# Patient Record
Sex: MALE | Race: WHITE | Employment: FULL TIME | URBAN - METROPOLITAN AREA
[De-identification: names, ages, dates, MRNs, and addresses within clinical notes are randomized per-mention and may not be internally consistent; named-entity substitution may affect disease eponyms.]

---

## 2017-02-14 PROBLEM — S83.241A TEAR OF MEDIAL MENISCUS OF RIGHT KNEE, CURRENT: Status: ACTIVE | Noted: 2017-02-14

## 2017-02-14 PROBLEM — M17.11 PRIMARY OSTEOARTHRITIS OF RIGHT KNEE: Status: ACTIVE | Noted: 2017-02-14

## 2018-03-27 ENCOUNTER — OFFICE VISIT (OUTPATIENT)
Dept: ORTHOPEDIC SURGERY | Age: 58
End: 2018-03-27
Payer: COMMERCIAL

## 2018-03-27 VITALS — HEIGHT: 76 IN | WEIGHT: 285 LBS | BODY MASS INDEX: 34.7 KG/M2

## 2018-03-27 DIAGNOSIS — M25.561 RIGHT KNEE PAIN, UNSPECIFIED CHRONICITY: Primary | ICD-10-CM

## 2018-03-27 DIAGNOSIS — M17.11 PRIMARY OSTEOARTHRITIS OF RIGHT KNEE: ICD-10-CM

## 2018-03-27 PROCEDURE — 99214 OFFICE O/P EST MOD 30 MIN: CPT | Performed by: ORTHOPAEDIC SURGERY

## 2018-03-27 PROCEDURE — 20610 DRAIN/INJ JOINT/BURSA W/O US: CPT | Performed by: ORTHOPAEDIC SURGERY

## 2018-03-27 RX ORDER — HYDROCODONE BITARTRATE AND ACETAMINOPHEN 7.5; 325 MG/1; MG/1
1 TABLET ORAL EVERY 6 HOURS PRN
COMMUNITY
End: 2018-04-24 | Stop reason: SDUPTHER

## 2018-03-27 RX ORDER — TRIAMCINOLONE ACETONIDE 40 MG/ML
40 INJECTION, SUSPENSION INTRA-ARTICULAR; INTRAMUSCULAR ONCE
Status: COMPLETED | OUTPATIENT
Start: 2018-03-27 | End: 2018-03-27

## 2018-03-27 RX ADMIN — TRIAMCINOLONE ACETONIDE 40 MG: 40 INJECTION, SUSPENSION INTRA-ARTICULAR; INTRAMUSCULAR at 11:10

## 2018-03-27 NOTE — PROGRESS NOTES
(-) nausea, (-) vomiting, (-) diarrhea, (-) blood in stool, (-) gastric ulcer. Psychiatric: (-) Depression, (-) Anxiety, (-) bipolar disease, (-) Alzheimer's Disease  Allergic/Immunologic: (-) allergies latex, (-) allergies metal, (-) skin sensitivity. Hematlogic: (-) anemia, (-) blood transfusion, (-) DVT/PE, (-) Clotting disorders      Subjective:    Constitution:    Ht. 6 ft 4 in. , Wt. 280 lbs. Psycihatric:    The patient is alert and oriented x 3, appears to be stated age and in no distress. Respiratory:    Respiratory effort is not labored. Patient is not gasping. Palpation of the chest reveals no tactile fremitus. Skin:    Upon inspection: the skin appears warm, dry and intact. There is  a previous scar over the affected area. There is any cellulitis, lymphedema or cutaneous lesions noted in the lower extremities. Upon palpation there is no induration noted. Neurologic:    Gait: antalgic; Motor exam of the lower extremities show ; quadriceps, hamstrings, foot dorsi and plantar flexors intact R.  5/5 and L. 5/5. Deep tendon reflexes are 2/4 at the knees and 2/4 at the ankles with strong extensor hallicus longus motor strength bilaterally. Sensory to both feet is intact to all sensory roots,. Cardiovascular: The vascular exam is normal and is well perfused to distal extremities. Distal pulses DP/PT: R. 2+; L. 2+. There is cap refill noted less than two seconds in all digits. There is not edema of the bilateral lower extremities. There is not varicosities noted in the distal extremities. Lymph:    Upon palpation,  there is no lymphadenopathy noted in bilateral lower extremities. Musculoskeletal:      Gait: antalgic; examination of the nails and digits reveal no cyanosis or clubbing. Lumbar exam:    On visual inspection, there is not deformity of the spine. full range of motion, no tenderness, palpable spasm or pain on motion.  Special tests: Straight Leg diagnosis. I explained treatment options including surgical vs non surgical treatment. I reviewed in detail the risks and benefits and outlined the procedure in detail with expected outcomes and possible complications. I also discussed non surgical treatment such as injections (CSI and visco supplementation), physical therapy, topical creams and NSAID's. They have elected for conservative management at this time. I explained to the patient that replacing his knee may or may not help with the ankle pain. I will proceed with a cortisone injection in the Right knee. Consent was obtained for the injections. The skin was prepped with alcohol. 1mL of Kenalog 40mg and 9mL of 0.25% Marcaine was  injected to Right knee. The injection was given through the lateral side of the knee. The patient tolerated the injection well.  I will see the patient back prn    I recommend that when he goes back to podiatrist I would like a copy of his XR of his ankle

## 2018-04-24 ENCOUNTER — OFFICE VISIT (OUTPATIENT)
Dept: ORTHOPEDIC SURGERY | Age: 58
End: 2018-04-24
Payer: COMMERCIAL

## 2018-04-24 VITALS — BODY MASS INDEX: 33.49 KG/M2 | HEIGHT: 76 IN | TEMPERATURE: 98 F | WEIGHT: 275 LBS

## 2018-04-24 DIAGNOSIS — M65.4 RADIAL STYLOID TENOSYNOVITIS: ICD-10-CM

## 2018-04-24 DIAGNOSIS — M93.1 KIENBOCK'S AVASCULAR NECROSIS OF LUNATE, ADULT: ICD-10-CM

## 2018-04-24 DIAGNOSIS — M65.4 RADIAL STYLOID TENOSYNOVITIS OF RIGHT HAND: ICD-10-CM

## 2018-04-24 PROCEDURE — 99213 OFFICE O/P EST LOW 20 MIN: CPT | Performed by: ORTHOPAEDIC SURGERY

## 2018-04-24 RX ORDER — CYCLOBENZAPRINE HCL 10 MG
TABLET ORAL
Qty: 60 TABLET | Refills: 0 | Status: SHIPPED | OUTPATIENT
Start: 2018-04-24 | End: 2018-06-13 | Stop reason: SDUPTHER

## 2018-04-24 RX ORDER — HYDROCODONE BITARTRATE AND ACETAMINOPHEN 7.5; 325 MG/1; MG/1
1 TABLET ORAL EVERY 6 HOURS PRN
Qty: 28 TABLET | Refills: 0 | Status: SHIPPED | OUTPATIENT
Start: 2018-04-24 | End: 2018-05-16 | Stop reason: SDUPTHER

## 2018-04-24 RX ORDER — CELECOXIB 200 MG/1
200 CAPSULE ORAL 2 TIMES DAILY
Qty: 60 CAPSULE | Refills: 3 | Status: SHIPPED | OUTPATIENT
Start: 2018-04-24 | End: 2018-07-24 | Stop reason: SDUPTHER

## 2018-05-16 ENCOUNTER — TELEPHONE (OUTPATIENT)
Dept: ORTHOPEDIC SURGERY | Age: 58
End: 2018-05-16

## 2018-05-16 DIAGNOSIS — M65.4 RADIAL STYLOID TENOSYNOVITIS: ICD-10-CM

## 2018-05-16 DIAGNOSIS — M65.4 RADIAL STYLOID TENOSYNOVITIS OF RIGHT HAND: ICD-10-CM

## 2018-05-16 DIAGNOSIS — M93.1 KIENBOCK'S AVASCULAR NECROSIS OF LUNATE, ADULT: ICD-10-CM

## 2018-05-16 RX ORDER — HYDROCODONE BITARTRATE AND ACETAMINOPHEN 7.5; 325 MG/1; MG/1
1 TABLET ORAL EVERY 8 HOURS PRN
Qty: 21 TABLET | Refills: 0 | Status: SHIPPED | OUTPATIENT
Start: 2018-05-16 | End: 2018-06-13 | Stop reason: SDUPTHER

## 2018-06-11 ENCOUNTER — TELEPHONE (OUTPATIENT)
Dept: ORTHOPEDIC SURGERY | Age: 58
End: 2018-06-11

## 2018-06-11 DIAGNOSIS — M93.1 KIENBOCK'S AVASCULAR NECROSIS OF LUNATE, ADULT: ICD-10-CM

## 2018-06-11 DIAGNOSIS — M65.4 RADIAL STYLOID TENOSYNOVITIS OF RIGHT HAND: ICD-10-CM

## 2018-06-11 DIAGNOSIS — M65.4 RADIAL STYLOID TENOSYNOVITIS: ICD-10-CM

## 2018-06-13 RX ORDER — CYCLOBENZAPRINE HCL 10 MG
TABLET ORAL
Qty: 60 TABLET | Refills: 0 | Status: SHIPPED | OUTPATIENT
Start: 2018-06-13 | End: 2018-07-24 | Stop reason: SDUPTHER

## 2018-06-13 RX ORDER — HYDROCODONE BITARTRATE AND ACETAMINOPHEN 7.5; 325 MG/1; MG/1
1 TABLET ORAL EVERY 6 HOURS PRN
Qty: 28 TABLET | Refills: 0 | Status: SHIPPED | OUTPATIENT
Start: 2018-06-13 | End: 2018-07-12 | Stop reason: SDUPTHER

## 2018-07-12 ENCOUNTER — TELEPHONE (OUTPATIENT)
Dept: ORTHOPEDIC SURGERY | Age: 58
End: 2018-07-12

## 2018-07-12 DIAGNOSIS — M65.4 RADIAL STYLOID TENOSYNOVITIS: ICD-10-CM

## 2018-07-12 DIAGNOSIS — M65.4 RADIAL STYLOID TENOSYNOVITIS OF RIGHT HAND: ICD-10-CM

## 2018-07-12 DIAGNOSIS — M93.1 KIENBOCK'S AVASCULAR NECROSIS OF LUNATE, ADULT: ICD-10-CM

## 2018-07-12 RX ORDER — HYDROCODONE BITARTRATE AND ACETAMINOPHEN 7.5; 325 MG/1; MG/1
1 TABLET ORAL EVERY 8 HOURS PRN
Qty: 21 TABLET | Refills: 0 | Status: SHIPPED | OUTPATIENT
Start: 2018-07-12 | End: 2018-07-24 | Stop reason: SDUPTHER

## 2018-07-24 ENCOUNTER — OFFICE VISIT (OUTPATIENT)
Dept: ORTHOPEDIC SURGERY | Age: 58
End: 2018-07-24
Payer: COMMERCIAL

## 2018-07-24 VITALS — TEMPERATURE: 96.4 F | HEIGHT: 76 IN | BODY MASS INDEX: 34.1 KG/M2 | WEIGHT: 280 LBS

## 2018-07-24 DIAGNOSIS — M93.1 KIENBOCK'S AVASCULAR NECROSIS OF LUNATE, ADULT: ICD-10-CM

## 2018-07-24 DIAGNOSIS — M65.4 RADIAL STYLOID TENOSYNOVITIS: ICD-10-CM

## 2018-07-24 DIAGNOSIS — M65.4 RADIAL STYLOID TENOSYNOVITIS OF RIGHT HAND: ICD-10-CM

## 2018-07-24 PROCEDURE — 99213 OFFICE O/P EST LOW 20 MIN: CPT | Performed by: ORTHOPAEDIC SURGERY

## 2018-07-24 RX ORDER — HYDROCODONE BITARTRATE AND ACETAMINOPHEN 7.5; 325 MG/1; MG/1
1 TABLET ORAL EVERY 8 HOURS PRN
Qty: 28 TABLET | Refills: 0 | Status: SHIPPED | OUTPATIENT
Start: 2018-07-24 | End: 2018-08-07

## 2018-07-24 RX ORDER — POLYETHYLENE GLYCOL 3350 17 G/17G
17 POWDER, FOR SOLUTION ORAL DAILY
Qty: 510 G | Refills: 3 | Status: SHIPPED | OUTPATIENT
Start: 2018-07-24 | End: 2018-08-23

## 2018-07-24 RX ORDER — CELECOXIB 200 MG/1
200 CAPSULE ORAL 2 TIMES DAILY
Qty: 60 CAPSULE | Refills: 3 | Status: SHIPPED | OUTPATIENT
Start: 2018-07-24 | End: 2018-10-15 | Stop reason: SDUPTHER

## 2018-07-24 RX ORDER — CYCLOBENZAPRINE HCL 10 MG
TABLET ORAL
Qty: 60 TABLET | Refills: 0 | Status: SHIPPED | OUTPATIENT
Start: 2018-07-24 | End: 2018-08-01 | Stop reason: ALTCHOICE

## 2018-07-24 NOTE — PROGRESS NOTES
no induration noted. Neurologic:    Gait: antalgic; Motor exam of the upper extremities show: The reflexes in biceps/triceps/brachioradialis are equal and symmetric. Sensory exam C5-T1 are normal bilaterally. Cardiovascular: The vascular exam is normal and is well perfused to distal extremities. There are 2+ radial pulses bilaterally, and motor and sensation is intact to median, ulnar, and radial, musclocutaneus, and axillary nerve distribution and grossly symmetric bilaterally. There is cap refill noted less than two seconds in all digits. There is not edema of the bilateral upper extremities. There is  varicosities noted in the distal extremities. Lymph:    Upon palpation,  there is no lymphadenopathy noted in bilateral upper extremities. Musculoskeletal:  Gait: antalgic; examination of the nails and digits reveal no cyanosis or clubbing. Cervical Exam:    On physical exam, Courtney Ramirez is well-developed, well-nourished, oriented to person, place and time. his gait is normal.  On evaluation of his cervical spine, he has full range of motion of the cervical spine without pain. There is no cervical tenderness to palpation. Shoulder Exam:    On evaluation of his bilaterally upper extremities, his bilateral shoulder has no deformity. There is not evidence of scapular dyskinesis. There is not muscle atrophy in shoulder girdle. The range of motion for the Right Shoulder is 160/45/T8 and for the Left shoulder is 160/45/T8. Right shoulder Motor strength is 5/5 in the supraspinatus, 5/5 internal rotation and 5/5 in external rotation, and Left shoulder motor strength 5/5 in supraspinatus, 5/5 in internal rotation, 5/5 in external rotation. Elbow exam:    Evaluation of the elbow, reveals no signs of swelling or deformity. ROM is 0-150. There is not instability with varus/valgus stresses. Motor strength is 5/5 with flexion/extension.      Wrist exam:       Inspection of

## 2018-07-28 ENCOUNTER — APPOINTMENT (OUTPATIENT)
Dept: ULTRASOUND IMAGING | Age: 58
End: 2018-07-28
Payer: COMMERCIAL

## 2018-07-28 ENCOUNTER — HOSPITAL ENCOUNTER (EMERGENCY)
Age: 58
Discharge: HOME | End: 2018-07-28
Attending: EMERGENCY MEDICINE
Payer: COMMERCIAL

## 2018-07-28 VITALS
BODY MASS INDEX: 34.1 KG/M2 | HEIGHT: 76 IN | TEMPERATURE: 98.3 F | OXYGEN SATURATION: 98 % | HEART RATE: 66 BPM | RESPIRATION RATE: 14 BRPM | SYSTOLIC BLOOD PRESSURE: 98 MMHG | DIASTOLIC BLOOD PRESSURE: 68 MMHG | WEIGHT: 280 LBS

## 2018-07-28 DIAGNOSIS — L03.115 CELLULITIS OF RIGHT LOWER EXTREMITY: Primary | ICD-10-CM

## 2018-07-28 PROCEDURE — 93971 EXTREMITY STUDY: CPT

## 2018-07-28 PROCEDURE — 99283 EMERGENCY DEPT VISIT LOW MDM: CPT

## 2018-07-28 RX ORDER — CEPHALEXIN 250 MG/1
500 CAPSULE ORAL 3 TIMES DAILY
Qty: 60 CAPSULE | Refills: 0 | Status: SHIPPED | OUTPATIENT
Start: 2018-07-28 | End: 2018-08-01

## 2018-07-28 ASSESSMENT — ENCOUNTER SYMPTOMS
VOMITING: 0
NAUSEA: 0
SHORTNESS OF BREATH: 0
RHINORRHEA: 0
SORE THROAT: 0
CONSTIPATION: 0
COLOR CHANGE: 1
COUGH: 0
PHOTOPHOBIA: 0
WHEEZING: 0
ABDOMINAL PAIN: 0
SINUS PRESSURE: 0
DIARRHEA: 0
SINUS PAIN: 0
BACK PAIN: 0

## 2018-07-28 NOTE — ED PROVIDER NOTES
Patient is a 59-year-old male who presents with a chief complaint of leg pain and swelling. Patient states that 2 days ago he had onset of right lower leg pain. He states that he woke up the next day and noticed some erythema of the anterior aspect of his distal right lower extremity. Today he woke up and he had increased swelling and erythema of the leg. He denies any numbness or tingling. Patient does have a history of multiple surgeries on his right foot, but no recent procedures. He did have a steroid injection in his right ankle recently. Patient denies any known trauma. No history of DVT/PE, no recent travel, no hemoptysis. The patient admits to subjective fever and chills. Denies any lightheadedness, dizziness, chest pain, shortness breath, abdominal pain, nausea, vomiting. The history is provided by the patient. No  was used. Review of Systems   Constitutional: Negative for chills, fatigue and fever. HENT: Negative for congestion, rhinorrhea, sinus pain, sinus pressure, sneezing and sore throat. Eyes: Negative for photophobia and visual disturbance. Respiratory: Negative for cough, shortness of breath and wheezing. Cardiovascular: Positive for leg swelling. Negative for chest pain and palpitations. Gastrointestinal: Negative for abdominal pain, constipation, diarrhea, nausea and vomiting. Genitourinary: Negative for dysuria, frequency and hematuria. Musculoskeletal: Negative for back pain, neck pain and neck stiffness. Skin: Positive for color change. Negative for rash and wound. Neurological: Negative for dizziness, light-headedness and headaches. Psychiatric/Behavioral: Negative for agitation, behavioral problems and confusion. Physical Exam   Constitutional: He is oriented to person, place, and time. He appears well-developed and well-nourished. No distress. HENT:   Head: Normocephalic and atraumatic.    Mouth/Throat: Oropharynx is clear and father; Asthma in his mother; Cancer in his mother; Emphysema in his mother; Heart Disease in his father. The patients home medications have been reviewed. Allergies: Daypro [oxaprozin]    -------------------------------------------------- RESULTS -------------------------------------------------  Labs:  No results found for this visit on 07/28/18. Radiology:  US DUP LOWER EXTREMITY RIGHT LAURITA   Final Result   No evidence of acute deep venous thrombosis.             ------------------------- NURSING NOTES AND VITALS REVIEWED ---------------------------  Date / Time Roomed:  7/28/2018  3:12 PM  ED Bed Assignment:  03/03    The nursing notes within the ED encounter and vital signs as below have been reviewed. BP 98/68   Pulse 66   Temp 98.3 °F (36.8 °C)   Resp 14   Ht 6' 4\" (1.93 m)   Wt 280 lb (127 kg)   SpO2 98%   BMI 34.08 kg/m²   Oxygen Saturation Interpretation: Normal      ------------------------------------------ PROGRESS NOTES ------------------------------------------  I have spoken with the patient and discussed todays results, in addition to providing specific details for the plan of care and counseling regarding the diagnosis and prognosis. Their questions are answered at this time and they are agreeable with the plan. I discussed at length with them reasons for immediate return here for re evaluation. They will followup with primary care by calling their office tomorrow. --------------------------------- ADDITIONAL PROVIDER NOTES ---------------------------------  At this time the patient is without objective evidence of an acute process requiring hospitalization or inpatient management. They have remained hemodynamically stable throughout their entire ED visit and are stable for discharge with outpatient follow-up. The plan has been discussed in detail and they are aware of the specific conditions for emergent return, as well as the importance of follow-up. MDM:  Patient presented with right leg swelling and erythema. He did not have any DVT present on US. Patient was given Keflex for cellulitis. He will return if he has worsening symptoms. He has no further questions at this time. Discharge Medication List as of 7/28/2018  6:12 PM      START taking these medications    Details   cephALEXin (KEFLEX) 250 MG capsule Take 2 capsules by mouth 3 times daily for 10 days, Disp-60 capsule, R-0Print             Diagnosis:  1. Cellulitis of right lower extremity        Disposition:  Patient's disposition: Discharge to home  Patient's condition is stable. ATTENDING PROVIDER ATTESTATION:     Eli Collado presented to the emergency department for evaluation of Leg Swelling (right lower leg pain with redness since thursday, fever/chills)    I have reviewed and discussed the case, including pertinent history (medical, surgical, family and social) and exam findings with the Resident and the Nurse assigned to Eli Collado. I have personally performed and/or participated in the history, exam, medical decision making, and procedures and agree with all pertinent clinical information. I have reviewed my findings and recommendations with Eli Collado and members of family present at the time of disposition. MDM: Supportive care, will obtain appropriate labs and imaging to assess patient's Leg Swelling (right lower leg pain with redness since thursday, fever/chills)       My findings/plan: There were no encounter diagnoses.   New Prescriptions    No medications on file     Rober Galindo [facility]   Resident  07/28/18 [id number]

## 2018-08-01 ENCOUNTER — APPOINTMENT (OUTPATIENT)
Dept: GENERAL RADIOLOGY | Age: 58
DRG: 603 | End: 2018-08-01
Payer: COMMERCIAL

## 2018-08-01 ENCOUNTER — HOSPITAL ENCOUNTER (INPATIENT)
Age: 58
LOS: 6 days | Discharge: HOME | DRG: 603 | End: 2018-08-07
Attending: EMERGENCY MEDICINE | Admitting: INTERNAL MEDICINE
Payer: COMMERCIAL

## 2018-08-01 ENCOUNTER — APPOINTMENT (OUTPATIENT)
Dept: ULTRASOUND IMAGING | Age: 58
DRG: 603 | End: 2018-08-01
Payer: COMMERCIAL

## 2018-08-01 DIAGNOSIS — L03.115 CELLULITIS OF RIGHT LOWER EXTREMITY: Primary | ICD-10-CM

## 2018-08-01 PROBLEM — L03.90 CELLULITIS: Status: ACTIVE | Noted: 2018-08-01

## 2018-08-01 LAB
ALBUMIN SERPL-MCNC: 3 G/DL (ref 3.5–5.2)
ALP BLD-CCNC: 120 U/L (ref 40–129)
ALT SERPL-CCNC: 49 U/L (ref 0–40)
ANION GAP SERPL CALCULATED.3IONS-SCNC: 9 MMOL/L (ref 7–16)
AST SERPL-CCNC: 32 U/L (ref 0–39)
BASOPHILS ABSOLUTE: 0.08 E9/L (ref 0–0.2)
BASOPHILS RELATIVE PERCENT: 0.6 % (ref 0–2)
BILIRUB SERPL-MCNC: 0.4 MG/DL (ref 0–1.2)
BUN BLDV-MCNC: 26 MG/DL (ref 6–20)
C-REACTIVE PROTEIN: 12.6 MG/DL (ref 0–0.4)
CALCIUM SERPL-MCNC: 8.7 MG/DL (ref 8.6–10.2)
CHLORIDE BLD-SCNC: 101 MMOL/L (ref 98–107)
CO2: 24 MMOL/L (ref 22–29)
CREAT SERPL-MCNC: 1 MG/DL (ref 0.7–1.2)
EOSINOPHILS ABSOLUTE: 0.06 E9/L (ref 0.05–0.5)
EOSINOPHILS RELATIVE PERCENT: 0.5 % (ref 0–6)
GFR AFRICAN AMERICAN: >60
GFR NON-AFRICAN AMERICAN: >60 ML/MIN/1.73
GLUCOSE BLD-MCNC: 88 MG/DL (ref 74–109)
HCT VFR BLD CALC: 41.8 % (ref 37–54)
HEMOGLOBIN: 14.1 G/DL (ref 12.5–16.5)
IMMATURE GRANULOCYTES #: 0.16 E9/L
IMMATURE GRANULOCYTES %: 1.2 % (ref 0–5)
LACTIC ACID: 1.4 MMOL/L (ref 0.5–2.2)
LYMPHOCYTES ABSOLUTE: 1.35 E9/L (ref 1.5–4)
LYMPHOCYTES RELATIVE PERCENT: 10.2 % (ref 20–42)
MCH RBC QN AUTO: 28.8 PG (ref 26–35)
MCHC RBC AUTO-ENTMCNC: 33.7 % (ref 32–34.5)
MCV RBC AUTO: 85.3 FL (ref 80–99.9)
MONOCYTES ABSOLUTE: 1.38 E9/L (ref 0.1–0.95)
MONOCYTES RELATIVE PERCENT: 10.5 % (ref 2–12)
NEUTROPHILS ABSOLUTE: 10.16 E9/L (ref 1.8–7.3)
NEUTROPHILS RELATIVE PERCENT: 77 % (ref 43–80)
PDW BLD-RTO: 13.3 FL (ref 11.5–15)
PLATELET # BLD: 272 E9/L (ref 130–450)
PMV BLD AUTO: 11.1 FL (ref 7–12)
POTASSIUM SERPL-SCNC: 4.6 MMOL/L (ref 3.5–5)
RBC # BLD: 4.9 E12/L (ref 3.8–5.8)
SEDIMENTATION RATE, ERYTHROCYTE: 45 MM/HR (ref 0–15)
SODIUM BLD-SCNC: 134 MMOL/L (ref 132–146)
TOTAL PROTEIN: 7.1 G/DL (ref 6.4–8.3)
WBC # BLD: 13.2 E9/L (ref 4.5–11.5)

## 2018-08-01 PROCEDURE — 71046 X-RAY EXAM CHEST 2 VIEWS: CPT

## 2018-08-01 PROCEDURE — 85025 COMPLETE CBC W/AUTO DIFF WBC: CPT

## 2018-08-01 PROCEDURE — 87040 BLOOD CULTURE FOR BACTERIA: CPT

## 2018-08-01 PROCEDURE — 99285 EMERGENCY DEPT VISIT HI MDM: CPT

## 2018-08-01 PROCEDURE — 86140 C-REACTIVE PROTEIN: CPT

## 2018-08-01 PROCEDURE — 6360000002 HC RX W HCPCS: Performed by: INTERNAL MEDICINE

## 2018-08-01 PROCEDURE — 1200000000 HC SEMI PRIVATE

## 2018-08-01 PROCEDURE — 85651 RBC SED RATE NONAUTOMATED: CPT

## 2018-08-01 PROCEDURE — 83605 ASSAY OF LACTIC ACID: CPT

## 2018-08-01 PROCEDURE — 87070 CULTURE OTHR SPECIMN AEROBIC: CPT

## 2018-08-01 PROCEDURE — 73590 X-RAY EXAM OF LOWER LEG: CPT

## 2018-08-01 PROCEDURE — 6360000002 HC RX W HCPCS: Performed by: NURSE PRACTITIONER

## 2018-08-01 PROCEDURE — 36415 COLL VENOUS BLD VENIPUNCTURE: CPT

## 2018-08-01 PROCEDURE — 80053 COMPREHEN METABOLIC PANEL: CPT

## 2018-08-01 PROCEDURE — 76881 US COMPL JOINT R-T W/IMG: CPT

## 2018-08-01 PROCEDURE — 2580000003 HC RX 258: Performed by: NURSE PRACTITIONER

## 2018-08-01 PROCEDURE — 6370000000 HC RX 637 (ALT 250 FOR IP): Performed by: INTERNAL MEDICINE

## 2018-08-01 PROCEDURE — 2580000003 HC RX 258: Performed by: INTERNAL MEDICINE

## 2018-08-01 PROCEDURE — 6370000000 HC RX 637 (ALT 250 FOR IP): Performed by: NURSE PRACTITIONER

## 2018-08-01 PROCEDURE — 96365 THER/PROPH/DIAG IV INF INIT: CPT

## 2018-08-01 RX ORDER — NICOTINE POLACRILEX 4 MG
15 LOZENGE BUCCAL PRN
Status: DISCONTINUED | OUTPATIENT
Start: 2018-08-01 | End: 2018-08-07 | Stop reason: HOSPADM

## 2018-08-01 RX ORDER — ONDANSETRON 2 MG/ML
4 INJECTION INTRAMUSCULAR; INTRAVENOUS EVERY 6 HOURS PRN
Status: DISCONTINUED | OUTPATIENT
Start: 2018-08-01 | End: 2018-08-01

## 2018-08-01 RX ORDER — SODIUM CHLORIDE 0.9 % (FLUSH) 0.9 %
10 SYRINGE (ML) INJECTION PRN
Status: DISCONTINUED | OUTPATIENT
Start: 2018-08-01 | End: 2018-08-07 | Stop reason: HOSPADM

## 2018-08-01 RX ORDER — HYDROCODONE BITARTRATE AND ACETAMINOPHEN 7.5; 325 MG/1; MG/1
1 TABLET ORAL EVERY 6 HOURS PRN
Status: DISCONTINUED | OUTPATIENT
Start: 2018-08-01 | End: 2018-08-07 | Stop reason: HOSPADM

## 2018-08-01 RX ORDER — 0.9 % SODIUM CHLORIDE 0.9 %
1000 INTRAVENOUS SOLUTION INTRAVENOUS ONCE
Status: COMPLETED | OUTPATIENT
Start: 2018-08-01 | End: 2018-08-01

## 2018-08-01 RX ORDER — SODIUM CHLORIDE 9 MG/ML
INJECTION, SOLUTION INTRAVENOUS CONTINUOUS
Status: DISCONTINUED | OUTPATIENT
Start: 2018-08-01 | End: 2018-08-03

## 2018-08-01 RX ORDER — SODIUM CHLORIDE 0.9 % (FLUSH) 0.9 %
10 SYRINGE (ML) INJECTION EVERY 12 HOURS SCHEDULED
Status: DISCONTINUED | OUTPATIENT
Start: 2018-08-01 | End: 2018-08-07 | Stop reason: HOSPADM

## 2018-08-01 RX ORDER — MAGNESIUM OXIDE 400 MG/1
400 TABLET ORAL DAILY
COMMUNITY
End: 2019-06-13

## 2018-08-01 RX ORDER — HYDROCODONE BITARTRATE AND ACETAMINOPHEN 7.5; 325 MG/1; MG/1
1 TABLET ORAL EVERY 8 HOURS PRN
Status: DISCONTINUED | OUTPATIENT
Start: 2018-08-01 | End: 2018-08-04

## 2018-08-01 RX ORDER — CLINDAMYCIN HYDROCHLORIDE 300 MG/1
300 CAPSULE ORAL 3 TIMES DAILY
COMMUNITY
Start: 2018-07-30 | End: 2018-08-08

## 2018-08-01 RX ORDER — CYCLOBENZAPRINE HCL 10 MG
10 TABLET ORAL NIGHTLY
Status: ON HOLD | COMMUNITY
End: 2018-08-07 | Stop reason: HOSPADM

## 2018-08-01 RX ORDER — TURMERIC ROOT EXTRACT 500 MG
500 TABLET ORAL DAILY
COMMUNITY

## 2018-08-01 RX ORDER — PANTOPRAZOLE SODIUM 40 MG/1
40 TABLET, DELAYED RELEASE ORAL
Status: DISCONTINUED | OUTPATIENT
Start: 2018-08-02 | End: 2018-08-07 | Stop reason: HOSPADM

## 2018-08-01 RX ORDER — DEXTROSE MONOHYDRATE 25 G/50ML
12.5 INJECTION, SOLUTION INTRAVENOUS PRN
Status: DISCONTINUED | OUTPATIENT
Start: 2018-08-01 | End: 2018-08-07 | Stop reason: HOSPADM

## 2018-08-01 RX ORDER — DILTIAZEM HYDROCHLORIDE 240 MG/1
240 CAPSULE, EXTENDED RELEASE ORAL DAILY
Status: DISCONTINUED | OUTPATIENT
Start: 2018-08-02 | End: 2018-08-02 | Stop reason: CLARIF

## 2018-08-01 RX ORDER — LOSARTAN POTASSIUM 50 MG/1
100 TABLET ORAL DAILY
Status: DISCONTINUED | OUTPATIENT
Start: 2018-08-02 | End: 2018-08-07 | Stop reason: HOSPADM

## 2018-08-01 RX ORDER — CYCLOBENZAPRINE HCL 10 MG
10 TABLET ORAL 2 TIMES DAILY PRN
COMMUNITY
End: 2018-09-04 | Stop reason: SDUPTHER

## 2018-08-01 RX ORDER — IRBESARTAN 150 MG/1
300 TABLET ORAL DAILY
Status: DISCONTINUED | OUTPATIENT
Start: 2018-08-02 | End: 2018-08-01 | Stop reason: CLARIF

## 2018-08-01 RX ORDER — DILTIAZEM HYDROCHLORIDE 240 MG/1
240 CAPSULE, EXTENDED RELEASE ORAL NIGHTLY
COMMUNITY
End: 2021-04-23

## 2018-08-01 RX ORDER — DEXTROSE MONOHYDRATE 50 MG/ML
100 INJECTION, SOLUTION INTRAVENOUS PRN
Status: DISCONTINUED | OUTPATIENT
Start: 2018-08-01 | End: 2018-08-07 | Stop reason: HOSPADM

## 2018-08-01 RX ORDER — CYCLOBENZAPRINE HCL 10 MG
10 TABLET ORAL NIGHTLY
Status: DISCONTINUED | OUTPATIENT
Start: 2018-08-01 | End: 2018-08-07 | Stop reason: HOSPADM

## 2018-08-01 RX ORDER — CYCLOBENZAPRINE HCL 10 MG
10 TABLET ORAL 2 TIMES DAILY PRN
Status: DISCONTINUED | OUTPATIENT
Start: 2018-08-01 | End: 2018-08-07 | Stop reason: HOSPADM

## 2018-08-01 RX ADMIN — PIPERACILLIN SODIUM AND TAZOBACTAM SODIUM 3.38 G: 3; .375 INJECTION, POWDER, LYOPHILIZED, FOR SOLUTION INTRAVENOUS at 22:46

## 2018-08-01 RX ADMIN — SODIUM CHLORIDE: 9 INJECTION, SOLUTION INTRAVENOUS at 17:33

## 2018-08-01 RX ADMIN — SODIUM CHLORIDE 1000 ML: 9 INJECTION, SOLUTION INTRAVENOUS at 14:03

## 2018-08-01 RX ADMIN — CYCLOBENZAPRINE HYDROCHLORIDE 10 MG: 10 TABLET, FILM COATED ORAL at 22:51

## 2018-08-01 RX ADMIN — PIPERACILLIN SODIUM,TAZOBACTAM SODIUM 3.38 G: 3; .375 INJECTION, POWDER, FOR SOLUTION INTRAVENOUS at 15:22

## 2018-08-01 RX ADMIN — HYDROCODONE BITARTRATE AND ACETAMINOPHEN 1 TABLET: 7.5; 325 TABLET ORAL at 14:02

## 2018-08-01 RX ADMIN — VANCOMYCIN HYDROCHLORIDE 2000 MG: 10 INJECTION, POWDER, LYOPHILIZED, FOR SOLUTION INTRAVENOUS at 16:48

## 2018-08-01 RX ADMIN — ENOXAPARIN SODIUM 40 MG: 40 INJECTION, SOLUTION INTRAVENOUS; SUBCUTANEOUS at 17:32

## 2018-08-01 ASSESSMENT — PAIN SCALES - GENERAL
PAINLEVEL_OUTOF10: 8
PAINLEVEL_OUTOF10: 0

## 2018-08-01 ASSESSMENT — PAIN DESCRIPTION - PAIN TYPE: TYPE: ACUTE PAIN

## 2018-08-01 ASSESSMENT — PAIN DESCRIPTION - LOCATION: LOCATION: FOOT;LEG

## 2018-08-01 ASSESSMENT — PAIN DESCRIPTION - ORIENTATION: ORIENTATION: RIGHT

## 2018-08-01 ASSESSMENT — PAIN DESCRIPTION - FREQUENCY: FREQUENCY: CONTINUOUS

## 2018-08-01 ASSESSMENT — PAIN DESCRIPTION - DESCRIPTORS: DESCRIPTORS: ACHING

## 2018-08-01 NOTE — ED PROVIDER NOTES
- 5.0 mmol/L    Chloride 101 98 - 107 mmol/L    CO2 24 22 - 29 mmol/L    Anion Gap 9 7 - 16 mmol/L    Glucose 88 74 - 109 mg/dL    BUN 26 (H) 6 - 20 mg/dL    CREATININE 1.0 0.7 - 1.2 mg/dL    GFR Non-African American >60 >=60 mL/min/1.73    GFR African American >60     Calcium 8.7 8.6 - 10.2 mg/dL    Total Protein 7.1 6.4 - 8.3 g/dL    Alb 3.0 (L) 3.5 - 5.2 g/dL    Total Bilirubin 0.4 0.0 - 1.2 mg/dL    Alkaline Phosphatase 120 40 - 129 U/L    ALT 49 (H) 0 - 40 U/L    AST 32 0 - 39 U/L   Lactic Acid, Plasma   Result Value Ref Range    Lactic Acid 1.4 0.5 - 2.2 mmol/L       Imaging: All Radiology results interpreted by Radiologist unless otherwise noted. XR TIBIA FIBULA RIGHT (2 VIEWS)   Final Result   1. No radiographic evidence of acute osteomyelitis. 2. Focal prominence of the posterior soft tissues at the level of the   distal tibial diaphysis. XR CHEST STANDARD (2 VW)   Final Result   No acute cardiopulmonary disease. ED Course / Medical Decision Making     Medications   HYDROcodone-acetaminophen (NORCO) 7.5-325 MG per tablet 1 tablet (1 tablet Oral Not Given 8/1/18 1403)   vancomycin 2 g in dextrose 5% 500 ml IVPB (not administered)   0.9 % sodium chloride bolus (1,000 mLs Intravenous New Bag 8/1/18 1403)   piperacillin-tazobactam (ZOSYN) 3.375 g in dextrose 5 % 50 mL IVPB (mini-bag) (3.375 g Intravenous New Bag 8/1/18 1522)     ED Course as of Aug 01 1558   Wed Aug 01, 2018   1506 ATTENDING PROVIDER ATTESTATION:     I have personally performed and/or participated in the history, exam, medical decision making, and procedures and agree with all pertinent clinical information unless otherwise noted. I have also reviewed and agree with the past medical, family and social history unless otherwise noted. My findings/plan: Patient complaining of infection to the right lower extremity.  Started several days ago with redness and developed some pain, he had some aches and generalized constitutional symptoms with this and then developed swelling to the right leg. No shortness of breath or chest pain. States that he had an ultrasound which showed no DVT. Has been on 2 different antibiotics including Keflex and clindamycin he continues to have worsening redness and swelling of the lower right leg down into the foot with blistering and fluid draining. On exam he has a diffusely erythematous, warm and tender lower extremity from the proximal right tibia down into the foot, the distal calf area is circumferentially erythematous with multiple fluid-filled blisters that are draining serosanguineous fluid. Heart rate regular, lungs are clear and equal. No abscesses. Has apparently failed outpatient attempts at therapy. [NC]   2801 3:47 PM  Case discussed with Dr. Saud Johnson, detailed overview given, he will admit the patient. [NC]      ED Course User Index  [NC] Curtis Fortune DO     Consults:   IP CONSULT TO INTERNAL MEDICINE    Procedures:   none    MDM:   Labs, wound and blood cultures were collected on the patient. He has been on PO Keflex and Clindamycin but has failed to improve with outpatient therapy. He was started on IVF and given IV antibiotics in the ER. He will be admitted for continued care and treatment. Patient is stable at this time- hospitalist has been notified and patient is ready for transport to the unit. Counseling: The emergency provider has spoken with the patient and spouse/SO and discussed todays results, in addition to providing specific details for the plan of care and counseling regarding the diagnosis and prognosis. Questions are answered at this time and they are agreeable with the plan. Assessment      1.  Cellulitis of right lower extremity      Plan   Admit to med/surg floor  Patient condition is stable    New Medications     New Prescriptions    No medications on file     Electronically signed by DINH Escamilla CNP   DD: 8/1/18  **This report was

## 2018-08-01 NOTE — PROGRESS NOTES
Pharmacy Consultation Note  (Antibiotic Dosing and Monitoring)    Initial consult date: 18  Consulting physician:Dr. Marcie Magdaleno  Drug(s): Vancomycin  Indication: R lower extremity cellulitis    Ht Readings from Last 1 Encounters:   18 6' 4\" (1.93 m)     Wt Readings from Last 1 Encounters:   18 280 lb (127 kg)       Age/  Gender IBW DW  Allergy Information   62 y.o. male  100 kg  Daypro [oxaprozin]                 Date  WBC BUN/CR Drug/Dose Time   Given Level(s)   (Time) Comments   18 13.2 26/1.0 Vancomycin 2000 mg x1 1648                                  Estimated Creatinine Clearance: 119 mL/min (based on SCr of 1 mg/dL).     No intake or output data in the 24 hours ending 18 1728    Temp max: Temp (24hrs), Av.5 °F (36.9 °C), Min:98.2 °F (36.8 °C), Max:98.8 °F (37.1 °C)      Cultures:  available culture and sensitivity results were reviewed in EPIC  · : Wound Culture  · : Blood Culture    Assessment:  · Consulted by Dr. Prince Valerio to dose/monitor vancomycin  · Goal trough level:  15-20 mcg/mL  · 57 YOM with cellulitis of R lower extremity   · Patient received Vancomycin 2000 mg x1 today at 1648  · On Vancomycin/Zosyn: day 1    Plan:  · Start Vancomycin 1750 mg IV Q12H tomorrow AM  · Will check trough at steady state  · Pharmacist will follow and monitor/adjust dosing as necessary      Aida Gracia PharmD 2018 5:28 PM

## 2018-08-01 NOTE — H&P
Name: Crissy Fuentes  : 1960  MRN: [id number]  Room:   DOS: 2018  PCP: Sarkis Walton MD  Admitting Physician: No admitting provider for patient encounter. Internal Medicine Resident History & Physical    Chief Complaint:  Right lower extremity cellulitis     History of Present Illness:    Patient is 62year old male who presented to the ED due to failure of antibiotic treatment for right lower extremity cellulitis. States he noticed fever/chills Friday night. On Saturday notice edema and erythema of right lower extremity in the area of the shin. Patient state he was started on antibiotics of keflex after coming to ED. He then visited podiatrist on Monday and was prescribed clindamycin. States however edema increased. Erythema increased. He does have pus like drainage from cellulitis main from posterior site. Patient admits to wearing brace to right lower extremity but no bites, trauma, etc to the right lower extremity. Labs:  Lab Results   Component Value Date    WBC 13.2 (H) 2018    HGB 14.1 2018    HCT 41.8 2018     2018     2018    K 4.6 2018     2018    CREATININE 1.0 2018    BUN 26 (H) 2018    CO2 24 2018    GLUCOSE 88 2018    ALT 49 (H) 2018    AST 32 2018     No results found for: CKTOTAL, CKMB, CKMBINDEX, TROPONINI  No results for input(s): BNP in the last 72 hours. Radiology:  Xr Chest Standard (2 Vw)    Result Date: 2018  Reading location: 200 Indication: Cough. Comparison: None available. Technique: Chest PA and lateral radiographs were obtained. Findings: The cardiomediastinal silhouette is normal in size and contours. Bilateral lungs and costophrenic angles are clear. There is no evidence of pneumothorax. No acute cardiopulmonary disease.      Xr Tibia Fibula Right (2 Views)    Result Date: 2018  Reading location:  200 Indication: Right leg cellulitis, evaluate hematochezia, melena, acholic stools  Genito-Urinary:  Dysuria, urgency, frequency, hematuria  Musculoskeletal:  Joint pain, joint stiffness, joint swelling, muscle pain  Neurology:  Headache, focal neurological deficits, weakness, numbness, paresthesia  Derm:  Rashes, ulcers, excoriations, bruising  Extremities:  Decreased ROM, peripheral edema, mottling    Past Medical Hx:  Past Medical History:   Diagnosis Date    GERD (gastroesophageal reflux disease)     Hyperlipidemia     Hypertension     Osteoarthritis        Past Surgical Hx:  Past Surgical History:   Procedure Laterality Date    FOOT SURGERY      OTHER SURGICAL HISTORY Right 01-25-13    right wrist 1st dorsal compartment release    SHOULDER ARTHROSCOPY      TONSILLECTOMY      VASECTOMY         Family Hx:  Family History   Problem Relation Age of Onset    Asthma Mother     Cancer Mother     Emphysema Mother     Heart Disease Father     Alzheimer's Disease Father         Home Medications:  Prior to Admission medications    Medication Sig Start Date End Date Taking?  Authorizing Provider   clindamycin (CLEOCIN) 300 MG capsule Take 300 mg by mouth 3 times daily 7/30/18 8/8/18 Yes Historical Provider, MD   cyclobenzaprine (FLEXERIL) 10 MG tablet Take 10 mg by mouth nightly   Yes Historical Provider, MD   cyclobenzaprine (FLEXERIL) 10 MG tablet Take 10 mg by mouth 2 times daily as needed for Muscle spasms   Yes Historical Provider, MD   Glucosamine-Chondroit-Vit C-Mn (GLUCOSAMINE 1500 COMPLEX PO) Take 1 tablet by mouth daily   Yes Historical Provider, MD   diltiazem (DILACOR XR) 240 MG extended release capsule Take 240 mg by mouth daily   Yes Historical Provider, MD   Turmeric 500 MG TABS Take 500 mg by mouth daily   Yes Historical Provider, MD   magnesium oxide (MAG-OX) 400 MG tablet Take 400 mg by mouth daily   Yes Historical Provider, MD   polyethylene glycol (MIRALAX) powder Take 17 g by mouth daily  Patient taking differently: Take 17 g by mouth daily as needed (Constipation)  7/24/18 8/23/18 Yes Izzy Ramos DO   celecoxib (CELEBREX) 200 MG capsule Take 1 capsule by mouth 2 times daily 7/24/18  Yes Izzy Ramos DO   HYDROcodone-acetaminophen (NORCO) 7.5-325 MG per tablet Take 1 tablet by mouth every 8 hours as needed for Pain for up to 14 days. .  Patient taking differently: Take 1 tablet by mouth 2 times daily as needed for Pain. . 7/24/18 8/7/18 Yes Izzy Ramos DO   omeprazole (PRILOSEC) 20 MG capsule Take 20 mg by mouth Daily  1/4/13  Yes Historical Provider, MD   irbesartan (AVAPRO) 300 MG tablet Take 300 mg by mouth daily. 12/18/12  Yes Historical Provider, MD       Allergies:  Daypro [oxaprozin]    Social Hx:  Social History     Social History    Marital status:      Spouse name: N/A    Number of children: N/A    Years of education: N/A     Occupational History    Not on file. Social History Main Topics    Smoking status: Never Smoker    Smokeless tobacco: Never Used    Alcohol use No    Drug use: No    Sexual activity: Not on file     Other Topics Concern    Not on file     Social History Narrative    No narrative on file       Vitals:  /76   Pulse 75   Temp 98.8 °F (37.1 °C) (Oral)   Resp 14   Ht 6' 4\" (1.93 m)   Wt 280 lb (127 kg)   SpO2 99%   BMI 34.08 kg/m²     Physical Exam:  General: Awake, alert, oriented to person, place, time, and purpose, appears stated age, cooperative, no acute distress   Head: Normocephalic, atraumatic  Eyes: Conjunctivae/corneas clear, Sclera non icteric  Mouth: Mucous membranes moist  Neck: No JVD, no adenopathy,  neck is supple, trachea is midline  Back: ROM normal  Lungs: Clear to auscultation bilaterally. No retractions or use of accessory muscles. No vocal fremitus. No rhonchi, crackle or rale.   Heart: Regular rate and regular rhythm, no murmur, normal S1, S2  Abdomen: Soft, non-tender; bowel sounds normal; no masses, no organomegaly  Extremities: right  lower

## 2018-08-02 LAB
ANION GAP SERPL CALCULATED.3IONS-SCNC: 10 MMOL/L (ref 7–16)
BUN BLDV-MCNC: 18 MG/DL (ref 6–20)
CALCIUM SERPL-MCNC: 8.4 MG/DL (ref 8.6–10.2)
CHLORIDE BLD-SCNC: 102 MMOL/L (ref 98–107)
CO2: 25 MMOL/L (ref 22–29)
CREAT SERPL-MCNC: 1 MG/DL (ref 0.7–1.2)
GFR AFRICAN AMERICAN: >60
GFR NON-AFRICAN AMERICAN: >60 ML/MIN/1.73
GLUCOSE BLD-MCNC: 97 MG/DL (ref 74–109)
HCT VFR BLD CALC: 37 % (ref 37–54)
HEMOGLOBIN: 12.6 G/DL (ref 12.5–16.5)
MAGNESIUM: 2 MG/DL (ref 1.6–2.6)
MCH RBC QN AUTO: 29 PG (ref 26–35)
MCHC RBC AUTO-ENTMCNC: 34.1 % (ref 32–34.5)
MCV RBC AUTO: 85.1 FL (ref 80–99.9)
PDW BLD-RTO: 13.2 FL (ref 11.5–15)
PHOSPHORUS: 3.7 MG/DL (ref 2.5–4.5)
PLATELET # BLD: 256 E9/L (ref 130–450)
PMV BLD AUTO: 10.9 FL (ref 7–12)
POTASSIUM SERPL-SCNC: 4.5 MMOL/L (ref 3.5–5)
RBC # BLD: 4.35 E12/L (ref 3.8–5.8)
SODIUM BLD-SCNC: 137 MMOL/L (ref 132–146)
TSH SERPL DL<=0.05 MIU/L-ACNC: 2.21 UIU/ML (ref 0.27–4.2)
WBC # BLD: 11.5 E9/L (ref 4.5–11.5)

## 2018-08-02 PROCEDURE — 84443 ASSAY THYROID STIM HORMONE: CPT

## 2018-08-02 PROCEDURE — 6360000002 HC RX W HCPCS: Performed by: INTERNAL MEDICINE

## 2018-08-02 PROCEDURE — 6370000000 HC RX 637 (ALT 250 FOR IP): Performed by: INTERNAL MEDICINE

## 2018-08-02 PROCEDURE — 83735 ASSAY OF MAGNESIUM: CPT

## 2018-08-02 PROCEDURE — 85027 COMPLETE CBC AUTOMATED: CPT

## 2018-08-02 PROCEDURE — 80048 BASIC METABOLIC PNL TOTAL CA: CPT

## 2018-08-02 PROCEDURE — 1200000000 HC SEMI PRIVATE

## 2018-08-02 PROCEDURE — 2580000003 HC RX 258: Performed by: INTERNAL MEDICINE

## 2018-08-02 PROCEDURE — 36415 COLL VENOUS BLD VENIPUNCTURE: CPT

## 2018-08-02 PROCEDURE — 6370000000 HC RX 637 (ALT 250 FOR IP): Performed by: NURSE PRACTITIONER

## 2018-08-02 PROCEDURE — 84100 ASSAY OF PHOSPHORUS: CPT

## 2018-08-02 RX ORDER — DILTIAZEM HYDROCHLORIDE 240 MG/1
240 CAPSULE, COATED, EXTENDED RELEASE ORAL DAILY
Status: DISCONTINUED | OUTPATIENT
Start: 2018-08-02 | End: 2018-08-07 | Stop reason: HOSPADM

## 2018-08-02 RX ORDER — DILTIAZEM HYDROCHLORIDE 240 MG/1
240 CAPSULE, EXTENDED RELEASE ORAL DAILY
Status: DISCONTINUED | OUTPATIENT
Start: 2018-08-02 | End: 2018-08-02 | Stop reason: CLARIF

## 2018-08-02 RX ADMIN — SODIUM CHLORIDE: 9 INJECTION, SOLUTION INTRAVENOUS at 09:06

## 2018-08-02 RX ADMIN — LOSARTAN POTASSIUM 100 MG: 50 TABLET, FILM COATED ORAL at 09:26

## 2018-08-02 RX ADMIN — VANCOMYCIN HYDROCHLORIDE 1750 MG: 10 INJECTION, POWDER, LYOPHILIZED, FOR SOLUTION INTRAVENOUS at 06:18

## 2018-08-02 RX ADMIN — VANCOMYCIN HYDROCHLORIDE 1750 MG: 10 INJECTION, POWDER, LYOPHILIZED, FOR SOLUTION INTRAVENOUS at 18:27

## 2018-08-02 RX ADMIN — Medication 10 ML: at 09:27

## 2018-08-02 RX ADMIN — PIPERACILLIN SODIUM AND TAZOBACTAM SODIUM 3.38 G: 3; .375 INJECTION, POWDER, LYOPHILIZED, FOR SOLUTION INTRAVENOUS at 23:19

## 2018-08-02 RX ADMIN — CYCLOBENZAPRINE HYDROCHLORIDE 10 MG: 10 TABLET, FILM COATED ORAL at 20:37

## 2018-08-02 RX ADMIN — HYDROCODONE BITARTRATE AND ACETAMINOPHEN 1 TABLET: 7.5; 325 TABLET ORAL at 00:31

## 2018-08-02 RX ADMIN — Medication 10 ML: at 20:40

## 2018-08-02 RX ADMIN — ENOXAPARIN SODIUM 40 MG: 40 INJECTION, SOLUTION INTRAVENOUS; SUBCUTANEOUS at 09:26

## 2018-08-02 RX ADMIN — HYDROCODONE BITARTRATE AND ACETAMINOPHEN 1 TABLET: 7.5; 325 TABLET ORAL at 20:37

## 2018-08-02 RX ADMIN — Medication 400 MG: at 09:26

## 2018-08-02 RX ADMIN — MAGNESIUM HYDROXIDE 30 ML: 400 SUSPENSION ORAL at 16:48

## 2018-08-02 RX ADMIN — DILTIAZEM HYDROCHLORIDE 240 MG: 240 CAPSULE, COATED, EXTENDED RELEASE ORAL at 11:52

## 2018-08-02 RX ADMIN — PANTOPRAZOLE SODIUM 40 MG: 40 TABLET, DELAYED RELEASE ORAL at 06:28

## 2018-08-02 RX ADMIN — PIPERACILLIN SODIUM AND TAZOBACTAM SODIUM 3.38 G: 3; .375 INJECTION, POWDER, LYOPHILIZED, FOR SOLUTION INTRAVENOUS at 09:05

## 2018-08-02 RX ADMIN — PIPERACILLIN SODIUM AND TAZOBACTAM SODIUM 3.38 G: 3; .375 INJECTION, POWDER, LYOPHILIZED, FOR SOLUTION INTRAVENOUS at 15:36

## 2018-08-02 ASSESSMENT — PAIN DESCRIPTION - PAIN TYPE
TYPE: ACUTE PAIN

## 2018-08-02 ASSESSMENT — PAIN DESCRIPTION - DESCRIPTORS
DESCRIPTORS: ACHING;DISCOMFORT;DULL
DESCRIPTORS: ACHING;DISCOMFORT;SORE
DESCRIPTORS: ACHING;DISCOMFORT
DESCRIPTORS: DISCOMFORT;SORE;DULL

## 2018-08-02 ASSESSMENT — PAIN SCALES - GENERAL
PAINLEVEL_OUTOF10: 3
PAINLEVEL_OUTOF10: 3
PAINLEVEL_OUTOF10: 2
PAINLEVEL_OUTOF10: 7
PAINLEVEL_OUTOF10: 3

## 2018-08-02 ASSESSMENT — PAIN DESCRIPTION - LOCATION
LOCATION: LEG

## 2018-08-02 ASSESSMENT — PAIN DESCRIPTION - ONSET
ONSET: ON-GOING

## 2018-08-02 ASSESSMENT — PAIN DESCRIPTION - FREQUENCY
FREQUENCY: CONTINUOUS
FREQUENCY: INTERMITTENT
FREQUENCY: CONTINUOUS
FREQUENCY: CONTINUOUS

## 2018-08-02 ASSESSMENT — PAIN DESCRIPTION - PROGRESSION
CLINICAL_PROGRESSION: GRADUALLY IMPROVING
CLINICAL_PROGRESSION: GRADUALLY IMPROVING

## 2018-08-02 ASSESSMENT — PAIN DESCRIPTION - ORIENTATION
ORIENTATION: RIGHT;LOWER
ORIENTATION: RIGHT
ORIENTATION: RIGHT
ORIENTATION: RIGHT;LOWER

## 2018-08-02 NOTE — PLAN OF CARE
Problem: Falls - Risk of:  Goal: Will remain free from falls  Will remain free from falls   Outcome: Ongoing      Problem: Skin Integrity - Impaired:  Goal: Skin will be intact without erythema or breakdown  Outcome: Ongoing      Problem: Tissue Perfusion - Peripheral, Altered:  Goal: Tissue perfusion - peripheral  Extent to which blood flows through the small vessels of the extremities  and maintains tissue function.      Outcome: Ongoing      Problem: Pain:  Goal: Pain level will decrease  Pain level will decrease   Outcome: Ongoing

## 2018-08-02 NOTE — PLAN OF CARE
Problem: Falls - Risk of:  Goal: Will remain free from falls  Will remain free from falls   Outcome: Met This Shift      Problem: Skin Integrity - Impaired:  Goal: Skin will be intact without erythema or breakdown  Outcome: Ongoing      Problem: Tissue Perfusion - Peripheral, Altered:  Goal: Tissue perfusion - peripheral  Extent to which blood flows through the small vessels of the extremities  and maintains tissue function.     Outcome: Ongoing

## 2018-08-02 NOTE — PROGRESS NOTES
Pharmacy Consultation Note  (Antibiotic Dosing and Monitoring)    Initial consult date: 18  Consulting physician:Dr. Matty Hoang  Drug(s): Vancomycin  Indication: R lower extremity cellulitis    Ht Readings from Last 1 Encounters:   18 6' 4\" (1.93 m)     Wt Readings from Last 1 Encounters:   18 280 lb (127 kg)       Age/  Gender IBW DW  Allergy Information   62 y.o. male  100 kg  Daypro [oxaprozin]                 Date  WBC BUN/CR Drug/Dose Time   Given Level(s)   (Time) Comments   18 13.2 26/1.0 Vancomycin 2000 mg x1 1648     18 11.5 18/1.0 Vancomycin 1750 mg IV Q12H 0618  <1800>                         Estimated Creatinine Clearance: 119 mL/min (based on SCr of 1 mg/dL).       Intake/Output Summary (Last 24 hours) at 18 1215  Last data filed at 18 7238   Gross per 24 hour   Intake              985 ml   Output                0 ml   Net              985 ml       Temp max: Temp (24hrs), Av.4 °F (37.4 °C), Min:98.2 °F (36.8 °C), Max:101.4 °F (38.6 °C)      Cultures:  available culture and sensitivity results were reviewed in EPIC  · : Wound Culture: GPCs  · : Blood Culture: sent    Assessment:  · Consulted by Dr. Ynes Katz to dose/monitor vancomycin  · Goal trough level:  15-20 mcg/mL  · 57 YOM with cellulitis of R lower extremity   · On Vancomycin/Zosyn: day 2    Plan:  · Continue Vancomycin 1750 mg IV Q12H  · Will check trough at steady state  · Pharmacist will follow and monitor/adjust dosing as necessary      Clive Miller PharmD 2018 12:15 PM

## 2018-08-03 LAB
ANION GAP SERPL CALCULATED.3IONS-SCNC: 9 MMOL/L (ref 7–16)
BUN BLDV-MCNC: 15 MG/DL (ref 6–20)
CALCIUM SERPL-MCNC: 8.4 MG/DL (ref 8.6–10.2)
CHLORIDE BLD-SCNC: 101 MMOL/L (ref 98–107)
CO2: 28 MMOL/L (ref 22–29)
CREAT SERPL-MCNC: 1.1 MG/DL (ref 0.7–1.2)
GFR AFRICAN AMERICAN: >60
GFR NON-AFRICAN AMERICAN: >60 ML/MIN/1.73
GLUCOSE BLD-MCNC: 113 MG/DL (ref 74–109)
HCT VFR BLD CALC: 35.5 % (ref 37–54)
HEMOGLOBIN: 11.8 G/DL (ref 12.5–16.5)
MCH RBC QN AUTO: 28.7 PG (ref 26–35)
MCHC RBC AUTO-ENTMCNC: 33.2 % (ref 32–34.5)
MCV RBC AUTO: 86.4 FL (ref 80–99.9)
ORGANISM: ABNORMAL
ORGANISM: ABNORMAL
PDW BLD-RTO: 13.2 FL (ref 11.5–15)
PLATELET # BLD: 263 E9/L (ref 130–450)
PMV BLD AUTO: 10.5 FL (ref 7–12)
POTASSIUM SERPL-SCNC: 4.6 MMOL/L (ref 3.5–5)
RBC # BLD: 4.11 E12/L (ref 3.8–5.8)
SODIUM BLD-SCNC: 138 MMOL/L (ref 132–146)
VANCOMYCIN TROUGH: 12.6 MCG/ML (ref 5–16)
WBC # BLD: 9.9 E9/L (ref 4.5–11.5)
WOUND/ABSCESS: ABNORMAL

## 2018-08-03 PROCEDURE — 80202 ASSAY OF VANCOMYCIN: CPT

## 2018-08-03 PROCEDURE — 85027 COMPLETE CBC AUTOMATED: CPT

## 2018-08-03 PROCEDURE — 1200000000 HC SEMI PRIVATE

## 2018-08-03 PROCEDURE — 6360000002 HC RX W HCPCS: Performed by: INTERNAL MEDICINE

## 2018-08-03 PROCEDURE — 6370000000 HC RX 637 (ALT 250 FOR IP): Performed by: NURSE PRACTITIONER

## 2018-08-03 PROCEDURE — 6370000000 HC RX 637 (ALT 250 FOR IP): Performed by: INTERNAL MEDICINE

## 2018-08-03 PROCEDURE — 80048 BASIC METABOLIC PNL TOTAL CA: CPT

## 2018-08-03 PROCEDURE — 36415 COLL VENOUS BLD VENIPUNCTURE: CPT

## 2018-08-03 PROCEDURE — 2580000003 HC RX 258: Performed by: INTERNAL MEDICINE

## 2018-08-03 RX ADMIN — LOSARTAN POTASSIUM 100 MG: 50 TABLET, FILM COATED ORAL at 10:50

## 2018-08-03 RX ADMIN — CYCLOBENZAPRINE HYDROCHLORIDE 10 MG: 10 TABLET, FILM COATED ORAL at 21:00

## 2018-08-03 RX ADMIN — PIPERACILLIN SODIUM AND TAZOBACTAM SODIUM 3.38 G: 3; .375 INJECTION, POWDER, LYOPHILIZED, FOR SOLUTION INTRAVENOUS at 06:42

## 2018-08-03 RX ADMIN — Medication 400 MG: at 10:50

## 2018-08-03 RX ADMIN — HYDROCODONE BITARTRATE AND ACETAMINOPHEN 1 TABLET: 7.5; 325 TABLET ORAL at 10:58

## 2018-08-03 RX ADMIN — VANCOMYCIN HYDROCHLORIDE 1750 MG: 10 INJECTION, POWDER, LYOPHILIZED, FOR SOLUTION INTRAVENOUS at 19:24

## 2018-08-03 RX ADMIN — HYDROCODONE BITARTRATE AND ACETAMINOPHEN 1 TABLET: 7.5; 325 TABLET ORAL at 19:35

## 2018-08-03 RX ADMIN — VANCOMYCIN HYDROCHLORIDE 1750 MG: 10 INJECTION, POWDER, LYOPHILIZED, FOR SOLUTION INTRAVENOUS at 06:11

## 2018-08-03 RX ADMIN — PIPERACILLIN SODIUM AND TAZOBACTAM SODIUM 3.38 G: 3; .375 INJECTION, POWDER, LYOPHILIZED, FOR SOLUTION INTRAVENOUS at 14:52

## 2018-08-03 RX ADMIN — DILTIAZEM HYDROCHLORIDE 240 MG: 240 CAPSULE, COATED, EXTENDED RELEASE ORAL at 10:50

## 2018-08-03 RX ADMIN — PANTOPRAZOLE SODIUM 40 MG: 40 TABLET, DELAYED RELEASE ORAL at 06:10

## 2018-08-03 RX ADMIN — SODIUM CHLORIDE: 9 INJECTION, SOLUTION INTRAVENOUS at 03:35

## 2018-08-03 RX ADMIN — ENOXAPARIN SODIUM 40 MG: 40 INJECTION, SOLUTION INTRAVENOUS; SUBCUTANEOUS at 10:50

## 2018-08-03 ASSESSMENT — PAIN SCALES - GENERAL
PAINLEVEL_OUTOF10: 3
PAINLEVEL_OUTOF10: 5
PAINLEVEL_OUTOF10: 7
PAINLEVEL_OUTOF10: 4
PAINLEVEL_OUTOF10: 0
PAINLEVEL_OUTOF10: 7
PAINLEVEL_OUTOF10: 0

## 2018-08-03 ASSESSMENT — PAIN DESCRIPTION - FREQUENCY: FREQUENCY: CONTINUOUS

## 2018-08-03 ASSESSMENT — PAIN DESCRIPTION - PAIN TYPE: TYPE: ACUTE PAIN

## 2018-08-03 ASSESSMENT — PAIN DESCRIPTION - ORIENTATION: ORIENTATION: RIGHT

## 2018-08-03 ASSESSMENT — PAIN DESCRIPTION - ONSET: ONSET: ON-GOING

## 2018-08-03 ASSESSMENT — PAIN DESCRIPTION - PROGRESSION: CLINICAL_PROGRESSION: GRADUALLY IMPROVING

## 2018-08-03 ASSESSMENT — PAIN DESCRIPTION - LOCATION: LOCATION: FOOT;LEG

## 2018-08-03 NOTE — PROGRESS NOTES
GLUCOSE  88  97  113*         Hepatic Function Panel:    Lab Results   Component Value Date    ALKPHOS 120 08/01/2018    ALT 49 08/01/2018    AST 32 08/01/2018    PROT 7.1 08/01/2018    BILITOT 0.4 08/01/2018    LABALBU 3.0 08/01/2018       Lab Results   Component Value Date    SEDRATE 45 (H) 08/01/2018       . Lab Results   Component Value Date    CRP 12.6 (H) 08/01/2018         Microbiology :  Recent Labs      08/01/18   1350   BC  24 Hours- no growth     Recent Labs      08/01/18   1350   BLOODCULT2  24 Hours- no growth     No results for input(s): Deanna Messenger in the last 72 hours. No results for input(s): CULTRESP in the last 72 hours. Recent Labs      08/01/18   1350   WNDABS  Rare growth  Rare growth       Radiology :  US EXTREMITY JOINT RIGHT NON VASC COMPLETE   Final Result   Subcutaneous edema within the right calf without a loculated   collection to suggest an abscess. XR TIBIA FIBULA RIGHT (2 VIEWS)   Final Result   1. No radiographic evidence of acute osteomyelitis. 2. Focal prominence of the posterior soft tissues at the level of the   distal tibial diaphysis. XR CHEST STANDARD (2 VW)   Final Result   No acute cardiopulmonary disease.                ASSESSMENT:   Rt. Leg cellulitis --- purulent   So far culture growing CoNS and Corynebacterium species (A)        PLAN:  Stop zosyn   Continue vancomycin       Trinh Morgan MD, FACP  8/3/2018  2:56 PM

## 2018-08-03 NOTE — CONSULTS
Consults     Infectious Disease Consult Note     Admit Date: 8/1/2018  1:00 PM    Chief complaint: rt. Leg cellulitis     Reason for Consult:   rt. Leg cellulitis    Requesting Physician:  Laura Veras DO      HISTORY OF PRESENT ILLNESS:  Patient is 62year old male who presented to the ED due to failure of antibiotic treatment for right lower extremity cellulitis. States he noticed fever/chills Friday night. On Saturday notice edema and erythema of right lower extremity in the area of the shin. Patient state he was started on antibiotics of keflex after coming to ED. He then visited podiatrist on Monday and was prescribed clindamycin. States however edema increased. Erythema increased. He does have pus like drainage from cellulitis main from posterior site. Patient admits to wearing brace to right lower extremity but no bites, trauma, etc to the right lower extremity. REVIEW OF SYSTEMS:    Constitutional:no Fever, chills or rigors. No unexplained weight loss. HEENT: no headache, dizziness or lightheadedness. No sore throat or runny nose. Respiratory: no cough, chest pain, shortness of breath or wheeze. Cardiovascular: no chest pain or palpitations  Gastrointestinal: no nausea, vomiting, diarrhea, constipation. No blood in stool. Genitourinary: no dysuria, hematuria, urgency, frequency or incontinence. Musculoskeletal: no joint pain anywhere in body, or bodyache. Neurologic: no h/o passing out, focal weakness or seizure. Skin: no h/o rashes or easy bruising. Hematologic: no gum bleeding or easy bruising. No hemoptysis. MEDICAL HISTORY  Past Medical History:   Diagnosis Date    GERD (gastroesophageal reflux disease)     Hyperlipidemia     Hypertension     Osteoarthritis         There is no immunization history on file for this patient.   Past Surgical History:   Procedure Laterality Date    FOOT SURGERY      OTHER SURGICAL HISTORY Right 01-25-13    right wrist 1st dorsal compartment release    SHOULDER ARTHROSCOPY      TONSILLECTOMY      VASECTOMY       FAMILY HISTORY  family history includes Alzheimer's Disease in his father; Asthma in his mother; Cancer in his mother; Emphysema in his mother; Heart Disease in his father. SOCIAL HISTORY  Social History     Social History    Marital status:      Spouse name: N/A    Number of children: N/A    Years of education: N/A     Occupational History    Not on file.      Social History Main Topics    Smoking status: Never Smoker    Smokeless tobacco: Never Used    Alcohol use No    Drug use: No    Sexual activity: Not on file     Other Topics Concern    Not on file     Social History Narrative    No narrative on file         Current Medications:     Scheduled Meds:   diltiazem  240 mg Oral Daily    sodium chloride flush  10 mL Intravenous 2 times per day    enoxaparin  40 mg Subcutaneous Daily    piperacillin-tazobactam  3.375 g Intravenous Q8H    pantoprazole  40 mg Oral QAM AC    losartan  100 mg Oral Daily    vancomycin  1,750 mg Intravenous Q12H    cyclobenzaprine  10 mg Oral Nightly    magnesium oxide  400 mg Oral Daily     Continuous Infusions:   dextrose      sodium chloride 75 mL/hr at 08/02/18 0906     PRN Meds:HYDROcodone-acetaminophen, sodium chloride flush, magnesium hydroxide, glucose, dextrose, glucagon (rDNA), dextrose, cyclobenzaprine, HYDROcodone-acetaminophen      PHYSICAL EXAM:  Vitals:    08/02/18 0430 08/02/18 0630 08/02/18 0822 08/02/18 2036   BP:  110/69 125/73 (!) 101/57   Pulse:  84 84 86   Resp:  16 18 16   Temp: 100.4 °F (38 °C) 99.9 °F (37.7 °C) 98.4 °F (36.9 °C) 98.9 °F (37.2 °C)   TempSrc: Oral Oral Oral Oral   SpO2:   98% 93%   Weight:       Height:           General Appearance:       Alert, cooperative, no distress, appears stated age        Heent:    Normocephalic, atraumatic,     PERRL, conjunctiva/corneas clear     no drainage or sinus tenderness      Neck:   Supple, symmetrical, trachea midline Back:     Symmetric, no CVA tenderness   Lungs:     Clear to auscultation bilaterally, respirations unlabored   Chest Wall:    No tenderness or deformity    Heart:    Regular rate and rhythm, S1 and S2 normal, no murmur, rub or   gallop   Abdomen:     Soft, non-tender, bowel sounds active all four quadrants         Extremities:   Rt. Leg redness and swelling , + warmth +purulent cellulitis    Pulses:   LE extremities   Skin:   Skin color, texture, turgor normal, no rashes or lesions   Lymph nodes:   Cervical, supraclavicular, and axillary nodes normal   Neurologic:   CNII-XII intact, no focal deficits    LINES : peripheral     BROWN: absent        LABS AND DATA REVIEW:     Recent Labs      08/01/18   1350  08/02/18   0504   WBC  13.2*  11.5   HGB  14.1  12.6   HCT  41.8  37.0   MCV  85.3  85.1   PLT  272  256     Recent Labs      08/01/18   1350  08/02/18   0504   NA  134  137   K  4.6  4.5   CL  101  102   CO2  24  25   BUN  26*  18   CREATININE  1.0  1.0   GFRAA  >60  >60   LABGLOM  >60  >60   GLUCOSE  88  97   PROT  7.1   --    LABALBU  3.0*   --    CALCIUM  8.7  8.4*   BILITOT  0.4   --    ALKPHOS  120   --    AST  32   --    ALT  49*   --        BLOOD CX #1  Recent Labs      08/01/18   1350   BC  24 Hours- no growth     BLOOD CX #2  Recent Labs      08/01/18   1350   BLOODCULT2  24 Hours- no growth       WOUND culture  Recent Labs      08/01/18   1350   WNDABS  Growth present, evaluating for:  Gram positive cocci         URINE CULTURE  No results for input(s): LABURIN in the last 72 hours. ASSESSMENT & PLAN  1. Cellulitis of right lower extremity: failed outpatient antibiotic treatment with clindamycin and keflex. Continue vancomycin   Stop zosyn   Follow cultures     Thank you for consult.       Gordon Echols MD, FACP  8/2/2018  10:01 PM

## 2018-08-03 NOTE — PROGRESS NOTES
[facility] Quality Flow/Interdisciplinary Rounds Progress Note        Quality Flow Rounds held on August 3, 2018    Disciplines Attending:  Bedside Nurse, ,  and Nursing Unit Leadership    Gerson Torres was admitted on 8/1/2018  1:00 PM    Anticipated Discharge Date:  Expected Discharge Date: 08/04/18    Disposition:    Floyd Score:  Floyd Scale Score: 21    Readmission Risk              Risk of Unplanned Readmission:        9             Discussed patient goal for the day, patient clinical progression, and barriers to discharge.   The following Goal(s) of the Day/Commitment(s) have been identified:  Continue to monitor      Speedyndhemanth Eusebia  August 3, 2018

## 2018-08-04 LAB
ANION GAP SERPL CALCULATED.3IONS-SCNC: 8 MMOL/L (ref 7–16)
BUN BLDV-MCNC: 13 MG/DL (ref 6–20)
CALCIUM SERPL-MCNC: 8.7 MG/DL (ref 8.6–10.2)
CHLORIDE BLD-SCNC: 104 MMOL/L (ref 98–107)
CO2: 28 MMOL/L (ref 22–29)
CREAT SERPL-MCNC: 1 MG/DL (ref 0.7–1.2)
GFR AFRICAN AMERICAN: >60
GFR NON-AFRICAN AMERICAN: >60 ML/MIN/1.73
GLUCOSE BLD-MCNC: 113 MG/DL (ref 74–109)
HCT VFR BLD CALC: 36 % (ref 37–54)
HEMOGLOBIN: 11.9 G/DL (ref 12.5–16.5)
MCH RBC QN AUTO: 28.5 PG (ref 26–35)
MCHC RBC AUTO-ENTMCNC: 33.1 % (ref 32–34.5)
MCV RBC AUTO: 86.3 FL (ref 80–99.9)
PDW BLD-RTO: 12.9 FL (ref 11.5–15)
PLATELET # BLD: 292 E9/L (ref 130–450)
PMV BLD AUTO: 10.3 FL (ref 7–12)
POTASSIUM SERPL-SCNC: 4.9 MMOL/L (ref 3.5–5)
RBC # BLD: 4.17 E12/L (ref 3.8–5.8)
SODIUM BLD-SCNC: 140 MMOL/L (ref 132–146)
WBC # BLD: 9.8 E9/L (ref 4.5–11.5)

## 2018-08-04 PROCEDURE — 80048 BASIC METABOLIC PNL TOTAL CA: CPT

## 2018-08-04 PROCEDURE — 6370000000 HC RX 637 (ALT 250 FOR IP): Performed by: INTERNAL MEDICINE

## 2018-08-04 PROCEDURE — 85027 COMPLETE CBC AUTOMATED: CPT

## 2018-08-04 PROCEDURE — 6370000000 HC RX 637 (ALT 250 FOR IP): Performed by: NURSE PRACTITIONER

## 2018-08-04 PROCEDURE — 2580000003 HC RX 258: Performed by: INTERNAL MEDICINE

## 2018-08-04 PROCEDURE — 36415 COLL VENOUS BLD VENIPUNCTURE: CPT

## 2018-08-04 PROCEDURE — 6360000002 HC RX W HCPCS: Performed by: INTERNAL MEDICINE

## 2018-08-04 PROCEDURE — 1200000000 HC SEMI PRIVATE

## 2018-08-04 RX ADMIN — Medication 10 ML: at 20:38

## 2018-08-04 RX ADMIN — Medication 400 MG: at 08:11

## 2018-08-04 RX ADMIN — HYDROCODONE BITARTRATE AND ACETAMINOPHEN 1 TABLET: 7.5; 325 TABLET ORAL at 08:11

## 2018-08-04 RX ADMIN — VANCOMYCIN HYDROCHLORIDE 1750 MG: 10 INJECTION, POWDER, LYOPHILIZED, FOR SOLUTION INTRAVENOUS at 17:54

## 2018-08-04 RX ADMIN — ENOXAPARIN SODIUM 40 MG: 40 INJECTION, SOLUTION INTRAVENOUS; SUBCUTANEOUS at 08:11

## 2018-08-04 RX ADMIN — PANTOPRAZOLE SODIUM 40 MG: 40 TABLET, DELAYED RELEASE ORAL at 06:36

## 2018-08-04 RX ADMIN — HYDROCODONE BITARTRATE AND ACETAMINOPHEN 1 TABLET: 7.5; 325 TABLET ORAL at 19:40

## 2018-08-04 RX ADMIN — Medication 10 ML: at 17:55

## 2018-08-04 RX ADMIN — Medication 10 ML: at 08:12

## 2018-08-04 RX ADMIN — DILTIAZEM HYDROCHLORIDE 240 MG: 240 CAPSULE, COATED, EXTENDED RELEASE ORAL at 08:12

## 2018-08-04 RX ADMIN — LOSARTAN POTASSIUM 100 MG: 50 TABLET, FILM COATED ORAL at 08:11

## 2018-08-04 RX ADMIN — Medication 10 ML: at 17:54

## 2018-08-04 RX ADMIN — CYCLOBENZAPRINE HYDROCHLORIDE 10 MG: 10 TABLET, FILM COATED ORAL at 20:37

## 2018-08-04 RX ADMIN — VANCOMYCIN HYDROCHLORIDE 1750 MG: 10 INJECTION, POWDER, LYOPHILIZED, FOR SOLUTION INTRAVENOUS at 06:03

## 2018-08-04 ASSESSMENT — PAIN DESCRIPTION - PAIN TYPE
TYPE: ACUTE PAIN
TYPE: ACUTE PAIN

## 2018-08-04 ASSESSMENT — PAIN DESCRIPTION - FREQUENCY
FREQUENCY: CONTINUOUS
FREQUENCY: CONTINUOUS

## 2018-08-04 ASSESSMENT — PAIN SCALES - GENERAL
PAINLEVEL_OUTOF10: 7
PAINLEVEL_OUTOF10: 5
PAINLEVEL_OUTOF10: 4
PAINLEVEL_OUTOF10: 7
PAINLEVEL_OUTOF10: 0
PAINLEVEL_OUTOF10: 6

## 2018-08-04 ASSESSMENT — PAIN DESCRIPTION - PROGRESSION
CLINICAL_PROGRESSION: NOT CHANGED
CLINICAL_PROGRESSION: NOT CHANGED

## 2018-08-04 ASSESSMENT — PAIN DESCRIPTION - ORIENTATION
ORIENTATION: RIGHT;LOWER
ORIENTATION: RIGHT

## 2018-08-04 ASSESSMENT — PAIN DESCRIPTION - DESCRIPTORS
DESCRIPTORS: ACHING;DISCOMFORT;SORE;TENDER
DESCRIPTORS: ACHING;DISCOMFORT;SORE;TENDER

## 2018-08-04 ASSESSMENT — PAIN DESCRIPTION - ONSET
ONSET: ON-GOING
ONSET: ON-GOING

## 2018-08-04 ASSESSMENT — PAIN DESCRIPTION - LOCATION
LOCATION: LEG
LOCATION: LEG

## 2018-08-04 NOTE — PROGRESS NOTES
Pharmacy Consultation Note  (Antibiotic Dosing and Monitoring)    Initial consult date: 18  Consulting physician:Dr. Mejia  Drug(s): Vancomycin  Indication: R lower extremity cellulitis    Ht Readings from Last 1 Encounters:   18 6' 4\" (1.93 m)     Wt Readings from Last 1 Encounters:   18 280 lb (127 kg)       Age/  Gender IBW DW  Allergy Information   62 y.o. male  100 kg  Daypro [oxaprozin]                 Date  WBC BUN/CR Drug/Dose Time   Given Level(s)   (Time) Comments   18 13.2 26/1.0 Vancomycin 2000 mg x1 1648     18 11.5 18/1.0 Vancomycin 1750 mg IV Q12H 0618  1820     8/3/18 9.9 15/1.1 Vancomycin 1750 mg IV Q12H 0611  1924   12.6 mcg/ml @1756    18 9.8 13/1.0 Vancomycin 1750 mg IV Q12H 0603  1754       Estimated Creatinine Clearance: 119 mL/min (based on SCr of 1 mg/dL).       Intake/Output Summary (Last 24 hours) at 18 1900  Last data filed at 18 1428   Gross per 24 hour   Intake              850 ml   Output             1100 ml   Net             -250 ml       Temp max: Temp (24hrs), Av.8 °F (37.1 °C), Min:98.4 °F (36.9 °C), Max:99.2 °F (37.3 °C)      Cultures:  available culture and sensitivity results were reviewed in EPIC  · : Wound Culture: Coag Negative staph, corynebacterium  · : Blood Culture: NGTD    Assessment:  · Consulted by Dr. Suyapa Dias to dose/monitor vancomycin  · Goal trough level:  10-20 mcg/mL  · 57 YOM with cellulitis of R lower extremity   · On Vancomycin: Day 4, Zosyn stopped by ID  · Vancomycin trough obtained this afternoon was 12.6 mcg/ml    Plan:  · Continue Vancomycin 1750 mg IV Q12H  · Will follow ID plan (probable home with oral ATB)  · Pharmacist will follow and monitor/adjust dosing as necessary      Jarrod Dominguez PharmD 2018 7:00 PM

## 2018-08-05 ENCOUNTER — APPOINTMENT (OUTPATIENT)
Dept: ULTRASOUND IMAGING | Age: 58
DRG: 603 | End: 2018-08-05
Payer: COMMERCIAL

## 2018-08-05 LAB
ANION GAP SERPL CALCULATED.3IONS-SCNC: 8 MMOL/L (ref 7–16)
BUN BLDV-MCNC: 12 MG/DL (ref 6–20)
CALCIUM SERPL-MCNC: 8.7 MG/DL (ref 8.6–10.2)
CHLORIDE BLD-SCNC: 101 MMOL/L (ref 98–107)
CO2: 29 MMOL/L (ref 22–29)
CREAT SERPL-MCNC: 1 MG/DL (ref 0.7–1.2)
GFR AFRICAN AMERICAN: >60
GFR NON-AFRICAN AMERICAN: >60 ML/MIN/1.73
GLUCOSE BLD-MCNC: 100 MG/DL (ref 74–109)
HCT VFR BLD CALC: 36.8 % (ref 37–54)
HEMOGLOBIN: 12.2 G/DL (ref 12.5–16.5)
MCH RBC QN AUTO: 28.5 PG (ref 26–35)
MCHC RBC AUTO-ENTMCNC: 33.2 % (ref 32–34.5)
MCV RBC AUTO: 86 FL (ref 80–99.9)
PDW BLD-RTO: 12.7 FL (ref 11.5–15)
PLATELET # BLD: 318 E9/L (ref 130–450)
PMV BLD AUTO: 10.2 FL (ref 7–12)
POTASSIUM SERPL-SCNC: 4.7 MMOL/L (ref 3.5–5)
RBC # BLD: 4.28 E12/L (ref 3.8–5.8)
SODIUM BLD-SCNC: 138 MMOL/L (ref 132–146)
WBC # BLD: 10.7 E9/L (ref 4.5–11.5)

## 2018-08-05 PROCEDURE — 2580000003 HC RX 258: Performed by: INTERNAL MEDICINE

## 2018-08-05 PROCEDURE — 85027 COMPLETE CBC AUTOMATED: CPT

## 2018-08-05 PROCEDURE — 6370000000 HC RX 637 (ALT 250 FOR IP): Performed by: NURSE PRACTITIONER

## 2018-08-05 PROCEDURE — 6360000002 HC RX W HCPCS: Performed by: INTERNAL MEDICINE

## 2018-08-05 PROCEDURE — 36415 COLL VENOUS BLD VENIPUNCTURE: CPT

## 2018-08-05 PROCEDURE — 1200000000 HC SEMI PRIVATE

## 2018-08-05 PROCEDURE — 80048 BASIC METABOLIC PNL TOTAL CA: CPT

## 2018-08-05 PROCEDURE — 93970 EXTREMITY STUDY: CPT

## 2018-08-05 PROCEDURE — 6370000000 HC RX 637 (ALT 250 FOR IP): Performed by: INTERNAL MEDICINE

## 2018-08-05 RX ORDER — FUROSEMIDE 10 MG/ML
20 INJECTION INTRAMUSCULAR; INTRAVENOUS ONCE
Status: COMPLETED | OUTPATIENT
Start: 2018-08-05 | End: 2018-08-05

## 2018-08-05 RX ADMIN — VANCOMYCIN HYDROCHLORIDE 1750 MG: 10 INJECTION, POWDER, LYOPHILIZED, FOR SOLUTION INTRAVENOUS at 06:27

## 2018-08-05 RX ADMIN — HYDROCODONE BITARTRATE AND ACETAMINOPHEN 1 TABLET: 7.5; 325 TABLET ORAL at 07:58

## 2018-08-05 RX ADMIN — PANTOPRAZOLE SODIUM 40 MG: 40 TABLET, DELAYED RELEASE ORAL at 06:26

## 2018-08-05 RX ADMIN — Medication 400 MG: at 09:57

## 2018-08-05 RX ADMIN — DILTIAZEM HYDROCHLORIDE 240 MG: 240 CAPSULE, COATED, EXTENDED RELEASE ORAL at 09:57

## 2018-08-05 RX ADMIN — Medication 10 ML: at 13:47

## 2018-08-05 RX ADMIN — VANCOMYCIN HYDROCHLORIDE 1750 MG: 10 INJECTION, POWDER, LYOPHILIZED, FOR SOLUTION INTRAVENOUS at 18:20

## 2018-08-05 RX ADMIN — CYCLOBENZAPRINE HYDROCHLORIDE 10 MG: 10 TABLET, FILM COATED ORAL at 20:53

## 2018-08-05 RX ADMIN — Medication 10 ML: at 20:50

## 2018-08-05 RX ADMIN — LOSARTAN POTASSIUM 100 MG: 50 TABLET, FILM COATED ORAL at 09:57

## 2018-08-05 RX ADMIN — Medication 10 ML: at 09:58

## 2018-08-05 RX ADMIN — HYDROCODONE BITARTRATE AND ACETAMINOPHEN 1 TABLET: 7.5; 325 TABLET ORAL at 20:53

## 2018-08-05 RX ADMIN — FUROSEMIDE 20 MG: 10 INJECTION, SOLUTION INTRAMUSCULAR; INTRAVENOUS at 13:47

## 2018-08-05 RX ADMIN — ENOXAPARIN SODIUM 40 MG: 40 INJECTION, SOLUTION INTRAVENOUS; SUBCUTANEOUS at 09:57

## 2018-08-05 RX ADMIN — HYDROCODONE BITARTRATE AND ACETAMINOPHEN 1 TABLET: 7.5; 325 TABLET ORAL at 16:06

## 2018-08-05 ASSESSMENT — PAIN DESCRIPTION - PROGRESSION: CLINICAL_PROGRESSION: NOT CHANGED

## 2018-08-05 ASSESSMENT — PAIN DESCRIPTION - ORIENTATION
ORIENTATION: RIGHT

## 2018-08-05 ASSESSMENT — PAIN SCALES - GENERAL
PAINLEVEL_OUTOF10: 2
PAINLEVEL_OUTOF10: 0
PAINLEVEL_OUTOF10: 3
PAINLEVEL_OUTOF10: 9
PAINLEVEL_OUTOF10: 6
PAINLEVEL_OUTOF10: 8
PAINLEVEL_OUTOF10: 8

## 2018-08-05 ASSESSMENT — PAIN DESCRIPTION - DESCRIPTORS
DESCRIPTORS: ACHING;DISCOMFORT;SORE;TENDER
DESCRIPTORS: ACHING;THROBBING
DESCRIPTORS: ACHING;DISCOMFORT;NAGGING

## 2018-08-05 ASSESSMENT — PAIN DESCRIPTION - PAIN TYPE
TYPE: ACUTE PAIN

## 2018-08-05 ASSESSMENT — PAIN DESCRIPTION - FREQUENCY: FREQUENCY: CONTINUOUS

## 2018-08-05 ASSESSMENT — PAIN DESCRIPTION - ONSET: ONSET: ON-GOING

## 2018-08-05 ASSESSMENT — PAIN DESCRIPTION - LOCATION
LOCATION: LEG

## 2018-08-05 NOTE — PROGRESS NOTES
Internal Medicine Progress Note      Hasmukh Willis. Victoria Heads., & [address] [facility] [address] Victoria Garcia., F.A.C.O.I. Indu Goldberg D.O. , F.A.C.O.I. Primary Care Physician: Silva Fuchs MD   Admitting Physician:  Laura Veras DO  Admission date and time: 8/1/2018  1:00 PM    Room:  [address] [place] [address]  Admitting diagnosis: Cellulitis [L03.90]    Patient Name: Shayna Hartman  MRN: [id number]    Date of Service: 8/5/2018     Subjective:    Rebecca Kauffman is a 62 y. o.  male who was seen and examined today,8/5/2018, at the bedside. Patient is complaining of increased edema of the right lower extremity today. States the right lower extremity has also been seeping a lot today. States he has been elevating his legs. No family members were present during my examination. Questions, answers, and tests reviewed. ROS:  Cardiovascular:   Denies any chest pain, irregular heartbeats, or palpitations. Respiratory:   Denies shortness of breath, coughing, sputum production, hemoptysis, or wheezing. Gastrointestinal:   Denies nausea, vomiting, diarrhea, or constipation. Denies any abdominal pain. Extremities:   Cellulitic pain appears is well controlled at this point. See subjective. Neurology:    Denies any headache or focal neurological deficits. No weakness or paresthesia. Derm:    Denies any rashes. Denies bruising. Cellulitis of the right lower leg. Genitourinary:    Denies any urgency, frequency, hematuria. Voiding without difficulty. Physical Exam:  Vital signs: Blood pressure 135/69, pulse 79, temperature 98.6 °F (37 °C), temperature source Oral, resp. rate 16, height 6' 4\" (1.93 m), weight 280 lb (127 kg), SpO2 98 %. HEENT:  PERRLA. EOMI. Sclera clear. Buccal mucosa moist. Eyeglasses on    Neck:  Supple. Trachea midline. No thyromegaly. No JVD. No bruits. Heart:  Rhythm regular, rate controlled. No murmurs. Lungs:  Symmetrical. Clear to auscultation bilaterally. No wheezes.  No extremity cellulitis with culture positive for coagulase negative       staphylococci and corynebacterium species   2. Essential hypertension  3. Hyperlipidemia  4. Gastroesophageal reflux disease  5. Leukocytosis secondary to #1  6. Chronic foot drop right foot. Plan:   The infectious disease team is providing consultation and antibiotics are at their discretion. Await sensitivities. Wayne Ackerman is also being followed by the wound care team. Will obtain US of the lower extremities due to increased swelling. Lasix times one. Monitor lower leg edema. Increase activity as tolerated. Patient's medications were reviewed/continued/adjusted. Labs as ordered. Please see orders for further plan of care. More than 50% of my  time was spent counseling and/or coordination of care with the patient and/or family with face to face contact. Time was spent reviewing notes and laboratory data, instructing and counseling the patient  regarding my findings and recommendations and reviewing and answer questions. Juanjose Rene D.O., F.A.C.O.I.  8/5/2018  12:39 PM

## 2018-08-05 NOTE — PROGRESS NOTES
Pharmacy Consultation Note  (Antibiotic Dosing and Monitoring)    Initial consult date: 18  Consulting physician:Dr. Cortland Seip  Drug(s): Vancomycin  Indication: R lower extremity cellulitis    Ht Readings from Last 1 Encounters:   18 6' 4\" (1.93 m)     Wt Readings from Last 1 Encounters:   18 280 lb (127 kg)       Age/  Gender IBW DW  Allergy Information   62 y.o. male  100 kg  Daypro [oxaprozin]                 Date  WBC BUN/CR Drug/Dose Time   Given Level(s)   (Time) Comments   18 13.2 26/1.0 Vancomycin 2000 mg x1 1648     18 11.5 18/1.0 Vancomycin 1750 mg IV Q12H 0618  1820     8/3/18 9.9 15/1.1 Vancomycin 1750 mg IV Q12H 0611  1924   12.6 mcg/ml @1756    18 9.8 13/1.0 Vancomycin 1750 mg IV Q12H 0603  1754     18 10.7 12/1.0 Vancomycin 1750 mg IV Q12H 0627  <1800>                         Estimated Creatinine Clearance: 119 mL/min (based on SCr of 1 mg/dL).       Intake/Output Summary (Last 24 hours) at 18 1335  Last data filed at 18 3810   Gross per 24 hour   Intake              960 ml   Output              975 ml   Net              -15 ml       Temp max: Temp (24hrs), Av.9 °F (37.2 °C), Min:98.6 °F (37 °C), Max:99.2 °F (37.3 °C)      Cultures:  available culture and sensitivity results were reviewed in EPIC  · : Wound Culture: Coag Negative staph, corynebacterium  · : Blood Culture: NGTD    Assessment:  · Consulted by Dr. Kika Valiente to dose/monitor vancomycin  · Goal trough level:  10-20 mcg/mL  · 57 YOM with cellulitis of R lower extremity   · On Vancomycin: Day 4, Zosyn stopped by ID  · Vancomycin trough obtained this afternoon was 12.6 mcg/ml    Plan:  · Continue Vancomycin 1750 mg IV Q12H  · Will follow ID plan (probable home with oral ATB)  · Pharmacist will follow and monitor/adjust dosing as necessary      Dora Rodgers PharmD 2018 1:35 PM

## 2018-08-06 LAB
ANION GAP SERPL CALCULATED.3IONS-SCNC: 8 MMOL/L (ref 7–16)
BLOOD CULTURE, ROUTINE: NORMAL
BUN BLDV-MCNC: 14 MG/DL (ref 6–20)
CALCIUM SERPL-MCNC: 8.6 MG/DL (ref 8.6–10.2)
CHLORIDE BLD-SCNC: 101 MMOL/L (ref 98–107)
CO2: 29 MMOL/L (ref 22–29)
CREAT SERPL-MCNC: 1 MG/DL (ref 0.7–1.2)
CULTURE, BLOOD 2: NORMAL
GFR AFRICAN AMERICAN: >60
GFR NON-AFRICAN AMERICAN: >60 ML/MIN/1.73
GLUCOSE BLD-MCNC: 109 MG/DL (ref 74–109)
HCT VFR BLD CALC: 38.5 % (ref 37–54)
HEMOGLOBIN: 12.8 G/DL (ref 12.5–16.5)
MCH RBC QN AUTO: 28.8 PG (ref 26–35)
MCHC RBC AUTO-ENTMCNC: 33.2 % (ref 32–34.5)
MCV RBC AUTO: 86.5 FL (ref 80–99.9)
PDW BLD-RTO: 12.5 FL (ref 11.5–15)
PLATELET # BLD: 341 E9/L (ref 130–450)
PMV BLD AUTO: 10.1 FL (ref 7–12)
POTASSIUM SERPL-SCNC: 4.7 MMOL/L (ref 3.5–5)
RBC # BLD: 4.45 E12/L (ref 3.8–5.8)
SODIUM BLD-SCNC: 138 MMOL/L (ref 132–146)
WBC # BLD: 10.8 E9/L (ref 4.5–11.5)

## 2018-08-06 PROCEDURE — 6370000000 HC RX 637 (ALT 250 FOR IP): Performed by: INTERNAL MEDICINE

## 2018-08-06 PROCEDURE — 2580000003 HC RX 258: Performed by: INTERNAL MEDICINE

## 2018-08-06 PROCEDURE — 85027 COMPLETE CBC AUTOMATED: CPT

## 2018-08-06 PROCEDURE — 36415 COLL VENOUS BLD VENIPUNCTURE: CPT

## 2018-08-06 PROCEDURE — 1200000000 HC SEMI PRIVATE

## 2018-08-06 PROCEDURE — 6360000002 HC RX W HCPCS: Performed by: INTERNAL MEDICINE

## 2018-08-06 PROCEDURE — 80048 BASIC METABOLIC PNL TOTAL CA: CPT

## 2018-08-06 PROCEDURE — 6370000000 HC RX 637 (ALT 250 FOR IP): Performed by: NURSE PRACTITIONER

## 2018-08-06 RX ADMIN — HYDROCODONE BITARTRATE AND ACETAMINOPHEN 1 TABLET: 7.5; 325 TABLET ORAL at 07:49

## 2018-08-06 RX ADMIN — Medication 10 ML: at 20:33

## 2018-08-06 RX ADMIN — ENOXAPARIN SODIUM 40 MG: 40 INJECTION, SOLUTION INTRAVENOUS; SUBCUTANEOUS at 09:10

## 2018-08-06 RX ADMIN — DILTIAZEM HYDROCHLORIDE 240 MG: 240 CAPSULE, COATED, EXTENDED RELEASE ORAL at 09:10

## 2018-08-06 RX ADMIN — VANCOMYCIN HYDROCHLORIDE 1750 MG: 10 INJECTION, POWDER, LYOPHILIZED, FOR SOLUTION INTRAVENOUS at 18:10

## 2018-08-06 RX ADMIN — PANTOPRAZOLE SODIUM 40 MG: 40 TABLET, DELAYED RELEASE ORAL at 06:57

## 2018-08-06 RX ADMIN — Medication 400 MG: at 09:10

## 2018-08-06 RX ADMIN — VANCOMYCIN HYDROCHLORIDE 1750 MG: 10 INJECTION, POWDER, LYOPHILIZED, FOR SOLUTION INTRAVENOUS at 05:02

## 2018-08-06 RX ADMIN — LOSARTAN POTASSIUM 100 MG: 50 TABLET, FILM COATED ORAL at 09:10

## 2018-08-06 RX ADMIN — CYCLOBENZAPRINE HYDROCHLORIDE 10 MG: 10 TABLET, FILM COATED ORAL at 20:32

## 2018-08-06 RX ADMIN — HYDROCODONE BITARTRATE AND ACETAMINOPHEN 1 TABLET: 7.5; 325 TABLET ORAL at 18:13

## 2018-08-06 ASSESSMENT — PAIN DESCRIPTION - ORIENTATION: ORIENTATION: RIGHT

## 2018-08-06 ASSESSMENT — PAIN DESCRIPTION - PAIN TYPE: TYPE: ACUTE PAIN

## 2018-08-06 ASSESSMENT — PAIN DESCRIPTION - ONSET: ONSET: ON-GOING

## 2018-08-06 ASSESSMENT — PAIN DESCRIPTION - LOCATION: LOCATION: LEG

## 2018-08-06 ASSESSMENT — PAIN SCALES - GENERAL
PAINLEVEL_OUTOF10: 5
PAINLEVEL_OUTOF10: 3
PAINLEVEL_OUTOF10: 8
PAINLEVEL_OUTOF10: 9

## 2018-08-06 ASSESSMENT — PAIN DESCRIPTION - FREQUENCY: FREQUENCY: CONTINUOUS

## 2018-08-06 ASSESSMENT — PAIN DESCRIPTION - PROGRESSION: CLINICAL_PROGRESSION: NOT CHANGED

## 2018-08-06 ASSESSMENT — PAIN DESCRIPTION - DESCRIPTORS: DESCRIPTORS: ACHING;DISCOMFORT

## 2018-08-06 NOTE — PROGRESS NOTES
[address] [facility]  NUPUR  Progress Note    Chief complain:  Rt. Leg cellulitis     SUBJECTIVE:    Patient is awake, alert , tolerating antibiotics well   swelling better    ROS:  Constitutional:  denies fever , chills   Cardiovascular: denies chest pain or palpitation   Gastrointestinal: . Denies abdominal pain, diarrhea, no nausea or vomiting  Genitourinary:      Denies  dysuria or burning micturition  Musculoskeletal: no aches or pain   Allergic/Immunologic:  No rash or itching     OBJECTIVE:    Vitals:    08/05/18 0758 08/05/18 1915 08/05/18 2330 08/06/18 0751   BP:  104/62 (!) 101/59 133/76   Pulse:  80 80 75   Resp:  16 16 18   Temp:  98.8 °F (37.1 °C) 98.1 °F (36.7 °C) 98.4 °F (36.9 °C)   TempSrc:  Oral  Oral   SpO2: 98% 98% 94% 95%   Weight:       Height:           HEENT :         unremarkable   Heart:             RRR,  No murmurs, no gallops  Lungs:            clear to auscultation, no wheezing , no rales  Abdomen:       soft, non tender, bowel sounds present  Extremites:     Significant redness and swelling of rt.  Leg with blister no pain  Skin:                Normal turgor,normal texture  Lines :             Peripheral              Covarrubias catheter :   Absent                        diltiazem  240 mg Oral Daily    sodium chloride flush  10 mL Intravenous 2 times per day    enoxaparin  40 mg Subcutaneous Daily    pantoprazole  40 mg Oral QAM AC    losartan  100 mg Oral Daily    vancomycin  1,750 mg Intravenous Q12H    cyclobenzaprine  10 mg Oral Nightly    magnesium oxide  400 mg Oral Daily       LABS    CBC:   Recent Labs      08/04/18 0454  08/05/18 0428  08/06/18   0437   WBC  9.8  10.7  10.8   HGB  11.9*  12.2*  12.8   HCT  36.0*  36.8*  38.5   PLT  292  318  341       BMP:    Recent Labs      08/04/18 0454  08/05/18 0428  08/06/18   0437   NA  140  138  138   K  4.9  4.7  4.7   CL  104  101  101   CO2  28  29  29   BUN  13  12  14   CREATININE  1.0  1.0 1.0   GLUCOSE  113*  100  109         Hepatic Function Panel:    Lab Results   Component Value Date    ALKPHOS 120 08/01/2018    ALT 49 08/01/2018    AST 32 08/01/2018    PROT 7.1 08/01/2018    BILITOT 0.4 08/01/2018    LABALBU 3.0 08/01/2018       Lab Results   Component Value Date    SEDRATE 45 (H) 08/01/2018       . Lab Results   Component Value Date    CRP 12.6 (H) 08/01/2018         Microbiology :   8/1  Radiology :  US DUP LOWER EXTREMITIES BILATERAL VENOUS   Final Result   1. No evidence of acute deep venous thrombosis. 2. 5.6 x 2.7 x 4.0 cm left popliteal fossa cyst      US EXTREMITY JOINT RIGHT NON VASC COMPLETE   Final Result   Subcutaneous edema within the right calf without a loculated   collection to suggest an abscess. XR TIBIA FIBULA RIGHT (2 VIEWS)   Final Result   1. No radiographic evidence of acute osteomyelitis. 2. Focal prominence of the posterior soft tissues at the level of the   distal tibial diaphysis. XR CHEST STANDARD (2 VW)   Final Result   No acute cardiopulmonary disease.            ASSESSMENT:   Rt. Leg cellulitis --- purulent complicated  So far culture growing CoNS and Corynebacterium species (A)      PLAN:    Continue vancomycin   Anticipate discharge with oral ATB in day or two -prob linezolid   vanco trough --- 12.6       Armani Toribio MD,  8/6/2018  8:54 AM

## 2018-08-06 NOTE — PROGRESS NOTES
2. Essential hypertension  3. Hyperlipidemia  4. Gastroesophageal reflux disease  5. Leukocytosis secondary to #1  6. Chronic foot drop right foot. Plan:   The infectious disease team is providing consultation and antibiotics are at their discretion. Await  discharge medication recommendations(antibiotics). Romain Vasques is also being followed by the wound care team. Leg edema much improved status post her therapy. Ultrasound was negative for DVT. Encouraged patient to elevate the right lower extremity throughout the day. Anticipate discharge home soon.  for discharge planning. Increase activity as tolerated. Patient's medications were reviewed/continued/adjusted. Labs as ordered. Please see orders for further plan of care. More than 50% of my  time was spent counseling and/or coordination of care with the patient and/or family with face to face contact. Time was spent reviewing notes and laboratory data, instructing and counseling the patient  regarding my findings and recommendations and reviewing and answer questions. Quentin Rene D.O., TOBIAS.PAN.O.I.  8/6/2018  1:26 PM

## 2018-08-07 VITALS
DIASTOLIC BLOOD PRESSURE: 62 MMHG | RESPIRATION RATE: 16 BRPM | BODY MASS INDEX: 34.1 KG/M2 | HEART RATE: 75 BPM | WEIGHT: 280 LBS | TEMPERATURE: 98.8 F | HEIGHT: 76 IN | OXYGEN SATURATION: 98 % | SYSTOLIC BLOOD PRESSURE: 115 MMHG

## 2018-08-07 LAB
ANION GAP SERPL CALCULATED.3IONS-SCNC: 7 MMOL/L (ref 7–16)
BUN BLDV-MCNC: 11 MG/DL (ref 6–20)
CALCIUM SERPL-MCNC: 8.5 MG/DL (ref 8.6–10.2)
CHLORIDE BLD-SCNC: 102 MMOL/L (ref 98–107)
CO2: 29 MMOL/L (ref 22–29)
CREAT SERPL-MCNC: 0.9 MG/DL (ref 0.7–1.2)
GFR AFRICAN AMERICAN: >60
GFR NON-AFRICAN AMERICAN: >60 ML/MIN/1.73
GLUCOSE BLD-MCNC: 109 MG/DL (ref 74–109)
HCT VFR BLD CALC: 36.8 % (ref 37–54)
HEMOGLOBIN: 12.2 G/DL (ref 12.5–16.5)
MCH RBC QN AUTO: 28.6 PG (ref 26–35)
MCHC RBC AUTO-ENTMCNC: 33.2 % (ref 32–34.5)
MCV RBC AUTO: 86.4 FL (ref 80–99.9)
PDW BLD-RTO: 12.6 FL (ref 11.5–15)
PLATELET # BLD: 330 E9/L (ref 130–450)
PMV BLD AUTO: 10.4 FL (ref 7–12)
POTASSIUM SERPL-SCNC: 4.6 MMOL/L (ref 3.5–5)
RBC # BLD: 4.26 E12/L (ref 3.8–5.8)
SODIUM BLD-SCNC: 138 MMOL/L (ref 132–146)
WBC # BLD: 8.8 E9/L (ref 4.5–11.5)

## 2018-08-07 PROCEDURE — 80048 BASIC METABOLIC PNL TOTAL CA: CPT

## 2018-08-07 PROCEDURE — 6370000000 HC RX 637 (ALT 250 FOR IP): Performed by: INTERNAL MEDICINE

## 2018-08-07 PROCEDURE — 36415 COLL VENOUS BLD VENIPUNCTURE: CPT

## 2018-08-07 PROCEDURE — 6370000000 HC RX 637 (ALT 250 FOR IP): Performed by: NURSE PRACTITIONER

## 2018-08-07 PROCEDURE — 85027 COMPLETE CBC AUTOMATED: CPT

## 2018-08-07 PROCEDURE — 2580000003 HC RX 258: Performed by: INTERNAL MEDICINE

## 2018-08-07 PROCEDURE — 6370000000 HC RX 637 (ALT 250 FOR IP): Performed by: SPECIALIST

## 2018-08-07 PROCEDURE — 6360000002 HC RX W HCPCS: Performed by: INTERNAL MEDICINE

## 2018-08-07 RX ORDER — LINEZOLID 600 MG/1
600 TABLET, FILM COATED ORAL EVERY 12 HOURS SCHEDULED
Qty: 28 TABLET | Refills: 0 | Status: SHIPPED | OUTPATIENT
Start: 2018-08-07 | End: 2018-08-07

## 2018-08-07 RX ORDER — LINEZOLID 600 MG/1
600 TABLET, FILM COATED ORAL EVERY 12 HOURS SCHEDULED
Qty: 28 TABLET | Refills: 0 | Status: SHIPPED | OUTPATIENT
Start: 2018-08-07 | End: 2018-08-21

## 2018-08-07 RX ORDER — HYDROCHLOROTHIAZIDE 25 MG/1
25 TABLET ORAL DAILY
Qty: 30 TABLET | Refills: 0 | Status: SHIPPED | OUTPATIENT
Start: 2018-08-07 | End: 2019-06-13 | Stop reason: ALTCHOICE

## 2018-08-07 RX ORDER — LINEZOLID 600 MG/1
600 TABLET, FILM COATED ORAL EVERY 12 HOURS SCHEDULED
Status: DISCONTINUED | OUTPATIENT
Start: 2018-08-07 | End: 2018-08-07 | Stop reason: HOSPADM

## 2018-08-07 RX ADMIN — DILTIAZEM HYDROCHLORIDE 240 MG: 240 CAPSULE, COATED, EXTENDED RELEASE ORAL at 09:38

## 2018-08-07 RX ADMIN — LINEZOLID 600 MG: 600 TABLET, FILM COATED ORAL at 09:38

## 2018-08-07 RX ADMIN — Medication 400 MG: at 09:38

## 2018-08-07 RX ADMIN — PANTOPRAZOLE SODIUM 40 MG: 40 TABLET, DELAYED RELEASE ORAL at 05:36

## 2018-08-07 RX ADMIN — VANCOMYCIN HYDROCHLORIDE 1750 MG: 10 INJECTION, POWDER, LYOPHILIZED, FOR SOLUTION INTRAVENOUS at 05:36

## 2018-08-07 RX ADMIN — Medication 10 ML: at 09:00

## 2018-08-07 RX ADMIN — ENOXAPARIN SODIUM 40 MG: 40 INJECTION, SOLUTION INTRAVENOUS; SUBCUTANEOUS at 09:38

## 2018-08-07 RX ADMIN — LOSARTAN POTASSIUM 100 MG: 50 TABLET, FILM COATED ORAL at 09:38

## 2018-08-07 RX ADMIN — HYDROCODONE BITARTRATE AND ACETAMINOPHEN 1 TABLET: 7.5; 325 TABLET ORAL at 05:39

## 2018-08-07 ASSESSMENT — PAIN DESCRIPTION - ORIENTATION: ORIENTATION: RIGHT;LOWER

## 2018-08-07 ASSESSMENT — PAIN DESCRIPTION - FREQUENCY: FREQUENCY: CONTINUOUS

## 2018-08-07 ASSESSMENT — PAIN SCALES - GENERAL
PAINLEVEL_OUTOF10: 2
PAINLEVEL_OUTOF10: 6
PAINLEVEL_OUTOF10: 0

## 2018-08-07 ASSESSMENT — PAIN DESCRIPTION - LOCATION: LOCATION: LEG

## 2018-08-07 ASSESSMENT — PAIN DESCRIPTION - PAIN TYPE: TYPE: ACUTE PAIN

## 2018-08-07 ASSESSMENT — PAIN DESCRIPTION - DESCRIPTORS: DESCRIPTORS: ACHING;DISCOMFORT;SORE

## 2018-08-07 ASSESSMENT — PAIN DESCRIPTION - ONSET: ONSET: ON-GOING

## 2018-08-07 NOTE — PLAN OF CARE
Problem: Falls - Risk of:  Goal: Will remain free from falls  Will remain free from falls   Outcome: Met This Shift      Problem: Pain:  Goal: Pain level will decrease  Pain level will decrease   Outcome: Met This Shift    Goal: Control of acute pain  Control of acute pain   Outcome: Met This Shift

## 2018-08-07 NOTE — PROGRESS NOTES
[address] [place] [address] [facility]  [facility]  Progress Note    Chief complain:  Rt. Leg cellulitis     SUBJECTIVE:    Patient is awake, alert , tolerating antibiotics well   swelling better min pain     ROS:  Constitutional:  denies fever , chills   Cardiovascular: denies chest pain or palpitation   Gastrointestinal: . Denies abdominal pain, diarrhea, no nausea or vomiting  Genitourinary:      Denies  dysuria or burning micturition  Musculoskeletal: no aches or pain   Allergic/Immunologic:  No rash or itching     OBJECTIVE:    Vitals:    08/05/18 2330 08/06/18 0751 08/06/18 1906 08/07/18 0000   BP: (!) 101/59 133/76 125/65    Pulse: 80 75 74    Resp: 16 18 16    Temp: 98.1 °F (36.7 °C) 98.4 °F (36.9 °C) 99.3 °F (37.4 °C)    TempSrc:  Oral Oral    SpO2: 94% 95% 98% 98%   Weight:       Height:           HEENT :         unremarkable   Heart:             RRR,  No murmurs, no gallops  Lungs:            clear to auscultation, no wheezing , no rales  Abdomen:       soft, non tender, bowel sounds present  Extremites:       redness and swelling of rt.  Leg with blister no pain dec  Skin:                Normal turgor,normal texture  Lines :             Peripheral              Covarrubias catheter :   Absent                        linezolid  600 mg Oral 2 times per day    diltiazem  240 mg Oral Daily    sodium chloride flush  10 mL Intravenous 2 times per day    enoxaparin  40 mg Subcutaneous Daily    pantoprazole  40 mg Oral QAM AC    losartan  100 mg Oral Daily    cyclobenzaprine  10 mg Oral Nightly    magnesium oxide  400 mg Oral Daily       LABS    CBC:   Recent Labs      08/05/18 0428 08/06/18 0437  08/07/18   0453   WBC  10.7  10.8  8.8   HGB  12.2*  12.8  12.2*   HCT  36.8*  38.5  36.8*   PLT  318  341  330       BMP:    Recent Labs      08/05/18 0428 08/06/18   0437  08/07/18   0453   NA  138  138  138   K  4.7  4.7  4.6   CL  101  101  102   CO2  29  29  29   BUN  12  14  11   CREATININE  1.0 1.0  0.9   GLUCOSE  100  109  109         Hepatic Function Panel:    Lab Results   Component Value Date    ALKPHOS 120 08/01/2018    ALT 49 08/01/2018    AST 32 08/01/2018    PROT 7.1 08/01/2018    BILITOT 0.4 08/01/2018    LABALBU 3.0 08/01/2018       Lab Results   Component Value Date    SEDRATE 45 (H) 08/01/2018       . Lab Results   Component Value Date    CRP 12.6 (H) 08/01/2018         Microbiology :   8/1  Radiology :  US DUP LOWER EXTREMITIES BILATERAL VENOUS   Final Result   1. No evidence of acute deep venous thrombosis. 2. 5.6 x 2.7 x 4.0 cm left popliteal fossa cyst      US EXTREMITY JOINT RIGHT NON VASC COMPLETE   Final Result   Subcutaneous edema within the right calf without a loculated   collection to suggest an abscess. XR TIBIA FIBULA RIGHT (2 VIEWS)   Final Result   1. No radiographic evidence of acute osteomyelitis. 2. Focal prominence of the posterior soft tissues at the level of the   distal tibial diaphysis. XR CHEST STANDARD (2 VW)   Final Result   No acute cardiopulmonary disease.            ASSESSMENT:   Rt. Leg cellulitis --- purulent complicated  So far culture growing CoNS and Corynebacterium species (A)      PLAN:    vancomycin  Change to linezolid  discharge with oral ATB in day or two -prob linezolid   f/u 2 weeks  Spoke with Dr Jatin Araujo MD,  8/7/2018  7:55 AM

## 2018-08-07 NOTE — CARE COORDINATION
Call placed to [address] [facility] [phone] - F [address] [place] [address] . No PA required but co-pay for Zyvox generic is $439.62 and brand name is $7,500. Patient unable to afford co-pay so with his consent, I placed a call to [employer] assist at [phone] and I spoke with Bautista Tanner. Patient provided information of hh size of 2 and monthly income of $2500/month. Based on that information patient approved for Zyvox with no out of pocket cost. Medication will be delivered to patients home tomorrow 8/8/18. Electronically signed by Augustine Smock Christopher Sever on 8/7/2018 at 9:31 AM

## 2018-08-08 NOTE — DISCHARGE SUMMARY
[address]                                 DISCHARGE SUMMARY    PATIENT NAME: Leidy Kent                   :        1960  MED REC NO:   [id number]                            ROOM:       [id number]  ACCOUNT NO:   [de-identified]                           ADMIT DATE: 2018  PROVIDER:     Dinesh Goodrich DO                  DISCHARGE DATE: 2018      ADMITTING DIAGNOSIS:  Cellulitis of right lower extremity with culture  positive for coag-negative staph and Corynebacterium. SECONDARY DISCHARGE DIAGNOSES:  Chronic right foot drop, essential  hypertension, hyperlipidemia, gastroesophageal reflux disease, leukocytosis  secondary to cellulitis. COMPLICATIONS:  None. OPERATIONS:  None. CONSULTATION OBTAINED:  Dr. Sharon Arnold. No OMT was given. ADMITTING PHYSICIANS:  Dr. Dillon Choe and Dr. Tahir Osborne. CHIEF COMPLAINT AND HISTORY OF CHIEF COMPLAINT:  This is a pleasant  63-year-old white male who was admitted to [address] [place] [address]. The  patient presented to the hospital here on 2018. The patient  presented to the emergency room with what appeared to be outpatient failure  of antibiotic therapy for right lower leg cellulitis. The patient was  noted to have fever and chills Friday evening. On Saturday, the patient  began to have edema and erythema of the right lower extremity in the area. The patient states that his symptomatology worsened and he presented to the  hospital here, was seen and evaluated and subsequently admitted to the  hospital under the service of Dr. Dillon Choe and Dr. Tahir Osborne. The patient was  seen at this time. PAST MEDICAL HISTORY:  Positive for the usual childhood diseases, history  of gastroesophageal reflux disease, hyperlipidemia, hypertension, and  osteoarthritis. PHYSICAL EXAMINATION:  VITAL SIGNS:  At this time showed blood pressure 121/76, pulse 75,  respirations 18. t.i.d. for the additional 2 days, Avapro 300  mg daily, magnesium oxide 400 daily, Prilosec 20 mg daily. The patient was given full instructions. Will follow up with the primary  care doctor. The patient should have further evaluation, which we  performed including suggestion for a sleep study. Venous duplex study was  obtained which showed no evidence of a DVT in the hospital.  The patient  would be recommended compressive stocking and possible modified brace to  the right lower extremity to prevent further cellulitis due to contact. Changes were made accordingly. The patient did verbalize understanding at  this time with his wife present at this time upon discharge.         [address] [place] [address],     D: 08/07/2018 13:15:31       T: 08/08/2018 12:47:20     [email or web address]  Job#: [id number]     Doc#: [id number]    CC:

## 2018-09-04 ENCOUNTER — TELEPHONE (OUTPATIENT)
Dept: ORTHOPEDIC SURGERY | Age: 58
End: 2018-09-04

## 2018-09-04 DIAGNOSIS — M93.1 KIENBOCK'S AVASCULAR NECROSIS OF LUNATE, ADULT: ICD-10-CM

## 2018-09-04 DIAGNOSIS — M65.4 RADIAL STYLOID TENOSYNOVITIS OF RIGHT HAND: ICD-10-CM

## 2018-09-04 DIAGNOSIS — M65.4 RADIAL STYLOID TENOSYNOVITIS: ICD-10-CM

## 2018-09-04 RX ORDER — CYCLOBENZAPRINE HCL 10 MG
10 TABLET ORAL 2 TIMES DAILY PRN
Qty: 30 TABLET | Refills: 0 | Status: SHIPPED | OUTPATIENT
Start: 2018-09-04 | End: 2018-10-15 | Stop reason: SDUPTHER

## 2018-09-04 RX ORDER — HYDROCODONE BITARTRATE AND ACETAMINOPHEN 7.5; 325 MG/1; MG/1
1 TABLET ORAL EVERY 8 HOURS PRN
Qty: 21 TABLET | Refills: 0 | Status: SHIPPED | OUTPATIENT
Start: 2018-09-04 | End: 2018-10-15 | Stop reason: SDUPTHER

## 2018-09-04 NOTE — TELEPHONE ENCOUNTER
.Patient called in requesting refill of cyclobenzaprine (FLEXERIL) 10 MG     HYDROcodone-acetaminophen (NORCO) 7.5-325 MG

## 2018-09-14 DIAGNOSIS — M25.512 ACUTE PAIN OF LEFT SHOULDER: Primary | ICD-10-CM

## 2018-09-17 ENCOUNTER — OFFICE VISIT (OUTPATIENT)
Dept: ORTHOPEDIC SURGERY | Age: 58
End: 2018-09-17
Payer: COMMERCIAL

## 2018-09-17 VITALS — WEIGHT: 275 LBS | HEIGHT: 76 IN | BODY MASS INDEX: 33.49 KG/M2

## 2018-09-17 DIAGNOSIS — M75.102 TEAR OF LEFT ROTATOR CUFF, UNSPECIFIED TEAR EXTENT: Primary | ICD-10-CM

## 2018-09-17 PROCEDURE — 1036F TOBACCO NON-USER: CPT | Performed by: ORTHOPAEDIC SURGERY

## 2018-09-17 PROCEDURE — G8427 DOCREV CUR MEDS BY ELIG CLIN: HCPCS | Performed by: ORTHOPAEDIC SURGERY

## 2018-09-17 PROCEDURE — 3017F COLORECTAL CA SCREEN DOC REV: CPT | Performed by: ORTHOPAEDIC SURGERY

## 2018-09-17 PROCEDURE — G8417 CALC BMI ABV UP PARAM F/U: HCPCS | Performed by: ORTHOPAEDIC SURGERY

## 2018-09-17 PROCEDURE — 99214 OFFICE O/P EST MOD 30 MIN: CPT | Performed by: ORTHOPAEDIC SURGERY

## 2018-09-17 RX ORDER — HYDROCODONE BITARTRATE AND ACETAMINOPHEN 5; 325 MG/1; MG/1
TABLET ORAL
COMMUNITY
Start: 2018-08-16 | End: 2018-10-01 | Stop reason: SDUPTHER

## 2018-09-17 NOTE — PROGRESS NOTES
Rfl: 3    omeprazole (PRILOSEC) 20 MG capsule, Take 20 mg by mouth Daily , Disp: , Rfl:     irbesartan (AVAPRO) 300 MG tablet, Take 300 mg by mouth daily. , Disp: , Rfl:   Allergies   Allergen Reactions    Daypro [Oxaprozin] Other (See Comments)     constipation     Social History     Social History    Marital status:      Spouse name: N/A    Number of children: N/A    Years of education: N/A     Occupational History    Not on file. Social History Main Topics    Smoking status: Never Smoker    Smokeless tobacco: Never Used    Alcohol use No    Drug use: No    Sexual activity: Not on file     Other Topics Concern    Not on file     Social History Narrative    No narrative on file     Family History   Problem Relation Age of Onset    Asthma Mother     Cancer Mother     Emphysema Mother     Heart Disease Father     Alzheimer's Disease Father        REVIEW OF SYSTEMS:     General/Constitution:  (-)weight loss, (-)fever, (-)chills, (-)weakness. Skin: (-) rash,(-) psoriasis,(-) eczema, (-)skin cancer. Musculoskeletal: (-) fractures,  (-) dislocations,(-) collagen vascular disease, (-) fibromyalgia, (-) multiple sclerosis, (-) muscular dystrophy, (-) RSD,(-) joint pain (-)swelling, (-) joint pain,swelling. Neurologic: (-) epilepsy, (-)seizures,(-) brain tumor,(-) TIA, (-)stroke, (-)headaches, (-)Parkinson disease,(-) memory loss, (-) LOC. Cardiovascular: (-) Chest pain, (-) swelling in legs/feet, (-) SOB, (-) cramping in legs/feet with walking. Respiratory: (-) SOB, (-) Coughing, (-) night sweats. GI: (-) nausea, (-) vomiting, (-) diarrhea, (-) blood in stool, (-) gastric ulcer. Psychiatric: (-) Depression, (-) Anxiety, (-) bipolar disease, (-) Alzheimer's Disease  Allergic/Immunologic: (-) allergies latex, (-) allergies metal, (-) skin sensitivity.   Hematlogic: (-) anemia, (-) blood transfusion, (-) DVT/PE, (-) Clotting disorders      Subjective:    Constitution:  Ht 6' 4\" (1.93 m)

## 2018-09-27 ENCOUNTER — HOSPITAL ENCOUNTER (OUTPATIENT)
Dept: MRI IMAGING | Age: 58
Discharge: HOME | End: 2018-09-29
Payer: COMMERCIAL

## 2018-09-27 DIAGNOSIS — M75.102 TEAR OF LEFT ROTATOR CUFF, UNSPECIFIED TEAR EXTENT: ICD-10-CM

## 2018-09-27 PROCEDURE — 73221 MRI JOINT UPR EXTREM W/O DYE: CPT

## 2018-10-01 ENCOUNTER — TELEPHONE (OUTPATIENT)
Dept: ORTHOPEDIC SURGERY | Age: 58
End: 2018-10-01

## 2018-10-01 DIAGNOSIS — M93.1 KIENBOCK'S AVASCULAR NECROSIS OF LUNATE, ADULT: Primary | ICD-10-CM

## 2018-10-01 RX ORDER — HYDROCODONE BITARTRATE AND ACETAMINOPHEN 5; 325 MG/1; MG/1
1 TABLET ORAL EVERY 8 HOURS PRN
Qty: 21 TABLET | Refills: 0 | Status: SHIPPED | OUTPATIENT
Start: 2018-10-01 | End: 2018-10-08

## 2018-10-15 ENCOUNTER — OFFICE VISIT (OUTPATIENT)
Dept: ORTHOPEDIC SURGERY | Age: 58
End: 2018-10-15
Payer: COMMERCIAL

## 2018-10-15 VITALS — BODY MASS INDEX: 33.49 KG/M2 | TEMPERATURE: 98 F | HEIGHT: 76 IN | WEIGHT: 275 LBS

## 2018-10-15 DIAGNOSIS — M65.4 RADIAL STYLOID TENOSYNOVITIS: ICD-10-CM

## 2018-10-15 DIAGNOSIS — M65.4 RADIAL STYLOID TENOSYNOVITIS OF RIGHT HAND: ICD-10-CM

## 2018-10-15 DIAGNOSIS — M93.1 KIENBOCK'S AVASCULAR NECROSIS OF LUNATE, ADULT: ICD-10-CM

## 2018-10-15 DIAGNOSIS — M75.112 INCOMPLETE TEAR OF LEFT ROTATOR CUFF: Primary | ICD-10-CM

## 2018-10-15 PROCEDURE — 20610 DRAIN/INJ JOINT/BURSA W/O US: CPT | Performed by: ORTHOPAEDIC SURGERY

## 2018-10-15 PROCEDURE — 99213 OFFICE O/P EST LOW 20 MIN: CPT | Performed by: ORTHOPAEDIC SURGERY

## 2018-10-15 RX ORDER — TRIAMCINOLONE ACETONIDE 40 MG/ML
40 INJECTION, SUSPENSION INTRA-ARTICULAR; INTRAMUSCULAR ONCE
Status: COMPLETED | OUTPATIENT
Start: 2018-10-15 | End: 2018-10-15

## 2018-10-15 RX ORDER — CELECOXIB 200 MG/1
200 CAPSULE ORAL 2 TIMES DAILY
Qty: 60 CAPSULE | Refills: 3 | Status: SHIPPED | OUTPATIENT
Start: 2018-10-15 | End: 2019-01-29 | Stop reason: SDUPTHER

## 2018-10-15 RX ORDER — HYDROCODONE BITARTRATE AND ACETAMINOPHEN 7.5; 325 MG/1; MG/1
1 TABLET ORAL EVERY 8 HOURS PRN
Qty: 21 TABLET | Refills: 0 | Status: SHIPPED | OUTPATIENT
Start: 2018-10-15 | End: 2018-11-07 | Stop reason: SDUPTHER

## 2018-10-15 RX ORDER — CYCLOBENZAPRINE HCL 10 MG
10 TABLET ORAL 2 TIMES DAILY PRN
Qty: 30 TABLET | Refills: 0 | Status: SHIPPED | OUTPATIENT
Start: 2018-10-15 | End: 2018-11-20 | Stop reason: SDUPTHER

## 2018-10-15 RX ADMIN — TRIAMCINOLONE ACETONIDE 40 MG: 40 INJECTION, SUSPENSION INTRA-ARTICULAR; INTRAMUSCULAR at 16:06

## 2018-10-15 NOTE — PROGRESS NOTES
mouth Daily , Disp: , Rfl:     irbesartan (AVAPRO) 300 MG tablet, Take 300 mg by mouth daily. , Disp: , Rfl:   Allergies   Allergen Reactions    Daypro [Oxaprozin] Other (See Comments)     constipation     Social History     Social History    Marital status:      Spouse name: N/A    Number of children: N/A    Years of education: N/A     Occupational History    Not on file. Social History Main Topics    Smoking status: Never Smoker    Smokeless tobacco: Never Used    Alcohol use No    Drug use: No    Sexual activity: Not on file     Other Topics Concern    Not on file     Social History Narrative    No narrative on file     Family History   Problem Relation Age of Onset    Asthma Mother     Cancer Mother     Emphysema Mother     Heart Disease Father     Alzheimer's Disease Father        REVIEW OF SYSTEMS:     General/Constitution:  (-)weight loss, (-)fever, (-)chills, (-)weakness. Skin: (-) rash,(-) psoriasis,(-) eczema, (-)skin cancer. Musculoskeletal: (-) fractures,  (-) dislocations,(-) collagen vascular disease, (-) fibromyalgia, (-) multiple sclerosis, (-) muscular dystrophy, (-) RSD,(-) joint pain (-)swelling, (-) joint pain,swelling. Neurologic: (-) epilepsy, (-)seizures,(-) brain tumor,(-) TIA, (-)stroke, (-)headaches, (-)Parkinson disease,(-) memory loss, (-) LOC. Cardiovascular: (-) Chest pain, (-) swelling in legs/feet, (-) SOB, (-) cramping in legs/feet with walking. Respiratory: (-) SOB, (-) Coughing, (-) night sweats. GI: (-) nausea, (-) vomiting, (-) diarrhea, (-) blood in stool, (-) gastric ulcer. Psychiatric: (-) Depression, (-) Anxiety, (-) bipolar disease, (-) Alzheimer's Disease  Allergic/Immunologic: (-) allergies latex, (-) allergies metal, (-) skin sensitivity.   Hematlogic: (-) anemia, (-) blood transfusion, (-) DVT/PE, (-) Clotting disorders      Subjective:    Constitution:  Temp 98 °F (36.7 °C)   Ht 6' 4\" (1.93 m)   Wt 275 lb (124.7 kg)   BMI 33.47 Shoulder is 160/45/t8 and for the Left shoulder is 140/30/pl. Right shoulder Motor strength is 5/5 in the supraspinatus, 5/5 internal rotation and 5/5 in external rotation, and Left shoulder motor strength 4+/5 in supraspinatus, 5/5 in internal rotation, 4+/5 in external rotation. Right shoulder:  negative Impingement , negative Willis ,negative  Speeds,negative  Apprehension ,negative Caballero Load Shift, negative Hank manuver, negative Cross arm test.     Left shoulder:  positive Impingement , positive Willis ,negative  Speeds,negative  Apprehension ,negative Caballero Load Shift, negative Hank manuver, negative Cross arm test.     XRAY:  Ac joint arthritis otherwise normal    MRI:    Impression   1. Subscapularis tearing with associated atrophy. 2. Mild subluxation of the biceps tendon. 3. No full-thickness rotator cuff tears. Radiographic findings reviewed with patient    Impression:   Encounter Diagnosis   Name Primary?  Incomplete tear of left rotator cuff Yes       Plan:    I will proceed with a cortisone injection in the Left shoulder. Verbal and written consent was obtained for the injection. Skin was prepped with alcohol, 1 ml of Kenalog 40 mg and 9 ml of 0.25% Marcaine was injected to the posterior shoulder through the subacromial space of the Left Shoulder. The patient tolerated the injections well. I will see the patient back prn.   HEP

## 2018-10-29 ENCOUNTER — OFFICE VISIT (OUTPATIENT)
Dept: ORTHOPEDIC SURGERY | Age: 58
End: 2018-10-29
Payer: COMMERCIAL

## 2018-10-29 VITALS — HEIGHT: 76 IN | TEMPERATURE: 98 F | WEIGHT: 275 LBS | BODY MASS INDEX: 33.49 KG/M2

## 2018-10-29 DIAGNOSIS — M93.1 KIENBOCK'S AVASCULAR NECROSIS OF LUNATE, ADULT: Primary | ICD-10-CM

## 2018-10-29 DIAGNOSIS — M65.4 RADIAL STYLOID TENOSYNOVITIS: ICD-10-CM

## 2018-10-29 PROCEDURE — 99213 OFFICE O/P EST LOW 20 MIN: CPT | Performed by: ORTHOPAEDIC SURGERY

## 2018-10-29 NOTE — PROGRESS NOTES
Chief Complaint   Patient presents with    Wrist Pain     Right Wrist, WC F/U         Emma Leigh is a 62y.o. year old male who presents for follow up of right wrist.  He states he has good days and bad. He reports he is overall the same as he has been. He is doing well with current medication regimen of celebrex, flexeril and norco.     Past Medical History:   Diagnosis Date    GERD (gastroesophageal reflux disease)     Hyperlipidemia     Hypertension     Osteoarthritis      Past Surgical History:   Procedure Laterality Date    FOOT SURGERY      OTHER SURGICAL HISTORY Right 01-25-13    right wrist 1st dorsal compartment release    SHOULDER ARTHROSCOPY      TONSILLECTOMY      VASECTOMY         Current Outpatient Prescriptions:     cyclobenzaprine (FLEXERIL) 10 MG tablet, Take 1 tablet by mouth 2 times daily as needed for Muscle spasms, Disp: 30 tablet, Rfl: 0    celecoxib (CELEBREX) 200 MG capsule, Take 1 capsule by mouth 2 times daily, Disp: 60 capsule, Rfl: 3    hydrochlorothiazide (HYDRODIURIL) 25 MG tablet, Take 1 tablet by mouth daily, Disp: 30 tablet, Rfl: 0    Glucosamine-Chondroit-Vit C-Mn (GLUCOSAMINE 1500 COMPLEX PO), Take 1 tablet by mouth daily, Disp: , Rfl:     diltiazem (DILACOR XR) 240 MG extended release capsule, Take 240 mg by mouth daily, Disp: , Rfl:     Turmeric 500 MG TABS, Take 500 mg by mouth daily, Disp: , Rfl:     magnesium oxide (MAG-OX) 400 MG tablet, Take 400 mg by mouth daily, Disp: , Rfl:     omeprazole (PRILOSEC) 20 MG capsule, Take 20 mg by mouth Daily , Disp: , Rfl:     irbesartan (AVAPRO) 300 MG tablet, Take 300 mg by mouth daily. , Disp: , Rfl:   Allergies   Allergen Reactions    Daypro [Oxaprozin] Other (See Comments)     constipation     Social History     Social History    Marital status:      Spouse name: N/A    Number of children: N/A    Years of education: N/A     Occupational History    Not on file.      Social History Main Topics    Smoking status: Never Smoker    Smokeless tobacco: Never Used    Alcohol use No    Drug use: No    Sexual activity: Not on file     Other Topics Concern    Not on file     Social History Narrative    No narrative on file     Family History   Problem Relation Age of Onset    Asthma Mother     Cancer Mother     Emphysema Mother     Heart Disease Father     Alzheimer's Disease Father        REVIEW OF SYSTEMS:    General/Constitution:  (-)weight loss, (-)fever, (-)chills, (-)weakness. Skin: (-) rash,(-) psoriasis,(-) eczema, (-)skin cancer. Musculoskeletal: (-) fractures,  (-) dislocations,(-) collagen vascular disease, (-) fibromyalgia, (-) multiple sclerosis, (-) muscular dystrophy, (-) RSD,(-) joint pain (-)swelling, (-) joint pain,swelling. Neurologic: (-) epilepsy, (-)seizures,(-) brain tumor,(-) TIA, (-)stroke, (-)headaches, (-)Parkinson disease,(-) memory loss, (-) LOC. Cardiovascular: (-) Chest pain, (-) swelling in legs/feet, (-) SOB, (-) cramping in legs/feet with walking. Respiratory: (-) SOB, (-) Coughing, (-) night sweats. GI: (-) nausea, (-) vomiting, (-) diarrhea, (-) blood in stool, (-) gastric ulcer. Psychiatric: (-) Depression, (-) Anxiety, (-) bipolar disease, (-) Alzheimer's Disease  Allergic/Immunologic: (-) allergies latex, (-) allergies metal, (-) skin sensitivity. Hematlogic: (-) anemia, (-) blood transfusion, (-) DVT/PE, (-) Clotting disorders    SUBJECTIVE:      Constitutional:    The patient is alert and oriented x 3, appears to be stated age and in no distress. Ht. 6 ft 4 in., Wt. 285 lbs. Skin:    Upon inspection: the skin appears warm, dry and intact. There is not a previous scar over the affected area. There is not any cellulitis, lymphedema or cutaneous lesions noted in the lower extremities. Upon palpation there is no induration noted. Neurologic:    Gait: antalgic; Motor exam of the upper extremities show:  The reflexes in biceps/triceps/brachioradialis

## 2018-11-06 ENCOUNTER — TELEPHONE (OUTPATIENT)
Dept: ORTHOPEDIC SURGERY | Age: 58
End: 2018-11-06

## 2018-11-06 DIAGNOSIS — M65.4 RADIAL STYLOID TENOSYNOVITIS OF RIGHT HAND: ICD-10-CM

## 2018-11-06 DIAGNOSIS — M65.4 RADIAL STYLOID TENOSYNOVITIS: ICD-10-CM

## 2018-11-06 DIAGNOSIS — M93.1 KIENBOCK'S AVASCULAR NECROSIS OF LUNATE, ADULT: ICD-10-CM

## 2018-11-07 RX ORDER — HYDROCODONE BITARTRATE AND ACETAMINOPHEN 7.5; 325 MG/1; MG/1
1 TABLET ORAL EVERY 8 HOURS PRN
Qty: 21 TABLET | Refills: 0 | Status: SHIPPED | OUTPATIENT
Start: 2018-11-07 | End: 2018-11-28 | Stop reason: SDUPTHER

## 2018-11-20 ENCOUNTER — TELEPHONE (OUTPATIENT)
Dept: ENT CLINIC | Age: 58
End: 2018-11-20

## 2018-11-20 RX ORDER — CYCLOBENZAPRINE HCL 10 MG
10 TABLET ORAL 2 TIMES DAILY PRN
Qty: 30 TABLET | Refills: 0 | Status: SHIPPED | OUTPATIENT
Start: 2018-11-20 | End: 2019-01-04 | Stop reason: SDUPTHER

## 2018-11-28 ENCOUNTER — TELEPHONE (OUTPATIENT)
Dept: ORTHOPEDIC SURGERY | Age: 58
End: 2018-11-28

## 2018-11-28 DIAGNOSIS — M65.4 RADIAL STYLOID TENOSYNOVITIS OF RIGHT HAND: ICD-10-CM

## 2018-11-28 DIAGNOSIS — M93.1 KIENBOCK'S AVASCULAR NECROSIS OF LUNATE, ADULT: ICD-10-CM

## 2018-11-28 DIAGNOSIS — M65.4 RADIAL STYLOID TENOSYNOVITIS: ICD-10-CM

## 2018-11-28 RX ORDER — HYDROCODONE BITARTRATE AND ACETAMINOPHEN 7.5; 325 MG/1; MG/1
1 TABLET ORAL EVERY 6 HOURS PRN
Qty: 28 TABLET | Refills: 0 | Status: SHIPPED | OUTPATIENT
Start: 2018-11-28 | End: 2019-01-04 | Stop reason: SDUPTHER

## 2018-12-26 ENCOUNTER — TELEPHONE (OUTPATIENT)
Dept: ORTHOPEDIC SURGERY | Age: 58
End: 2018-12-26

## 2019-01-04 DIAGNOSIS — M65.4 RADIAL STYLOID TENOSYNOVITIS OF RIGHT HAND: ICD-10-CM

## 2019-01-04 DIAGNOSIS — M93.1 KIENBOCK'S AVASCULAR NECROSIS OF LUNATE, ADULT: ICD-10-CM

## 2019-01-04 DIAGNOSIS — M65.4 RADIAL STYLOID TENOSYNOVITIS: ICD-10-CM

## 2019-01-04 RX ORDER — HYDROCODONE BITARTRATE AND ACETAMINOPHEN 7.5; 325 MG/1; MG/1
1 TABLET ORAL EVERY 6 HOURS PRN
Qty: 28 TABLET | Refills: 0 | Status: SHIPPED | OUTPATIENT
Start: 2019-01-04 | End: 2019-01-29 | Stop reason: SDUPTHER

## 2019-01-04 RX ORDER — CYCLOBENZAPRINE HCL 10 MG
10 TABLET ORAL 2 TIMES DAILY PRN
Qty: 30 TABLET | Refills: 0 | Status: SHIPPED | OUTPATIENT
Start: 2019-01-04 | End: 2019-01-29 | Stop reason: SDUPTHER

## 2019-01-29 ENCOUNTER — HOSPITAL ENCOUNTER (OUTPATIENT)
Dept: INTERVENTIONAL RADIOLOGY/VASCULAR | Age: 59
Discharge: HOME | End: 2019-01-31
Payer: COMMERCIAL

## 2019-01-29 ENCOUNTER — OFFICE VISIT (OUTPATIENT)
Dept: ORTHOPEDIC SURGERY | Age: 59
End: 2019-01-29
Payer: COMMERCIAL

## 2019-01-29 ENCOUNTER — HOSPITAL ENCOUNTER (OUTPATIENT)
Dept: ULTRASOUND IMAGING | Age: 59
Discharge: HOME | End: 2019-01-29
Payer: COMMERCIAL

## 2019-01-29 VITALS — WEIGHT: 280 LBS | HEIGHT: 76 IN | BODY MASS INDEX: 34.1 KG/M2

## 2019-01-29 DIAGNOSIS — M65.4 RADIAL STYLOID TENOSYNOVITIS: ICD-10-CM

## 2019-01-29 DIAGNOSIS — M93.1 KIENBOCK'S AVASCULAR NECROSIS OF LUNATE, ADULT: Primary | ICD-10-CM

## 2019-01-29 DIAGNOSIS — R52 PAIN: ICD-10-CM

## 2019-01-29 DIAGNOSIS — M65.4 RADIAL STYLOID TENOSYNOVITIS OF RIGHT HAND: ICD-10-CM

## 2019-01-29 PROCEDURE — 99213 OFFICE O/P EST LOW 20 MIN: CPT | Performed by: ORTHOPAEDIC SURGERY

## 2019-01-29 PROCEDURE — 93971 EXTREMITY STUDY: CPT

## 2019-01-29 RX ORDER — CYCLOBENZAPRINE HCL 10 MG
10 TABLET ORAL 2 TIMES DAILY PRN
Qty: 30 TABLET | Refills: 0 | Status: SHIPPED | OUTPATIENT
Start: 2019-01-29 | End: 2019-03-08 | Stop reason: SDUPTHER

## 2019-01-29 RX ORDER — HYDROCODONE BITARTRATE AND ACETAMINOPHEN 7.5; 325 MG/1; MG/1
1 TABLET ORAL EVERY 6 HOURS PRN
Qty: 28 TABLET | Refills: 0 | Status: SHIPPED | OUTPATIENT
Start: 2019-01-29 | End: 2019-02-27 | Stop reason: SDUPTHER

## 2019-01-29 RX ORDER — CELECOXIB 200 MG/1
200 CAPSULE ORAL 2 TIMES DAILY
Qty: 60 CAPSULE | Refills: 3 | Status: SHIPPED | OUTPATIENT
Start: 2019-01-29 | End: 2019-07-05 | Stop reason: SDUPTHER

## 2019-02-27 ENCOUNTER — TELEPHONE (OUTPATIENT)
Dept: ORTHOPEDIC SURGERY | Age: 59
End: 2019-02-27

## 2019-02-27 DIAGNOSIS — M93.1 KIENBOCK'S AVASCULAR NECROSIS OF LUNATE, ADULT: ICD-10-CM

## 2019-02-27 DIAGNOSIS — M65.4 RADIAL STYLOID TENOSYNOVITIS OF RIGHT HAND: ICD-10-CM

## 2019-02-27 DIAGNOSIS — M65.4 RADIAL STYLOID TENOSYNOVITIS: ICD-10-CM

## 2019-02-27 RX ORDER — HYDROCODONE BITARTRATE AND ACETAMINOPHEN 7.5; 325 MG/1; MG/1
1 TABLET ORAL EVERY 6 HOURS PRN
Qty: 28 TABLET | Refills: 0 | Status: SHIPPED | OUTPATIENT
Start: 2019-02-27 | End: 2019-04-01 | Stop reason: SDUPTHER

## 2019-03-08 ENCOUNTER — TELEPHONE (OUTPATIENT)
Dept: ORTHOPEDIC SURGERY | Age: 59
End: 2019-03-08

## 2019-03-08 RX ORDER — CYCLOBENZAPRINE HCL 10 MG
10 TABLET ORAL 2 TIMES DAILY PRN
Qty: 30 TABLET | Refills: 0 | Status: SHIPPED | OUTPATIENT
Start: 2019-03-08 | End: 2019-04-01 | Stop reason: SDUPTHER

## 2019-04-01 ENCOUNTER — TELEPHONE (OUTPATIENT)
Dept: ORTHOPEDIC SURGERY | Age: 59
End: 2019-04-01

## 2019-04-01 DIAGNOSIS — M65.4 RADIAL STYLOID TENOSYNOVITIS OF RIGHT HAND: ICD-10-CM

## 2019-04-01 DIAGNOSIS — M65.4 RADIAL STYLOID TENOSYNOVITIS: ICD-10-CM

## 2019-04-01 DIAGNOSIS — M93.1 KIENBOCK'S AVASCULAR NECROSIS OF LUNATE, ADULT: ICD-10-CM

## 2019-04-01 RX ORDER — CYCLOBENZAPRINE HCL 10 MG
10 TABLET ORAL 2 TIMES DAILY PRN
Qty: 30 TABLET | Refills: 0 | Status: SHIPPED | OUTPATIENT
Start: 2019-04-01 | End: 2019-06-11 | Stop reason: SDUPTHER

## 2019-04-01 RX ORDER — HYDROCODONE BITARTRATE AND ACETAMINOPHEN 7.5; 325 MG/1; MG/1
1 TABLET ORAL EVERY 6 HOURS PRN
Qty: 28 TABLET | Refills: 0 | Status: SHIPPED | OUTPATIENT
Start: 2019-04-01 | End: 2019-04-08

## 2019-05-06 ENCOUNTER — APPOINTMENT (OUTPATIENT)
Dept: ULTRASOUND IMAGING | Age: 59
End: 2019-05-06
Payer: COMMERCIAL

## 2019-05-06 ENCOUNTER — HOSPITAL ENCOUNTER (EMERGENCY)
Age: 59
Discharge: HOME | End: 2019-05-06
Attending: EMERGENCY MEDICINE
Payer: COMMERCIAL

## 2019-05-06 VITALS
SYSTOLIC BLOOD PRESSURE: 142 MMHG | HEIGHT: 76 IN | OXYGEN SATURATION: 99 % | HEART RATE: 66 BPM | WEIGHT: 289 LBS | TEMPERATURE: 98 F | DIASTOLIC BLOOD PRESSURE: 95 MMHG | BODY MASS INDEX: 35.19 KG/M2 | RESPIRATION RATE: 18 BRPM

## 2019-05-06 DIAGNOSIS — R60.0 LEG EDEMA: Primary | ICD-10-CM

## 2019-05-06 DIAGNOSIS — I87.2 VENOUS STASIS DERMATITIS OF BOTH LOWER EXTREMITIES: ICD-10-CM

## 2019-05-06 DIAGNOSIS — L03.116 CELLULITIS OF LEFT LOWER EXTREMITY: ICD-10-CM

## 2019-05-06 LAB
ALBUMIN SERPL-MCNC: 4.3 G/DL (ref 3.5–5.2)
ALP BLD-CCNC: 111 U/L (ref 40–129)
ALT SERPL-CCNC: 19 U/L (ref 0–40)
ANION GAP SERPL CALCULATED.3IONS-SCNC: 7 MMOL/L (ref 7–16)
APTT: 43.2 SEC (ref 24.5–35.1)
AST SERPL-CCNC: 17 U/L (ref 0–39)
BASOPHILS ABSOLUTE: 0.05 E9/L (ref 0–0.2)
BASOPHILS RELATIVE PERCENT: 0.7 % (ref 0–2)
BILIRUB SERPL-MCNC: 0.4 MG/DL (ref 0–1.2)
BUN BLDV-MCNC: 18 MG/DL (ref 6–20)
CALCIUM SERPL-MCNC: 8.8 MG/DL (ref 8.6–10.2)
CHLORIDE BLD-SCNC: 105 MMOL/L (ref 98–107)
CO2: 26 MMOL/L (ref 22–29)
CREAT SERPL-MCNC: 0.8 MG/DL (ref 0.7–1.2)
EOSINOPHILS ABSOLUTE: 0.12 E9/L (ref 0.05–0.5)
EOSINOPHILS RELATIVE PERCENT: 1.8 % (ref 0–6)
GFR AFRICAN AMERICAN: >60
GFR NON-AFRICAN AMERICAN: >60 ML/MIN/1.73
GLUCOSE BLD-MCNC: 93 MG/DL (ref 74–99)
HCT VFR BLD CALC: 42.1 % (ref 37–54)
HEMOGLOBIN: 14.1 G/DL (ref 12.5–16.5)
IMMATURE GRANULOCYTES #: 0.02 E9/L
IMMATURE GRANULOCYTES %: 0.3 % (ref 0–5)
INR BLD: 1.1
LACTIC ACID: 0.8 MMOL/L (ref 0.5–2.2)
LYMPHOCYTES ABSOLUTE: 1.88 E9/L (ref 1.5–4)
LYMPHOCYTES RELATIVE PERCENT: 28.1 % (ref 20–42)
MAGNESIUM: 1.9 MG/DL (ref 1.6–2.6)
MCH RBC QN AUTO: 28.1 PG (ref 26–35)
MCHC RBC AUTO-ENTMCNC: 33.5 % (ref 32–34.5)
MCV RBC AUTO: 83.9 FL (ref 80–99.9)
MONOCYTES ABSOLUTE: 0.67 E9/L (ref 0.1–0.95)
MONOCYTES RELATIVE PERCENT: 10 % (ref 2–12)
NEUTROPHILS ABSOLUTE: 3.94 E9/L (ref 1.8–7.3)
NEUTROPHILS RELATIVE PERCENT: 59.1 % (ref 43–80)
PDW BLD-RTO: 12.6 FL (ref 11.5–15)
PLATELET # BLD: 212 E9/L (ref 130–450)
PMV BLD AUTO: 11 FL (ref 7–12)
POTASSIUM SERPL-SCNC: 3.9 MMOL/L (ref 3.5–5)
PRO-BNP: 47 PG/ML (ref 0–125)
PROTHROMBIN TIME: 12 SEC (ref 9.3–12.4)
RBC # BLD: 5.02 E12/L (ref 3.8–5.8)
SEDIMENTATION RATE, ERYTHROCYTE: 6 MM/HR (ref 0–15)
SODIUM BLD-SCNC: 138 MMOL/L (ref 132–146)
TOTAL PROTEIN: 7.4 G/DL (ref 6.4–8.3)
TROPONIN: <0.01 NG/ML (ref 0–0.03)
WBC # BLD: 6.7 E9/L (ref 4.5–11.5)

## 2019-05-06 PROCEDURE — 85610 PROTHROMBIN TIME: CPT

## 2019-05-06 PROCEDURE — 93970 EXTREMITY STUDY: CPT

## 2019-05-06 PROCEDURE — 87186 SC STD MICRODIL/AGAR DIL: CPT

## 2019-05-06 PROCEDURE — 93010 ELECTROCARDIOGRAM REPORT: CPT | Performed by: INTERNAL MEDICINE

## 2019-05-06 PROCEDURE — 36415 COLL VENOUS BLD VENIPUNCTURE: CPT

## 2019-05-06 PROCEDURE — 87077 CULTURE AEROBIC IDENTIFY: CPT

## 2019-05-06 PROCEDURE — 96365 THER/PROPH/DIAG IV INF INIT: CPT

## 2019-05-06 PROCEDURE — 6370000000 HC RX 637 (ALT 250 FOR IP): Performed by: EMERGENCY MEDICINE

## 2019-05-06 PROCEDURE — 2580000003 HC RX 258: Performed by: EMERGENCY MEDICINE

## 2019-05-06 PROCEDURE — 83735 ASSAY OF MAGNESIUM: CPT

## 2019-05-06 PROCEDURE — 85730 THROMBOPLASTIN TIME PARTIAL: CPT

## 2019-05-06 PROCEDURE — 87040 BLOOD CULTURE FOR BACTERIA: CPT

## 2019-05-06 PROCEDURE — 2500000003 HC RX 250 WO HCPCS: Performed by: EMERGENCY MEDICINE

## 2019-05-06 PROCEDURE — 83880 ASSAY OF NATRIURETIC PEPTIDE: CPT

## 2019-05-06 PROCEDURE — 83605 ASSAY OF LACTIC ACID: CPT

## 2019-05-06 PROCEDURE — 99284 EMERGENCY DEPT VISIT MOD MDM: CPT

## 2019-05-06 PROCEDURE — 85025 COMPLETE CBC W/AUTO DIFF WBC: CPT

## 2019-05-06 PROCEDURE — 84484 ASSAY OF TROPONIN QUANT: CPT

## 2019-05-06 PROCEDURE — 85651 RBC SED RATE NONAUTOMATED: CPT

## 2019-05-06 PROCEDURE — 87070 CULTURE OTHR SPECIMN AEROBIC: CPT

## 2019-05-06 PROCEDURE — 80053 COMPREHEN METABOLIC PANEL: CPT

## 2019-05-06 PROCEDURE — 93005 ELECTROCARDIOGRAM TRACING: CPT | Performed by: EMERGENCY MEDICINE

## 2019-05-06 RX ORDER — DIAPER,BRIEF,INFANT-TODD,DISP
EACH MISCELLANEOUS ONCE
Status: COMPLETED | OUTPATIENT
Start: 2019-05-06 | End: 2019-05-06

## 2019-05-06 RX ORDER — DOXYCYCLINE 100 MG/1
100 CAPSULE ORAL 2 TIMES DAILY
Qty: 20 CAPSULE | Refills: 0 | Status: SHIPPED | OUTPATIENT
Start: 2019-05-06 | End: 2019-05-16

## 2019-05-06 RX ORDER — NYSTATIN 10B UNIT
POWDER (EA) MISCELLANEOUS
Qty: 1 EACH | Refills: 0 | Status: SHIPPED | OUTPATIENT
Start: 2019-05-06 | End: 2019-09-26 | Stop reason: ALTCHOICE

## 2019-05-06 RX ORDER — DOXYCYCLINE 100 MG/1
100 CAPSULE ORAL 2 TIMES DAILY
Qty: 20 CAPSULE | Refills: 0 | Status: SHIPPED | OUTPATIENT
Start: 2019-05-06 | End: 2019-05-06 | Stop reason: SDUPTHER

## 2019-05-06 RX ADMIN — BACITRACIN ZINC: 500 OINTMENT TOPICAL at 21:34

## 2019-05-06 RX ADMIN — DOXYCYCLINE 100 MG: 100 INJECTION, POWDER, LYOPHILIZED, FOR SOLUTION INTRAVENOUS at 21:32

## 2019-05-06 ASSESSMENT — PAIN DESCRIPTION - LOCATION
LOCATION: BACK
LOCATION: LEG

## 2019-05-06 ASSESSMENT — PAIN DESCRIPTION - ORIENTATION
ORIENTATION: UPPER
ORIENTATION: RIGHT;LEFT

## 2019-05-06 ASSESSMENT — PAIN SCALES - GENERAL
PAINLEVEL_OUTOF10: 10
PAINLEVEL_OUTOF10: 8

## 2019-05-06 ASSESSMENT — PAIN DESCRIPTION - PAIN TYPE
TYPE: ACUTE PAIN;CHRONIC PAIN
TYPE: CHRONIC PAIN

## 2019-05-06 ASSESSMENT — PAIN DESCRIPTION - DESCRIPTORS: DESCRIPTORS: ACHING;THROBBING

## 2019-05-06 NOTE — ED PROVIDER NOTES
HPI:  5/6/19,   Time: 6:47 PM           Jd Gonzales is a 62 y.o. male presenting to the ED for leg welling weeping from the left side, beginning one week ago. The complaint has been persistent, moderate in severity, and worsened by nothing. Patient presents for worsening leg swelling and weeping of the left leg. Has a history of chronic venous stasis, states he had a vein procedure done in February of this year, states he had some improvement initially but then returned of swelling. He states is medically worse in last week and is now leaking fluid particular the left leg in between left toes. Denies fevers chills nausea vomiting. Has previous history of cellulitis. Vein procedure done in 2/2019     ROS:   Pertinent positives and negatives are stated within HPI, all other systems reviewed and are negative.  --------------------------------------------- PAST HISTORY ---------------------------------------------  Past Medical History:  has a past medical history of Cellulitis, GERD (gastroesophageal reflux disease), Hyperlipidemia, Hypertension, and Osteoarthritis. Past Surgical History:  has a past surgical history that includes Shoulder arthroscopy; Foot surgery; Vasectomy; Tonsillectomy; and other surgical history (Right, 01-25-13). Social History:  reports that he has never smoked. He has never used smokeless tobacco. He reports that he does not drink alcohol or use drugs. Family History: family history includes Alzheimer's Disease in his father; Asthma in his mother; Cancer in his mother; Emphysema in his mother; Heart Disease in his father. The patients home medications have been reviewed.     Allergies: Daypro [oxaprozin]    -------------------------------------------------- RESULTS -------------------------------------------------  All laboratory and radiology results have been personally reviewed by myself   LABS:  Results for orders placed or performed during the hospital encounter of 05/06/19   Troponin   Result Value Ref Range    Troponin <0.01 0.00 - 0.03 ng/mL   CBC Auto Differential   Result Value Ref Range    WBC 6.7 4.5 - 11.5 E9/L    RBC 5.02 3.80 - 5.80 E12/L    Hemoglobin 14.1 12.5 - 16.5 g/dL    Hematocrit 42.1 37.0 - 54.0 %    MCV 83.9 80.0 - 99.9 fL    MCH 28.1 26.0 - 35.0 pg    MCHC 33.5 32.0 - 34.5 %    RDW 12.6 11.5 - 15.0 fL    Platelets 190 451 - 655 E9/L    MPV 11.0 7.0 - 12.0 fL    Neutrophils % 59.1 43.0 - 80.0 %    Immature Granulocytes % 0.3 0.0 - 5.0 %    Lymphocytes % 28.1 20.0 - 42.0 %    Monocytes % 10.0 2.0 - 12.0 %    Eosinophils % 1.8 0.0 - 6.0 %    Basophils % 0.7 0.0 - 2.0 %    Neutrophils # 3.94 1.80 - 7.30 E9/L    Immature Granulocytes # 0.02 E9/L    Lymphocytes # 1.88 1.50 - 4.00 E9/L    Monocytes # 0.67 0.10 - 0.95 E9/L    Eosinophils # 0.12 0.05 - 0.50 E9/L    Basophils # 0.05 0.00 - 0.20 E9/L   Comprehensive Metabolic Panel   Result Value Ref Range    Sodium 138 132 - 146 mmol/L    Potassium 3.9 3.5 - 5.0 mmol/L    Chloride 105 98 - 107 mmol/L    CO2 26 22 - 29 mmol/L    Anion Gap 7 7 - 16 mmol/L    Glucose 93 74 - 99 mg/dL    BUN 18 6 - 20 mg/dL    CREATININE 0.8 0.7 - 1.2 mg/dL    GFR Non-African American >60 >=60 mL/min/1.73    GFR African American >60     Calcium 8.8 8.6 - 10.2 mg/dL    Total Protein 7.4 6.4 - 8.3 g/dL    Alb 4.3 3.5 - 5.2 g/dL    Total Bilirubin 0.4 0.0 - 1.2 mg/dL    Alkaline Phosphatase 111 40 - 129 U/L    ALT 19 0 - 40 U/L    AST 17 0 - 39 U/L   Protime-INR   Result Value Ref Range    Protime 12.0 9.3 - 12.4 sec    INR 1.1    APTT   Result Value Ref Range    aPTT 43.2 (H) 24.5 - 35.1 sec   Lactic Acid, Plasma   Result Value Ref Range    Lactic Acid 0.8 0.5 - 2.2 mmol/L   Sedimentation Rate   Result Value Ref Range    Sed Rate 6 0 - 15 mm/Hr   Brain Natriuretic Peptide   Result Value Ref Range    Pro-BNP 47 0 - 125 pg/mL   Magnesium   Result Value Ref Range    Magnesium 1.9 1.6 - 2.6 mg/dL   EKG 12 Lead   Result Value Ref Range Ventricular Rate 74 BPM    Atrial Rate 74 BPM    P-R Interval 172 ms    QRS Duration 102 ms    Q-T Interval 400 ms    QTc Calculation (Bazett) 444 ms    P Axis 55 degrees    R Axis 47 degrees    T Axis 55 degrees       RADIOLOGY:  Interpreted by Radiologist.  US DUP LOWER EXTREMITIES BILATERAL VENOUS   Final Result   No evidence of acute deep venous thrombosis.             ------------------------- NURSING NOTES AND VITALS REVIEWED ---------------------------   The nursing notes within the ED encounter and vital signs as below have been reviewed. BP (!) 142/95   Pulse 66   Temp 98 °F (36.7 °C) (Oral)   Resp 18   Ht 6' 4\" (1.93 m)   Wt 289 lb (131.1 kg)   SpO2 99%   BMI 35.18 kg/m²   Oxygen Saturation Interpretation: Normal      ---------------------------------------------------PHYSICAL EXAM--------------------------------------       Constitutional/General: Alert and oriented x3,    Head: NC/AT  Eyes: PERRL, EOMI  Mouth: Oropharynx clear, handling secretions, no trismus  Neck: Supple, full ROM, no meningeal signs no JVD   Pulmonary: Lungs clear to auscultation bilaterally,   Not in respiratory distress  Cardiovascular:  Regular rate and rhythm,   2+ distal pulses  Abdomen: Soft, non tender, non distended,   Extremities: Moves all extremities x 4. Warm and well perfused  Skin: warm. There are venous stasis changes noted bilateral lower extremities with significant swelling and asymmetric swelling. Worse on the left compared to right. Both leaking serosanguineous when his fluid but much worse on the left. There is small eschar on the left 2nd toe. Neurologic: GCS 15,  Psych: Normal Affect    EKG: This EKG is signed and interpreted by me.     Rate: 74  Rhythm: Sinus  Interpretation: NSR, normal axis, PVC, Non Specific ST Changes    Comparison: no previous EKG available    ------------------------------ ED COURSE/MEDICAL DECISION MAKING----------------------  Medications   doxycycline (VIBRAMYCIN) 100 mg in dextrose 5 % 100 mL IVPB (0 mg Intravenous Stopped 5/6/19 2238)   bacitracin zinc ointment ( Topical Given 5/6/19 2134)         Medical Decision Making:    Workup reassuring, we'll give dose of IV antibiotics, started on oral antibiotics, he is referred to wound care,    Counseling: The emergency provider has spoken with the patient and discussed todays results, in addition to providing specific details for the plan of care and counseling regarding the diagnosis and prognosis. Questions are answered at this time and they are agreeable with the plan.      --------------------------------- IMPRESSION AND DISPOSITION ---------------------------------    IMPRESSION  1. Leg edema    2. Cellulitis of left lower extremity    3.  Venous stasis dermatitis of both lower extremities        DISPOSITION  Disposition: Discharge to home  Patient condition is stable                 Mathew Alvarez DO  05/06/19 [id number]

## 2019-05-06 NOTE — LETTER
[facility] Emergency Department  [address]  [place] [address]  Phone: [phone]               May 6, 2019    Patient: Helen Bates   YOB: 1960   Date of Visit: 5/6/2019       To Whom It May Concern:    Jing Benitez was seen and treated in our emergency department on 5/6/2019. He may return to work on 5/8/19.       Sincerely,       Junior Patel RN         Signature:__________________________________

## 2019-05-07 LAB
EKG ATRIAL RATE: 74 BPM
EKG P AXIS: 55 DEGREES
EKG P-R INTERVAL: 172 MS
EKG Q-T INTERVAL: 400 MS
EKG QRS DURATION: 102 MS
EKG QTC CALCULATION (BAZETT): 444 MS
EKG R AXIS: 47 DEGREES
EKG T AXIS: 55 DEGREES
EKG VENTRICULAR RATE: 74 BPM

## 2019-05-09 ENCOUNTER — OFFICE VISIT (OUTPATIENT)
Dept: ORTHOPEDIC SURGERY | Age: 59
End: 2019-05-09
Payer: COMMERCIAL

## 2019-05-09 VITALS — HEIGHT: 76 IN | BODY MASS INDEX: 34.1 KG/M2 | WEIGHT: 280 LBS

## 2019-05-09 DIAGNOSIS — M65.4 RADIAL STYLOID TENOSYNOVITIS OF RIGHT HAND: ICD-10-CM

## 2019-05-09 DIAGNOSIS — M93.1 KIENBOCK'S AVASCULAR NECROSIS OF LUNATE, ADULT: Primary | ICD-10-CM

## 2019-05-09 LAB
ORGANISM: ABNORMAL
ORGANISM: ABNORMAL
WOUND/ABSCESS: ABNORMAL

## 2019-05-09 PROCEDURE — 99213 OFFICE O/P EST LOW 20 MIN: CPT | Performed by: ORTHOPAEDIC SURGERY

## 2019-05-09 PROCEDURE — G8427 DOCREV CUR MEDS BY ELIG CLIN: HCPCS | Performed by: ORTHOPAEDIC SURGERY

## 2019-05-09 PROCEDURE — 1036F TOBACCO NON-USER: CPT | Performed by: ORTHOPAEDIC SURGERY

## 2019-05-09 PROCEDURE — G8417 CALC BMI ABV UP PARAM F/U: HCPCS | Performed by: ORTHOPAEDIC SURGERY

## 2019-05-09 PROCEDURE — 3017F COLORECTAL CA SCREEN DOC REV: CPT | Performed by: ORTHOPAEDIC SURGERY

## 2019-05-09 RX ORDER — CYCLOBENZAPRINE HCL 10 MG
10 TABLET ORAL 2 TIMES DAILY PRN
Qty: 30 TABLET | Refills: 0 | Status: SHIPPED | OUTPATIENT
Start: 2019-05-09 | End: 2019-05-23

## 2019-05-09 RX ORDER — HYDROCODONE BITARTRATE AND ACETAMINOPHEN 10; 325 MG/1; MG/1
1 TABLET ORAL DAILY PRN
Qty: 28 TABLET | Refills: 0 | Status: SHIPPED | OUTPATIENT
Start: 2019-05-09 | End: 2019-05-10 | Stop reason: SDUPTHER

## 2019-05-09 NOTE — PROGRESS NOTES
Chief Complaint   Patient presents with    Follow-up     follow up from right wrist [facility] 6/15/11, patient states he has pain that goes from wrist to elbow. Sung Jimenes is a 62y.o. year old male who presents for follow up of right wrist.  He states he has good days and bad. He reports he is overall the same as he has been. He is doing well with current medication regimen of celebrex, flexeril and norco.     Past Medical History:   Diagnosis Date    Cellulitis     GERD (gastroesophageal reflux disease)     Hyperlipidemia     Hypertension     Osteoarthritis      Past Surgical History:   Procedure Laterality Date    FOOT SURGERY      OTHER SURGICAL HISTORY Right 01-25-13    right wrist 1st dorsal compartment release    SHOULDER ARTHROSCOPY      TONSILLECTOMY      VASECTOMY         Current Outpatient Medications:     doxycycline monohydrate (MONODOX) 100 MG capsule, Take 1 capsule by mouth 2 times daily for 10 days, Disp: 20 capsule, Rfl: 0    nystatin (MYCOSTATIN) POWD powder, Apply powder to toes of left foot twice daily for 21 days.  Dispense QS no refills, Disp: 1 each, Rfl: 0    cyclobenzaprine (FLEXERIL) 10 MG tablet, Take 1 tablet by mouth 2 times daily as needed for Muscle spasms, Disp: 30 tablet, Rfl: 0    celecoxib (CELEBREX) 200 MG capsule, Take 1 capsule by mouth 2 times daily, Disp: 60 capsule, Rfl: 3    hydrochlorothiazide (HYDRODIURIL) 25 MG tablet, Take 1 tablet by mouth daily, Disp: 30 tablet, Rfl: 0    Glucosamine-Chondroit-Vit C-Mn (GLUCOSAMINE 1500 COMPLEX PO), Take 1 tablet by mouth daily, Disp: , Rfl:     diltiazem (DILACOR XR) 240 MG extended release capsule, Take 240 mg by mouth daily, Disp: , Rfl:     Turmeric 500 MG TABS, Take 500 mg by mouth daily, Disp: , Rfl:     magnesium oxide (MAG-OX) 400 MG tablet, Take 400 mg by mouth daily, Disp: , Rfl:     omeprazole (PRILOSEC) 20 MG capsule, Take 20 mg by mouth Daily , Disp: , Rfl:     irbesartan (AVAPRO) 300 MG tablet, Take 300 mg by mouth daily. , Disp: , Rfl:   Allergies   Allergen Reactions    Daypro [Oxaprozin] Other (See Comments)     constipation     Social History     Socioeconomic History    Marital status:      Spouse name: Not on file    Number of children: Not on file    Years of education: Not on file    Highest education level: Not on file   Occupational History    Not on file   Social Needs    Financial resource strain: Not on file    Food insecurity:     Worry: Not on file     Inability: Not on file    Transportation needs:     Medical: Not on file     Non-medical: Not on file   Tobacco Use    Smoking status: Never Smoker    Smokeless tobacco: Never Used   Substance and Sexual Activity    Alcohol use: No    Drug use: No    Sexual activity: Not on file   Lifestyle    Physical activity:     Days per week: Not on file     Minutes per session: Not on file    Stress: Not on file   Relationships    Social connections:     Talks on phone: Not on file     Gets together: Not on file     Attends Mormon service: Not on file     Active member of club or organization: Not on file     Attends meetings of clubs or organizations: Not on file     Relationship status: Not on file    Intimate partner violence:     Fear of current or ex partner: Not on file     Emotionally abused: Not on file     Physically abused: Not on file     Forced sexual activity: Not on file   Other Topics Concern    Not on file   Social History Narrative    Not on file     Family History   Problem Relation Age of Onset    Asthma Mother     Cancer Mother     Emphysema Mother     Heart Disease Father     Alzheimer's Disease Father        REVIEW OF SYSTEMS:    General/Constitution:  (-)weight loss, (-)fever, (-)chills, (-)weakness. Skin: (-) rash,(-) psoriasis,(-) eczema, (-)skin cancer.    Musculoskeletal: (-) fractures,  (-) dislocations,(-) collagen vascular disease, (-) fibromyalgia, (-) multiple sclerosis, (-) muscular dystrophy, (-) RSD,(-) joint pain (-)swelling, (-) joint pain,swelling. Neurologic: (-) epilepsy, (-)seizures,(-) brain tumor,(-) TIA, (-)stroke, (-)headaches, (-)Parkinson disease,(-) memory loss, (-) LOC. Cardiovascular: (-) Chest pain, (-) swelling in legs/feet, (-) SOB, (-) cramping in legs/feet with walking. Respiratory: (-) SOB, (-) Coughing, (-) night sweats. GI: (-) nausea, (-) vomiting, (-) diarrhea, (-) blood in stool, (-) gastric ulcer. Psychiatric: (-) Depression, (-) Anxiety, (-) bipolar disease, (-) Alzheimer's Disease  Allergic/Immunologic: (-) allergies latex, (-) allergies metal, (-) skin sensitivity. Hematlogic: (-) anemia, (-) blood transfusion, (-) DVT/PE, (-) Clotting disorders    SUBJECTIVE:      Constitutional:    The patient is alert and oriented x 3, appears to be stated age and in no distress. Ht. 6 ft 4 in., Wt. 285 lbs. Skin:    Upon inspection: the skin appears warm, dry and intact. There is not a previous scar over the affected area. There is not any cellulitis, lymphedema or cutaneous lesions noted in the lower extremities. Upon palpation there is no induration noted. Neurologic:    Gait: antalgic; Motor exam of the upper extremities show: The reflexes in biceps/triceps/brachioradialis are equal and symmetric. Sensory exam C5-T1 are normal bilaterally. Cardiovascular: The vascular exam is normal and is well perfused to distal extremities. There are 2+ radial pulses bilaterally, and motor and sensation is intact to median, ulnar, and radial, musclocutaneus, and axillary nerve distribution and grossly symmetric bilaterally. There is cap refill noted less than two seconds in all digits. There is not edema of the bilateral upper extremities. There is  varicosities noted in the distal extremities. Lymph:    Upon palpation,  there is no lymphadenopathy noted in bilateral upper extremities.       Musculoskeletal:  Gait: antalgic; examination of flexion WNL/ extension WNL, DIP flexion WNL/ extension WNL. There is not rotational deformity. There is no masses or adenopathy in bilateral upper extremities. Radial pulses are 2+ and symmetric bilaterally. Capillary refill is intact and < 2 seconds. Motor strength is 5/5 with flexion and extension of the small finger joints. Phallens sign(-), Tinnells sign (-), Median nerve compression test (-),  Finklesteins (-), CMC Grind test (-), Piano Key Test(+). Xrays:   Not performed today. Radiographic findings reviewed with patient    Impression:   Encounter Diagnoses   Name Primary?  Kienbock's avascular necrosis of lunate, adult Yes    Radial styloid tenosynovitis of right hand        Plan: Natural history and expected course discussed. Questions answered. Educational materials distributed. He will continue with activities as tolerated. Celebrex, and norco escribed  I will see him back in 3 months.

## 2019-05-10 DIAGNOSIS — M65.4 RADIAL STYLOID TENOSYNOVITIS OF RIGHT HAND: ICD-10-CM

## 2019-05-10 DIAGNOSIS — M93.1 KIENBOCK'S AVASCULAR NECROSIS OF LUNATE, ADULT: ICD-10-CM

## 2019-05-10 RX ORDER — HYDROCODONE BITARTRATE AND ACETAMINOPHEN 10; 325 MG/1; MG/1
1 TABLET ORAL DAILY PRN
Qty: 30 TABLET | Refills: 0 | Status: SHIPPED | OUTPATIENT
Start: 2019-05-10 | End: 2019-06-11 | Stop reason: SDUPTHER

## 2019-05-11 LAB — BLOOD CULTURE, ROUTINE: NORMAL

## 2019-05-12 LAB — CULTURE, BLOOD 2: NORMAL

## 2019-05-23 ENCOUNTER — OFFICE VISIT (OUTPATIENT)
Dept: ORTHOPEDIC SURGERY | Age: 59
End: 2019-05-23
Payer: COMMERCIAL

## 2019-05-23 VITALS — WEIGHT: 285 LBS | HEIGHT: 76 IN | BODY MASS INDEX: 34.7 KG/M2

## 2019-05-23 DIAGNOSIS — M17.12 PRIMARY OSTEOARTHRITIS OF LEFT KNEE: ICD-10-CM

## 2019-05-23 DIAGNOSIS — M17.11 PRIMARY OSTEOARTHRITIS OF RIGHT KNEE: Primary | ICD-10-CM

## 2019-05-23 PROCEDURE — G8427 DOCREV CUR MEDS BY ELIG CLIN: HCPCS | Performed by: ORTHOPAEDIC SURGERY

## 2019-05-23 PROCEDURE — 3017F COLORECTAL CA SCREEN DOC REV: CPT | Performed by: ORTHOPAEDIC SURGERY

## 2019-05-23 PROCEDURE — 99213 OFFICE O/P EST LOW 20 MIN: CPT | Performed by: ORTHOPAEDIC SURGERY

## 2019-05-23 PROCEDURE — G8417 CALC BMI ABV UP PARAM F/U: HCPCS | Performed by: ORTHOPAEDIC SURGERY

## 2019-05-23 PROCEDURE — 1036F TOBACCO NON-USER: CPT | Performed by: ORTHOPAEDIC SURGERY

## 2019-05-23 NOTE — PROGRESS NOTES
Chief Complaint   Patient presents with    Follow-up     follow up from bilateral knee pain, patient wants to discuss surgery. Subjective:     Patient ID: Jd Gonzales is a 62 y.o..  male    Knee Pain  Patient complains of bilateral knee pain. This is evaluated as a personal injury. There was not a history of injury. The pain began 6 months ago. The pain is located global. He describes his symptoms as aching and throbbing. He has experienced popping, clicking, locking, and giving way in the affected knee. The patient has had pain with kneeling, squating, and climbing stairs. Symptoms improve with rest. The symptoms are worse with activity, stair climbing, kneeling. The knee has given out or felt unstable. The patient cannot bend and straighten the knee fully. The patient is active in none. Treatment to date has been ice, NSAID's, CSI without significant relief. The patient is working. The patients occupation is a . He would like to discuss surgery. Past Medical History:   Diagnosis Date    Cellulitis     GERD (gastroesophageal reflux disease)     Hyperlipidemia     Hypertension     Osteoarthritis      Past Surgical History:   Procedure Laterality Date    FOOT SURGERY      OTHER SURGICAL HISTORY Right 01-25-13    right wrist 1st dorsal compartment release    SHOULDER ARTHROSCOPY      TONSILLECTOMY      VASECTOMY         Current Outpatient Medications:     HYDROcodone-acetaminophen (NORCO)  MG per tablet, Take 1 tablet by mouth daily as needed for Pain for up to 30 days. , Disp: 30 tablet, Rfl: 0    nystatin (MYCOSTATIN) POWD powder, Apply powder to toes of left foot twice daily for 21 days.  Dispense QS no refills, Disp: 1 each, Rfl: 0    cyclobenzaprine (FLEXERIL) 10 MG tablet, Take 1 tablet by mouth 2 times daily as needed for Muscle spasms, Disp: 30 tablet, Rfl: 0    celecoxib (CELEBREX) 200 MG capsule, Take 1 capsule by mouth 2 times daily, Disp: 60 capsule, Rfl: 3    hydrochlorothiazide (HYDRODIURIL) 25 MG tablet, Take 1 tablet by mouth daily, Disp: 30 tablet, Rfl: 0    Glucosamine-Chondroit-Vit C-Mn (GLUCOSAMINE 1500 COMPLEX PO), Take 1 tablet by mouth daily, Disp: , Rfl:     diltiazem (DILACOR XR) 240 MG extended release capsule, Take 240 mg by mouth daily, Disp: , Rfl:     Turmeric 500 MG TABS, Take 500 mg by mouth daily, Disp: , Rfl:     magnesium oxide (MAG-OX) 400 MG tablet, Take 400 mg by mouth daily, Disp: , Rfl:     omeprazole (PRILOSEC) 20 MG capsule, Take 20 mg by mouth Daily , Disp: , Rfl:     irbesartan (AVAPRO) 300 MG tablet, Take 300 mg by mouth daily. , Disp: , Rfl:   Allergies   Allergen Reactions    Daypro [Oxaprozin] Other (See Comments)     constipation     Social History     Socioeconomic History    Marital status:      Spouse name: Not on file    Number of children: Not on file    Years of education: Not on file    Highest education level: Not on file   Occupational History    Not on file   Social Needs    Financial resource strain: Not on file    Food insecurity:     Worry: Not on file     Inability: Not on file    Transportation needs:     Medical: Not on file     Non-medical: Not on file   Tobacco Use    Smoking status: Never Smoker    Smokeless tobacco: Never Used   Substance and Sexual Activity    Alcohol use: No    Drug use: No    Sexual activity: Not on file   Lifestyle    Physical activity:     Days per week: Not on file     Minutes per session: Not on file    Stress: Not on file   Relationships    Social connections:     Talks on phone: Not on file     Gets together: Not on file     Attends Adventism service: Not on file     Active member of club or organization: Not on file     Attends meetings of clubs or organizations: Not on file     Relationship status: Not on file    Intimate partner violence:     Fear of current or ex partner: Not on file     Emotionally abused: Not on file     Physically abused: Not on file     Forced sexual activity: Not on file   Other Topics Concern    Not on file   Social History Narrative    Not on file     Family History   Problem Relation Age of Onset    Asthma Mother     Cancer Mother     Emphysema Mother     Heart Disease Father     Alzheimer's Disease Father          REVIEW OF SYSTEMS:     General/Constitution:  (-)weight loss, (-)fever, (-)chills, (-)weakness. Skin: (-) rash,(-) psoriasis,(-) eczema, (-)skin cancer. Musculoskeletal: (-) fractures,  (-) dislocations,(-) collagen vascular disease, (-) fibromyalgia, (-) multiple sclerosis, (-) muscular dystrophy, (-) RSD,(-) joint pain (-)swelling, (-) joint pain,swelling. Neurologic: (-) epilepsy, (-)seizures,(-) brain tumor,(-) TIA, (-)stroke, (-)headaches, (-)Parkinson disease,(-) memory loss, (-) LOC. Cardiovascular: (-) Chest pain, (-) swelling in legs/feet, (-) SOB, (-) cramping in legs/feet with walking. Respiratory: (-) SOB, (-) Coughing, (-) night sweats. GI: (-) nausea, (-) vomiting, (-) diarrhea, (-) blood in stool, (-) gastric ulcer. Psychiatric: (-) Depression, (-) Anxiety, (-) bipolar disease, (-) Alzheimer's Disease  Allergic/Immunologic: (-) allergies latex, (-) allergies metal, (-) skin sensitivity. Hematlogic: (-) anemia, (-) blood transfusion, (-) DVT/PE, (-) Clotting disorders      Subjective:    Constitution:    Ht. 6 ft 4 in. , Wt. 280 lbs. Psycihatric:    The patient is alert and oriented x 3, appears to be stated age and in no distress. Respiratory:    Respiratory effort is not labored. Patient is not gasping. Palpation of the chest reveals no tactile fremitus. Skin:    Upon inspection: the skin appears warm, dry and intact. There is  a previous scar over the affected area. There is any cellulitis, lymphedema or cutaneous lesions noted in the lower extremities. Upon palpation there is no induration noted. Neurologic:    Gait: antalgic;   Motor exam of the lower extremities show ; quadriceps, hamstrings, foot dorsi and plantar flexors intact R.  5/5 and L. 5/5. Deep tendon reflexes are 2/4 at the knees and 2/4 at the ankles with strong extensor hallicus longus motor strength bilaterally. Sensory to both feet is intact to all sensory roots,. Cardiovascular: The vascular exam is normal and is well perfused to distal extremities. Distal pulses DP/PT: R. 2+; L. 2+. There is cap refill noted less than two seconds in all digits. There is not edema of the bilateral lower extremities. There is not varicosities noted in the distal extremities. Lymph:    Upon palpation,  there is no lymphadenopathy noted in bilateral lower extremities. Musculoskeletal:      Gait: antalgic; examination of the nails and digits reveal no cyanosis or clubbing. Lumbar exam:    On visual inspection, there is not deformity of the spine. full range of motion, no tenderness, palpable spasm or pain on motion. Special tests: Straight Leg Raise negative, Rico test negative. Hip exam-     Upon inspection, there is not deformity noted. Upon palpation there is not tenderness. ROM: is  full and symmetrical.   Strength: Hip Flexors 5/5; Hip Abductors 5/5; Hip Adduction 5/5. Knee exam    Right knee exam shows;  range of motion of R. Knee is -5 to 115, and L. Knee is 0 to 115. The patient does have  pain on motion, effusion is severe, there is tenderness over the  lateral, anterior region, there are not any masses, there is not ligamentous instability, there is  deformity noted. Knee exam: right positive for moderate crepitations, some mild tenderness laxity is not noted with stress.       R. Knee:  Lachman's negative, Anterior Drawer negative, Posterior Drawer negative  Elijah's positive, Thallasy  positive,   PF grind test positive, Apprehension test negative, Patellar J sign  negative  L. Knee:  Lachman's negative, Anterior Drawer negative, Posterior Drawer

## 2019-06-11 ENCOUNTER — TELEPHONE (OUTPATIENT)
Dept: ORTHOPEDIC SURGERY | Age: 59
End: 2019-06-11

## 2019-06-11 DIAGNOSIS — M65.4 RADIAL STYLOID TENOSYNOVITIS OF RIGHT HAND: ICD-10-CM

## 2019-06-11 DIAGNOSIS — M93.1 KIENBOECK DISEASE OF ADULTS: ICD-10-CM

## 2019-06-11 RX ORDER — CYCLOBENZAPRINE HCL 10 MG
10 TABLET ORAL 2 TIMES DAILY PRN
Qty: 30 TABLET | Refills: 0 | Status: SHIPPED | OUTPATIENT
Start: 2019-06-11 | End: 2019-08-13 | Stop reason: SDUPTHER

## 2019-06-11 RX ORDER — HYDROCODONE BITARTRATE AND ACETAMINOPHEN 10; 325 MG/1; MG/1
1 TABLET ORAL DAILY PRN
Qty: 30 TABLET | Refills: 0 | Status: SHIPPED | OUTPATIENT
Start: 2019-06-11 | End: 2019-06-21 | Stop reason: SDUPTHER

## 2019-06-11 NOTE — TELEPHONE ENCOUNTER
.Patient called in requesting refill of   :  HYDROcodone-acetaminophen ([facility])  MG per tablet [[id number]]      Pharmacy:  [address] [place] [address] Avenue          :  cyclobenzaprine (FLEXERIL) 10 MG tablet [[id number]]      Pharmacy:  [id number] - [address]

## 2019-06-13 ENCOUNTER — HOSPITAL ENCOUNTER (OUTPATIENT)
Dept: WOUND CARE | Age: 59
Discharge: HOME | End: 2019-06-13
Payer: COMMERCIAL

## 2019-06-13 VITALS
HEART RATE: 62 BPM | HEIGHT: 76 IN | RESPIRATION RATE: 16 BRPM | BODY MASS INDEX: 34.7 KG/M2 | SYSTOLIC BLOOD PRESSURE: 130 MMHG | WEIGHT: 285 LBS | TEMPERATURE: 97.5 F | DIASTOLIC BLOOD PRESSURE: 76 MMHG

## 2019-06-13 DIAGNOSIS — I87.8 VENOUS STASIS OF LOWER EXTREMITY: ICD-10-CM

## 2019-06-13 DIAGNOSIS — L97.922 NON-PRESSURE CHRONIC ULCER OF LEFT LOWER LEG WITH FAT LAYER EXPOSED (HCC): ICD-10-CM

## 2019-06-13 PROCEDURE — 99213 OFFICE O/P EST LOW 20 MIN: CPT

## 2019-06-13 PROCEDURE — 11042 DBRDMT SUBQ TIS 1ST 20SQCM/<: CPT

## 2019-06-13 RX ORDER — LIDOCAINE HYDROCHLORIDE 20 MG/ML
JELLY TOPICAL ONCE
Status: DISCONTINUED | OUTPATIENT
Start: 2019-06-13 | End: 2019-06-14 | Stop reason: HOSPADM

## 2019-06-13 NOTE — PROGRESS NOTES
[facility]  History and Physical/Consultation  Podiatry    Referring Physician : Ashley Murillo MD  [address] [place] [address] RECORD NUMBER:  [id number]  AGE: 62 y.o. GENDER: male  : 1960  EPISODE DATE:  2019  Subjective:     Chief Complaint   Patient presents with    Wound Check     lt leg          HISTORY of PRESENT ILLNESS HPI     Mark Kelly is a 62 y.o. male who presents today for wound/ulcer evaluation. History of Wound Context:  The patient has had a wound of left leg which was first noted approximately May 6th of 2019. This has been treated per his PCP with local wound care and doxycycline 100mg BID. The patient was first seen in the urgent care with concern of cellulitis of the left leg. He was placed on docycyline at this time. He then followed up with his family doctor and did a second course of abx. The ulcerations have persisted and he was referred to  Clinic. He is also seeing vascular for venous insufficiency. On their initial visit to the wound healing center, 19, the patient has noted that the wound has not been improving. The patient has had wounds similar to this in the past, on the right leg. Pt is currently not on abx. He has completed 2 courses of abx.       Wound/Ulcer Pain Timing/Severity: none  Quality of pain: N/A  Severity:  0 / 10   Modifying Factors: None  Associated Signs/Symptoms: none    Ulcer Identification:  Ulcer Type: venous  Contributing Factors: edema, venous stasis and lymphedema      Wound: partial thickness ulcerations LLE        PAST MEDICAL HISTORY      Diagnosis Date    Cellulitis     GERD (gastroesophageal reflux disease)     Hyperlipidemia     Hypertension     Osteoarthritis      Past Surgical History:   Procedure Laterality Date    FOOT SURGERY      OTHER SURGICAL HISTORY Right 13    right wrist 1st dorsal compartment release    SHOULDER ARTHROSCOPY      TONSILLECTOMY      VASECTOMY       Family History Problem Relation Age of Onset    Asthma Mother     Cancer Mother     Emphysema Mother     Heart Disease Father     Alzheimer's Disease Father      Social History     Tobacco Use    Smoking status: Never Smoker    Smokeless tobacco: Never Used   Substance Use Topics    Alcohol use: No    Drug use: No     Allergies   Allergen Reactions    Daypro [Oxaprozin] Other (See Comments)     constipation     Current Outpatient Medications on File Prior to Encounter   Medication Sig Dispense Refill    HYDROcodone-acetaminophen (NORCO)  MG per tablet Take 1 tablet by mouth daily as needed for Pain for up to 30 days. 30 tablet 0    cyclobenzaprine (FLEXERIL) 10 MG tablet Take 1 tablet by mouth 2 times daily as needed for Muscle spasms 30 tablet 0    nystatin (MYCOSTATIN) POWD powder Apply powder to toes of left foot twice daily for 21 days. Dispense QS no refills 1 each 0    celecoxib (CELEBREX) 200 MG capsule Take 1 capsule by mouth 2 times daily 60 capsule 3    Glucosamine-Chondroit-Vit C-Mn (GLUCOSAMINE 1500 COMPLEX PO) Take 1 tablet by mouth daily      diltiazem (DILACOR XR) 240 MG extended release capsule Take 240 mg by mouth daily      Turmeric 500 MG TABS Take 500 mg by mouth daily      omeprazole (PRILOSEC) 20 MG capsule Take 20 mg by mouth Daily       irbesartan (AVAPRO) 300 MG tablet Take 300 mg by mouth daily. No current facility-administered medications on file prior to encounter.         REVIEW OF SYSTEMS   ROS : All others Negative if blank [], Positive if [x]  General Vascular   [] Fevers [] Claudication   [] Chills [] Rest Pain   Skin Neurologic   [x] Tissue Loss [] Lower extremity neuropathy     Objective:    /76   Pulse 62   Temp 97.5 °F (36.4 °C) (Oral)   Resp 16   Ht 6' 4\" (1.93 m)   Wt 285 lb (129.3 kg)   BMI 34.69 kg/m²   Wt Readings from Last 3 Encounters:   06/13/19 285 lb (129.3 kg)   05/23/19 285 lb (129.3 kg)   05/09/19 280 lb (127 kg)       PHYSICAL EXAM CONSTITUTIONAL:   Awake, alert, cooperative   PSYCHIATRIC :  Oriented to time, place and person      normal insight to disease process  EXTREMITIES:   R LE Open wounds are not noted   Skin color is nearly normal without any significant findings   Edema is  Noted, pitting +2. Sensation intact to light touch   Palpation of the foot does not cause pain   5/5 strength DF/PF  L LE Open wounds are noted, lateral aspect of the LLE. The ulceration are partial thickness, with fibrotic tissue present in the wound bed. No purulent drainage, no mal odor, no ascending erythema LLE. No increased calor present LLE. Skin color is nearly normal without any significant findings   Edema is  Noted, pitting +2. 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     Performed by: Katarzyna Perez DPM    Consent obtained:  No:     Time out taken:  No:     Pain Control: Anesthetic  Anesthetic: 2% Lidocaine Gel Topical     Measurements:  Wound/Ulcer Descriptions are Pre Debridement except measurements:    Wound 06/13/19 Leg Lateral;Left;Lower #1 ACQUIRED 6-1-19 (Active)   Wound Image   6/13/2019  9:30 AM   Wound Venous 6/13/2019  9:30 AM   Wound Length (cm) 1.5 cm 6/13/2019  9:30 AM   Wound Width (cm) 1.4 cm 6/13/2019  9:30 AM   Wound Depth (cm) 0.1 cm 6/13/2019  9:30 AM   Wound Surface Area (cm^2) 2.1 cm^2 6/13/2019  9:30 AM   Wound Volume (cm^3) 0.21 cm^3 6/13/2019  9:30 AM   Post-Procedure Length (cm) 1.5 cm 6/13/2019  9:43 AM   Post-Procedure Width (cm) 1.4 cm 6/13/2019  9:43 AM   Post-Procedure Depth (cm) 0.1 cm 6/13/2019  9:43 AM   Post-Procedure Surface Area (cm^2) 2.1 cm^2 6/13/2019  9:43 AM   Post-Procedure Volume (cm^3) 0.21 cm^3 6/13/2019  9:43 AM   Wound Assessment Pink;Red 6/13/2019  9:30 AM   Drainage Amount Scant 6/13/2019  9:30 AM   Drainage Description Yellow 6/13/2019  9:30 AM   Odor None 6/13/2019  9:30 AM   Patricia-wound Assessment Fragile 6/13/2019  9:30 AM   Number of days: 0       Percent of Wound/Ulcer Debrided: 0%    Total Surface Area Debrided:  0 sq cm     Response to treatment:  Well tolerated by patient. A culture was not done. Plan:   Discussed lymphedema therapy with the pt. He is to have a procedure for the venous insufficiency later to day. He was informed that he does need to wear his compression stocking regularly to control the edema that is present b/l LE. He will be referred to [facility] for therapy to help control and manage the lymphedema that is present b/l LE. Will follow, xeroform to the ulcerations of the left leg. Pt has never been a smoker   - Discussed relationship of smoking and negative affects on wound healing   - Emphasized importance of tobacco avoidace/cessation   - no Script for nicotine patch given to patient   - no Information regarding support groups for smoking cessation given    In my professional opinion and based off the information that is available at this time this patient has appropriate indication for HBO Therapy: No    Treatment Note please see attached Discharge Instructions    Written patient dismissal instructions given to patient and signed by patient or POA.          Discharge Instructions       Visit Discharge/Physician Orders    Discharge condition: Stable    Assessment of pain at discharge: Minimal    Anesthetic used: 2% lidocaine    Discharge to: Home    Left via:Private automobile    Accompanied by: accompanied by self    ECF/HHA: N/A    Dressing Orders: Cleanse left leg wound with normal saline solution and apply xeroform to left leg wound. I will give you some xeroform to take with you to appt with Dr. Dontae Rico today and leave in place per his orders. Treatment Orders: Will consider lymphedema therapy. [address] [place] [address],[address] followup visit ____________one week_________________  (Please note your next appointment above and if you are unable to keep, kindly give a 24 hour notice. Thank you.)    Physician signature:__________________________      If you experience any of the following, please call the [address] during business hours:    * Increase in Pain  * Temperature over 101  * Increase in drainage from your wound  * Drainage with a foul odor  * Bleeding  * Increase in swelling  * Need for compression bandage changes due to slippage, breakthrough drainage. If you need medical attention outside of the business hours of the [address] please contact your PCP or go to the nearest emergency room.         Electronically signed by Valarie Hinojosa DPM on 6/13/2019 at 9:59 AM

## 2019-06-21 DIAGNOSIS — M93.1 KIENBOECK DISEASE OF ADULTS: ICD-10-CM

## 2019-06-21 DIAGNOSIS — M65.4 RADIAL STYLOID TENOSYNOVITIS OF RIGHT HAND: ICD-10-CM

## 2019-06-21 RX ORDER — HYDROCODONE BITARTRATE AND ACETAMINOPHEN 10; 325 MG/1; MG/1
1 TABLET ORAL DAILY PRN
Qty: 30 TABLET | Refills: 0 | Status: SHIPPED | OUTPATIENT
Start: 2019-06-21 | End: 2019-07-12 | Stop reason: SDUPTHER

## 2019-06-27 ENCOUNTER — OFFICE VISIT (OUTPATIENT)
Dept: ORTHOPEDIC SURGERY | Age: 59
End: 2019-06-27
Payer: COMMERCIAL

## 2019-06-27 VITALS — WEIGHT: 281 LBS | HEIGHT: 76 IN | BODY MASS INDEX: 34.22 KG/M2

## 2019-06-27 DIAGNOSIS — M17.12 PRIMARY OSTEOARTHRITIS OF LEFT KNEE: ICD-10-CM

## 2019-06-27 DIAGNOSIS — S83.411A COMPLETE TEAR OF MEDIAL COLLATERAL LIGAMENT OF RIGHT KNEE, INITIAL ENCOUNTER: Primary | ICD-10-CM

## 2019-06-27 DIAGNOSIS — M17.11 PRIMARY OSTEOARTHRITIS OF RIGHT KNEE: ICD-10-CM

## 2019-06-27 DIAGNOSIS — S83.412A COMPLETE TEAR OF MEDIAL COLLATERAL LIGAMENT OF LEFT KNEE, INITIAL ENCOUNTER: ICD-10-CM

## 2019-06-27 PROCEDURE — G8417 CALC BMI ABV UP PARAM F/U: HCPCS | Performed by: ORTHOPAEDIC SURGERY

## 2019-06-27 PROCEDURE — 99214 OFFICE O/P EST MOD 30 MIN: CPT | Performed by: ORTHOPAEDIC SURGERY

## 2019-06-27 PROCEDURE — G8427 DOCREV CUR MEDS BY ELIG CLIN: HCPCS | Performed by: ORTHOPAEDIC SURGERY

## 2019-06-27 PROCEDURE — 3017F COLORECTAL CA SCREEN DOC REV: CPT | Performed by: ORTHOPAEDIC SURGERY

## 2019-06-27 PROCEDURE — 1036F TOBACCO NON-USER: CPT | Performed by: ORTHOPAEDIC SURGERY

## 2019-06-27 NOTE — PROGRESS NOTES
celecoxib (CELEBREX) 200 MG capsule, Take 1 capsule by mouth 2 times daily, Disp: 60 capsule, Rfl: 3    Glucosamine-Chondroit-Vit C-Mn (GLUCOSAMINE 1500 COMPLEX PO), Take 1 tablet by mouth daily, Disp: , Rfl:     diltiazem (DILACOR XR) 240 MG extended release capsule, Take 240 mg by mouth daily, Disp: , Rfl:     Turmeric 500 MG TABS, Take 500 mg by mouth daily, Disp: , Rfl:     omeprazole (PRILOSEC) 20 MG capsule, Take 20 mg by mouth Daily , Disp: , Rfl:     irbesartan (AVAPRO) 300 MG tablet, Take 300 mg by mouth daily. , Disp: , Rfl:   Allergies   Allergen Reactions    Daypro [Oxaprozin] Other (See Comments)     constipation     Social History     Socioeconomic History    Marital status:      Spouse name: Not on file    Number of children: Not on file    Years of education: Not on file    Highest education level: Not on file   Occupational History    Not on file   Social Needs    Financial resource strain: Not on file    Food insecurity:     Worry: Not on file     Inability: Not on file    Transportation needs:     Medical: Not on file     Non-medical: Not on file   Tobacco Use    Smoking status: Never Smoker    Smokeless tobacco: Never Used   Substance and Sexual Activity    Alcohol use: No    Drug use: No    Sexual activity: Yes   Lifestyle    Physical activity:     Days per week: Not on file     Minutes per session: Not on file    Stress: Not on file   Relationships    Social connections:     Talks on phone: Not on file     Gets together: Not on file     Attends Confucianist service: Not on file     Active member of club or organization: Not on file     Attends meetings of clubs or organizations: Not on file     Relationship status: Not on file    Intimate partner violence:     Fear of current or ex partner: Not on file     Emotionally abused: Not on file     Physically abused: Not on file     Forced sexual activity: Not on file   Other Topics Concern    Not on file   Social History Narrative    Not on file     Family History   Problem Relation Age of Onset    Asthma Mother     Cancer Mother     Emphysema Mother     Heart Disease Father     Alzheimer's Disease Father          REVIEW OF SYSTEMS:     General/Constitution:  (-)weight loss, (-)fever, (-)chills, (-)weakness. Skin: (-) rash,(-) psoriasis,(-) eczema, (-)skin cancer. Musculoskeletal: (-) fractures,  (-) dislocations,(-) collagen vascular disease, (-) fibromyalgia, (-) multiple sclerosis, (-) muscular dystrophy, (-) RSD,(-) joint pain (-)swelling, (-) joint pain,swelling. Neurologic: (-) epilepsy, (-)seizures,(-) brain tumor,(-) TIA, (-)stroke, (-)headaches, (-)Parkinson disease,(-) memory loss, (-) LOC. Cardiovascular: (-) Chest pain, (-) swelling in legs/feet, (-) SOB, (-) cramping in legs/feet with walking. Respiratory: (-) SOB, (-) Coughing, (-) night sweats. GI: (-) nausea, (-) vomiting, (-) diarrhea, (-) blood in stool, (-) gastric ulcer. Psychiatric: (-) Depression, (-) Anxiety, (-) bipolar disease, (-) Alzheimer's Disease  Allergic/Immunologic: (-) allergies latex, (-) allergies metal, (-) skin sensitivity. Hematlogic: (-) anemia, (-) blood transfusion, (-) DVT/PE, (-) Clotting disorders    Subjective:    Constitution:  Ht 6' 4\" (1.93 m)   Wt 281 lb (127.5 kg)   BMI 34.20 kg/m²     Psycihatric:  The patient is alert and oriented x 3, appears to be stated age and in no distress. Respiratory:  Respiratory effort is not labored. Patient is not gasping. Palpation of the chest reveals no tactile fremitus. Skin:  Upon inspection: the skin appears warm, dry and intact. There is  a previous scar over the affected area. There is any cellulitis, lymphedema or cutaneous lesions noted in the lower extremities. Upon palpation there is no induration noted. Neurologic:  Gait: antalgic and using straight cane;   Motor exam of the lower extremities show ; quadriceps, hamstrings, foot dorsi and plantar flexors Thallasy  negative,   PF grind test negative, Apprehension test negative,  Patellar J sign  negative    Xray Exam:  Severe tricompartmental djd b/l knees with varus deformity    Radiographic findings reviewed with patient    Assessment:  Encounter Diagnoses   Name Primary?  Complete tear of medial collateral ligament of right knee, initial encounter Yes    Primary osteoarthritis of right knee     Primary osteoarthritis of left knee     Complete tear of medial collateral ligament of left knee, initial encounter        Plan:  Natural history and expected course discussed. Questions answered. Educational materials distributed. Rest, ice, compression, and elevation (RICE) therapy. Reduction in offending activity. We discussed various treatment options both surgical and non-surgical.   The patient is unable to ambulate more than 100 feet and is unable to perform the average daily activities including:  Light housework, ADLs, donning clothes, toileting and exercise. Patient has failed previous conservative measures including cortisone injections, NSAIDs, PT, HEP and pain medication and is currently a fall risk to the disability and decreased functioning. The patient wishes to have the total knee arthroplasty. The risks and benefits of a total knee replacement were discussed with the patient. The risks include but are not limited to: infection, injury to blood vessels and nerves, non relief of symptoms, arthrofibrosis of knee, aseptic loosening of prosthesis, intraoperative fracture, blood loss, PE/DVT, MI, dislocation of hip and knee, need for further operative intervention and death. The patient is aware of the risks and wished to proceed with a Bilateral total knee replacement 7/31/2019. We will obtain medical clearence from the PCP.

## 2019-07-05 DIAGNOSIS — M65.4 RADIAL STYLOID TENOSYNOVITIS OF RIGHT HAND: ICD-10-CM

## 2019-07-05 DIAGNOSIS — M65.4 RADIAL STYLOID TENOSYNOVITIS: ICD-10-CM

## 2019-07-05 DIAGNOSIS — M16.11 ARTHRITIS OF RIGHT HIP: ICD-10-CM

## 2019-07-05 RX ORDER — CELECOXIB 200 MG/1
200 CAPSULE ORAL 2 TIMES DAILY
Qty: 60 CAPSULE | Refills: 3 | Status: SHIPPED | OUTPATIENT
Start: 2019-07-05 | End: 2019-11-19 | Stop reason: SDUPTHER

## 2019-07-09 ENCOUNTER — HOSPITAL ENCOUNTER (OUTPATIENT)
Dept: MRI IMAGING | Age: 59
Discharge: HOME | End: 2019-07-11
Payer: COMMERCIAL

## 2019-07-09 DIAGNOSIS — S83.412A COMPLETE TEAR OF MEDIAL COLLATERAL LIGAMENT OF LEFT KNEE, INITIAL ENCOUNTER: ICD-10-CM

## 2019-07-09 DIAGNOSIS — S83.411A COMPLETE TEAR OF MEDIAL COLLATERAL LIGAMENT OF RIGHT KNEE, INITIAL ENCOUNTER: ICD-10-CM

## 2019-07-09 DIAGNOSIS — M17.12 PRIMARY OSTEOARTHRITIS OF LEFT KNEE: ICD-10-CM

## 2019-07-09 PROCEDURE — 73721 MRI JNT OF LWR EXTRE W/O DYE: CPT

## 2019-07-12 ENCOUNTER — TELEPHONE (OUTPATIENT)
Dept: ORTHOPEDIC SURGERY | Age: 59
End: 2019-07-12

## 2019-07-12 DIAGNOSIS — M93.1 KIENBOECK DISEASE OF ADULTS: ICD-10-CM

## 2019-07-12 DIAGNOSIS — M65.4 RADIAL STYLOID TENOSYNOVITIS OF RIGHT HAND: ICD-10-CM

## 2019-07-12 RX ORDER — HYDROCODONE BITARTRATE AND ACETAMINOPHEN 10; 325 MG/1; MG/1
1 TABLET ORAL DAILY PRN
Qty: 30 TABLET | Refills: 0 | Status: SHIPPED | OUTPATIENT
Start: 2019-07-12 | End: 2020-01-06 | Stop reason: SDUPTHER

## 2019-07-22 ENCOUNTER — HOSPITAL ENCOUNTER (OUTPATIENT)
Dept: PREADMISSION TESTING | Age: 59
Discharge: HOME | End: 2019-07-22
Payer: COMMERCIAL

## 2019-07-22 ENCOUNTER — HOSPITAL ENCOUNTER (OUTPATIENT)
Dept: GENERAL RADIOLOGY | Age: 59
Discharge: HOME | End: 2019-07-24
Payer: COMMERCIAL

## 2019-07-22 ENCOUNTER — ANESTHESIA EVENT (OUTPATIENT)
Dept: OPERATING ROOM | Age: 59
End: 2019-07-22
Payer: COMMERCIAL

## 2019-07-22 VITALS
DIASTOLIC BLOOD PRESSURE: 66 MMHG | OXYGEN SATURATION: 99 % | HEART RATE: 58 BPM | SYSTOLIC BLOOD PRESSURE: 114 MMHG | RESPIRATION RATE: 20 BRPM | BODY MASS INDEX: 34.81 KG/M2 | WEIGHT: 286 LBS | TEMPERATURE: 98.4 F

## 2019-07-22 DIAGNOSIS — Z01.818 PRE-OP TESTING: Primary | ICD-10-CM

## 2019-07-22 LAB
ABO/RH: NORMAL
ANION GAP SERPL CALCULATED.3IONS-SCNC: 9 MMOL/L (ref 7–16)
ANTIBODY SCREEN: NORMAL
APTT: 43.2 SEC (ref 24.5–35.1)
BILIRUBIN URINE: NEGATIVE
BLOOD, URINE: NEGATIVE
BUN BLDV-MCNC: 22 MG/DL (ref 6–20)
CALCIUM SERPL-MCNC: 8.9 MG/DL (ref 8.6–10.2)
CHLORIDE BLD-SCNC: 106 MMOL/L (ref 98–107)
CLARITY: CLEAR
CO2: 28 MMOL/L (ref 22–29)
COLOR: YELLOW
CREAT SERPL-MCNC: 1 MG/DL (ref 0.7–1.2)
GFR AFRICAN AMERICAN: >60
GFR NON-AFRICAN AMERICAN: >60 ML/MIN/1.73
GLUCOSE BLD-MCNC: 98 MG/DL (ref 74–99)
GLUCOSE URINE: NEGATIVE MG/DL
HCT VFR BLD CALC: 40.9 % (ref 37–54)
HEMOGLOBIN: 13.4 G/DL (ref 12.5–16.5)
INR BLD: 1
KETONES, URINE: NEGATIVE MG/DL
LEUKOCYTE ESTERASE, URINE: NEGATIVE
MCH RBC QN AUTO: 28.2 PG (ref 26–35)
MCHC RBC AUTO-ENTMCNC: 32.8 % (ref 32–34.5)
MCV RBC AUTO: 86.1 FL (ref 80–99.9)
NITRITE, URINE: NEGATIVE
PDW BLD-RTO: 13.1 FL (ref 11.5–15)
PH UA: 6 (ref 5–9)
PLATELET # BLD: 199 E9/L (ref 130–450)
PMV BLD AUTO: 11.5 FL (ref 7–12)
POTASSIUM REFLEX MAGNESIUM: 5 MMOL/L (ref 3.5–5)
PREALBUMIN: 26 MG/DL (ref 20–40)
PROTEIN UA: NEGATIVE MG/DL
PROTHROMBIN TIME: 11.6 SEC (ref 9.3–12.4)
RBC # BLD: 4.75 E12/L (ref 3.8–5.8)
SODIUM BLD-SCNC: 143 MMOL/L (ref 132–146)
SPECIFIC GRAVITY UA: 1.02 (ref 1–1.03)
UROBILINOGEN, URINE: 0.2 E.U./DL
WBC # BLD: 5.3 E9/L (ref 4.5–11.5)

## 2019-07-22 PROCEDURE — 86900 BLOOD TYPING SEROLOGIC ABO: CPT

## 2019-07-22 PROCEDURE — 80048 BASIC METABOLIC PNL TOTAL CA: CPT

## 2019-07-22 PROCEDURE — 87088 URINE BACTERIA CULTURE: CPT

## 2019-07-22 PROCEDURE — 71046 X-RAY EXAM CHEST 2 VIEWS: CPT

## 2019-07-22 PROCEDURE — 85730 THROMBOPLASTIN TIME PARTIAL: CPT

## 2019-07-22 PROCEDURE — 36415 COLL VENOUS BLD VENIPUNCTURE: CPT

## 2019-07-22 PROCEDURE — 84134 ASSAY OF PREALBUMIN: CPT

## 2019-07-22 PROCEDURE — 87081 CULTURE SCREEN ONLY: CPT

## 2019-07-22 PROCEDURE — 81003 URINALYSIS AUTO W/O SCOPE: CPT

## 2019-07-22 PROCEDURE — 85027 COMPLETE CBC AUTOMATED: CPT

## 2019-07-22 PROCEDURE — 85610 PROTHROMBIN TIME: CPT

## 2019-07-22 PROCEDURE — 86850 RBC ANTIBODY SCREEN: CPT

## 2019-07-22 PROCEDURE — 86901 BLOOD TYPING SEROLOGIC RH(D): CPT

## 2019-07-22 RX ORDER — FENTANYL CITRATE 50 UG/ML
50 INJECTION, SOLUTION INTRAMUSCULAR; INTRAVENOUS EVERY 10 MIN PRN
Status: DISCONTINUED | OUTPATIENT
Start: 2019-07-22 | End: 2019-07-23 | Stop reason: HOSPADM

## 2019-07-22 RX ORDER — ROPIVACAINE HYDROCHLORIDE 5 MG/ML
60 INJECTION, SOLUTION EPIDURAL; INFILTRATION; PERINEURAL
Status: CANCELLED | OUTPATIENT
Start: 2019-07-22 | End: 2019-07-22

## 2019-07-22 RX ORDER — ACETAMINOPHEN,DIPHENHYDRAMINE HCL 500; 25 MG/1; MG/1
1 TABLET, FILM COATED ORAL NIGHTLY PRN
COMMUNITY

## 2019-07-22 RX ORDER — SODIUM CHLORIDE, SODIUM LACTATE, POTASSIUM CHLORIDE, CALCIUM CHLORIDE 600; 310; 30; 20 MG/100ML; MG/100ML; MG/100ML; MG/100ML
INJECTION, SOLUTION INTRAVENOUS CONTINUOUS
Status: CANCELLED | OUTPATIENT
Start: 2019-07-31

## 2019-07-22 RX ORDER — MIDAZOLAM HYDROCHLORIDE 1 MG/ML
1 INJECTION INTRAMUSCULAR; INTRAVENOUS PRN
Status: CANCELLED | OUTPATIENT
Start: 2019-07-22

## 2019-07-22 ASSESSMENT — PAIN DESCRIPTION - LOCATION: LOCATION: KNEE

## 2019-07-22 ASSESSMENT — PAIN DESCRIPTION - ORIENTATION: ORIENTATION: RIGHT;LEFT

## 2019-07-22 ASSESSMENT — PAIN DESCRIPTION - DESCRIPTORS: DESCRIPTORS: BURNING

## 2019-07-22 ASSESSMENT — PAIN DESCRIPTION - ONSET: ONSET: ON-GOING

## 2019-07-22 ASSESSMENT — PAIN DESCRIPTION - FREQUENCY: FREQUENCY: CONTINUOUS

## 2019-07-22 ASSESSMENT — PAIN DESCRIPTION - PAIN TYPE: TYPE: CHRONIC PAIN

## 2019-07-22 ASSESSMENT — PAIN DESCRIPTION - PROGRESSION: CLINICAL_PROGRESSION: GRADUALLY WORSENING

## 2019-07-22 ASSESSMENT — PAIN SCALES - GENERAL: PAINLEVEL_OUTOF10: 8

## 2019-07-23 ENCOUNTER — HOSPITAL ENCOUNTER (OUTPATIENT)
Age: 59
Discharge: HOME | End: 2019-07-23
Payer: COMMERCIAL

## 2019-07-23 LAB
APTT: 42.7 SEC (ref 24.5–35.1)
MRSA CULTURE ONLY: NORMAL

## 2019-07-23 PROCEDURE — 85730 THROMBOPLASTIN TIME PARTIAL: CPT

## 2019-07-23 PROCEDURE — 36415 COLL VENOUS BLD VENIPUNCTURE: CPT

## 2019-07-27 LAB — URINE CULTURE, ROUTINE: NORMAL

## 2019-07-31 ENCOUNTER — APPOINTMENT (OUTPATIENT)
Dept: OPERATING ROOM | Age: 59
End: 2019-07-31
Attending: ORTHOPAEDIC SURGERY
Payer: COMMERCIAL

## 2019-07-31 ENCOUNTER — ANESTHESIA (OUTPATIENT)
Dept: OPERATING ROOM | Age: 59
End: 2019-07-31
Payer: COMMERCIAL

## 2019-07-31 ENCOUNTER — APPOINTMENT (OUTPATIENT)
Dept: GENERAL RADIOLOGY | Age: 59
End: 2019-07-31
Attending: ORTHOPAEDIC SURGERY
Payer: COMMERCIAL

## 2019-07-31 ENCOUNTER — HOSPITAL ENCOUNTER (OUTPATIENT)
Age: 59
Setting detail: OBSERVATION
Discharge: NURSING, REHAB OR LONG-TERM CARE | End: 2019-08-02
Attending: ORTHOPAEDIC SURGERY | Admitting: ORTHOPAEDIC SURGERY
Payer: COMMERCIAL

## 2019-07-31 VITALS
SYSTOLIC BLOOD PRESSURE: 144 MMHG | RESPIRATION RATE: 6 BRPM | OXYGEN SATURATION: 100 % | DIASTOLIC BLOOD PRESSURE: 72 MMHG

## 2019-07-31 DIAGNOSIS — M17.11 PRIMARY OSTEOARTHRITIS OF RIGHT KNEE: Primary | ICD-10-CM

## 2019-07-31 PROBLEM — Z96.659 S/P KNEE REPLACEMENT: Status: ACTIVE | Noted: 2019-07-31

## 2019-07-31 PROBLEM — M17.12 PRIMARY OSTEOARTHRITIS OF LEFT KNEE: Status: ACTIVE | Noted: 2019-07-31

## 2019-07-31 LAB
APTT: 38.4 SEC (ref 24.5–35.1)
INR BLD: 1.1
PROTHROMBIN TIME: 11.9 SEC (ref 9.3–12.4)

## 2019-07-31 PROCEDURE — 2580000003 HC RX 258: Performed by: ORTHOPAEDIC SURGERY

## 2019-07-31 PROCEDURE — 96360 HYDRATION IV INFUSION INIT: CPT

## 2019-07-31 PROCEDURE — 85610 PROTHROMBIN TIME: CPT

## 2019-07-31 PROCEDURE — 6370000000 HC RX 637 (ALT 250 FOR IP): Performed by: ANESTHESIOLOGY

## 2019-07-31 PROCEDURE — 6360000002 HC RX W HCPCS: Performed by: NURSE PRACTITIONER

## 2019-07-31 PROCEDURE — 97110 THERAPEUTIC EXERCISES: CPT | Performed by: PHYSICAL THERAPIST

## 2019-07-31 PROCEDURE — 6360000002 HC RX W HCPCS: Performed by: ORTHOPAEDIC SURGERY

## 2019-07-31 PROCEDURE — 2580000003 HC RX 258: Performed by: ANESTHESIOLOGY

## 2019-07-31 PROCEDURE — 7100000001 HC PACU RECOVERY - ADDTL 15 MIN: Performed by: ORTHOPAEDIC SURGERY

## 2019-07-31 PROCEDURE — 6360000002 HC RX W HCPCS: Performed by: ANESTHESIOLOGY

## 2019-07-31 PROCEDURE — 2720000010 HC SURG SUPPLY STERILE: Performed by: ORTHOPAEDIC SURGERY

## 2019-07-31 PROCEDURE — 3700000000 HC ANESTHESIA ATTENDED CARE: Performed by: ORTHOPAEDIC SURGERY

## 2019-07-31 PROCEDURE — 6360000002 HC RX W HCPCS: Performed by: REGISTERED NURSE

## 2019-07-31 PROCEDURE — C1713 ANCHOR/SCREW BN/BN,TIS/BN: HCPCS | Performed by: ORTHOPAEDIC SURGERY

## 2019-07-31 PROCEDURE — 2580000003 HC RX 258: Performed by: REGISTERED NURSE

## 2019-07-31 PROCEDURE — 3600000005 HC SURGERY LEVEL 5 BASE: Performed by: ORTHOPAEDIC SURGERY

## 2019-07-31 PROCEDURE — 73562 X-RAY EXAM OF KNEE 3: CPT

## 2019-07-31 PROCEDURE — 7100000000 HC PACU RECOVERY - FIRST 15 MIN: Performed by: ORTHOPAEDIC SURGERY

## 2019-07-31 PROCEDURE — 6360000002 HC RX W HCPCS

## 2019-07-31 PROCEDURE — C1776 JOINT DEVICE (IMPLANTABLE): HCPCS | Performed by: ORTHOPAEDIC SURGERY

## 2019-07-31 PROCEDURE — 2500000003 HC RX 250 WO HCPCS: Performed by: REGISTERED NURSE

## 2019-07-31 PROCEDURE — 36415 COLL VENOUS BLD VENIPUNCTURE: CPT

## 2019-07-31 PROCEDURE — 97161 PT EVAL LOW COMPLEX 20 MIN: CPT | Performed by: PHYSICAL THERAPIST

## 2019-07-31 PROCEDURE — 3600000015 HC SURGERY LEVEL 5 ADDTL 15MIN: Performed by: ORTHOPAEDIC SURGERY

## 2019-07-31 PROCEDURE — 2500000003 HC RX 250 WO HCPCS: Performed by: ORTHOPAEDIC SURGERY

## 2019-07-31 PROCEDURE — 97166 OT EVAL MOD COMPLEX 45 MIN: CPT

## 2019-07-31 PROCEDURE — 97530 THERAPEUTIC ACTIVITIES: CPT

## 2019-07-31 PROCEDURE — 88305 TISSUE EXAM BY PATHOLOGIST: CPT

## 2019-07-31 PROCEDURE — 88311 DECALCIFY TISSUE: CPT

## 2019-07-31 PROCEDURE — 6370000000 HC RX 637 (ALT 250 FOR IP): Performed by: ORTHOPAEDIC SURGERY

## 2019-07-31 PROCEDURE — 3700000001 HC ADD 15 MINUTES (ANESTHESIA): Performed by: ORTHOPAEDIC SURGERY

## 2019-07-31 PROCEDURE — 97530 THERAPEUTIC ACTIVITIES: CPT | Performed by: PHYSICAL THERAPIST

## 2019-07-31 PROCEDURE — 2709999900 HC NON-CHARGEABLE SUPPLY: Performed by: ORTHOPAEDIC SURGERY

## 2019-07-31 PROCEDURE — 96374 THER/PROPH/DIAG INJ IV PUSH: CPT

## 2019-07-31 PROCEDURE — 27447 TOTAL KNEE ARTHROPLASTY: CPT | Performed by: ORTHOPAEDIC SURGERY

## 2019-07-31 PROCEDURE — 64447 NJX AA&/STRD FEMORAL NRV IMG: CPT | Performed by: ANESTHESIOLOGY

## 2019-07-31 PROCEDURE — 2580000003 HC RX 258: Performed by: NURSE PRACTITIONER

## 2019-07-31 PROCEDURE — 85730 THROMBOPLASTIN TIME PARTIAL: CPT

## 2019-07-31 PROCEDURE — 96361 HYDRATE IV INFUSION ADD-ON: CPT

## 2019-07-31 DEVICE — JOURNEY 7.5 ROUND RESURF PAT 29MM STANDARD
Type: IMPLANTABLE DEVICE | Site: KNEE | Status: FUNCTIONAL
Brand: JOURNEY

## 2019-07-31 DEVICE — JOURNEY TIBIAL BASEPLATE NONPOROUS                                    LEFT SIZE 7
Type: IMPLANTABLE DEVICE | Site: KNEE | Status: FUNCTIONAL
Brand: JOURNEY

## 2019-07-31 DEVICE — JOURNEY II BCS XLPE ARTICULAR                                    INSERT SIZE 7-8 LEFT 11MM
Type: IMPLANTABLE DEVICE | Site: KNEE | Status: FUNCTIONAL
Brand: JOURNEY

## 2019-07-31 DEVICE — JOURNEY II BCS FEMORAL OXINIUM                                    RIGHT SIZE 7
Type: IMPLANTABLE DEVICE | Site: KNEE | Status: FUNCTIONAL
Brand: JOURNEY

## 2019-07-31 DEVICE — CEMENT BNE 20ML 40GM FULL DOSE PMMA W/O ANTIBIO M VISC: Type: IMPLANTABLE DEVICE | Site: KNEE | Status: FUNCTIONAL

## 2019-07-31 DEVICE — COMPONENT TOT KNEE CAPPED ADV OXIN NP JOURNEY II: Type: IMPLANTABLE DEVICE | Site: KNEE | Status: FUNCTIONAL

## 2019-07-31 DEVICE — JOURNEY II BCS FEMORAL OXINIUM                                    LEFT SIZE 7
Type: IMPLANTABLE DEVICE | Site: KNEE | Status: FUNCTIONAL
Brand: JOURNEY

## 2019-07-31 RX ORDER — ACETAMINOPHEN 500 MG
500 TABLET ORAL NIGHTLY PRN
Status: DISCONTINUED | OUTPATIENT
Start: 2019-07-31 | End: 2019-08-02 | Stop reason: HOSPADM

## 2019-07-31 RX ORDER — PROMETHAZINE HYDROCHLORIDE 25 MG/ML
6.25 INJECTION, SOLUTION INTRAMUSCULAR; INTRAVENOUS
Status: DISCONTINUED | OUTPATIENT
Start: 2019-07-31 | End: 2019-07-31 | Stop reason: HOSPADM

## 2019-07-31 RX ORDER — DOCUSATE SODIUM 100 MG/1
100 CAPSULE, LIQUID FILLED ORAL 2 TIMES DAILY
Status: DISCONTINUED | OUTPATIENT
Start: 2019-07-31 | End: 2019-08-02 | Stop reason: HOSPADM

## 2019-07-31 RX ORDER — CELECOXIB 100 MG/1
200 CAPSULE ORAL EVERY 12 HOURS
Status: DISCONTINUED | OUTPATIENT
Start: 2019-07-31 | End: 2019-08-02 | Stop reason: HOSPADM

## 2019-07-31 RX ORDER — NEOSTIGMINE METHYLSULFATE 1 MG/ML
INJECTION, SOLUTION INTRAVENOUS PRN
Status: DISCONTINUED | OUTPATIENT
Start: 2019-07-31 | End: 2019-07-31 | Stop reason: SDUPTHER

## 2019-07-31 RX ORDER — MIDAZOLAM HYDROCHLORIDE 1 MG/ML
INJECTION INTRAMUSCULAR; INTRAVENOUS
Status: COMPLETED
Start: 2019-07-31 | End: 2019-07-31

## 2019-07-31 RX ORDER — ACETAMINOPHEN 500 MG
1000 TABLET ORAL ONCE
Status: DISCONTINUED | OUTPATIENT
Start: 2019-07-31 | End: 2019-07-31 | Stop reason: SDUPTHER

## 2019-07-31 RX ORDER — HYDROMORPHONE HYDROCHLORIDE 1 MG/ML
0.5 INJECTION, SOLUTION INTRAMUSCULAR; INTRAVENOUS; SUBCUTANEOUS EVERY 5 MIN PRN
Status: DISCONTINUED | OUTPATIENT
Start: 2019-07-31 | End: 2019-07-31 | Stop reason: HOSPADM

## 2019-07-31 RX ORDER — PROPOFOL 10 MG/ML
INJECTION, EMULSION INTRAVENOUS PRN
Status: DISCONTINUED | OUTPATIENT
Start: 2019-07-31 | End: 2019-07-31 | Stop reason: SDUPTHER

## 2019-07-31 RX ORDER — LOSARTAN POTASSIUM 50 MG/1
100 TABLET ORAL DAILY
Status: DISCONTINUED | OUTPATIENT
Start: 2019-07-31 | End: 2019-08-02 | Stop reason: HOSPADM

## 2019-07-31 RX ORDER — GABAPENTIN 300 MG/1
600 CAPSULE ORAL ONCE
Status: COMPLETED | OUTPATIENT
Start: 2019-07-31 | End: 2019-07-31

## 2019-07-31 RX ORDER — SODIUM CHLORIDE 0.9 % (FLUSH) 0.9 %
10 SYRINGE (ML) INJECTION PRN
Status: DISCONTINUED | OUTPATIENT
Start: 2019-07-31 | End: 2019-07-31 | Stop reason: HOSPADM

## 2019-07-31 RX ORDER — ACETAMINOPHEN 325 MG/1
650 TABLET ORAL EVERY 4 HOURS PRN
Status: DISCONTINUED | OUTPATIENT
Start: 2019-07-31 | End: 2019-08-02 | Stop reason: HOSPADM

## 2019-07-31 RX ORDER — GABAPENTIN 100 MG/1
100 CAPSULE ORAL EVERY 8 HOURS
Status: DISCONTINUED | OUTPATIENT
Start: 2019-07-31 | End: 2019-08-02 | Stop reason: HOSPADM

## 2019-07-31 RX ORDER — KETOROLAC TROMETHAMINE 30 MG/ML
INJECTION, SOLUTION INTRAMUSCULAR; INTRAVENOUS PRN
Status: DISCONTINUED | OUTPATIENT
Start: 2019-07-31 | End: 2019-07-31 | Stop reason: SDUPTHER

## 2019-07-31 RX ORDER — CYCLOBENZAPRINE HCL 10 MG
10 TABLET ORAL 2 TIMES DAILY PRN
Status: DISCONTINUED | OUTPATIENT
Start: 2019-07-31 | End: 2019-08-02 | Stop reason: HOSPADM

## 2019-07-31 RX ORDER — DILTIAZEM HYDROCHLORIDE 240 MG/1
240 CAPSULE, COATED, EXTENDED RELEASE ORAL NIGHTLY
Status: DISCONTINUED | OUTPATIENT
Start: 2019-08-01 | End: 2019-08-02 | Stop reason: HOSPADM

## 2019-07-31 RX ORDER — DEXAMETHASONE SODIUM PHOSPHATE 10 MG/ML
8 INJECTION INTRAMUSCULAR; INTRAVENOUS ONCE
Status: COMPLETED | OUTPATIENT
Start: 2019-07-31 | End: 2019-07-31

## 2019-07-31 RX ORDER — SODIUM CHLORIDE, SODIUM LACTATE, POTASSIUM CHLORIDE, CALCIUM CHLORIDE 600; 310; 30; 20 MG/100ML; MG/100ML; MG/100ML; MG/100ML
INJECTION, SOLUTION INTRAVENOUS CONTINUOUS PRN
Status: DISCONTINUED | OUTPATIENT
Start: 2019-07-31 | End: 2019-07-31 | Stop reason: SDUPTHER

## 2019-07-31 RX ORDER — GABAPENTIN 300 MG/1
600 CAPSULE ORAL ONCE
Status: DISCONTINUED | OUTPATIENT
Start: 2019-07-31 | End: 2019-07-31 | Stop reason: SDUPTHER

## 2019-07-31 RX ORDER — SODIUM CHLORIDE, SODIUM LACTATE, POTASSIUM CHLORIDE, CALCIUM CHLORIDE 600; 310; 30; 20 MG/100ML; MG/100ML; MG/100ML; MG/100ML
INJECTION, SOLUTION INTRAVENOUS CONTINUOUS
Status: DISCONTINUED | OUTPATIENT
Start: 2019-07-31 | End: 2019-07-31

## 2019-07-31 RX ORDER — ROCURONIUM BROMIDE 10 MG/ML
INJECTION, SOLUTION INTRAVENOUS PRN
Status: DISCONTINUED | OUTPATIENT
Start: 2019-07-31 | End: 2019-07-31 | Stop reason: SDUPTHER

## 2019-07-31 RX ORDER — MEPERIDINE HYDROCHLORIDE 25 MG/ML
INJECTION INTRAMUSCULAR; INTRAVENOUS; SUBCUTANEOUS
Status: DISPENSED
Start: 2019-07-31 | End: 2019-08-01

## 2019-07-31 RX ORDER — ACETAMINOPHEN,DIPHENHYDRAMINE HCL 500; 25 MG/1; MG/1
1 TABLET, FILM COATED ORAL NIGHTLY PRN
Status: DISCONTINUED | OUTPATIENT
Start: 2019-07-31 | End: 2019-07-31 | Stop reason: CLARIF

## 2019-07-31 RX ORDER — MEPERIDINE HYDROCHLORIDE 50 MG/ML
12.5 INJECTION INTRAMUSCULAR; INTRAVENOUS; SUBCUTANEOUS EVERY 5 MIN PRN
Status: DISCONTINUED | OUTPATIENT
Start: 2019-07-31 | End: 2019-07-31 | Stop reason: HOSPADM

## 2019-07-31 RX ORDER — GLYCOPYRROLATE 1 MG/5 ML
SYRINGE (ML) INTRAVENOUS PRN
Status: DISCONTINUED | OUTPATIENT
Start: 2019-07-31 | End: 2019-07-31 | Stop reason: SDUPTHER

## 2019-07-31 RX ORDER — OXYCODONE HYDROCHLORIDE AND ACETAMINOPHEN 5; 325 MG/1; MG/1
1 TABLET ORAL EVERY 6 HOURS PRN
Qty: 28 TABLET | Refills: 0 | Status: SHIPPED | OUTPATIENT
Start: 2019-07-31 | End: 2019-08-07

## 2019-07-31 RX ORDER — ONDANSETRON 2 MG/ML
INJECTION INTRAMUSCULAR; INTRAVENOUS PRN
Status: DISCONTINUED | OUTPATIENT
Start: 2019-07-31 | End: 2019-07-31 | Stop reason: SDUPTHER

## 2019-07-31 RX ORDER — DIPHENHYDRAMINE HCL 25 MG
25 TABLET ORAL NIGHTLY PRN
Status: DISCONTINUED | OUTPATIENT
Start: 2019-07-31 | End: 2019-08-02 | Stop reason: HOSPADM

## 2019-07-31 RX ORDER — LIDOCAINE HYDROCHLORIDE 20 MG/ML
INJECTION, SOLUTION INTRAVENOUS PRN
Status: DISCONTINUED | OUTPATIENT
Start: 2019-07-31 | End: 2019-07-31 | Stop reason: SDUPTHER

## 2019-07-31 RX ORDER — PANTOPRAZOLE SODIUM 40 MG/1
40 TABLET, DELAYED RELEASE ORAL
Status: DISCONTINUED | OUTPATIENT
Start: 2019-08-01 | End: 2019-08-02 | Stop reason: HOSPADM

## 2019-07-31 RX ORDER — OXYCODONE HCL 10 MG/1
10 TABLET, FILM COATED, EXTENDED RELEASE ORAL ONCE
Status: COMPLETED | OUTPATIENT
Start: 2019-07-31 | End: 2019-07-31

## 2019-07-31 RX ORDER — ACETAMINOPHEN 500 MG
1000 TABLET ORAL ONCE
Status: COMPLETED | OUTPATIENT
Start: 2019-07-31 | End: 2019-07-31

## 2019-07-31 RX ORDER — CELECOXIB 100 MG/1
200 CAPSULE ORAL ONCE
Status: COMPLETED | OUTPATIENT
Start: 2019-07-31 | End: 2019-07-31

## 2019-07-31 RX ORDER — VANCOMYCIN HYDROCHLORIDE 1 G/20ML
INJECTION, POWDER, LYOPHILIZED, FOR SOLUTION INTRAVENOUS PRN
Status: DISCONTINUED | OUTPATIENT
Start: 2019-07-31 | End: 2019-07-31 | Stop reason: ALTCHOICE

## 2019-07-31 RX ORDER — SODIUM CHLORIDE 0.9 % (FLUSH) 0.9 %
10 SYRINGE (ML) INJECTION EVERY 12 HOURS SCHEDULED
Status: DISCONTINUED | OUTPATIENT
Start: 2019-07-31 | End: 2019-07-31 | Stop reason: HOSPADM

## 2019-07-31 RX ORDER — FENTANYL CITRATE 50 UG/ML
INJECTION, SOLUTION INTRAMUSCULAR; INTRAVENOUS PRN
Status: DISCONTINUED | OUTPATIENT
Start: 2019-07-31 | End: 2019-07-31 | Stop reason: SDUPTHER

## 2019-07-31 RX ORDER — CHLORHEXIDINE GLUCONATE 4 G/100ML
SOLUTION TOPICAL
Status: DISCONTINUED | OUTPATIENT
Start: 2019-07-31 | End: 2019-07-31 | Stop reason: HOSPADM

## 2019-07-31 RX ORDER — BISACODYL 10 MG
10 SUPPOSITORY, RECTAL RECTAL PRN
Status: DISCONTINUED | OUTPATIENT
Start: 2019-07-31 | End: 2019-08-02 | Stop reason: HOSPADM

## 2019-07-31 RX ORDER — SODIUM CHLORIDE 9 MG/ML
INJECTION, SOLUTION INTRAVENOUS CONTINUOUS
Status: DISCONTINUED | OUTPATIENT
Start: 2019-07-31 | End: 2019-08-02 | Stop reason: HOSPADM

## 2019-07-31 RX ORDER — DEXAMETHASONE SODIUM PHOSPHATE 10 MG/ML
INJECTION, SOLUTION INTRAMUSCULAR; INTRAVENOUS PRN
Status: DISCONTINUED | OUTPATIENT
Start: 2019-07-31 | End: 2019-07-31 | Stop reason: SDUPTHER

## 2019-07-31 RX ORDER — FENTANYL CITRATE 50 UG/ML
INJECTION, SOLUTION INTRAMUSCULAR; INTRAVENOUS
Status: COMPLETED
Start: 2019-07-31 | End: 2019-07-31

## 2019-07-31 RX ORDER — MORPHINE SULFATE 2 MG/ML
2 INJECTION, SOLUTION INTRAMUSCULAR; INTRAVENOUS EVERY 5 MIN PRN
Status: DISCONTINUED | OUTPATIENT
Start: 2019-07-31 | End: 2019-07-31 | Stop reason: HOSPADM

## 2019-07-31 RX ORDER — ONDANSETRON 2 MG/ML
4 INJECTION INTRAMUSCULAR; INTRAVENOUS
Status: DISCONTINUED | OUTPATIENT
Start: 2019-07-31 | End: 2019-07-31 | Stop reason: HOSPADM

## 2019-07-31 RX ORDER — ROPIVACAINE HYDROCHLORIDE 5 MG/ML
INJECTION, SOLUTION EPIDURAL; INFILTRATION; PERINEURAL
Status: COMPLETED | OUTPATIENT
Start: 2019-07-31 | End: 2019-07-31

## 2019-07-31 RX ORDER — PROMETHAZINE HYDROCHLORIDE 25 MG/ML
25 INJECTION, SOLUTION INTRAMUSCULAR; INTRAVENOUS EVERY 6 HOURS PRN
Status: DISCONTINUED | OUTPATIENT
Start: 2019-07-31 | End: 2019-08-02 | Stop reason: HOSPADM

## 2019-07-31 RX ORDER — M-VIT,TX,IRON,MINS/CALC/FOLIC 27MG-0.4MG
1 TABLET ORAL DAILY
Status: DISCONTINUED | OUTPATIENT
Start: 2019-07-31 | End: 2019-08-02 | Stop reason: HOSPADM

## 2019-07-31 RX ORDER — ROPIVACAINE HYDROCHLORIDE 5 MG/ML
INJECTION, SOLUTION EPIDURAL; INFILTRATION; PERINEURAL
Status: COMPLETED
Start: 2019-07-31 | End: 2019-07-31

## 2019-07-31 RX ORDER — MORPHINE SULFATE 4 MG/ML
4 INJECTION, SOLUTION INTRAMUSCULAR; INTRAVENOUS EVERY 4 HOURS PRN
Status: DISCONTINUED | OUTPATIENT
Start: 2019-07-31 | End: 2019-08-02 | Stop reason: HOSPADM

## 2019-07-31 RX ORDER — MIDAZOLAM HYDROCHLORIDE 1 MG/ML
1 INJECTION INTRAMUSCULAR; INTRAVENOUS PRN
Status: COMPLETED | OUTPATIENT
Start: 2019-07-31 | End: 2019-07-31

## 2019-07-31 RX ORDER — LIDOCAINE HYDROCHLORIDE 20 MG/ML
INJECTION, SOLUTION INTRAVENOUS PRN
Status: DISCONTINUED | OUTPATIENT
Start: 2019-07-31 | End: 2019-07-31

## 2019-07-31 RX ORDER — ROPIVACAINE HYDROCHLORIDE 5 MG/ML
60 INJECTION, SOLUTION EPIDURAL; INFILTRATION; PERINEURAL
Status: COMPLETED | OUTPATIENT
Start: 2019-07-31 | End: 2019-07-31

## 2019-07-31 RX ORDER — OXYCODONE AND ACETAMINOPHEN 10; 325 MG/1; MG/1
1 TABLET ORAL EVERY 4 HOURS PRN
Status: DISCONTINUED | OUTPATIENT
Start: 2019-07-31 | End: 2019-08-02 | Stop reason: HOSPADM

## 2019-07-31 RX ORDER — FENTANYL CITRATE 50 UG/ML
100 INJECTION, SOLUTION INTRAMUSCULAR; INTRAVENOUS ONCE
Status: COMPLETED | OUTPATIENT
Start: 2019-07-31 | End: 2019-07-31

## 2019-07-31 RX ADMIN — FENTANYL CITRATE 50 MCG: 50 INJECTION, SOLUTION INTRAMUSCULAR; INTRAVENOUS at 12:01

## 2019-07-31 RX ADMIN — LIDOCAINE HYDROCHLORIDE 80 MG: 20 INJECTION, SOLUTION INTRAVENOUS at 09:24

## 2019-07-31 RX ADMIN — MEPERIDINE HYDROCHLORIDE 12.5 MG: 50 INJECTION, SOLUTION INTRAMUSCULAR; INTRAVENOUS; SUBCUTANEOUS at 13:26

## 2019-07-31 RX ADMIN — LOSARTAN POTASSIUM 100 MG: 50 TABLET ORAL at 16:00

## 2019-07-31 RX ADMIN — DEXAMETHASONE SODIUM PHOSPHATE 8 MG: 10 INJECTION INTRAMUSCULAR; INTRAVENOUS at 07:55

## 2019-07-31 RX ADMIN — GABAPENTIN 100 MG: 100 CAPSULE ORAL at 19:28

## 2019-07-31 RX ADMIN — SODIUM CHLORIDE: 9 INJECTION, SOLUTION INTRAVENOUS at 16:13

## 2019-07-31 RX ADMIN — OXYCODONE HYDROCHLORIDE AND ACETAMINOPHEN 1 TABLET: 10; 325 TABLET ORAL at 22:45

## 2019-07-31 RX ADMIN — MORPHINE SULFATE 4 MG: 4 INJECTION, SOLUTION INTRAMUSCULAR; INTRAVENOUS at 19:29

## 2019-07-31 RX ADMIN — Medication 20 MG: at 10:11

## 2019-07-31 RX ADMIN — FENTANYL CITRATE 50 MCG: 50 INJECTION, SOLUTION INTRAMUSCULAR; INTRAVENOUS at 12:15

## 2019-07-31 RX ADMIN — WATER 2 G: 1 INJECTION INTRAMUSCULAR; INTRAVENOUS; SUBCUTANEOUS at 19:29

## 2019-07-31 RX ADMIN — ROPIVACAINE HYDROCHLORIDE 60 ML: 5 INJECTION, SOLUTION EPIDURAL; INFILTRATION; PERINEURAL at 08:40

## 2019-07-31 RX ADMIN — Medication 50 MG: at 09:24

## 2019-07-31 RX ADMIN — PROPOFOL 200 MG: 10 INJECTION, EMULSION INTRAVENOUS at 09:24

## 2019-07-31 RX ADMIN — ROPIVACAINE HYDROCHLORIDE 70 ML: 5 INJECTION, SOLUTION EPIDURAL; INFILTRATION; PERINEURAL at 09:04

## 2019-07-31 RX ADMIN — DOCUSATE SODIUM 100 MG: 100 CAPSULE, LIQUID FILLED ORAL at 19:28

## 2019-07-31 RX ADMIN — Medication 30 MG: at 10:50

## 2019-07-31 RX ADMIN — MIDAZOLAM HYDROCHLORIDE 1 MG: 1 INJECTION, SOLUTION INTRAMUSCULAR; INTRAVENOUS at 08:35

## 2019-07-31 RX ADMIN — ACETAMINOPHEN 1000 MG: 500 TABLET, FILM COATED ORAL at 07:53

## 2019-07-31 RX ADMIN — FENTANYL CITRATE 50 MCG: 50 INJECTION, SOLUTION INTRAMUSCULAR; INTRAVENOUS at 10:12

## 2019-07-31 RX ADMIN — SODIUM CHLORIDE, POTASSIUM CHLORIDE, SODIUM LACTATE AND CALCIUM CHLORIDE: 600; 310; 30; 20 INJECTION, SOLUTION INTRAVENOUS at 09:18

## 2019-07-31 RX ADMIN — MEPERIDINE HYDROCHLORIDE 12.5 MG: 50 INJECTION, SOLUTION INTRAMUSCULAR; INTRAVENOUS; SUBCUTANEOUS at 13:31

## 2019-07-31 RX ADMIN — FENTANYL CITRATE 50 MCG: 50 INJECTION, SOLUTION INTRAMUSCULAR; INTRAVENOUS at 12:30

## 2019-07-31 RX ADMIN — CYCLOBENZAPRINE HYDROCHLORIDE 10 MG: 10 TABLET, FILM COATED ORAL at 22:45

## 2019-07-31 RX ADMIN — Medication 0.6 MG: at 12:28

## 2019-07-31 RX ADMIN — DEXAMETHASONE SODIUM PHOSPHATE 10 MG: 10 INJECTION, SOLUTION INTRAMUSCULAR; INTRAVENOUS at 09:24

## 2019-07-31 RX ADMIN — CEFAZOLIN 3 G: 10 INJECTION, POWDER, FOR SOLUTION INTRAVENOUS; PARENTERAL at 09:26

## 2019-07-31 RX ADMIN — CELECOXIB 200 MG: 100 CAPSULE ORAL at 07:52

## 2019-07-31 RX ADMIN — OXYCODONE HYDROCHLORIDE AND ACETAMINOPHEN 1 TABLET: 10; 325 TABLET ORAL at 16:01

## 2019-07-31 RX ADMIN — MIDAZOLAM HYDROCHLORIDE 1 MG: 1 INJECTION, SOLUTION INTRAMUSCULAR; INTRAVENOUS at 08:40

## 2019-07-31 RX ADMIN — KETOROLAC TROMETHAMINE 30 MG: 30 INJECTION, SOLUTION INTRAMUSCULAR; INTRAVENOUS at 12:47

## 2019-07-31 RX ADMIN — GABAPENTIN 600 MG: 300 CAPSULE ORAL at 07:52

## 2019-07-31 RX ADMIN — FENTANYL CITRATE 50 MCG: 50 INJECTION, SOLUTION INTRAMUSCULAR; INTRAVENOUS at 09:24

## 2019-07-31 RX ADMIN — CELECOXIB 200 MG: 100 CAPSULE ORAL at 19:28

## 2019-07-31 RX ADMIN — SODIUM CHLORIDE, POTASSIUM CHLORIDE, SODIUM LACTATE AND CALCIUM CHLORIDE: 600; 310; 30; 20 INJECTION, SOLUTION INTRAVENOUS at 08:04

## 2019-07-31 RX ADMIN — OXYCODONE HYDROCHLORIDE 10 MG: 10 TABLET, FILM COATED, EXTENDED RELEASE ORAL at 07:52

## 2019-07-31 RX ADMIN — Medication 3 MG: at 12:28

## 2019-07-31 RX ADMIN — SODIUM CHLORIDE, POTASSIUM CHLORIDE, SODIUM LACTATE AND CALCIUM CHLORIDE: 600; 310; 30; 20 INJECTION, SOLUTION INTRAVENOUS at 12:26

## 2019-07-31 RX ADMIN — ONDANSETRON HYDROCHLORIDE 4 MG: 2 INJECTION, SOLUTION INTRAMUSCULAR; INTRAVENOUS at 12:27

## 2019-07-31 RX ADMIN — BUPIVACAINE HYDROCHLORIDE: 5 INJECTION, SOLUTION PERINEURAL at 12:41

## 2019-07-31 RX ADMIN — FENTANYL CITRATE 100 MCG: 50 INJECTION, SOLUTION INTRAMUSCULAR; INTRAVENOUS at 08:40

## 2019-07-31 ASSESSMENT — PULMONARY FUNCTION TESTS
PIF_VALUE: 14
PIF_VALUE: 20
PIF_VALUE: 0
PIF_VALUE: 24
PIF_VALUE: 20
PIF_VALUE: 13
PIF_VALUE: 20
PIF_VALUE: 19
PIF_VALUE: 19
PIF_VALUE: 13
PIF_VALUE: 20
PIF_VALUE: 0
PIF_VALUE: 19
PIF_VALUE: 20
PIF_VALUE: 21
PIF_VALUE: 34
PIF_VALUE: 20
PIF_VALUE: 23
PIF_VALUE: 19
PIF_VALUE: 20
PIF_VALUE: 20
PIF_VALUE: 19
PIF_VALUE: 16
PIF_VALUE: 1
PIF_VALUE: 21
PIF_VALUE: 21
PIF_VALUE: 19
PIF_VALUE: 20
PIF_VALUE: 13
PIF_VALUE: 21
PIF_VALUE: 24
PIF_VALUE: 13
PIF_VALUE: 22
PIF_VALUE: 13
PIF_VALUE: 20
PIF_VALUE: 13
PIF_VALUE: 17
PIF_VALUE: 19
PIF_VALUE: 19
PIF_VALUE: 13
PIF_VALUE: 13
PIF_VALUE: 21
PIF_VALUE: 21
PIF_VALUE: 20
PIF_VALUE: 19
PIF_VALUE: 19
PIF_VALUE: 20
PIF_VALUE: 13
PIF_VALUE: 13
PIF_VALUE: 20
PIF_VALUE: 20
PIF_VALUE: 19
PIF_VALUE: 14
PIF_VALUE: 20
PIF_VALUE: 19
PIF_VALUE: 21
PIF_VALUE: 21
PIF_VALUE: 13
PIF_VALUE: 13
PIF_VALUE: 20
PIF_VALUE: 13
PIF_VALUE: 3
PIF_VALUE: 16
PIF_VALUE: 20
PIF_VALUE: 31
PIF_VALUE: 20
PIF_VALUE: 2
PIF_VALUE: 20
PIF_VALUE: 20
PIF_VALUE: 24
PIF_VALUE: 20
PIF_VALUE: 1
PIF_VALUE: 13
PIF_VALUE: 20
PIF_VALUE: 19
PIF_VALUE: 12
PIF_VALUE: 13
PIF_VALUE: 19
PIF_VALUE: 13
PIF_VALUE: 20
PIF_VALUE: 19
PIF_VALUE: 1
PIF_VALUE: 20
PIF_VALUE: 19
PIF_VALUE: 20
PIF_VALUE: 13
PIF_VALUE: 21
PIF_VALUE: 19
PIF_VALUE: 13
PIF_VALUE: 20
PIF_VALUE: 20
PIF_VALUE: 13
PIF_VALUE: 20
PIF_VALUE: 19
PIF_VALUE: 21
PIF_VALUE: 24
PIF_VALUE: 13
PIF_VALUE: 13
PIF_VALUE: 21
PIF_VALUE: 23
PIF_VALUE: 20
PIF_VALUE: 13
PIF_VALUE: 13
PIF_VALUE: 14
PIF_VALUE: 20
PIF_VALUE: 21
PIF_VALUE: 20
PIF_VALUE: 19
PIF_VALUE: 20
PIF_VALUE: 14
PIF_VALUE: 20
PIF_VALUE: 19
PIF_VALUE: 19
PIF_VALUE: 20
PIF_VALUE: 21
PIF_VALUE: 21
PIF_VALUE: 20
PIF_VALUE: 20
PIF_VALUE: 23
PIF_VALUE: 3
PIF_VALUE: 19
PIF_VALUE: 20
PIF_VALUE: 19
PIF_VALUE: 20
PIF_VALUE: 19
PIF_VALUE: 20
PIF_VALUE: 16
PIF_VALUE: 20
PIF_VALUE: 18
PIF_VALUE: 20
PIF_VALUE: 13
PIF_VALUE: 20
PIF_VALUE: 15
PIF_VALUE: 22
PIF_VALUE: 13
PIF_VALUE: 20
PIF_VALUE: 20
PIF_VALUE: 2
PIF_VALUE: 20
PIF_VALUE: 13
PIF_VALUE: 16
PIF_VALUE: 20
PIF_VALUE: 16
PIF_VALUE: 19
PIF_VALUE: 20
PIF_VALUE: 23
PIF_VALUE: 19
PIF_VALUE: 13
PIF_VALUE: 20
PIF_VALUE: 13
PIF_VALUE: 20
PIF_VALUE: 20
PIF_VALUE: 22
PIF_VALUE: 17
PIF_VALUE: 20
PIF_VALUE: 19
PIF_VALUE: 17
PIF_VALUE: 20
PIF_VALUE: 18
PIF_VALUE: 12
PIF_VALUE: 22
PIF_VALUE: 13
PIF_VALUE: 20
PIF_VALUE: 19
PIF_VALUE: 40
PIF_VALUE: 20
PIF_VALUE: 19
PIF_VALUE: 19
PIF_VALUE: 20
PIF_VALUE: 23
PIF_VALUE: 20
PIF_VALUE: 19
PIF_VALUE: 1
PIF_VALUE: 20
PIF_VALUE: 27
PIF_VALUE: 19
PIF_VALUE: 21
PIF_VALUE: 20
PIF_VALUE: 20
PIF_VALUE: 18
PIF_VALUE: 2
PIF_VALUE: 20
PIF_VALUE: 21
PIF_VALUE: 0
PIF_VALUE: 21
PIF_VALUE: 19
PIF_VALUE: 20
PIF_VALUE: 20
PIF_VALUE: 19
PIF_VALUE: 20
PIF_VALUE: 19
PIF_VALUE: 22
PIF_VALUE: 21
PIF_VALUE: 20
PIF_VALUE: 19
PIF_VALUE: 20
PIF_VALUE: 20
PIF_VALUE: 19
PIF_VALUE: 20
PIF_VALUE: 20
PIF_VALUE: 3
PIF_VALUE: 13
PIF_VALUE: 20
PIF_VALUE: 20
PIF_VALUE: 19
PIF_VALUE: 20
PIF_VALUE: 20
PIF_VALUE: 18
PIF_VALUE: 21
PIF_VALUE: 22
PIF_VALUE: 16

## 2019-07-31 ASSESSMENT — PAIN DESCRIPTION - PROGRESSION: CLINICAL_PROGRESSION: RESOLVED

## 2019-07-31 ASSESSMENT — PAIN SCALES - GENERAL
PAINLEVEL_OUTOF10: 7
PAINLEVEL_OUTOF10: 0
PAINLEVEL_OUTOF10: 9
PAINLEVEL_OUTOF10: 0
PAINLEVEL_OUTOF10: 7
PAINLEVEL_OUTOF10: 9
PAINLEVEL_OUTOF10: 0
PAINLEVEL_OUTOF10: 7
PAINLEVEL_OUTOF10: 0
PAINLEVEL_OUTOF10: 9
PAINLEVEL_OUTOF10: 0
PAINLEVEL_OUTOF10: 0

## 2019-07-31 ASSESSMENT — PAIN DESCRIPTION - ORIENTATION: ORIENTATION: RIGHT;LEFT

## 2019-07-31 ASSESSMENT — PAIN DESCRIPTION - DESCRIPTORS
DESCRIPTORS: ACHING;DISCOMFORT;SORE
DESCRIPTORS: DISCOMFORT

## 2019-07-31 ASSESSMENT — PAIN DESCRIPTION - LOCATION: LOCATION: KNEE

## 2019-07-31 ASSESSMENT — PAIN DESCRIPTION - PAIN TYPE: TYPE: SURGICAL PAIN

## 2019-07-31 ASSESSMENT — PAIN - FUNCTIONAL ASSESSMENT: PAIN_FUNCTIONAL_ASSESSMENT: 0-10

## 2019-07-31 NOTE — OP NOTE
femur trial component was then placed. The knee was reduced and taken through a range of motion and found to be appropriately stable. Half pins were then placed in the tibial base plate confirming position in preparation for keel punch. The patella was then prepared. The patella surface was free hand cut for the appropriate size implant. The patella holes were drilled and the patella was then trialed. There was good alignment and tracking of the patella. Distal femoral component was removed, trial poly was then removed. Keel punch was then performed of the proximal tibial. Tibial base plate was then removed. Copious irrigation was performed of the wound and final inspection for all interposed soft tissue and loose bodies was then performed as cement was being mixed. Copious irrigation was performed once again of the knee. Cement was placed on the proximal tibial component and the tibial component was then impacted into appropriate position with all excess extruded cement being removed with Erick elevator. A polyethylene component was then impacted into appropriate position and checked for stability, which it was stable. Cement was then placed on the distal femoral component. Distal femoral component was then impacted in appropriate position with all excess extruded cement being removed with a Erick elevator. The knee was extended, taken through a range of motion, and found to be appropriately stable. Final inspection for all excess extruded cement was then performed. Erick elevator removed all excess extruded cement. Copious irrigation was performed of the knee. The knee was then soaked with a bedadine solution soak for 3 minutes. The knee was then throughly irrigated with saline solution. Then 1 gram of vanomycin powder was placed within the wound. The capsule was  then closed with strata-fix closure. I then injected our TXA mixture into the knee joint.   Copious irrigation was performed of this layer

## 2019-07-31 NOTE — CONSULTS
7/9/2019  Reading location:  200 Indication: Osteoarthritis, [facility] protocol. Findings: Nondiagnostic MR images were obtained for the [facility] protocol and were not diagnostically reviewed. Mri Knee Left Wo Contrast    Result Date: 7/9/2019  Reading location:  200 Indication: Osteoarthritis, [facility] protocol. Findings: Nondiagnostic MR images were obtained for the [facility] protocol and were not diagnostically reviewed. Us Nerve Block (surgery Use Only)    Result Date: 7/31/2019  No radiologist dictation. Documentation on this procedure can be found under the NOTES tab in Chart Review within the patient's chart.         ASSESSMENT/PLAN:  62 y.o. male status post bilateral knee arthroplasty with Dr. Janice Azul today for osteoarthritis  Hypertension  GERD  Osteoarthritis        Follow with orthopedics with plan of care  Pain medication per Ortho  Morning lab work  PT and OT  Patient plans to go home upon discharge  Activity as tolerated per Ortho  Home medications reviewed  Electronically signed by Galdino Salazar DO on 7/31/2019 at 3:57 PM

## 2019-07-31 NOTE — H&P
negative  Elijah's negative, Thallasy  negative,   PF grind test negative, Apprehension test negative, Patellar J sign  negative  L. Knee:  Lachman's negative, Anterior Drawer negative, Posterior Drawer negative  Elijah's negative, Thallasy  negative,   PF grind test negative, Apprehension test negative,  Patellar J sign  negative     Xray Exam:  Severe tricompartmental djd b/l knees with varus deformity     Radiographic findings reviewed with patient     Assessment:       Encounter Diagnoses   Name Primary?  Complete tear of medial collateral ligament of right knee, initial encounter Yes    Primary osteoarthritis of right knee      Primary osteoarthritis of left knee      Complete tear of medial collateral ligament of left knee, initial encounter           Plan:  Natural history and expected course discussed. Questions answered. Educational materials distributed. Rest, ice, compression, and elevation (RICE) therapy. Reduction in offending activity. We discussed various treatment options both surgical and non-surgical.   The patient is unable to ambulate more than 100 feet and is unable to perform the average daily activities including:  Light housework, ADLs, donning clothes, toileting and exercise. Patient has failed previous conservative measures including cortisone injections, NSAIDs, PT, HEP and pain medication and is currently a fall risk to the disability and decreased functioning. The patient wishes to have the total knee arthroplasty. The risks and benefits of a total knee replacement were discussed with the patient. The risks include but are not limited to: infection, injury to blood vessels and nerves, non relief of symptoms, arthrofibrosis of knee, aseptic loosening of prosthesis, intraoperative fracture, blood loss, PE/DVT, MI, dislocation of hip and knee, need for further operative intervention and death.   The patient is aware of the risks and wished to proceed with a Bilateral total

## 2019-08-01 PROBLEM — M17.0 BILATERAL PRIMARY OSTEOARTHRITIS OF KNEE: Status: ACTIVE | Noted: 2019-08-01

## 2019-08-01 LAB
ANION GAP SERPL CALCULATED.3IONS-SCNC: 11 MMOL/L (ref 7–16)
BUN BLDV-MCNC: 16 MG/DL (ref 6–20)
CALCIUM SERPL-MCNC: 8 MG/DL (ref 8.6–10.2)
CHLORIDE BLD-SCNC: 105 MMOL/L (ref 98–107)
CO2: 23 MMOL/L (ref 22–29)
CREAT SERPL-MCNC: 0.8 MG/DL (ref 0.7–1.2)
GFR AFRICAN AMERICAN: >60
GFR NON-AFRICAN AMERICAN: >60 ML/MIN/1.73
GLUCOSE BLD-MCNC: 129 MG/DL (ref 74–99)
HCT VFR BLD CALC: 33 % (ref 37–54)
HEMOGLOBIN: 10.8 G/DL (ref 12.5–16.5)
POTASSIUM SERPL-SCNC: 4.3 MMOL/L (ref 3.5–5)
SODIUM BLD-SCNC: 139 MMOL/L (ref 132–146)

## 2019-08-01 PROCEDURE — 2580000003 HC RX 258: Performed by: ORTHOPAEDIC SURGERY

## 2019-08-01 PROCEDURE — 96361 HYDRATE IV INFUSION ADD-ON: CPT

## 2019-08-01 PROCEDURE — 6360000002 HC RX W HCPCS: Performed by: ORTHOPAEDIC SURGERY

## 2019-08-01 PROCEDURE — 85014 HEMATOCRIT: CPT

## 2019-08-01 PROCEDURE — G0378 HOSPITAL OBSERVATION PER HR: HCPCS

## 2019-08-01 PROCEDURE — 96376 TX/PRO/DX INJ SAME DRUG ADON: CPT

## 2019-08-01 PROCEDURE — 97530 THERAPEUTIC ACTIVITIES: CPT

## 2019-08-01 PROCEDURE — 36415 COLL VENOUS BLD VENIPUNCTURE: CPT

## 2019-08-01 PROCEDURE — 6370000000 HC RX 637 (ALT 250 FOR IP): Performed by: ORTHOPAEDIC SURGERY

## 2019-08-01 PROCEDURE — 85018 HEMOGLOBIN: CPT

## 2019-08-01 PROCEDURE — 97110 THERAPEUTIC EXERCISES: CPT

## 2019-08-01 PROCEDURE — 80048 BASIC METABOLIC PNL TOTAL CA: CPT

## 2019-08-01 RX ADMIN — RIVAROXABAN 10 MG: 10 TABLET, FILM COATED ORAL at 09:44

## 2019-08-01 RX ADMIN — GABAPENTIN 100 MG: 100 CAPSULE ORAL at 12:59

## 2019-08-01 RX ADMIN — MULTIPLE VITAMINS W/ MINERALS TAB 1 TABLET: TAB at 09:44

## 2019-08-01 RX ADMIN — DILTIAZEM HYDROCHLORIDE 240 MG: 240 CAPSULE, COATED, EXTENDED RELEASE ORAL at 21:38

## 2019-08-01 RX ADMIN — OXYCODONE HYDROCHLORIDE AND ACETAMINOPHEN 1 TABLET: 10; 325 TABLET ORAL at 08:30

## 2019-08-01 RX ADMIN — DOCUSATE SODIUM 100 MG: 100 CAPSULE, LIQUID FILLED ORAL at 21:39

## 2019-08-01 RX ADMIN — OXYCODONE HYDROCHLORIDE AND ACETAMINOPHEN 1 TABLET: 10; 325 TABLET ORAL at 17:15

## 2019-08-01 RX ADMIN — MORPHINE SULFATE 4 MG: 4 INJECTION, SOLUTION INTRAMUSCULAR; INTRAVENOUS at 02:13

## 2019-08-01 RX ADMIN — MORPHINE SULFATE 4 MG: 4 INJECTION, SOLUTION INTRAMUSCULAR; INTRAVENOUS at 16:07

## 2019-08-01 RX ADMIN — MAGNESIUM HYDROXIDE 30 ML: 400 SUSPENSION ORAL at 22:03

## 2019-08-01 RX ADMIN — OXYCODONE HYDROCHLORIDE AND ACETAMINOPHEN 1 TABLET: 10; 325 TABLET ORAL at 13:07

## 2019-08-01 RX ADMIN — CELECOXIB 200 MG: 100 CAPSULE ORAL at 09:42

## 2019-08-01 RX ADMIN — OXYCODONE HYDROCHLORIDE AND ACETAMINOPHEN 1 TABLET: 10; 325 TABLET ORAL at 23:26

## 2019-08-01 RX ADMIN — DOCUSATE SODIUM 100 MG: 100 CAPSULE, LIQUID FILLED ORAL at 09:43

## 2019-08-01 RX ADMIN — PANTOPRAZOLE SODIUM 40 MG: 40 TABLET, DELAYED RELEASE ORAL at 07:04

## 2019-08-01 RX ADMIN — MORPHINE SULFATE 4 MG: 4 INJECTION, SOLUTION INTRAMUSCULAR; INTRAVENOUS at 20:54

## 2019-08-01 RX ADMIN — GABAPENTIN 100 MG: 100 CAPSULE ORAL at 02:14

## 2019-08-01 RX ADMIN — CELECOXIB 200 MG: 100 CAPSULE ORAL at 21:39

## 2019-08-01 RX ADMIN — LOSARTAN POTASSIUM 100 MG: 50 TABLET ORAL at 13:00

## 2019-08-01 RX ADMIN — WATER 2 G: 1 INJECTION INTRAMUSCULAR; INTRAVENOUS; SUBCUTANEOUS at 02:14

## 2019-08-01 RX ADMIN — GABAPENTIN 100 MG: 100 CAPSULE ORAL at 21:39

## 2019-08-01 ASSESSMENT — PAIN DESCRIPTION - FREQUENCY
FREQUENCY: CONTINUOUS

## 2019-08-01 ASSESSMENT — PAIN DESCRIPTION - ORIENTATION
ORIENTATION: RIGHT;LEFT

## 2019-08-01 ASSESSMENT — PAIN SCALES - GENERAL
PAINLEVEL_OUTOF10: 9
PAINLEVEL_OUTOF10: 10
PAINLEVEL_OUTOF10: 9
PAINLEVEL_OUTOF10: 10
PAINLEVEL_OUTOF10: 10
PAINLEVEL_OUTOF10: 9
PAINLEVEL_OUTOF10: 7
PAINLEVEL_OUTOF10: 8
PAINLEVEL_OUTOF10: 9
PAINLEVEL_OUTOF10: 6

## 2019-08-01 ASSESSMENT — PAIN DESCRIPTION - LOCATION
LOCATION: KNEE

## 2019-08-01 ASSESSMENT — PAIN DESCRIPTION - PAIN TYPE
TYPE: ACUTE PAIN
TYPE: SURGICAL PAIN
TYPE: SURGICAL PAIN;ACUTE PAIN

## 2019-08-01 ASSESSMENT — PAIN DESCRIPTION - DESCRIPTORS
DESCRIPTORS: ACHING
DESCRIPTORS: ACHING
DESCRIPTORS: ACHING;DISCOMFORT;SORE;TENDER
DESCRIPTORS: ACHING
DESCRIPTORS: ACHING

## 2019-08-01 ASSESSMENT — PAIN DESCRIPTION - ONSET
ONSET: GRADUAL
ONSET: ON-GOING
ONSET: GRADUAL
ONSET: ON-GOING
ONSET: GRADUAL

## 2019-08-01 ASSESSMENT — PAIN - FUNCTIONAL ASSESSMENT
PAIN_FUNCTIONAL_ASSESSMENT: PREVENTS OR INTERFERES WITH ALL ACTIVE AND SOME PASSIVE ACTIVITIES
PAIN_FUNCTIONAL_ASSESSMENT: PREVENTS OR INTERFERES SOME ACTIVE ACTIVITIES AND ADLS

## 2019-08-01 ASSESSMENT — PAIN DESCRIPTION - PROGRESSION
CLINICAL_PROGRESSION: NOT CHANGED
CLINICAL_PROGRESSION: NOT CHANGED
CLINICAL_PROGRESSION: GRADUALLY WORSENING

## 2019-08-02 VITALS
OXYGEN SATURATION: 96 % | SYSTOLIC BLOOD PRESSURE: 118 MMHG | TEMPERATURE: 99.5 F | RESPIRATION RATE: 16 BRPM | BODY MASS INDEX: 33.61 KG/M2 | HEART RATE: 76 BPM | HEIGHT: 76 IN | WEIGHT: 276 LBS | DIASTOLIC BLOOD PRESSURE: 65 MMHG

## 2019-08-02 LAB
ANION GAP SERPL CALCULATED.3IONS-SCNC: 10 MMOL/L (ref 7–16)
BUN BLDV-MCNC: 19 MG/DL (ref 6–20)
CALCIUM SERPL-MCNC: 8.2 MG/DL (ref 8.6–10.2)
CHLORIDE BLD-SCNC: 104 MMOL/L (ref 98–107)
CO2: 25 MMOL/L (ref 22–29)
CREAT SERPL-MCNC: 0.8 MG/DL (ref 0.7–1.2)
GFR AFRICAN AMERICAN: >60
GFR NON-AFRICAN AMERICAN: >60 ML/MIN/1.73
GLUCOSE BLD-MCNC: 120 MG/DL (ref 74–99)
HCT VFR BLD CALC: 31.7 % (ref 37–54)
HEMOGLOBIN: 10.3 G/DL (ref 12.5–16.5)
POTASSIUM SERPL-SCNC: 4 MMOL/L (ref 3.5–5)
SODIUM BLD-SCNC: 139 MMOL/L (ref 132–146)

## 2019-08-02 PROCEDURE — 85014 HEMATOCRIT: CPT

## 2019-08-02 PROCEDURE — 96376 TX/PRO/DX INJ SAME DRUG ADON: CPT

## 2019-08-02 PROCEDURE — 97530 THERAPEUTIC ACTIVITIES: CPT

## 2019-08-02 PROCEDURE — 80048 BASIC METABOLIC PNL TOTAL CA: CPT

## 2019-08-02 PROCEDURE — 97110 THERAPEUTIC EXERCISES: CPT

## 2019-08-02 PROCEDURE — 85018 HEMOGLOBIN: CPT

## 2019-08-02 PROCEDURE — 6360000002 HC RX W HCPCS: Performed by: ORTHOPAEDIC SURGERY

## 2019-08-02 PROCEDURE — 6370000000 HC RX 637 (ALT 250 FOR IP): Performed by: ORTHOPAEDIC SURGERY

## 2019-08-02 PROCEDURE — 96372 THER/PROPH/DIAG INJ SC/IM: CPT

## 2019-08-02 PROCEDURE — 6360000002 HC RX W HCPCS: Performed by: INTERNAL MEDICINE

## 2019-08-02 PROCEDURE — G0378 HOSPITAL OBSERVATION PER HR: HCPCS

## 2019-08-02 PROCEDURE — 96374 THER/PROPH/DIAG INJ IV PUSH: CPT

## 2019-08-02 PROCEDURE — 36415 COLL VENOUS BLD VENIPUNCTURE: CPT

## 2019-08-02 PROCEDURE — 6370000000 HC RX 637 (ALT 250 FOR IP): Performed by: INTERNAL MEDICINE

## 2019-08-02 PROCEDURE — 2580000003 HC RX 258: Performed by: INTERNAL MEDICINE

## 2019-08-02 RX ORDER — POLYETHYLENE GLYCOL 3350 17 G/17G
17 POWDER, FOR SOLUTION ORAL DAILY
Status: DISCONTINUED | OUTPATIENT
Start: 2019-08-02 | End: 2019-08-02 | Stop reason: HOSPADM

## 2019-08-02 RX ORDER — SODIUM CHLORIDE 0.9 % (FLUSH) 0.9 %
10 SYRINGE (ML) INJECTION EVERY 12 HOURS
Status: DISCONTINUED | OUTPATIENT
Start: 2019-08-02 | End: 2019-08-02 | Stop reason: HOSPADM

## 2019-08-02 RX ADMIN — PANTOPRAZOLE SODIUM 40 MG: 40 TABLET, DELAYED RELEASE ORAL at 06:27

## 2019-08-02 RX ADMIN — CYCLOBENZAPRINE HYDROCHLORIDE 10 MG: 10 TABLET, FILM COATED ORAL at 19:42

## 2019-08-02 RX ADMIN — MORPHINE SULFATE 4 MG: 4 INJECTION, SOLUTION INTRAMUSCULAR; INTRAVENOUS at 10:51

## 2019-08-02 RX ADMIN — GABAPENTIN 100 MG: 100 CAPSULE ORAL at 06:28

## 2019-08-02 RX ADMIN — OXYCODONE HYDROCHLORIDE AND ACETAMINOPHEN 1 TABLET: 10; 325 TABLET ORAL at 12:58

## 2019-08-02 RX ADMIN — Medication 10 ML: at 06:48

## 2019-08-02 RX ADMIN — OXYCODONE HYDROCHLORIDE AND ACETAMINOPHEN 1 TABLET: 10; 325 TABLET ORAL at 17:31

## 2019-08-02 RX ADMIN — MULTIPLE VITAMINS W/ MINERALS TAB 1 TABLET: TAB at 08:16

## 2019-08-02 RX ADMIN — MORPHINE SULFATE 4 MG: 4 INJECTION, SOLUTION INTRAMUSCULAR; INTRAVENOUS at 02:40

## 2019-08-02 RX ADMIN — OXYCODONE HYDROCHLORIDE AND ACETAMINOPHEN 1 TABLET: 10; 325 TABLET ORAL at 08:16

## 2019-08-02 RX ADMIN — LOSARTAN POTASSIUM 100 MG: 50 TABLET ORAL at 08:16

## 2019-08-02 RX ADMIN — POLYETHYLENE GLYCOL 3350 17 G: 17 POWDER, FOR SOLUTION ORAL at 12:58

## 2019-08-02 RX ADMIN — METHYLNALTREXONE BROMIDE 12 MG: 12 INJECTION, SOLUTION SUBCUTANEOUS at 12:58

## 2019-08-02 RX ADMIN — GABAPENTIN 100 MG: 100 CAPSULE ORAL at 13:48

## 2019-08-02 RX ADMIN — CELECOXIB 200 MG: 100 CAPSULE ORAL at 10:50

## 2019-08-02 RX ADMIN — MORPHINE SULFATE 4 MG: 4 INJECTION, SOLUTION INTRAMUSCULAR; INTRAVENOUS at 15:39

## 2019-08-02 RX ADMIN — Medication 10 ML: at 10:51

## 2019-08-02 RX ADMIN — DOCUSATE SODIUM 100 MG: 100 CAPSULE, LIQUID FILLED ORAL at 08:17

## 2019-08-02 ASSESSMENT — PAIN DESCRIPTION - PAIN TYPE: TYPE: ACUTE PAIN;SURGICAL PAIN

## 2019-08-02 ASSESSMENT — PAIN SCALES - GENERAL
PAINLEVEL_OUTOF10: 9
PAINLEVEL_OUTOF10: 10
PAINLEVEL_OUTOF10: 9
PAINLEVEL_OUTOF10: 9
PAINLEVEL_OUTOF10: 10
PAINLEVEL_OUTOF10: 9

## 2019-08-02 ASSESSMENT — PAIN DESCRIPTION - DESCRIPTORS
DESCRIPTORS: ACHING;DISCOMFORT;SORE;TENDER
DESCRIPTORS: ACHING

## 2019-08-02 ASSESSMENT — PAIN DESCRIPTION - PROGRESSION
CLINICAL_PROGRESSION: NOT CHANGED
CLINICAL_PROGRESSION: NOT CHANGED

## 2019-08-02 ASSESSMENT — PAIN DESCRIPTION - FREQUENCY
FREQUENCY: CONTINUOUS
FREQUENCY: CONTINUOUS

## 2019-08-02 ASSESSMENT — PAIN DESCRIPTION - LOCATION
LOCATION: KNEE
LOCATION: KNEE

## 2019-08-02 ASSESSMENT — PAIN DESCRIPTION - ORIENTATION
ORIENTATION: RIGHT;LEFT
ORIENTATION: RIGHT;LEFT

## 2019-08-02 ASSESSMENT — PAIN DESCRIPTION - ONSET
ONSET: GRADUAL
ONSET: ON-GOING

## 2019-08-02 NOTE — DISCHARGE SUMMARY
osteoarthritis of knee [M17.0] after workup. The patient was admitted under the service of Rock Sanchez DO with consultation to Dr. Ady Reyes and myself for medical management. Klever Basurto presented to the hospital for elective bilateral knee arthroplasty. He tolerated the procedure well and had no acute events in the PACU. He worked with therapies well. He did develop increased pain and had difficulty performing ADL's independently. Stated it was harder having both knees performed at the same time than he thought it would be and as a result he requested rehab.  assisted with discharge planning. Arrangement was made for discharge to [facility] and he was stable for discharge there today. Brief Physical Examination and Laboratory Data on Day of Discharge  Vitals:  /65   Pulse 76   Temp 99.5 °F (37.5 °C) (Oral)   Resp 16   Ht 6' 4\" (1.93 m)   Wt 276 lb (125.2 kg)   SpO2 96%   BMI 33.60 kg/m²   Klever Basurto is a 62 y. o.  male who is alert, responsive, oriented to person, place, and time. General appearance:   Well preserved, alert, no distress. Head:  Normocephalic. No masses, lesions or tenderness. Eyes:  PERRLA. EOMI. Sclera clear. Buccal mucosa moist. Impaired vision. ENT:  Ears normal. Mucosa normal.  Neck:    Supple. Trachea midline. No thyromegaly. No JVD. No bruits. Heart:    Rhythm regular. Rate controlled. No murmurs. Lungs:    Symmetrical. Clear to auscultation bilaterally. No wheezes. No rhonchi. No rales. Abdomen:   Soft. Non-tender. Non-distended. Bowel sounds positive. No organomegaly or masses. No pain on palpation. Extremities:    Peripheral pulses present. Swelling of the bilateral lower extremities. Surgical changes of the bilateral lower extremities. No ulcers. No cyanosis. No clubbing. Neurologic:    Alert x 3. No focal deficit. Cranial nerves grossly intact. No focal weakness.   Psych:   Behavior is normal. Mood appears normal. Speech is diltiazem (DILACOR XR) 240 MG extended release capsule  Take 240 mg by mouth nightly              diphenhydrAMINE-APAP, sleep, (TYLENOL PM EXTRA STRENGTH)  MG tablet  Take 1 tablet by mouth nightly as needed for Sleep             Glucosamine-Chondroit-Vit C-Mn (GLUCOSAMINE 1500 COMPLEX PO)  Take 1 tablet by mouth daily             HYDROcodone-acetaminophen (NORCO)  MG per tablet  Take 1 tablet by mouth daily as needed for Pain for up to 30 days. irbesartan (AVAPRO) 300 MG tablet  Take 300 mg by mouth daily. nystatin (MYCOSTATIN) POWD powder  Apply powder to toes of left foot twice daily for 21 days. Dispense QS no refills             omeprazole (PRILOSEC) 20 MG capsule  Take 20 mg by mouth Daily              oxyCODONE-acetaminophen (PERCOCET) 5-325 MG per tablet  Take 1 tablet by mouth every 6 hours as needed for Pain for up to 7 days. Intended supply: 7 days. Take lowest dose possible to manage pain             rivaroxaban (XARELTO) 10 MG TABS tablet  Take 1 tablet by mouth daily             Turmeric 500 MG TABS  Take 500 mg by mouth daily                 Follow-up/Instructions  · This patient is instructed to follow-up with his primary care physician in 3-5 days after discharge from [place]. · Patient is instructed to follow-up with the consults listed above as directed by them. · he is instructed to resume home medications and take new medications as indicated in the list above. · If the patient has a recurrence of symptoms, he is instructed to go to the ED. Preparing for this patient's discharge, including paperwork, orders, instructions, and meeting with patient required > 30 minutes. Anton Rene D.O., F.A.C.O.I.  5:33 PM  8/2/2019

## 2019-08-02 NOTE — PROGRESS NOTES
Called Clara at [facility]. She will confirm if OR is right knee or bilateral. Also will check if PTT needs repeated DOS. Medical clearance from [address] [place] [address] obtained and on chart. Jerzy Ramos  7/29/2019  10:17 AM    Spoke with Philip Bowden again at Dr [facility]. Order for catia knees is in computer. Coags can be repeated DOS.    Jerzy Ramos  7/30/2019  4:15 PM
Internal Medicine Progress Note    Sandra Schaffer. Nona Castañeda., & [address] [facility] [address] Nona Castañeda., F.A.C.O.I. Jody Torres D.O., F.A.C.O.I. Primary Care Physician: Lluvia Grigsby MD   Admitting Physician:  Ky Simmons DO  Admission date and time: 7/31/2019  6:51 AM    Room:  [address] [place] [address]  Admitting diagnosis: Primary osteoarthritis of right knee [M17.11]  Bilateral primary osteoarthritis of knee [M17.0]    Patient Name: Deborah White  MRN: [id number]    Date of Service: 8/2/2019     Subjective:    Dean Marin is a 62 y. o.  male who was seen and examined today,8/2/2019, at the bedside. S/P bilateral TKA on 7/31/2019. Admits to increased pain and bilateral leg heaviness-wants rehab. Awaiting precert for [facility]. . States that pain medication is helping/ Pain is currently a 6/10. States he is working with the therapies. Feels constipated. No chest pain or shortness of breath. No family present during my examination. Review of System:    Constitutional:   Denies fever or chills, weight loss or gain, fatigue or malaise. Surgical pain. HEENT:   Denies ear pain, sore throat, sinus or eye problems. Cardiovascular:   Denies any chest pain, irregular heartbeats, or palpitations. Respiratory:   Denies shortness of breath, coughing, sputum production, hemoptysis, or wheezing. Gastrointestinal:   Denies nausea, vomiting, diarrhea. Constipated. Denies any abdominal pain. Genitourinary:    Denies any urgency, frequency, hematuria. Voiding  without difficulty. Extremities:   Denies lower extremity swelling, edema or cyanosis. Neurology:    Denies any headache or focal neurological deficits, Denies generalized weakness or memory difficulty. Psch:   Denies being anxious or depressed. Musculoskeletal:    Denies  myalgias, joint complaints or back pain. Integumentary:   Denies any rashes, ulcers, or excoriations. Denies bruising.  Surgical changes of the bilateral
Patient and wife attended preoperative Total Joint Camp on 7/22/2019. Patient is scheduled to have elective bilateral knee replacements. Patient was educated regarding Disease Process, Medications, Smoking Cessation, Oxygenation, Incentive Spirometry and Deep Breath and Cough, signs and symptoms of postoperative joint infection that include: Fever, Chills, Pain Control, Drainage and Redness, post-op follow up with orthopaedic surgeon, dressing removal, zip line, ambulatory devices which include a standard walker and cane, bed mobility, correct anatomical alignment, active range of motion, proper transferring technique, incision care, infection prevention measures, non-pharmacologic comfort measures, notification of inadequate pain control measures, pain scale for assessing level of pain, pharmacologic pain management, relaxation techniques. [address] also included patient and family watching an educational total knee replacement video and visual examples of mobility training postoperative by the orthopaedic navigator.
Physical Therapy  PHYSICAL THERAPY  TREATMENT NOTE    8/2/2019  0317/0317-01                      Patient Name: Lisa Griffiths      [id number]                              1960     Recommendation for discharge: Inpatient Rehab  Equipment prescriptions needed: to be determined    AM-[facility] Mobility Inpatient   How much difficulty turning over in bed?: A Little  How much difficulty sitting down on / standing up from a chair with arms?: A Lot  How much difficulty moving from lying on back to sitting on side of bed?: A Lot  How much help from another person moving to and from a bed to a chair?: A Lot  How much help from another person needed to walk in hospital room?: A Lot  How much help from another person for climbing 3-5 steps with a railing?: Total  AM-PAC Inpatient Mobility Raw Score : 12  AM-PAC Inpatient T-Scale Score : 35.33  Mobility Inpatient CMS 0-100% Score: 68.66  Mobility Inpatient CMS G-Code Modifier : CL      Patient Active Problem List   Diagnosis    Impingement syndrome of shoulder    Rotator cuff tear    Shoulder pain    Biceps tendon rupture, proximal    De Quervain's disease (tenosynovitis)    Other tenosynovitis of hand and wrist    Radial styloid tenosynovitis    Kienbock's avascular necrosis of lunate, adult    Primary osteoarthritis of right knee    Tear of medial meniscus of right knee, current    Cellulitis of the right lower extremity    Non-pressure chronic ulcer of left lower leg with fat layer exposed ([address]    Venous stasis of lower extremity    Primary osteoarthritis of left knee    S/P knee replacement    Bilateral primary osteoarthritis of knee         Weight bearing/Precautions: falls, B TKA : FWB     S: Patient cleared by nursing for treatment. Patient is agreeable to treatment. Pt c/o increased pain with both knees.   Pain status: (measured on a visual analog scale with 0=no pain and 10=excruciating pain) 10/10 for both knees  O: Pt was instructed in and performed
Score: 16      Precautions: falls, full weight bearing: B LE d/t B TKA    S:  - Pt cleared for treatment through nursing.  - Pain: pt c/o 20/10 pain in B knees;  Nsg notified, as pt requesting pain medication at end of session.  - Pt pleasant and cooperative during session, however unable to tolerate standing d/t B LE pain. O:  - BUE strengthening exercises: 20 - 25 reps in all planes of movement with mod resist theraband (doubled over) to increase/maintain strength required for functional transfers/ADL participation 2* to B TKA. Exercises completed in shoulder and elbow flexion/extension, internal/external rotation and abduction/adduction. Min rest breaks provided 2* to decreased endurance/tolerance noted during ex's. Ex's completed on RTB with HOB elevated; HEP provided; good understanding noted    Function Assessment     Status  Goal Comment   Feeding:  Independent   Independent   N/T -Per last report      Grooming:  N/T Independent   Per last report      UE dressing:  Minimal Assist  Independent  Per last report   LE dressing:   Mod/Max Assist Modified Harmon  Per last report; pt had assist to doff shorts prior to session; pt has underwear donned   Bathing:  Maximal Assist  Modified Harmon   N/T -Per last report     Bed Mobility:       Sit to supine at mod A x 2  Independent  Assist to guide UB and B LE's to supine   Functional Transfers:    Pt unable to stand d/t pain; pt able to scoot to [facility] using B UE's  And LE's and min A for safety  Independent  Safety: fair     Functional Mobility:  N/T Modified Harmon  No LOB noted; cuing for sequencing, walker safety            A:  -Balance: Sitting: fair         Standing: N/T d/t pain  -Endurance: fair -/poor (2* to decreased endurance, strength, pain); decreased tolerance since last session d/t increased pain; nsg notified, as pt requesting pain medication  -Edema: yes - B LE's; nsg aware; B ERICA hose are donned  -Safety:fair (VC's for joint
performed the following:   Bed Mobility-  Supine to sit-Minimal assists x 1       Scooting- Minimal assists x 1      Sit to supine-Minimal assists x 1   Transfers-sit to stand- Minimal assists x 1   Gait:  Patient ambulated 50 feet x 2 using Wheeled Walker with Minimal assists x 1. Verbal cues required for sequencing, safety, upright posture, weight bearing, increased base of support, increased step length,     Steps: Not assessed    Treatment: Pt performed supine ex's: ankle pumps, gluteal sets, quad sets, heel slides, straight leg raise, hip abduction, hip adduction, AAROM  x 15 reps. V/C for technique. Pt transferred to edge of bed, stood, ambulated in the hallway and back to bed. Family education/handouts: Pt's wife present during tx session. Comment: Call light left by patient. RTB at end of tx session. A: Pt able to stand (without hermann stedy) and progress with increased distance with cueing for sequencing, upright posture, pacing, and safety. P: Continue with physical therapy    SOF[facility]A JONES, [facility]   GOALS to be met in 1-2 days. Bed mobility-  Independent                                         Transfers-Sit to stand-Modified Independent                Gait:  Patient to ambulate 100 feet using wheeled walker with Modified Independent                Steps: Patient to go up and down 3  step(s) using - rail(s) with  Minimal assistance      Increase rom in affected joints by 10%  Increase strength in affected mm groups by 1/3 grade  Increase balance to allow for improvement towards functional goals.   Increase endurance to allow for improvement towards functional goals.   
lower extremities bolstered in neutral.   Instruction and performance of incentive inspirometer. Sat EOB x 10 minutes to increase dynamic sitting balance and activity tolerance. Patient stood with hermann becker. Returned to bed. Patient demonstrating fair   understanding of education/techniques, requiring additional training/education. At end of session, patient in bed with alarm call light and phone within reach,   all lines and tubes intact, nursing notified. Pt would benefit from continued skilled PT to increase functional independence and quality of life. Rehab Potential: good     Patients Goal: home      ASSESSMENT  Patient exhibits decreased strength, balance, coordination impairing functional mobility. GOALS to be met in 1-2 days. Bed mobility-  Independent      Transfers-Sit to stand-Modified Independent     Gait:  Patient to ambulate 100 feet using wheeled walker with Modified Independent     Steps: Patient to go up and down 3  step(s) using - rail(s) with  Minimal assistance     Increase rom in affected joints by 10%  Increase strength in affected mm groups by 1/3 grade  Increase balance to allow for improvement towards functional goals. Increase endurance to allow for improvement towards functional goals.         Marly Smith, PT
Problems:    * No resolved hospital problems. *      Assessment:  status post bilateral knee arthroplasty with Dr. Janell Bryant today for osteoarthritis  Hypertension  GERD  Osteoarthritis  Obesity secondary to excess caloric intake     Plan:     Doing well working with therapies. Pain is a little worse today for the patient. Stated current pain medication is adequate just more pain than he expected. PT/OT to evaluate and treat. Orthopedic team is following and recommendations have been reviewed. Xarelto for DVT prophylaxis. Monitor Hgb. Treat pain and monitor. [facility] tomorrow[facility]. More than 50% of my  time was spent at the bedside counseling and/or coordination of care with the patient and/or family with face to face contact. This time was spent reviewing notes and laboratory data, instructing and counseling the patient. Time I spent with the family or surrogate(s) is included only if the patient was incapable of providing the necessary information or participating in medical decisionsI also discussed the differential diagnosis and all of the proposed management plans with the patient and individuals accompanying the patient. I reviewed the patient's past medical, surgical history and medication. Please see orders for further plan of care. Reviewed consultant recommendations/notes and/or discussion. Patient's medications were reviewed/continued/adjusted. Labs as ordered. Please see orders for further plan of care. I reviewed the  course of events since last visit. Brian Rene DO, F.A.C.O.I.   On 8/1/2019  2:07 PM

## 2019-08-13 ENCOUNTER — OFFICE VISIT (OUTPATIENT)
Dept: ORTHOPEDIC SURGERY | Age: 59
End: 2019-08-13

## 2019-08-13 VITALS — BODY MASS INDEX: 34.1 KG/M2 | HEIGHT: 76 IN | WEIGHT: 280 LBS

## 2019-08-13 DIAGNOSIS — M17.11 PRIMARY OSTEOARTHRITIS OF RIGHT KNEE: Primary | ICD-10-CM

## 2019-08-13 DIAGNOSIS — M17.12 PRIMARY OSTEOARTHRITIS OF LEFT KNEE: ICD-10-CM

## 2019-08-13 DIAGNOSIS — Z96.653 STATUS POST TOTAL BILATERAL KNEE REPLACEMENT: ICD-10-CM

## 2019-08-13 PROCEDURE — 99024 POSTOP FOLLOW-UP VISIT: CPT | Performed by: ORTHOPAEDIC SURGERY

## 2019-08-13 RX ORDER — OXYCODONE AND ACETAMINOPHEN 10; 325 MG/1; MG/1
1 TABLET ORAL EVERY 6 HOURS PRN
Qty: 28 TABLET | Refills: 0 | Status: SHIPPED | OUTPATIENT
Start: 2019-08-13 | End: 2019-08-26 | Stop reason: SDUPTHER

## 2019-08-13 RX ORDER — CYCLOBENZAPRINE HCL 10 MG
10 TABLET ORAL 2 TIMES DAILY PRN
Qty: 30 TABLET | Refills: 0 | Status: SHIPPED | OUTPATIENT
Start: 2019-08-13 | End: 2019-09-26 | Stop reason: SDUPTHER

## 2019-08-13 NOTE — PATIENT INSTRUCTIONS
Continues current post-op course, staples were removed at today's appointment  Patient is to continue taking enteric coated aspirin 325 mg, 1 tablet twice daily. Patient is to continue wearing ERICA hose, on during the day and off at night. Outpatient physical therapy is to begin immediately. No bathing/swimming/hot tub for two weeks or until incision is completely closed. We will see the patient back in 4 weeks for repeat xray and evaluation.   Please call office with any questions or concerns at [phone]

## 2019-08-14 ENCOUNTER — EVALUATION (OUTPATIENT)
Dept: PHYSICAL THERAPY | Age: 59
End: 2019-08-14
Payer: COMMERCIAL

## 2019-08-14 DIAGNOSIS — Z96.653 STATUS POST TOTAL BILATERAL KNEE REPLACEMENT: Primary | ICD-10-CM

## 2019-08-14 PROCEDURE — 97016 VASOPNEUMATIC DEVICE THERAPY: CPT | Performed by: PHYSICAL THERAPIST

## 2019-08-14 PROCEDURE — 97110 THERAPEUTIC EXERCISES: CPT | Performed by: PHYSICAL THERAPIST

## 2019-08-14 PROCEDURE — 97161 PT EVAL LOW COMPLEX 20 MIN: CPT | Performed by: PHYSICAL THERAPIST

## 2019-08-14 NOTE — PROGRESS NOTES
AROM: 89° Flexion,  14° Extension  PROM: 92° Flexion,  12° Extension  Left:   AROM: 89° Flexion,  14° Extension  PROM: 92° Flexion,  12° Extension    Strength:    Knee:   Right: Flexion 3-/5,  Extension 3-/5  Left: Flexion  3-/5,  Extension 3-/5    Palpation: Tender to palpation babak incision     ASSESSMENT     Outcome Measure:   Lower Extremity Functional Scale (LEFS) 85% impairment    Problems:    Pain reported 7/10   ROM decreased AROM B 14-89*, PROM B 12-92*   Strength decreased 3-/5   Decreased functional ability with walking, standing, work and driving    [x] There are no barriers affecting plan of care or recovery    [] Barriers to this patient's plan of care or recovery include.     Domestic Concerns:  [x] No  [] Yes:    Short Term goals (2-3 weeks)   Decrease reported pain to 0-3/10   Increase ROM to 5-100*   Increase Strength to 4/5    Able to perform/complete the following functions/tasks: increased standing duration, independent gait   Lower Extremity Functional Scale (LEFS) 40% impairment    Long Term goals (4-6 weeks)   Decrease reported pain to 0-2/10   Increase ROM to 0-120*   Increase Strength to 5/5    Able to perform/complete the following functions/tasks: independent gait, patient able to return to work  [facility] Independent with Home Exercise Programs   Lower Extremity Functional Scale (LEFS) 15% impairment    Rehab Potential: [x] Good  [x] Fair  [] Poor    PLAN       Treatment Plan:  [x] Therapeutic Exercise  [x] Therapeutic Activity  [x] Neuromuscular Re-education   [x] Gait Training  [x] Balance Training  [] Aerobic conditioning  [x] Manual Therapy  [] Massage/Fascial release   [] Work/Sport specific activities    [] Pain Neuroscience [x] Cold/hotpack  [x] Vasocompression  [] Electrical Stimulation  [] Lumbar/Cervical Traction  [] Ultrasound   [] Iontophoresis: 4 mg/mL Dexamethasone Sodium Phosphate 40-80 mAmin        [x] Instruction in HEP      []  Medication allergies reviewed for

## 2019-08-19 ENCOUNTER — TREATMENT (OUTPATIENT)
Dept: PHYSICAL THERAPY | Age: 59
End: 2019-08-19
Payer: COMMERCIAL

## 2019-08-19 DIAGNOSIS — Z96.653 STATUS POST TOTAL BILATERAL KNEE REPLACEMENT: Primary | ICD-10-CM

## 2019-08-19 PROCEDURE — 97016 VASOPNEUMATIC DEVICE THERAPY: CPT | Performed by: PHYSICAL THERAPIST

## 2019-08-19 PROCEDURE — 97110 THERAPEUTIC EXERCISES: CPT | Performed by: PHYSICAL THERAPIST

## 2019-08-21 ENCOUNTER — TREATMENT (OUTPATIENT)
Dept: PHYSICAL THERAPY | Age: 59
End: 2019-08-21
Payer: COMMERCIAL

## 2019-08-21 DIAGNOSIS — Z96.653 STATUS POST TOTAL BILATERAL KNEE REPLACEMENT: Primary | ICD-10-CM

## 2019-08-21 PROCEDURE — 97110 THERAPEUTIC EXERCISES: CPT

## 2019-08-21 PROCEDURE — 97016 VASOPNEUMATIC DEVICE THERAPY: CPT

## 2019-08-23 ENCOUNTER — TREATMENT (OUTPATIENT)
Dept: PHYSICAL THERAPY | Age: 59
End: 2019-08-23
Payer: COMMERCIAL

## 2019-08-23 DIAGNOSIS — Z96.653 STATUS POST TOTAL BILATERAL KNEE REPLACEMENT: Primary | ICD-10-CM

## 2019-08-23 PROCEDURE — 97016 VASOPNEUMATIC DEVICE THERAPY: CPT

## 2019-08-23 PROCEDURE — 97110 THERAPEUTIC EXERCISES: CPT

## 2019-08-26 ENCOUNTER — TREATMENT (OUTPATIENT)
Dept: PHYSICAL THERAPY | Age: 59
End: 2019-08-26
Payer: COMMERCIAL

## 2019-08-26 ENCOUNTER — TELEPHONE (OUTPATIENT)
Dept: ORTHOPEDIC SURGERY | Age: 59
End: 2019-08-26

## 2019-08-26 DIAGNOSIS — Z96.653 STATUS POST TOTAL BILATERAL KNEE REPLACEMENT: Primary | ICD-10-CM

## 2019-08-26 DIAGNOSIS — M17.11 PRIMARY OSTEOARTHRITIS OF RIGHT KNEE: ICD-10-CM

## 2019-08-26 DIAGNOSIS — M17.12 PRIMARY OSTEOARTHRITIS OF LEFT KNEE: ICD-10-CM

## 2019-08-26 DIAGNOSIS — Z96.653 STATUS POST TOTAL BILATERAL KNEE REPLACEMENT: ICD-10-CM

## 2019-08-26 PROCEDURE — 97016 VASOPNEUMATIC DEVICE THERAPY: CPT

## 2019-08-26 PROCEDURE — 97530 THERAPEUTIC ACTIVITIES: CPT

## 2019-08-26 PROCEDURE — 97110 THERAPEUTIC EXERCISES: CPT

## 2019-08-26 RX ORDER — OXYCODONE AND ACETAMINOPHEN 10; 325 MG/1; MG/1
1 TABLET ORAL EVERY 6 HOURS PRN
Qty: 28 TABLET | Refills: 0 | Status: SHIPPED | OUTPATIENT
Start: 2019-08-26 | End: 2019-09-13 | Stop reason: SDUPTHER

## 2019-08-27 ASSESSMENT — PROMIS GLOBAL HEALTH SCALE
IN THE PAST 7 DAYS, HOW WOULD YOU RATE YOUR FATIGUE ON AVERAGE [ON A SCALE FROM 1 (NONE) TO 5 (VERY SEVERE)]?: 3
IN GENERAL, HOW WOULD YOU RATE YOUR MENTAL HEALTH, INCLUDING YOUR MOOD AND YOUR ABILITY TO THINK [ON A SCALE OF 1 (POOR) TO 5 (EXCELLENT)]?: 4
TO WHAT EXTENT ARE YOU ABLE TO CARRY OUT YOUR EVERYDAY PHYSICAL ACTIVITIES SUCH AS WALKING, CLIMBING STAIRS, CARRYING GROCERIES, OR MOVING A CHAIR [ON A SCALE OF 1 (NOT AT ALL) TO 5 (COMPLETELY)]?: 3
HOW IS THE PROMIS V1.1 BEING ADMINISTERED?: 2
IN GENERAL, WOULD YOU SAY YOUR QUALITY OF LIFE IS...[ON A SCALE OF 1 (POOR) TO 5 (EXCELLENT)]: 3
IN GENERAL, PLEASE RATE HOW WELL YOU CARRY OUT YOUR USUAL SOCIAL ACTIVITIES (INCLUDES ACTIVITIES AT HOME, AT WORK, AND IN YOUR COMMUNITY, AND RESPONSIBILITIES AS A PARENT, CHILD, SPOUSE, EMPLOYEE, FRIEND, ETC) [ON A SCALE OF 1 (POOR) TO 5 (EXCELLENT)]?: 3
IN GENERAL, HOW WOULD YOU RATE YOUR SATISFACTION WITH YOUR SOCIAL ACTIVITIES AND RELATIONSHIPS [ON A SCALE OF 1 (POOR) TO 5 (EXCELLENT)]?: 3
IN THE PAST 7 DAYS, HOW OFTEN HAVE YOU BEEN BOTHERED BY EMOTIONAL PROBLEMS, SUCH AS FEELING ANXIOUS, DEPRESSED, OR IRRITABLE [ON A SCALE FROM 1 (NEVER) TO 5 (ALWAYS)]?: 1
IN GENERAL, WOULD YOU SAY YOUR HEALTH IS...[ON A SCALE OF 1 (POOR) TO 5 (EXCELLENT)]: 3
IN THE PAST 7 DAYS, HOW WOULD YOU RATE YOUR PAIN ON AVERAGE [ON A SCALE FROM 0 (NO PAIN) TO 10 (WORST IMAGINABLE PAIN)]?: 6
IN GENERAL, HOW WOULD YOU RATE YOUR PHYSICAL HEALTH [ON A SCALE OF 1 (POOR) TO 5 (EXCELLENT)]?: 3

## 2019-08-27 ASSESSMENT — KOOS JR
BENDING TO THE FLOOR TO PICK UP OBJECT: 2
STANDING UPRIGHT: 1
STRAIGHTENING KNEE FULLY: 1
RISING FROM SITTING: 2
HOW SEVERE IS YOUR KNEE STIFFNESS AFTER FIRST WAKING IN MORNING: 2
TWISING OR PIVOTING ON KNEE: 3
GOING UP OR DOWN STAIRS: 2

## 2019-08-28 ENCOUNTER — TREATMENT (OUTPATIENT)
Dept: PHYSICAL THERAPY | Age: 59
End: 2019-08-28
Payer: COMMERCIAL

## 2019-08-28 DIAGNOSIS — Z96.653 STATUS POST TOTAL BILATERAL KNEE REPLACEMENT: Primary | ICD-10-CM

## 2019-08-28 PROCEDURE — 97016 VASOPNEUMATIC DEVICE THERAPY: CPT

## 2019-08-28 PROCEDURE — 97110 THERAPEUTIC EXERCISES: CPT

## 2019-08-28 PROCEDURE — 97530 THERAPEUTIC ACTIVITIES: CPT

## 2019-08-30 ENCOUNTER — TREATMENT (OUTPATIENT)
Dept: PHYSICAL THERAPY | Age: 59
End: 2019-08-30
Payer: COMMERCIAL

## 2019-08-30 DIAGNOSIS — Z96.653 STATUS POST TOTAL BILATERAL KNEE REPLACEMENT: Primary | ICD-10-CM

## 2019-08-30 PROCEDURE — 97016 VASOPNEUMATIC DEVICE THERAPY: CPT

## 2019-08-30 PROCEDURE — 97530 THERAPEUTIC ACTIVITIES: CPT

## 2019-08-30 PROCEDURE — 97110 THERAPEUTIC EXERCISES: CPT

## 2019-08-30 NOTE — PROGRESS NOTES
Physical Therapy Daily Treatment Note    Date: 2019  Patient Name: Batsheva Martinez  : 1960   MRN: [id number]  DOInjury: insidious  DOSx: 2019  Referring Provider: No referring provider defined for this encounter. Medical Diagnosis:      Diagnosis Orders   1. Status post total bilateral knee replacement         Outcome Measure:   Lower Extremity Functional Scale (LEFS) 85% impairment    S: stiff and sore. But starting to feel better  O: Mod edema  Time  1753-6829      Visit  7/ Repeat outcome measure at mid point and end. Pain   9/10     ROM  R *  L *     Modalities       Ice, compression, elevation L/R 75 mmHg x 15 min Each LE    Stretching       Patella mobs x 40     Prone hangs       Heel props 4# 10 min     Knee flex stretch-seated 3 x 1 min     Prone self flexion stretch      Exercise       Nustep  L5/ 10 min incr seat    Quad sets  HEP    Heel slides  x 20 each   Use sheet    SLR  HEP    Marching  20x     Sidekicks 20x     Squat  20x     Step-ups - FWD  8\" 25x       Step-ups - LAT 8\"  25x     Step-ups - BWD  6\" 25x     Step up and over reciprocally       TG squats L18   3 x 20     TG Calf raises      Gait 2 x 100 feet Cues for knee ext in stance, knee flex in swing phase gait     NMR For safe ambulation in community and home    Marching gait      Side stepping      Retrowalk      Heel to toe      A: Tolerated well. Above added to written HEP along with instructions for ice and elevation.   P: Continue with rehab plan  Dahiana Patience, PTA    Treatment Charges: Mins Units   Initial Evaluation     Re-Evaluation     Ther Exercise         TE 30 2   Manual Therapy     MT     Ther Activities        TA 30 2   Gait Training          GT     Neuro Re-education NR     Modalities Vasopneum 30 2   Non-Billable Service Time     Other     Total Time/Units 90 6

## 2019-09-04 ENCOUNTER — TREATMENT (OUTPATIENT)
Dept: PHYSICAL THERAPY | Age: 59
End: 2019-09-04
Payer: COMMERCIAL

## 2019-09-04 DIAGNOSIS — Z96.653 STATUS POST TOTAL BILATERAL KNEE REPLACEMENT: Primary | ICD-10-CM

## 2019-09-04 PROCEDURE — 97016 VASOPNEUMATIC DEVICE THERAPY: CPT

## 2019-09-04 PROCEDURE — 97530 THERAPEUTIC ACTIVITIES: CPT

## 2019-09-04 PROCEDURE — 97110 THERAPEUTIC EXERCISES: CPT

## 2019-09-06 ENCOUNTER — TREATMENT (OUTPATIENT)
Dept: PHYSICAL THERAPY | Age: 59
End: 2019-09-06
Payer: COMMERCIAL

## 2019-09-06 DIAGNOSIS — Z96.653 STATUS POST TOTAL BILATERAL KNEE REPLACEMENT: Primary | ICD-10-CM

## 2019-09-06 PROCEDURE — 97016 VASOPNEUMATIC DEVICE THERAPY: CPT | Performed by: PHYSICAL THERAPIST

## 2019-09-06 PROCEDURE — 97110 THERAPEUTIC EXERCISES: CPT | Performed by: PHYSICAL THERAPIST

## 2019-09-06 PROCEDURE — 97530 THERAPEUTIC ACTIVITIES: CPT | Performed by: PHYSICAL THERAPIST

## 2019-09-06 NOTE — PROGRESS NOTES
Physical Therapy Daily Treatment Note    Date: 2019  Patient Name: Bryce Juárez  : 1960   MRN: [id number]  DOInjury: insidious  DOSx: 2019  Referring Provider: No referring provider defined for this encounter. Medical Diagnosis:      Diagnosis Orders   1. Status post total bilateral knee replacement         Outcome Measure:   Lower Extremity Functional Scale (LEFS) 85% impairment    S: stiff and sore. But starting to feel better  O: Mod edema  Time  1682-3569      Visit  9/ Repeat outcome measure at mid point and end. Pain   9/10     ROM  R *  L *     Modalities       Ice, compression, elevation L/R 75 mmHg x 15 min Each LE    Stretching       Patella mobs x 40     Prone hangs       Heel props 4# 10 min     Knee flex stretch-seated 3 x 1 min     Prone self flexion stretch      Exercise       Nustep  L5/ 10 min incr seat    Quad sets  HEP    Heel slides  x 20 each   Use sheet    SLR  HEP    Marching  20x     Sidekicks 20x     Squat  20x     Step-ups - FWD  8\" 25x       Step-ups - LAT 8\"  25x     Step-ups - BWD  6\" 25x     Step up and over reciprocally       TG squats L18   3 x 20     TG Calf raises      Gait 2 x 100 feet Cues for knee ext in stance, knee flex in swing phase gait     NMR For safe ambulation in community and home    Marching gait 2 x 35 ft     Side stepping 2 x 35 ft     Retrowalk      Heel to toe      A: Tolerated well. Above added to written HEP along with instructions for ice and elevation.   P: Continue with rehab plan  [place] Reach, PTA    Treatment Charges: Mins Units   Initial Evaluation     Re-Evaluation     Ther Exercise         TE 30 2   Manual Therapy     MT     Ther Activities        TA 30 2   Gait Training          GT     Neuro Re-education NR     Modalities Vasopneum 30 2   Non-Billable Service Time     Other     Total Time/Units 90 6

## 2019-09-09 ENCOUNTER — TREATMENT (OUTPATIENT)
Dept: PHYSICAL THERAPY | Age: 59
End: 2019-09-09
Payer: COMMERCIAL

## 2019-09-09 DIAGNOSIS — Z96.653 STATUS POST TOTAL BILATERAL KNEE REPLACEMENT: Primary | ICD-10-CM

## 2019-09-09 PROCEDURE — 97016 VASOPNEUMATIC DEVICE THERAPY: CPT

## 2019-09-09 PROCEDURE — 97530 THERAPEUTIC ACTIVITIES: CPT

## 2019-09-09 PROCEDURE — 97110 THERAPEUTIC EXERCISES: CPT

## 2019-09-09 NOTE — PROGRESS NOTES
Physical Therapy Daily Treatment Note    Date: 2019  Patient Name: Tg Sandy  : 1960   MRN: [id number]  DOInjury: insidious  DOSx: 2019  Referring Provider:  Andrea Estevez DO     Medical Diagnosis:      Diagnosis Orders   1. Status post total bilateral knee replacement         Outcome Measure:   Lower Extremity Functional Scale (LEFS) 85% impairment    S: stiff and sore. But starting to feel better  O: Mod edema  Time  9977-7150      Visit  10/ Repeat outcome measure at mid point and end. Pain   9/10     ROM  R *  L *     Modalities       Ice, compression, elevation L/R 75 mmHg x 15 min Each LE    Stretching       Patella mobs x 40     Prone hangs       Heel props 4# 10 min     Knee flex stretch-seated 3 x 1 min     Prone self flexion stretch      Exercise       Nustep  L5/ 10 min incr seat    Quad sets  HEP    Heel slides  x 20 each   Use sheet    SLR  HEP    Marching      Sidekicks     Squat      Step-ups - FWD  8\" 25x       Step-ups - LAT 8\"  25x     Step-ups - BWD  6\" 25x     Step up and over reciprocally       TG squats L18   3 x 20     TG Calf raises      Gait 2 x 100 feet Cues for knee ext in stance, knee flex in swing phase gait     NMR For safe ambulation in community and home    Marching gait 2 x 35 ft     Side stepping 2 x 35 ft     Push cart 3 laps fwd/ 1 lap bkwd     Heel to toe      A: Tolerated well. Above added to written HEP along with instructions for ice and elevation.   P: Continue with rehab plan  Yuan Soto PTA    Treatment Charges: Mins Units   Initial Evaluation     Re-Evaluation     Ther Exercise         TE 15 1   Manual Therapy     MT     Ther Activities        TA 60 4   Gait Training          GT     Neuro Re-education NR     Modalities Vasopneum 30 2   Non-Billable Service Time     Other     Total Time/Units 105 7

## 2019-09-11 ENCOUNTER — TREATMENT (OUTPATIENT)
Dept: PHYSICAL THERAPY | Age: 59
End: 2019-09-11
Payer: COMMERCIAL

## 2019-09-11 DIAGNOSIS — Z96.653 STATUS POST TOTAL BILATERAL KNEE REPLACEMENT: Primary | ICD-10-CM

## 2019-09-11 PROCEDURE — 97530 THERAPEUTIC ACTIVITIES: CPT

## 2019-09-11 PROCEDURE — 97016 VASOPNEUMATIC DEVICE THERAPY: CPT

## 2019-09-11 NOTE — PROGRESS NOTES
Physical Therapy Daily Treatment Note    Date: 2019  Patient Name: Josh Espinal  : 1960   MRN: [id number]  DOInjury: insidious  DOSx: 2019  Referring Provider:  Earnest Lopez DO     Medical Diagnosis:      Diagnosis Orders   1. Status post total bilateral knee replacement         Outcome Measure:   Lower Extremity Functional Scale (LEFS) 85% impairment    S: stiff and sore. But starting to feel better  O: Mod edema  Time  3772-6988      Visit  10/ Repeat outcome measure at mid point and end. Pain   9/10     ROM  R *  L *     Modalities       Ice, compression, elevation L/R 75 mmHg x 15 min Each LE    Stretching       Patella mobs x 40     Prone hangs       Heel props 4# 10 min     Knee flex stretch-seated 3 x 1 min     Prone self flexion stretch      Exercise       Nustep  L5/ 10 min incr seat    Quad sets  HEP    Heel slides  x 20 each   Use sheet    SLR  HEP    Marching      Sidekicks     Squat      Step-ups - FWD  8\" 25x       Step-ups - LAT 8\"  25x     Step-ups - BWD  6\" 25x     Step up and over reciprocally       TG squats L18   3 x 20     TG Calf raises      Gait 2 x 100 feet     Wood chops 9# med ball 15x ea side     Squats/overhead 9# med ball 15x      Marching gait 2 x 35 ft     Side stepping 2 x 35 ft     Push cart 3 laps fwd/ 1 lap bkwd     Heel to toe      A: Tolerated well. Above added to written HEP along with instructions for ice and elevation.   P: Continue with rehab plan  Carrol Gonsalves PTA    Treatment Charges: Mins Units   Initial Evaluation     Re-Evaluation     Ther Exercise         TE     Manual Therapy     MT     Ther Activities        TA 60 4   Gait Training          GT     Neuro Re-education NR     Modalities Vasopneum 30 2   Non-Billable Service Time     Other     Total Time/Units 90 6

## 2019-09-13 ENCOUNTER — OFFICE VISIT (OUTPATIENT)
Dept: ORTHOPEDIC SURGERY | Age: 59
End: 2019-09-13

## 2019-09-13 ENCOUNTER — TREATMENT (OUTPATIENT)
Dept: PHYSICAL THERAPY | Age: 59
End: 2019-09-13
Payer: COMMERCIAL

## 2019-09-13 VITALS — TEMPERATURE: 98 F | BODY MASS INDEX: 33.49 KG/M2 | WEIGHT: 275 LBS | HEIGHT: 76 IN

## 2019-09-13 DIAGNOSIS — M17.12 PRIMARY OSTEOARTHRITIS OF LEFT KNEE: ICD-10-CM

## 2019-09-13 DIAGNOSIS — M17.11 PRIMARY OSTEOARTHRITIS OF RIGHT KNEE: ICD-10-CM

## 2019-09-13 DIAGNOSIS — Z96.653 STATUS POST TOTAL BILATERAL KNEE REPLACEMENT: Primary | ICD-10-CM

## 2019-09-13 PROCEDURE — 97016 VASOPNEUMATIC DEVICE THERAPY: CPT

## 2019-09-13 PROCEDURE — 97530 THERAPEUTIC ACTIVITIES: CPT

## 2019-09-13 PROCEDURE — 99024 POSTOP FOLLOW-UP VISIT: CPT | Performed by: NURSE PRACTITIONER

## 2019-09-13 RX ORDER — OXYCODONE AND ACETAMINOPHEN 10; 325 MG/1; MG/1
1 TABLET ORAL EVERY 6 HOURS PRN
Qty: 28 TABLET | Refills: 0 | Status: SHIPPED | OUTPATIENT
Start: 2019-09-13 | End: 2019-09-26 | Stop reason: SDUPTHER

## 2019-09-13 NOTE — PROGRESS NOTES
Physical Therapy Daily Treatment Note    Date: 2019  Patient Name: Petra Gonzáles  : 1960   MRN: [id number]  DOInjury: insidious  DOSx: 2019  Referring Provider:  Augusta Taveras DO     Medical Diagnosis:      Diagnosis Orders   1. Status post total bilateral knee replacement         Outcome Measure:   Lower Extremity Functional Scale (LEFS) 85% impairment    S: stiff and sore. But starting to feel better  O: Mod edema  Time  930-1130      Visit  11/ Repeat outcome measure at mid point and end. Pain   9/10     ROM  R *  L *     Modalities       Ice, compression, elevation L/R 75 mmHg x 15 min Each LE    Stretching       Patella mobs x 40     Prone hangs       Heel props 4# 10 min     Knee flex stretch-seated 3 x 1 min     Prone self flexion stretch      Exercise       Nustep  L5/ 10 min incr seat    Quad sets  HEP    Heel slides  x 20 each   Use sheet    SLR  HEP    Marching      Sidekicks     Squat      Step-ups - FWD  8\" 25x       Step-ups - LAT 8\"  25x     Step-ups - BWD  6\" 25x     Step up and over reciprocally       TG squats L18   3 x 20     TG Calf raises      Gait 2 x 100 feet     Wood chops 9# med ball 15x ea side     Squats/overhead 9# med ball 15x      Marching gait 2 x 35 ft     Side stepping 2 x 35 ft     Push cart 3 laps fwd/ 1 lap bkwd     Heel to toe      A: Tolerated well. Above added to written HEP along with instructions for ice and elevation.   P: Continue with rehab plan  Chau Jerez PTA    Treatment Charges: Mins Units   Initial Evaluation     Re-Evaluation     Ther Exercise         TE     Manual Therapy     MT     Ther Activities        TA 30 2   Gait Training          GT     Neuro Re-education NR     Modalities Vasopneum 30 2   Non-Billable Service Time     Other     Total Time/Units 60 4

## 2019-09-16 ENCOUNTER — TREATMENT (OUTPATIENT)
Dept: PHYSICAL THERAPY | Age: 59
End: 2019-09-16
Payer: COMMERCIAL

## 2019-09-16 DIAGNOSIS — Z96.653 STATUS POST TOTAL BILATERAL KNEE REPLACEMENT: Primary | ICD-10-CM

## 2019-09-16 PROCEDURE — 97530 THERAPEUTIC ACTIVITIES: CPT | Performed by: PHYSICAL THERAPIST

## 2019-09-16 PROCEDURE — 97016 VASOPNEUMATIC DEVICE THERAPY: CPT | Performed by: PHYSICAL THERAPIST

## 2019-09-18 ENCOUNTER — TREATMENT (OUTPATIENT)
Dept: PHYSICAL THERAPY | Age: 59
End: 2019-09-18
Payer: COMMERCIAL

## 2019-09-18 DIAGNOSIS — Z96.653 STATUS POST TOTAL BILATERAL KNEE REPLACEMENT: Primary | ICD-10-CM

## 2019-09-18 PROCEDURE — 97530 THERAPEUTIC ACTIVITIES: CPT | Performed by: PHYSICAL THERAPIST

## 2019-09-18 PROCEDURE — 97016 VASOPNEUMATIC DEVICE THERAPY: CPT | Performed by: PHYSICAL THERAPIST

## 2019-09-18 NOTE — PROGRESS NOTES
Physical Therapy Daily Treatment Note    Date: 2019  Patient Name: Bryce Juárez  : 1960   MRN: [id number]  DOInjury: insidious  DOSx: 2019  Referring Provider:  Clement Goldmann, DO     Medical Diagnosis:      Diagnosis Orders   1. Status post total bilateral knee replacement         Outcome Measure:   Lower Extremity Functional Scale (LEFS) 85% impairment    S: stiff and sore. But starting to feel better. With the exception of a open skin tear on left LE about 6\" below knee cap. Pt had it covered with gauze. Calling family dr to get in, due to staff infection last may. O: Mod edema  Time  5726-4722      Visit   Repeat outcome measure at mid point and end. Pain   9/10     ROM  R *  L *     Modalities       Ice, compression, elevation L/R 75 mmHg x 15 min Each LE    Stretching       Patella mobs x 40     Prone hangs       Heel props 4# 10 min     Knee flex stretch-seated 3 x 1 min     Prone self flexion stretch      Exercise       Nustep  L5/ 10 min incr seat    Quad sets  HEP    Heel slides  x 20 each   Use sheet    SLR  HEP    Marching      Sidekicks     Squat      Step-ups - FWD        Step-ups - LAT 8\"  25x     Step-ups - BWD  6\" 25x     Step up and over reciprocally  15x      TG squats L18   3 x 20     Leg press 80# 2 x 20     Leg press SL 40#  2 x 20     Leg ext SL 5# 14x  B  LE     TG Calf raises      Gait 2 x 100 feet     Wood chops 15# med ball 15x ea side     Squats/overhead 15# med ball 15x      Marching gait 2 x 35 ft     Side stepping 2 x 35 ft     Push cart 3 laps fwd/ 2 lap bkwd     Heel to toe      A: Tolerated well. Above added to written HEP along with instructions for ice and elevation.   P: Continue with rehab plan  Wenona Reach, PTA    Treatment Charges: Mins Units   Initial Evaluation     Re-Evaluation     Ther Exercise         TE     Manual Therapy     MT     Ther Activities        TA 45 3   Gait Training          GT     Neuro Re-education NR

## 2019-09-20 ENCOUNTER — TREATMENT (OUTPATIENT)
Dept: PHYSICAL THERAPY | Age: 59
End: 2019-09-20
Payer: COMMERCIAL

## 2019-09-20 DIAGNOSIS — Z96.653 STATUS POST TOTAL BILATERAL KNEE REPLACEMENT: Primary | ICD-10-CM

## 2019-09-20 PROCEDURE — 97016 VASOPNEUMATIC DEVICE THERAPY: CPT | Performed by: PHYSICAL THERAPIST

## 2019-09-20 PROCEDURE — 97530 THERAPEUTIC ACTIVITIES: CPT | Performed by: PHYSICAL THERAPIST

## 2019-09-23 ENCOUNTER — TREATMENT (OUTPATIENT)
Dept: PHYSICAL THERAPY | Age: 59
End: 2019-09-23
Payer: COMMERCIAL

## 2019-09-23 DIAGNOSIS — Z96.653 STATUS POST TOTAL BILATERAL KNEE REPLACEMENT: Primary | ICD-10-CM

## 2019-09-23 PROCEDURE — 97530 THERAPEUTIC ACTIVITIES: CPT

## 2019-09-23 PROCEDURE — 97016 VASOPNEUMATIC DEVICE THERAPY: CPT

## 2019-09-25 ENCOUNTER — TREATMENT (OUTPATIENT)
Dept: PHYSICAL THERAPY | Age: 59
End: 2019-09-25
Payer: COMMERCIAL

## 2019-09-25 DIAGNOSIS — Z96.653 STATUS POST TOTAL BILATERAL KNEE REPLACEMENT: Primary | ICD-10-CM

## 2019-09-25 PROCEDURE — 97110 THERAPEUTIC EXERCISES: CPT

## 2019-09-25 PROCEDURE — 97016 VASOPNEUMATIC DEVICE THERAPY: CPT

## 2019-09-25 NOTE — PROGRESS NOTES
Physical Therapy Daily Treatment Note    Date: 2019  Patient Name: Jaylin Verdugo  : 1960   MRN: [id number]  DOInjury: insidious  DOSx: 2019  Referring Provider:  Dakota Buitrago DO     Medical Diagnosis:      Diagnosis Orders   1. Status post total bilateral knee replacement         Outcome Measure:   Lower Extremity Functional Scale (LEFS) 85% impairment    S:  Pt reports RTW next week. O: Mod edema  Time  5861-4989       Visit   Repeat outcome measure at mid point and end. Pain   7-8 /10     ROM  R *  L *     Modalities       Ice, compression, elevation L/R 75 mmHg x 15 min Each LE    Stretching       Patella mobs x 40     Prone hangs       Heel props 4# 10 min     Knee flex stretch-seated 3 x 1 min     Prone self flexion stretch      Exercise       Nustep  L5/ 10 min incr seat    Quad sets  HEP    Heel slides  x 20 each   Use sheet    SLR  HEP    Marching      Sidekicks     Squat      Step-ups - FWD  8\" 25x       Step-ups - LAT 8\"  25x     Step-ups - BWD  6\" 25x     Step up and over reciprocally      TG squats L18   3 x 20     Leg press 80# 2 x 20     Leg press SL 40#  2 x 20     Leg ext SL 5# 14x  B  LE     TG Calf raises      Gait 2 x 100 feet     Wood chops 15# med ball 15x ea side     Squats/overhead 15# med ball 15x      Marching gait 2 x 35 ft     Side stepping 2 x 35 ft     Push cart 3 laps fwd/ 2 lap bkwd     Heel to toe      A: Tolerated well. Above added to written HEP along with instructions for ice and elevation.   P: Continue with rehab plan  Herlinda Navarro PTA    Treatment Charges: Mins Units   Initial Evaluation     Re-Evaluation     Ther Exercise         TE     Manual Therapy     MT     Ther Activities        TA 45 3   Gait Training          GT     Neuro Re-education NR     Modalities Vasopneum 30 2   Non-Billable Service Time     Other     Total Time/Units 75 5

## 2019-09-26 ENCOUNTER — OFFICE VISIT (OUTPATIENT)
Dept: ORTHOPEDIC SURGERY | Age: 59
End: 2019-09-26
Payer: COMMERCIAL

## 2019-09-26 VITALS — BODY MASS INDEX: 34.77 KG/M2 | WEIGHT: 285.5 LBS | HEIGHT: 76 IN

## 2019-09-26 DIAGNOSIS — M17.11 PRIMARY OSTEOARTHRITIS OF RIGHT KNEE: ICD-10-CM

## 2019-09-26 DIAGNOSIS — Z96.653 STATUS POST TOTAL BILATERAL KNEE REPLACEMENT: ICD-10-CM

## 2019-09-26 DIAGNOSIS — M65.4 RADIAL STYLOID TENOSYNOVITIS OF RIGHT HAND: ICD-10-CM

## 2019-09-26 DIAGNOSIS — M93.1 KIENBOECK DISEASE OF ADULTS: Primary | ICD-10-CM

## 2019-09-26 DIAGNOSIS — M17.12 PRIMARY OSTEOARTHRITIS OF LEFT KNEE: ICD-10-CM

## 2019-09-26 PROCEDURE — 99213 OFFICE O/P EST LOW 20 MIN: CPT | Performed by: ORTHOPAEDIC SURGERY

## 2019-09-26 RX ORDER — OXYCODONE AND ACETAMINOPHEN 10; 325 MG/1; MG/1
1 TABLET ORAL EVERY 6 HOURS PRN
Qty: 28 TABLET | Refills: 0 | Status: SHIPPED | OUTPATIENT
Start: 2019-09-26 | End: 2019-10-28 | Stop reason: SDUPTHER

## 2019-09-26 RX ORDER — CYCLOBENZAPRINE HCL 10 MG
10 TABLET ORAL 2 TIMES DAILY PRN
Qty: 30 TABLET | Refills: 0 | Status: SHIPPED | OUTPATIENT
Start: 2019-09-26 | End: 2019-11-14 | Stop reason: SDUPTHER

## 2019-09-27 ENCOUNTER — TREATMENT (OUTPATIENT)
Dept: PHYSICAL THERAPY | Age: 59
End: 2019-09-27
Payer: COMMERCIAL

## 2019-09-27 DIAGNOSIS — Z96.653 STATUS POST TOTAL BILATERAL KNEE REPLACEMENT: Primary | ICD-10-CM

## 2019-09-27 PROCEDURE — 97530 THERAPEUTIC ACTIVITIES: CPT | Performed by: PHYSICAL THERAPIST

## 2019-09-27 PROCEDURE — 97016 VASOPNEUMATIC DEVICE THERAPY: CPT | Performed by: PHYSICAL THERAPIST

## 2019-09-27 NOTE — PROGRESS NOTES
Physical Therapy Daily Treatment Note    Date: 2019  Patient Name: Laisha Oliveira  : 1960   MRN: [id number]  DOInjury: insidious  DOSx: 2019  Referring Provider:  Crystal Peraza DO     Medical Diagnosis:      Diagnosis Orders   1. Status post total bilateral knee replacement         Outcome Measure:   Lower Extremity Functional Scale (LEFS) 85% impairment. LEFS 19  20%    S:  Pt reports RTW next week. Everything going well  O: Mod edema  Time  8444-5740       Visit   Repeat outcome measure at mid point and end. Pain   7-8 /10     ROM  R 8-115*  L *     Modalities       Ice, compression, elevation L/R 75 mmHg x 15 min Each LE    Stretching       Patella mobs x 40     Prone hangs       Heel props 4# 10 min     Knee flex stretch-seated 3 x 1 min     Prone self flexion stretch      Exercise       Nustep  L5/ 10 min incr seat    Quad sets  HEP    Heel slides  x 20 each   Use sheet    SLR  HEP    Marching      Sidekicks     Squat      Step-ups - FWD  8\" 25x       Step-ups - LAT 8\"  25x     Step-ups - BWD  6\" 25x     Step up and over reciprocally      TG squats L18   3 x 20     Leg press 80# 2 x 20     Leg press SL 40#  2 x 20     Leg ext SL 5# 14x  B  LE     TG Calf raises      Gait 2 x 100 feet     Wood chops 15# med ball 15x ea side     Squats/overhead 15# med ball 15x      Marching gait 2 x 35 ft     Side stepping 2 x 35 ft     Push cart 3 laps fwd/ 2 lap bkwd     Heel to toe      A: Tolerated well.     P: discharge with pt to continue HEP  Velta Noss, PTA    Treatment Charges: Mins Units   Initial Evaluation     Re-Evaluation     Ther Exercise         TE     Manual Therapy     MT     Ther Activities        TA 45 3   Gait Training          GT     Neuro Re-education NR     Modalities Vasopneum 30 2   Non-Billable Service Time     Other     Total Time/Units 75 5

## 2019-10-28 ENCOUNTER — TELEPHONE (OUTPATIENT)
Dept: ORTHOPEDIC SURGERY | Age: 59
End: 2019-10-28

## 2019-10-28 DIAGNOSIS — M17.12 PRIMARY OSTEOARTHRITIS OF LEFT KNEE: ICD-10-CM

## 2019-10-28 DIAGNOSIS — Z96.653 STATUS POST TOTAL BILATERAL KNEE REPLACEMENT: ICD-10-CM

## 2019-10-28 DIAGNOSIS — M17.11 PRIMARY OSTEOARTHRITIS OF RIGHT KNEE: ICD-10-CM

## 2019-10-28 RX ORDER — OXYCODONE AND ACETAMINOPHEN 10; 325 MG/1; MG/1
1 TABLET ORAL EVERY 6 HOURS PRN
Qty: 28 TABLET | Refills: 0 | Status: SHIPPED | OUTPATIENT
Start: 2019-10-28 | End: 2019-12-02 | Stop reason: SDUPTHER

## 2019-11-13 DIAGNOSIS — Z96.653 STATUS POST TOTAL BILATERAL KNEE REPLACEMENT: Primary | ICD-10-CM

## 2019-11-14 ENCOUNTER — OFFICE VISIT (OUTPATIENT)
Dept: ORTHOPEDIC SURGERY | Age: 59
End: 2019-11-14
Payer: COMMERCIAL

## 2019-11-14 VITALS — BODY MASS INDEX: 34.1 KG/M2 | HEIGHT: 76 IN | WEIGHT: 280 LBS

## 2019-11-14 DIAGNOSIS — Z96.653 STATUS POST TOTAL BILATERAL KNEE REPLACEMENT: Primary | ICD-10-CM

## 2019-11-14 PROCEDURE — 3017F COLORECTAL CA SCREEN DOC REV: CPT | Performed by: ORTHOPAEDIC SURGERY

## 2019-11-14 PROCEDURE — 99213 OFFICE O/P EST LOW 20 MIN: CPT | Performed by: ORTHOPAEDIC SURGERY

## 2019-11-14 PROCEDURE — G8417 CALC BMI ABV UP PARAM F/U: HCPCS | Performed by: ORTHOPAEDIC SURGERY

## 2019-11-14 PROCEDURE — G8427 DOCREV CUR MEDS BY ELIG CLIN: HCPCS | Performed by: ORTHOPAEDIC SURGERY

## 2019-11-14 PROCEDURE — G8484 FLU IMMUNIZE NO ADMIN: HCPCS | Performed by: ORTHOPAEDIC SURGERY

## 2019-11-14 PROCEDURE — 1036F TOBACCO NON-USER: CPT | Performed by: ORTHOPAEDIC SURGERY

## 2019-11-14 RX ORDER — CYCLOBENZAPRINE HCL 10 MG
10 TABLET ORAL 2 TIMES DAILY PRN
Qty: 30 TABLET | Refills: 0 | Status: SHIPPED | OUTPATIENT
Start: 2019-11-14 | End: 2019-12-20 | Stop reason: SDUPTHER

## 2019-11-19 DIAGNOSIS — M16.11 ARTHRITIS OF RIGHT HIP: ICD-10-CM

## 2019-11-19 DIAGNOSIS — M65.4 RADIAL STYLOID TENOSYNOVITIS: ICD-10-CM

## 2019-11-19 DIAGNOSIS — M65.4 RADIAL STYLOID TENOSYNOVITIS OF RIGHT HAND: ICD-10-CM

## 2019-11-19 RX ORDER — CELECOXIB 200 MG/1
CAPSULE ORAL
Qty: 60 CAPSULE | Refills: 0 | Status: SHIPPED
Start: 2019-11-19 | End: 2020-03-02 | Stop reason: SDUPTHER

## 2019-12-02 ENCOUNTER — TELEPHONE (OUTPATIENT)
Dept: ORTHOPEDIC SURGERY | Age: 59
End: 2019-12-02

## 2019-12-02 DIAGNOSIS — M17.11 PRIMARY OSTEOARTHRITIS OF RIGHT KNEE: ICD-10-CM

## 2019-12-02 DIAGNOSIS — M17.12 PRIMARY OSTEOARTHRITIS OF LEFT KNEE: ICD-10-CM

## 2019-12-02 DIAGNOSIS — Z96.653 STATUS POST TOTAL BILATERAL KNEE REPLACEMENT: ICD-10-CM

## 2019-12-02 RX ORDER — OXYCODONE AND ACETAMINOPHEN 10; 325 MG/1; MG/1
1 TABLET ORAL EVERY 6 HOURS PRN
Qty: 28 TABLET | Refills: 0 | Status: SHIPPED | OUTPATIENT
Start: 2019-12-02 | End: 2019-12-09

## 2019-12-03 ENCOUNTER — OFFICE VISIT (OUTPATIENT)
Dept: ORTHOPEDIC SURGERY | Age: 59
End: 2019-12-03
Payer: COMMERCIAL

## 2019-12-03 VITALS — HEIGHT: 76 IN | TEMPERATURE: 98 F | BODY MASS INDEX: 34.1 KG/M2 | WEIGHT: 280 LBS

## 2019-12-03 DIAGNOSIS — M93.1 KIENBOECK DISEASE OF ADULTS: ICD-10-CM

## 2019-12-03 DIAGNOSIS — M65.4 RADIAL STYLOID TENOSYNOVITIS OF RIGHT HAND: Primary | ICD-10-CM

## 2019-12-03 DIAGNOSIS — M65.4 RADIAL STYLOID TENOSYNOVITIS: ICD-10-CM

## 2019-12-03 PROCEDURE — G8484 FLU IMMUNIZE NO ADMIN: HCPCS | Performed by: ORTHOPAEDIC SURGERY

## 2019-12-03 PROCEDURE — G8427 DOCREV CUR MEDS BY ELIG CLIN: HCPCS | Performed by: ORTHOPAEDIC SURGERY

## 2019-12-03 PROCEDURE — 99213 OFFICE O/P EST LOW 20 MIN: CPT | Performed by: ORTHOPAEDIC SURGERY

## 2019-12-03 PROCEDURE — G8417 CALC BMI ABV UP PARAM F/U: HCPCS | Performed by: ORTHOPAEDIC SURGERY

## 2019-12-03 PROCEDURE — 3017F COLORECTAL CA SCREEN DOC REV: CPT | Performed by: ORTHOPAEDIC SURGERY

## 2019-12-03 PROCEDURE — 1036F TOBACCO NON-USER: CPT | Performed by: ORTHOPAEDIC SURGERY

## 2019-12-20 DIAGNOSIS — Z96.653 STATUS POST TOTAL BILATERAL KNEE REPLACEMENT: ICD-10-CM

## 2019-12-20 RX ORDER — CYCLOBENZAPRINE HCL 10 MG
10 TABLET ORAL 2 TIMES DAILY PRN
Qty: 30 TABLET | Refills: 0 | Status: SHIPPED | OUTPATIENT
Start: 2019-12-20 | End: 2020-01-24 | Stop reason: SDUPTHER

## 2020-01-03 ENCOUNTER — TELEPHONE (OUTPATIENT)
Dept: ORTHOPEDIC SURGERY | Age: 60
End: 2020-01-03

## 2020-01-06 RX ORDER — HYDROCODONE BITARTRATE AND ACETAMINOPHEN 10; 325 MG/1; MG/1
1 TABLET ORAL DAILY PRN
Qty: 30 TABLET | Refills: 0 | Status: SHIPPED
Start: 2020-01-06 | End: 2020-02-19 | Stop reason: SDUPTHER

## 2020-01-24 ENCOUNTER — TELEPHONE (OUTPATIENT)
Dept: ORTHOPEDIC SURGERY | Age: 60
End: 2020-01-24

## 2020-01-24 RX ORDER — CYCLOBENZAPRINE HCL 10 MG
10 TABLET ORAL 2 TIMES DAILY PRN
Qty: 30 TABLET | Refills: 0 | Status: SHIPPED
Start: 2020-01-24 | End: 2020-03-02 | Stop reason: SDUPTHER

## 2020-02-13 ENCOUNTER — OFFICE VISIT (OUTPATIENT)
Dept: ORTHOPEDIC SURGERY | Age: 60
End: 2020-02-13
Payer: COMMERCIAL

## 2020-02-13 VITALS — WEIGHT: 280 LBS | BODY MASS INDEX: 34.1 KG/M2 | HEIGHT: 76 IN

## 2020-02-13 PROCEDURE — G8427 DOCREV CUR MEDS BY ELIG CLIN: HCPCS | Performed by: ORTHOPAEDIC SURGERY

## 2020-02-13 PROCEDURE — 3017F COLORECTAL CA SCREEN DOC REV: CPT | Performed by: ORTHOPAEDIC SURGERY

## 2020-02-13 PROCEDURE — 1036F TOBACCO NON-USER: CPT | Performed by: ORTHOPAEDIC SURGERY

## 2020-02-13 PROCEDURE — 99214 OFFICE O/P EST MOD 30 MIN: CPT | Performed by: ORTHOPAEDIC SURGERY

## 2020-02-13 PROCEDURE — G8417 CALC BMI ABV UP PARAM F/U: HCPCS | Performed by: ORTHOPAEDIC SURGERY

## 2020-02-13 PROCEDURE — G8484 FLU IMMUNIZE NO ADMIN: HCPCS | Performed by: ORTHOPAEDIC SURGERY

## 2020-02-13 NOTE — PROGRESS NOTES
masses, there is not ligamentous instability, there is not  deformity noted. Knee exam: bilateral positive for moderate crepitations, some mild tenderness laxity is not noted with  stress. R. Knee:  Lachman's negative, Anterior Drawer negative, Posterior Drawer negative  Elijah's negative, Thallasy  negative,   PF grind test negative, Apprehension test negative, Patellar J sign  negative  L. Knee:  Lachman's negative, Anterior Drawer negative, Posterior Drawer negative  Elijah's negative, Thallasy  negative,   PF grind test negative, Apprehension test negative,  Patellar J sign  negative    Xrays:   S/p bilateral knee arthroplasty with no signs of loosening    MRI:   N/a   Radiographic findings reviewed with patient    Impression:  Encounter Diagnosis   Name Primary?  Status post total bilateral knee replacement Yes       Plan:   Natural history and expected course discussed. Questions answered. Educational materials distributed. Rest, ice, compression, and elevation (RICE) therapy. Reduction in offending activity. Patellar compression sleeve.    Hep  Activity as tolerated  Fu 6 months b/l knee xray

## 2020-02-19 ENCOUNTER — TELEPHONE (OUTPATIENT)
Dept: ORTHOPEDIC SURGERY | Age: 60
End: 2020-02-19

## 2020-02-19 RX ORDER — HYDROCODONE BITARTRATE AND ACETAMINOPHEN 10; 325 MG/1; MG/1
1 TABLET ORAL DAILY PRN
Qty: 30 TABLET | Refills: 0 | Status: SHIPPED
Start: 2020-02-19 | End: 2020-03-17 | Stop reason: SDUPTHER

## 2020-02-19 NOTE — TELEPHONE ENCOUNTER
Last appointment 2/13/2020  Next appointment 8/11/2020 for knees  Future Appointments   Date Time Provider Leanne Montelongo   2/13/2020  8:10 AM Jd Grigsby DO [facility]   3/3/2020  8:10 AM Jd Grigsby [facility]      Last refill 1/6/2020  DOS: 07/31/2019       Patient called in requesting refill of      HYDROcodone-acetaminophen (Lady Rolls)  MG per tablet            Pharmacy:  [facility] #[id number] - [address]

## 2020-03-02 ENCOUNTER — TELEPHONE (OUTPATIENT)
Dept: ORTHOPEDIC SURGERY | Age: 60
End: 2020-03-02

## 2020-03-02 RX ORDER — CELECOXIB 200 MG/1
CAPSULE ORAL
Qty: 60 CAPSULE | Refills: 3 | Status: SHIPPED
Start: 2020-03-02 | End: 2020-12-17

## 2020-03-02 RX ORDER — CYCLOBENZAPRINE HCL 10 MG
10 TABLET ORAL 2 TIMES DAILY PRN
Qty: 30 TABLET | Refills: 0 | Status: SHIPPED
Start: 2020-03-02 | End: 2020-04-08 | Stop reason: SDUPTHER

## 2020-03-02 NOTE — TELEPHONE ENCOUNTER
.  Last appointment 2/13/2020  Next appointment   Future Appointments   Date Time Provider Leanne Montelongo   3/17/2020  8:20 AM DO Jhoan Wing [facility]   8/11/2020  8:10 AM Jhoan Mcqueen [facility]      Last refill 1/24/2020  DOS: Please check notes      Patient called in requesting refill of Flexeril 10 mg and Celebrex 200 mg

## 2020-03-17 ENCOUNTER — OFFICE VISIT (OUTPATIENT)
Dept: ORTHOPEDIC SURGERY | Age: 60
End: 2020-03-17
Payer: COMMERCIAL

## 2020-03-17 VITALS — BODY MASS INDEX: 34.1 KG/M2 | HEIGHT: 76 IN | WEIGHT: 280 LBS

## 2020-03-17 PROCEDURE — 99213 OFFICE O/P EST LOW 20 MIN: CPT | Performed by: ORTHOPAEDIC SURGERY

## 2020-03-17 RX ORDER — HYDROCODONE BITARTRATE AND ACETAMINOPHEN 10; 325 MG/1; MG/1
1 TABLET ORAL DAILY PRN
Qty: 30 TABLET | Refills: 0 | Status: SHIPPED
Start: 2020-03-17 | End: 2020-04-21 | Stop reason: SDUPTHER

## 2020-03-17 NOTE — PROGRESS NOTES
Chief Complaint   Patient presents with    Follow-up     follow up from right wrist [facility] 6/15/11         Evelia Garvin is a 61y.o. year old male who presents for follow up of right wrist.  He states he has good days and bad. He reports he is overall the same as he has been. He is doing well with current medication regimen of celebrex, flexeril and norco.     Past Medical History:   Diagnosis Date    Cellulitis     Edema     legs with cellulitis    GERD (gastroesophageal reflux disease)     Hypertension     Osteoarthritis      Past Surgical History:   Procedure Laterality Date    COLONOSCOPY      x2    FOOT SURGERY      OTHER SURGICAL HISTORY Right 01-25-13    right wrist 1st dorsal compartment release    REPLACEMENT TOTAL KNEE BILATERAL Bilateral 7/31/2019    KNEE TOTAL ARTHROPLASTY BILATERAL performed by Kwame Rizo DO at [facility] ARTHROSCOPY      TONSILLECTOMY      TONSILLECTOMY AND ADENOIDECTOMY      VASCULAR SURGERY      both legs     VASECTOMY         Current Outpatient Medications:     celecoxib (CELEBREX) 200 MG capsule, TAKE ONE CAPSULE BY MOUTH TWO TIMES A DAY, Disp: 60 capsule, Rfl: 3    cyclobenzaprine (FLEXERIL) 10 MG tablet, Take 1 tablet by mouth 2 times daily as needed for Muscle spasms, Disp: 30 tablet, Rfl: 0    HYDROcodone-acetaminophen (NORCO)  MG per tablet, Take 1 tablet by mouth daily as needed for Pain for up to 30 days. , Disp: 30 tablet, Rfl: 0    diphenhydrAMINE-APAP, sleep, (TYLENOL PM EXTRA STRENGTH)  MG tablet, Take 1 tablet by mouth nightly as needed for Sleep, Disp: , Rfl:     Glucosamine-Chondroit-Vit C-Mn (GLUCOSAMINE 1500 COMPLEX PO), Take 1 tablet by mouth daily, Disp: , Rfl:     diltiazem (DILACOR XR) 240 MG extended release capsule, Take 240 mg by mouth nightly , Disp: , Rfl:     Turmeric 500 MG TABS, Take 500 mg by mouth daily, Disp: , Rfl:     omeprazole (PRILOSEC) 20 MG capsule, Take 20 mg by mouth Daily , Disp: , Rfl: extension WNL. There is not rotational deformity. There is no masses or adenopathy in bilateral upper extremities. Radial pulses are 2+ and symmetric bilaterally. Capillary refill is intact and < 2 seconds. Motor strength is 5/5 with flexion and extension of the small finger joints. Phallens sign(-), Tinnells sign (-), Median nerve compression test (-),  Finklesteins (-), CMC Grind test (-), Piano Key Test(+). Xrays:   Not performed today. Radiographic findings reviewed with patient    Impression:   Encounter Diagnoses   Name Primary?  Radial styloid tenosynovitis Yes    Kienboeck disease of adults        Plan:   He will continue with activities as tolerated. I will refill his medication when he is ready. I will see him back in 3 months.

## 2020-04-07 ENCOUNTER — TELEPHONE (OUTPATIENT)
Dept: ORTHOPEDIC SURGERY | Age: 60
End: 2020-04-07

## 2020-04-07 NOTE — TELEPHONE ENCOUNTER
.  Last appointment 3/17/2020  Next appointment   Future Appointments   Date Time Provider Leanne Montelongo   6/16/2020  8:10 AM Maryam Gordon DO [facility]   8/11/2020  8:10 AM Maryam Gordon DO [facility]      Last refill:  03/02/2020  DOS: 07/13/20219       Patient called in requesting refill of:    cyclobenzaprine (FLEXERIL) 10 MG tablet         HYDROcodone-acetaminophen (Marii Cruise)  MG per tablet [[id number]]      Pharmacy:  [facility] #[id number] - [place] [phone] - [phone]

## 2020-04-08 RX ORDER — CYCLOBENZAPRINE HCL 10 MG
10 TABLET ORAL 2 TIMES DAILY PRN
Qty: 60 TABLET | Refills: 0 | Status: SHIPPED
Start: 2020-04-08 | End: 2020-05-20 | Stop reason: SDUPTHER

## 2020-04-21 ENCOUNTER — TELEPHONE (OUTPATIENT)
Dept: ORTHOPEDIC SURGERY | Age: 60
End: 2020-04-21

## 2020-04-21 RX ORDER — HYDROCODONE BITARTRATE AND ACETAMINOPHEN 10; 325 MG/1; MG/1
1 TABLET ORAL DAILY PRN
Qty: 30 TABLET | Refills: 0 | Status: SHIPPED
Start: 2020-04-21 | End: 2020-05-20 | Stop reason: SDUPTHER

## 2020-04-26 ASSESSMENT — PROMIS GLOBAL HEALTH SCALE
IN THE PAST 7 DAYS, HOW WOULD YOU RATE YOUR PAIN ON AVERAGE [ON A SCALE FROM 0 (NO PAIN) TO 10 (WORST IMAGINABLE PAIN)]?: 7
IN THE PAST 7 DAYS, HOW OFTEN HAVE YOU BEEN BOTHERED BY EMOTIONAL PROBLEMS, SUCH AS FEELING ANXIOUS, DEPRESSED, OR IRRITABLE [ON A SCALE FROM 1 (NEVER) TO 5 (ALWAYS)]?: 1
IN GENERAL, HOW WOULD YOU RATE YOUR PHYSICAL HEALTH [ON A SCALE OF 1 (POOR) TO 5 (EXCELLENT)]?: 2
IN THE PAST 7 DAYS, HOW WOULD YOU RATE YOUR FATIGUE ON AVERAGE [ON A SCALE FROM 1 (NONE) TO 5 (VERY SEVERE)]?: 4
TO WHAT EXTENT ARE YOU ABLE TO CARRY OUT YOUR EVERYDAY PHYSICAL ACTIVITIES SUCH AS WALKING, CLIMBING STAIRS, CARRYING GROCERIES, OR MOVING A CHAIR [ON A SCALE OF 1 (NOT AT ALL) TO 5 (COMPLETELY)]?: 5
IN GENERAL, WOULD YOU SAY YOUR HEALTH IS...[ON A SCALE OF 1 (POOR) TO 5 (EXCELLENT)]: 3
IN GENERAL, PLEASE RATE HOW WELL YOU CARRY OUT YOUR USUAL SOCIAL ACTIVITIES (INCLUDES ACTIVITIES AT HOME, AT WORK, AND IN YOUR COMMUNITY, AND RESPONSIBILITIES AS A PARENT, CHILD, SPOUSE, EMPLOYEE, FRIEND, ETC) [ON A SCALE OF 1 (POOR) TO 5 (EXCELLENT)]?: 3
IN GENERAL, HOW WOULD YOU RATE YOUR SATISFACTION WITH YOUR SOCIAL ACTIVITIES AND RELATIONSHIPS [ON A SCALE OF 1 (POOR) TO 5 (EXCELLENT)]?: 2
IN GENERAL, WOULD YOU SAY YOUR QUALITY OF LIFE IS...[ON A SCALE OF 1 (POOR) TO 5 (EXCELLENT)]: 2
HOW IS THE PROMIS V1.1 BEING ADMINISTERED?: 2
IN GENERAL, HOW WOULD YOU RATE YOUR MENTAL HEALTH, INCLUDING YOUR MOOD AND YOUR ABILITY TO THINK [ON A SCALE OF 1 (POOR) TO 5 (EXCELLENT)]?: 4
WHO IS THE PERSON COMPLETING THE PROMIS V1.1 SURVEY?: 0

## 2020-04-26 ASSESSMENT — KOOS JR
BENDING TO THE FLOOR TO PICK UP OBJECT: 2
STANDING UPRIGHT: 0
RISING FROM SITTING: 2
GOING UP OR DOWN STAIRS: 2
STRAIGHTENING KNEE FULLY: 0
HOW SEVERE IS YOUR KNEE STIFFNESS AFTER FIRST WAKING IN MORNING: 1
TWISING OR PIVOTING ON KNEE: 2

## 2020-05-20 ENCOUNTER — TELEPHONE (OUTPATIENT)
Dept: ORTHOPEDIC SURGERY | Age: 60
End: 2020-05-20

## 2020-05-20 RX ORDER — CYCLOBENZAPRINE HCL 10 MG
10 TABLET ORAL 2 TIMES DAILY PRN
Qty: 60 TABLET | Refills: 0 | Status: SHIPPED
Start: 2020-05-20 | End: 2020-08-05 | Stop reason: SDUPTHER

## 2020-05-20 RX ORDER — HYDROCODONE BITARTRATE AND ACETAMINOPHEN 10; 325 MG/1; MG/1
1 TABLET ORAL DAILY PRN
Qty: 30 TABLET | Refills: 0 | Status: SHIPPED | OUTPATIENT
Start: 2020-05-20 | End: 2020-06-19

## 2020-06-16 ENCOUNTER — OFFICE VISIT (OUTPATIENT)
Dept: ORTHOPEDIC SURGERY | Age: 60
End: 2020-06-16
Payer: COMMERCIAL

## 2020-06-16 VITALS — BODY MASS INDEX: 34.1 KG/M2 | HEIGHT: 76 IN | WEIGHT: 280 LBS

## 2020-06-16 PROCEDURE — 99213 OFFICE O/P EST LOW 20 MIN: CPT | Performed by: ORTHOPAEDIC SURGERY

## 2020-06-16 RX ORDER — HYDROCODONE BITARTRATE AND ACETAMINOPHEN 10; 325 MG/1; MG/1
1 TABLET ORAL DAILY
Qty: 30 TABLET | Refills: 0 | Status: SHIPPED
Start: 2020-06-16 | End: 2020-08-05 | Stop reason: SDUPTHER

## 2020-06-16 RX ORDER — CELECOXIB 200 MG/1
200 CAPSULE ORAL DAILY
Qty: 30 CAPSULE | Refills: 3 | Status: SHIPPED
Start: 2020-06-16 | End: 2020-11-11 | Stop reason: SDUPTHER

## 2020-06-16 RX ORDER — CYCLOBENZAPRINE HCL 10 MG
10 TABLET ORAL NIGHTLY PRN
Qty: 30 TABLET | Refills: 0 | Status: SHIPPED | OUTPATIENT
Start: 2020-06-16 | End: 2020-07-16

## 2020-06-16 NOTE — PROGRESS NOTES
extension WNL, DIP flexion WNL/ extension WNL. There is not rotational deformity. There is no masses or adenopathy in bilateral upper extremities. Radial pulses are 2+ and symmetric bilaterally. Capillary refill is intact and < 2 seconds. Motor strength is 5/5 with flexion and extension of the small finger joints. Phallens sign(-), Tinnells sign (-), Median nerve compression test (-),  Finklesteins (-), CMC Grind test (-), Piano Key Test(+). Xrays:   Not performed today. Radiographic findings reviewed with patient    Impression:   Encounter Diagnoses   Name Primary?  Kienboeck disease of adults Yes    Radial styloid tenosynovitis of right hand        Plan:   He will continue with activities as tolerated. I will refill his medication when he is ready. I will see him back in 3 months.

## 2020-06-23 ENCOUNTER — OFFICE VISIT (OUTPATIENT)
Dept: ORTHOPEDIC SURGERY | Age: 60
End: 2020-06-23
Payer: COMMERCIAL

## 2020-06-23 VITALS — WEIGHT: 280 LBS | TEMPERATURE: 98 F | HEIGHT: 76 IN | BODY MASS INDEX: 34.1 KG/M2

## 2020-06-23 PROCEDURE — 99213 OFFICE O/P EST LOW 20 MIN: CPT | Performed by: ORTHOPAEDIC SURGERY

## 2020-06-23 PROCEDURE — 20610 DRAIN/INJ JOINT/BURSA W/O US: CPT | Performed by: ORTHOPAEDIC SURGERY

## 2020-06-23 RX ORDER — TRIAMCINOLONE ACETONIDE 40 MG/ML
40 INJECTION, SUSPENSION INTRA-ARTICULAR; INTRAMUSCULAR ONCE
Status: COMPLETED | OUTPATIENT
Start: 2020-06-23 | End: 2020-06-23

## 2020-06-23 RX ADMIN — TRIAMCINOLONE ACETONIDE 40 MG: 40 INJECTION, SUSPENSION INTRA-ARTICULAR; INTRAMUSCULAR at 08:11

## 2020-06-23 NOTE — PROGRESS NOTES
extremities show: The reflexes in biceps/triceps/brachioradialis are equal and symmetric. Sensory exam C5-T1 are normal bilaterally. Cardiovascular: The vascular exam is normal and is well perfused to distal extremities. There are 2+ radial pulses bilaterally, and motor and sensation is intact to median, ulnar, and radial, musclocutaneus, and axillary nerve distribution and grossly symmetric bilaterally. There is cap refill noted less than two seconds in all digits. There is not edema of the bilateral upper extremities. There is not varicosities noted in the distal extremities. Lymph:  Upon palpation,  there is no lymphadenopathy noted in bilateral upper extremities. Musculoskeletal:  Gait: normal; examination of the nails and digits reveal no cyanosis or clubbing. Cervical Exam:  On physical exam, Tray Serrano is well-developed, well-nourished, oriented to person, place and time. his gait is normal.  On evaluation of hiscervical spine, He has full range of motion of the cervical spine without pain. There is no cervical tenderness to palpation. Shoulder Exam:  On evaluation of his bilaterally upper extremities, his left shoulder has no deformity. There is tenderness upon palpation of the greater tuberosity and lateral arm. There is not evidence of scapular dyskinesis. There is not muscle atrophy in shoulder girdle. The range of motion for the Right Shoulder is 160/45/t8 and for the Left shoulder is 150/30/pl. Right shoulder Motor strength is 5/5 in the supraspinatus, 5/5 internal rotation and 5/5 in external rotation, and Left shoulder motor strength 5/5 in supraspinatus, 5/5 in internal rotation, 5/5 in external rotation.         Right shoulder:  negative Impingement , negative Willis ,negative  Speeds,negative  Apprehension ,negative Caballero Load Shift, negative Hank manuver, negative Cross arm test.     Left shoulder:  positive Impingement , positive Willis ,negative

## 2020-07-30 ENCOUNTER — HOSPITAL ENCOUNTER (OUTPATIENT)
Age: 60
Discharge: HOME | End: 2020-07-30
Payer: COMMERCIAL

## 2020-07-30 LAB
ALBUMIN SERPL-MCNC: 4 G/DL (ref 3.5–5.2)
ALP BLD-CCNC: 127 U/L (ref 40–129)
ALT SERPL-CCNC: 21 U/L (ref 0–40)
ANION GAP SERPL CALCULATED.3IONS-SCNC: 12 MMOL/L (ref 7–16)
AST SERPL-CCNC: 18 U/L (ref 0–39)
BILIRUB SERPL-MCNC: 0.3 MG/DL (ref 0–1.2)
BUN BLDV-MCNC: 16 MG/DL (ref 6–20)
CALCIUM SERPL-MCNC: 8.8 MG/DL (ref 8.6–10.2)
CHLORIDE BLD-SCNC: 106 MMOL/L (ref 98–107)
CO2: 24 MMOL/L (ref 22–29)
CREAT SERPL-MCNC: 0.9 MG/DL (ref 0.7–1.2)
GFR AFRICAN AMERICAN: >60
GFR NON-AFRICAN AMERICAN: >60 ML/MIN/1.73
GLUCOSE FASTING: 96 MG/DL (ref 74–99)
POTASSIUM SERPL-SCNC: 4.4 MMOL/L (ref 3.5–5)
PROSTATE SPECIFIC ANTIGEN: 0.54 NG/ML (ref 0–4)
SODIUM BLD-SCNC: 142 MMOL/L (ref 132–146)
TOTAL PROTEIN: 7 G/DL (ref 6.4–8.3)

## 2020-07-30 PROCEDURE — G0103 PSA SCREENING: HCPCS

## 2020-07-30 PROCEDURE — 80053 COMPREHEN METABOLIC PANEL: CPT

## 2020-07-30 PROCEDURE — 36415 COLL VENOUS BLD VENIPUNCTURE: CPT

## 2020-08-05 ENCOUNTER — TELEPHONE (OUTPATIENT)
Dept: ORTHOPEDIC SURGERY | Age: 60
End: 2020-08-05

## 2020-08-05 RX ORDER — CYCLOBENZAPRINE HCL 10 MG
10 TABLET ORAL 2 TIMES DAILY PRN
Qty: 60 TABLET | Refills: 0 | Status: SHIPPED
Start: 2020-08-05 | End: 2020-09-17 | Stop reason: SDUPTHER

## 2020-08-05 RX ORDER — HYDROCODONE BITARTRATE AND ACETAMINOPHEN 10; 325 MG/1; MG/1
1 TABLET ORAL DAILY
Qty: 30 TABLET | Refills: 0 | Status: SHIPPED
Start: 2020-08-05 | End: 2020-09-17 | Stop reason: SDUPTHER

## 2020-08-05 NOTE — TELEPHONE ENCOUNTER
.  Last appointment 6/23/2020  Next appointment   Future Appointments   Date Time Provider Leanne Montelongo   8/18/2020  8:20 AM DO Jerod Burton [facility]   9/17/2020  8:10 AM Jerod Pinedo DO [facility]      Last refill 6/16/2020  DOS:       Patient called in requesting refill of HYDROcodone-acetaminophen (Vesper Shield)  MG per tablet      cyclobenzaprine (FLEXERIL) 10 MG tablet [[id number]]    To   [address] [place] [address]    Pharmacy Comments: --

## 2020-08-18 ENCOUNTER — OFFICE VISIT (OUTPATIENT)
Dept: ORTHOPEDIC SURGERY | Age: 60
End: 2020-08-18
Payer: COMMERCIAL

## 2020-08-18 VITALS — BODY MASS INDEX: 34.7 KG/M2 | HEIGHT: 76 IN | WEIGHT: 285 LBS

## 2020-08-18 PROCEDURE — G8417 CALC BMI ABV UP PARAM F/U: HCPCS | Performed by: ORTHOPAEDIC SURGERY

## 2020-08-18 PROCEDURE — 99213 OFFICE O/P EST LOW 20 MIN: CPT | Performed by: ORTHOPAEDIC SURGERY

## 2020-08-18 PROCEDURE — 1036F TOBACCO NON-USER: CPT | Performed by: ORTHOPAEDIC SURGERY

## 2020-08-18 PROCEDURE — 3017F COLORECTAL CA SCREEN DOC REV: CPT | Performed by: ORTHOPAEDIC SURGERY

## 2020-08-18 PROCEDURE — G8427 DOCREV CUR MEDS BY ELIG CLIN: HCPCS | Performed by: ORTHOPAEDIC SURGERY

## 2020-08-18 NOTE — PROGRESS NOTES
Chief Complaint   Patient presents with    Follow-up     follow up from bilateral TKA, patient states he has issues with stairs       Skylar Garner returns today for follow-up of his bilateral knee TKA 7/31/2019. he reports this is better than when I saw him last.  He states he recently went hiking and did well. He states he would never have been able to hike prior to knee replacement surgery. Past Medical History:   Diagnosis Date    Cellulitis     Edema     legs with cellulitis    GERD (gastroesophageal reflux disease)     Hypertension     Osteoarthritis      Past Surgical History:   Procedure Laterality Date    COLONOSCOPY      x2    FOOT SURGERY      OTHER SURGICAL HISTORY Right 01-25-13    right wrist 1st dorsal compartment release    REPLACEMENT TOTAL KNEE BILATERAL Bilateral 7/31/2019    KNEE TOTAL ARTHROPLASTY BILATERAL performed by Home Avina DO at [facility] ARTHROSCOPY      TONSILLECTOMY      TONSILLECTOMY AND ADENOIDECTOMY      VASCULAR SURGERY      both legs     VASECTOMY         Current Outpatient Medications:     cyclobenzaprine (FLEXERIL) 10 MG tablet, Take 1 tablet by mouth 2 times daily as needed for Muscle spasms, Disp: 60 tablet, Rfl: 0    HYDROcodone-acetaminophen (NORCO)  MG per tablet, Take 1 tablet by mouth daily for 30 days.  Intended supply: 30 days, Disp: 30 tablet, Rfl: 0    celecoxib (CELEBREX) 200 MG capsule, Take 1 capsule by mouth daily, Disp: 30 capsule, Rfl: 3    celecoxib (CELEBREX) 200 MG capsule, TAKE ONE CAPSULE BY MOUTH TWO TIMES A DAY, Disp: 60 capsule, Rfl: 3    diphenhydrAMINE-APAP, sleep, (TYLENOL PM EXTRA STRENGTH)  MG tablet, Take 1 tablet by mouth nightly as needed for Sleep, Disp: , Rfl:     Glucosamine-Chondroit-Vit C-Mn (GLUCOSAMINE 1500 COMPLEX PO), Take 1 tablet by mouth daily, Disp: , Rfl:     diltiazem (DILACOR XR) 240 MG extended release capsule, Take 240 mg by mouth nightly , Disp: , Rfl:     Turmeric 500 MG (-) muscular dystrophy, (-) RSD,(-) joint pain (-)swelling, (-) joint pain,swelling. Neurologic: (-) epilepsy, (-)seizures,(-) brain tumor,(-) TIA, (-)stroke, (-)headaches, (-)Parkinson disease,(-) memory loss, (-) LOC. Cardiovascular: (-) Chest pain, (-) swelling in legs/feet, (-) SOB, (-) cramping in legs/feet with walking. Constitutional:  The patient is alert and oriented x 3, appears to be stated age and in no distress. Ht 6' 4\" (1.93 m)   Wt 285 lb (129.3 kg)   BMI 34.69 kg/m²     Skin:  Upon inspection: the skin appears warm, dry and intact. There is not a previous scar over the affected area. There is not any cellulitis, lymphedema or cutaneous lesions noted in the lower extremities. Upon palpation there is no induration noted. Neurologic:  Gait: normal;  Motor exam of the lower extremities show ; quadriceps, hamstrings, foot dorsi and plantar flexors intact R.  5/5 and L. 5/5. Deep tendon reflexes are 2/4 at the knees and 2/4 at the ankles with strong extensor hallicus longus motor strength bilaterally. Sensory to both feet is intact to all sensory roots. Cardiovascular: The vascular exam is normal and is well perfused to distal extremities. Distal pulses DP/PT: R. 2+; L. 2+. There is cap refill noted less than two seconds in all digits. There is not edema of the bilateral lower extremities. There is not varicosities noted in the distal extremities. Lymph:  Upon palpation,  there is no lymphadenopathy noted in bilateral lower extremities. Musculoskeletal:  Gait: antalgic; examination of the nails and digits reveal no cyanosis or clubbing    Lumbar exam:  On visual inspection, there is no deformity of the spine. full range of motion, no tenderness, palpable spasm or pain on motion. Special tests: Straight Leg Raise negative, Rico testnegative. Hip exam:  Upon inspection, there is no deformity noted. Upon palpation there is not tenderness.   ROM: is   full and semetrical. Strength: Hip Flexors 5/5; Hip Abductors 5/5; Hip Adduction 5/5. Knee exam:  Bilateral knee exam shows;  range of motion of R. Knee is 0 to 120, and L. Knee is 0 to 120. He does not have  pain on motion, effusion is mild, there is tenderness over the  anterior region, there are not any masses, there is not ligamentous instability, there is not  deformity noted. Knee exam: bilateral positive for moderate crepitations, some mild tenderness laxity is not noted with stress. R. Knee:  Lachman's negative, Anterior Drawer negative, Posterior Drawer negative  Elijah's negative, Thallasy  negative,   PF grind test negative, Apprehension test negative, Patellar J sign  negative  L. Knee:  Lachman's negative, Anterior Drawer negative, Posterior Drawer negative  Elijah's negative, Thallasy  negative,   PF grind test negative, Apprehension test negative,  Patellar J sign  negative    Xrays:   Bilateral total knee prosthesis with no abnormalities. MRI:   Not performed  Radiographic findings reviewed with patient    Impression:  Encounter Diagnosis   Name Primary?  Status post total bilateral knee replacement Yes       Plan:   Natural history and expected course discussed. Questions answered. Educational materials distributed. He will continue with activities as tolerated. I reminded the patient about using antibiotics prior to dental appointments and endoscopies. I will see him back in 2 years.

## 2020-09-17 ENCOUNTER — OFFICE VISIT (OUTPATIENT)
Dept: ORTHOPEDIC SURGERY | Age: 60
End: 2020-09-17
Payer: COMMERCIAL

## 2020-09-17 VITALS — HEIGHT: 76 IN | BODY MASS INDEX: 34.7 KG/M2 | WEIGHT: 285 LBS | TEMPERATURE: 98 F

## 2020-09-17 PROCEDURE — 99213 OFFICE O/P EST LOW 20 MIN: CPT | Performed by: ORTHOPAEDIC SURGERY

## 2020-09-17 RX ORDER — HYDROCODONE BITARTRATE AND ACETAMINOPHEN 10; 325 MG/1; MG/1
1 TABLET ORAL DAILY
Qty: 30 TABLET | Refills: 0 | Status: SHIPPED
Start: 2020-09-17 | End: 2020-10-23 | Stop reason: SDUPTHER

## 2020-09-17 RX ORDER — CYCLOBENZAPRINE HCL 10 MG
10 TABLET ORAL 2 TIMES DAILY PRN
Qty: 60 TABLET | Refills: 0 | Status: SHIPPED
Start: 2020-09-17 | End: 2020-10-23 | Stop reason: SDUPTHER

## 2020-09-17 NOTE — PROGRESS NOTES
Turmeric 500 MG TABS, Take 500 mg by mouth daily, Disp: , Rfl:     omeprazole (PRILOSEC) 20 MG capsule, Take 20 mg by mouth Daily , Disp: , Rfl:     irbesartan (AVAPRO) 300 MG tablet, Take 300 mg by mouth daily. , Disp: , Rfl:   No Known Allergies  Social History     Socioeconomic History    Marital status:      Spouse name: Not on file    Number of children: Not on file    Years of education: Not on file    Highest education level: Not on file   Occupational History    Not on file   Social Needs    Financial resource strain: Not on file    Food insecurity     Worry: Not on file     Inability: Not on file    Transportation needs     Medical: Not on file     Non-medical: Not on file   Tobacco Use    Smoking status: Never Smoker    Smokeless tobacco: Never Used   Substance and Sexual Activity    Alcohol use: Not Currently    Drug use: No    Sexual activity: Yes   Lifestyle    Physical activity     Days per week: Not on file     Minutes per session: Not on file    Stress: Not on file   Relationships    Social connections     Talks on phone: Not on file     Gets together: Not on file     Attends Scientology service: Not on file     Active member of club or organization: Not on file     Attends meetings of clubs or organizations: Not on file     Relationship status: Not on file    Intimate partner violence     Fear of current or ex partner: Not on file     Emotionally abused: Not on file     Physically abused: Not on file     Forced sexual activity: Not on file   Other Topics Concern    Not on file   Social History Narrative    Not on file     Family History   Problem Relation Age of Onset    Asthma Mother     Cancer Mother     Emphysema Mother     Heart Disease Father     Alzheimer's Disease Father        REVIEW OF SYSTEMS:    General/Constitution:  (-)weight loss, (-)fever, (-)chills, (-)weakness. Skin: (-) rash,(-) psoriasis,(-) eczema, (-)skin cancer.    Musculoskeletal: (-) fractures, (-) dislocations,(-) collagen vascular disease, (-) fibromyalgia, (-) multiple sclerosis, (-) muscular dystrophy, (-) RSD,(-) joint pain (-)swelling, (-) joint pain,swelling. Neurologic: (-) epilepsy, (-)seizures,(-) brain tumor,(-) TIA, (-)stroke, (-)headaches, (-)Parkinson disease,(-) memory loss, (-) LOC. Cardiovascular: (-) Chest pain, (-) swelling in legs/feet, (-) SOB, (-) cramping in legs/feet with walking. Respiratory: (-) SOB, (-) Coughing, (-) night sweats. GI: (-) nausea, (-) vomiting, (-) diarrhea, (-) blood in stool, (-) gastric ulcer. Psychiatric: (-) Depression, (-) Anxiety, (-) bipolar disease, (-) Alzheimer's Disease  Allergic/Immunologic: (-) allergies latex, (-) allergies metal, (-) skin sensitivity. Hematlogic: (-) anemia, (-) blood transfusion, (-) DVT/PE, (-) Clotting disorders    SUBJECTIVE:      Constitutional:    The patient is alert and oriented x 3, appears to be stated age and in no distress. Ht. 6 ft 4 in., Wt. 285 lbs. Skin:    Upon inspection: the skin appears warm, dry and intact. There is not a previous scar over the affected area. There is not any cellulitis, lymphedema or cutaneous lesions noted in the lower extremities. Upon palpation there is no induration noted. Neurologic:    Gait: antalgic; Motor exam of the upper extremities show: The reflexes in biceps/triceps/brachioradialis are equal and symmetric. Sensory exam C5-T1 are normal bilaterally. Cardiovascular: The vascular exam is normal and is well perfused to distal extremities. There are 2+ radial pulses bilaterally, and motor and sensation is intact to median, ulnar, and radial, musclocutaneus, and axillary nerve distribution and grossly symmetric bilaterally. There is cap refill noted less than two seconds in all digits. There is not edema of the bilateral upper extremities. There is  varicosities noted in the distal extremities.       Lymph:    Upon palpation,  there is no lymphadenopathy noted in bilateral upper extremities. Musculoskeletal:  Gait: antalgic; examination of the nails and digits reveal no cyanosis or clubbing. Cervical Exam:    On physical exam, Bryan Carver is well-developed, well-nourished, oriented to person, place and time. his gait is normal.  On evaluation of his cervical spine, he has full range of motion of the cervical spine without pain. There is no cervical tenderness to palpation. Shoulder Exam:    On evaluation of his bilaterally upper extremities, his bilateral shoulder has no deformity. There is not evidence of scapular dyskinesis. There is not muscle atrophy in shoulder girdle. The range of motion for the Right Shoulder is 160/45/T8 and for the Left shoulder is 160/45/T8. Right shoulder Motor strength is 5/5 in the supraspinatus, 5/5 internal rotation and 5/5 in external rotation, and Left shoulder motor strength 5/5 in supraspinatus, 5/5 in internal rotation, 5/5 in external rotation. Elbow exam:    Evaluation of the elbow, reveals no signs of swelling or deformity. ROM is 0-150. There is not instability with varus/valgus stresses. Motor strength is 5/5 with flexion/extension. Wrist exam:       Inspection of the bilateral upper extremities, there is evidence of deformity of the wrist.  ROM Wrist ROM R wrist DF 30, VF 10, L wrist DF 40, VF 30, R pronation 70/ supination 70, L pronation 90/supination 90. Motor strength is 3/5 with Dorsiflexion/Volarflexion/Supination/Pronation. Motor and sensation is intact and symmetric throughout the bilateral upper extremities in the median, ulnar and radial , musclcutaneous, and axillary nerve distributions. He has severe pain over TFCC and has a positive piano key sign. He also has pain with ulnar and radial deviation. Hand exam:    The skin overlying the hand is not intact. There is not evidence of scar, lesion, laceration, or abrasion.   The motion in the small joints of the hand are intact with no stiffness or deformity. The ROM in the MCP flexion WNL/ extension WNL , PIP flexion WNL/ extension WNL, DIP flexion WNL/ extension WNL. There is not rotational deformity. There is no masses or adenopathy in bilateral upper extremities. Radial pulses are 2+ and symmetric bilaterally. Capillary refill is intact and < 2 seconds. Motor strength is 5/5 with flexion and extension of the small finger joints. Phallens sign(-), Tinnells sign (-), Median nerve compression test (-),  Finklesteins (-), CMC Grind test (-), Piano Key Test(+). Xrays:   Not performed today. Radiographic findings reviewed with patient    Impression:   Encounter Diagnoses   Name Primary?  Status post total bilateral knee replacement     Kienboeck disease of adults     Radial styloid tenosynovitis of right hand        Plan:   He will continue with activities as tolerated. I will refill his medication when he is ready. I will see him back in 3 months.

## 2020-10-22 ENCOUNTER — TELEPHONE (OUTPATIENT)
Dept: ORTHOPEDIC SURGERY | Age: 60
End: 2020-10-22

## 2020-10-22 NOTE — TELEPHONE ENCOUNTER
.  Last appointment 9/17/2020  Next appointment   Future Appointments   Date Time Provider Leanne Montelongo   10/29/2020  9:00 AM Bandar Leyva DPM [facility]   12/17/2020  8:00 AM DO Aleisha Eddy [facility]      Last refill:  09/17/2020  DOS: Monthly RX      Patient called in requesting refill of:    cyclobenzaprine (FLEXERIL) 10 MG tablet [[id number]]         HYDROcodone-acetaminophen ([address] [facility])  MG per tablet [[id number]]       Pharmacy:  [id number] - [address] [phone] - [phone]

## 2020-10-23 RX ORDER — CYCLOBENZAPRINE HCL 10 MG
10 TABLET ORAL 2 TIMES DAILY PRN
Qty: 60 TABLET | Refills: 0 | Status: SHIPPED
Start: 2020-10-23 | End: 2020-12-17 | Stop reason: SDUPTHER

## 2020-10-23 RX ORDER — HYDROCODONE BITARTRATE AND ACETAMINOPHEN 10; 325 MG/1; MG/1
1 TABLET ORAL DAILY
Qty: 30 TABLET | Refills: 0 | Status: SHIPPED
Start: 2020-10-23 | End: 2020-12-02 | Stop reason: SDUPTHER

## 2020-10-29 ENCOUNTER — OFFICE VISIT (OUTPATIENT)
Dept: PODIATRY | Age: 60
End: 2020-10-29
Payer: COMMERCIAL

## 2020-10-29 VITALS — BODY MASS INDEX: 34.7 KG/M2 | WEIGHT: 285 LBS | HEIGHT: 76 IN

## 2020-10-29 PROBLEM — M19.071 ARTHRITIS OF RIGHT FOOT: Status: ACTIVE | Noted: 2020-10-29

## 2020-10-29 PROBLEM — M65.9 TENOSYNOVITIS OF FOOT: Status: ACTIVE | Noted: 2020-10-29

## 2020-10-29 PROBLEM — M25.571 PAIN IN RIGHT ANKLE AND JOINTS OF RIGHT FOOT: Status: ACTIVE | Noted: 2020-10-29

## 2020-10-29 PROBLEM — M65.979 TENOSYNOVITIS OF FOOT: Status: ACTIVE | Noted: 2020-10-29

## 2020-10-29 PROCEDURE — 99243 OFF/OP CNSLTJ NEW/EST LOW 30: CPT | Performed by: PODIATRIST

## 2020-10-29 PROCEDURE — G8484 FLU IMMUNIZE NO ADMIN: HCPCS | Performed by: PODIATRIST

## 2020-10-29 PROCEDURE — G8417 CALC BMI ABV UP PARAM F/U: HCPCS | Performed by: PODIATRIST

## 2020-10-29 PROCEDURE — G8427 DOCREV CUR MEDS BY ELIG CLIN: HCPCS | Performed by: PODIATRIST

## 2020-10-29 NOTE — LETTER
[address] [place]  Phone: [phone]  Fax: [phone]    Jevon Nation  <[id number]>   1960  10/29/2020      Dear Dr. Patrick Martin,    I would like to thank you for the kind referral of Isis Nation. He presented to the office today for evaluation regarding chronic arthritis and pain into his right lower extremity. Patient has had multiple surgeries in the past without improvement in symptoms noted. We did recommend accommodative insoles for the patient today different from his current orthoses and previous bracing. No surgical intervention was recommended on today's visit. Shoe recommendations were discussed in detail today as well. We will have continued follow-up until issues are improved. If you should have any questions concerning his visit today, please do not hesitate to contact me.     Sincerely,    Francisco Baer DPM

## 2020-10-29 NOTE — PROGRESS NOTES
10/29/20     Jose Edmonds    : 1960 Sex: male   Age: 61 y.o. Patient was referred by: Sudeep Vaughn DO  Patient's PCP/Provider is:  Jovanny Uriarte MD    Subjective:    Patient seen today for evaluation regarding chronic arthritis and pain into his right lower extremity. Chief Complaint   Patient presents with    Foot Pain     right foot and ankle        HPI: Patient stated that he has had multiple surgeries and interventions throughout the last several 20+ years. Last physician he saw did give him a cortisone injection which did not help due to the limited ankle joint space noted. He was recommended to have a subtalar joint fusion as well which he deferred. Presented today to discuss other potential treatment options available. Patient has been in functional orthotics and bracing in the recent past without resolution of symptoms. He denies any recent injuries or change in activities. No other additional abnormalities noted at this time. ROS:  Const: Positives and pertinent negatives as per HPI. Musculo: Denies symptoms other than stated above. Neuro: Denies symptoms other than stated above. Skin: Denies symptoms other than stated above. Current Medications:    Current Outpatient Medications:     cyclobenzaprine (FLEXERIL) 10 MG tablet, Take 1 tablet by mouth 2 times daily as needed for Muscle spasms, Disp: 60 tablet, Rfl: 0    HYDROcodone-acetaminophen (NORCO)  MG per tablet, Take 1 tablet by mouth daily for 30 days.  Intended supply: 30 days, Disp: 30 tablet, Rfl: 0    celecoxib (CELEBREX) 200 MG capsule, TAKE ONE CAPSULE BY MOUTH TWO TIMES A DAY, Disp: 60 capsule, Rfl: 3    diphenhydrAMINE-APAP, sleep, (TYLENOL PM EXTRA STRENGTH)  MG tablet, Take 1 tablet by mouth nightly as needed for Sleep, Disp: , Rfl:     Glucosamine-Chondroit-Vit C-Mn (GLUCOSAMINE 1500 COMPLEX PO), Take 1 tablet by mouth daily, Disp: , Rfl:     diltiazem (DILACOR XR) 240 MG extended release capsule, Take 240 mg by mouth nightly , Disp: , Rfl:     Turmeric 500 MG TABS, Take 500 mg by mouth daily, Disp: , Rfl:     omeprazole (PRILOSEC) 20 MG capsule, Take 20 mg by mouth Daily , Disp: , Rfl:     irbesartan (AVAPRO) 300 MG tablet, Take 300 mg by mouth daily. , Disp: , Rfl:     celecoxib (CELEBREX) 200 MG capsule, Take 1 capsule by mouth daily, Disp: 30 capsule, Rfl: 3    Allergies:  No Known Allergies    Vitals:    10/29/20 0900   Weight: 285 lb (129.3 kg)   Height: 6' 4\" (1.93 m)        Past Medical History:   Diagnosis Date    Cellulitis     Edema     legs with cellulitis    GERD (gastroesophageal reflux disease)     Hypertension     Osteoarthritis      Family History   Problem Relation Age of Onset    Asthma Mother     Cancer Mother     Emphysema Mother     Heart Disease Father     Alzheimer's Disease Father      Past Surgical History:   Procedure Laterality Date    COLONOSCOPY      x2    FOOT SURGERY      OTHER SURGICAL HISTORY Right 01-25-13    right wrist 1st dorsal compartment release    REPLACEMENT TOTAL KNEE BILATERAL Bilateral 7/31/2019    KNEE TOTAL ARTHROPLASTY BILATERAL performed by Alber Uriostegui DO at [facility][1] ARTHROSCOPY      TONSILLECTOMY      TONSILLECTOMY AND ADENOIDECTOMY      VASCULAR SURGERY      both legs     VASECTOMY       Social History     Tobacco Use    Smoking status: Never Smoker    Smokeless tobacco: Never Used   Substance Use Topics    Alcohol use: Not Currently    Drug use: No           Diagnostic studies:    Xr Ankle Right (min 3 Views)    Result Date: 10/29/2020  EXAMINATION: THREE XRAY VIEWS OF THE RIGHT ANKLE 10/29/2020 8:43 am COMPARISON: None HISTORY: ORDERING SYSTEM PROVIDED HISTORY: Right ankle pain, unspecified chronicity FINDINGS: There is no fracture or dislocation of the right ankle. There are degenerative changes of the joints of the midfoot and hindfoot. There is no osseous lesion.  Plantar and retrocalcaneal surgical intervention at this time. 3. We did recommend getting fitted for custom accommodative orthotics to help control his hindfoot/ankle motion to alleviate his current symptoms. Shoe recommendations were discussed with patient in detail today as well. 4. Patient will be followed up in 1 month's time or sooner if needed for reevaluation. Patient was given a prescription today to get in contact with the orthotic facility to get evaluated and orthoses fabricated. He was advised to call the office with any questions or concerns in the interim. Seen By:    Irene Dennison DPM    Electronically signed by Irene Dennison DPM on 10/29/2020 at 12:02 PM      This note was created using voice recognition software. The note was reviewed however may contain grammatical errors.

## 2020-11-10 ENCOUNTER — TELEPHONE (OUTPATIENT)
Dept: ORTHOPEDIC SURGERY | Age: 60
End: 2020-11-10

## 2020-11-11 RX ORDER — CELECOXIB 200 MG/1
200 CAPSULE ORAL DAILY
Qty: 30 CAPSULE | Refills: 3 | Status: SHIPPED | OUTPATIENT
Start: 2020-11-11 | End: 2021-07-07

## 2020-12-01 ENCOUNTER — TELEPHONE (OUTPATIENT)
Dept: ORTHOPEDIC SURGERY | Age: 60
End: 2020-12-01

## 2020-12-01 NOTE — TELEPHONE ENCOUNTER
.  Last appointment 9/17/2020  Next appointment   Future Appointments   Date Time Provider Leanne Montelongo   12/17/2020  8:00 AM Bharath Remy, DO Bharath Remy [facility]   12/17/2020  2:15 PM Tan Gilbert, DPMODESTO [facility]      Last refill 10-23-20    monthly rx    Patient called in requesting refill of   HYDROcodone-acetaminophen [facility])  MG per tablet [[id number]]       Pharmacy:  [id number] - [place] [address] [place] [address]

## 2020-12-02 RX ORDER — HYDROCODONE BITARTRATE AND ACETAMINOPHEN 10; 325 MG/1; MG/1
1 TABLET ORAL DAILY
Qty: 30 TABLET | Refills: 0 | Status: SHIPPED
Start: 2020-12-02 | End: 2021-01-19 | Stop reason: SDUPTHER

## 2020-12-17 ENCOUNTER — OFFICE VISIT (OUTPATIENT)
Dept: ORTHOPEDIC SURGERY | Age: 60
End: 2020-12-17
Payer: COMMERCIAL

## 2020-12-17 VITALS — HEIGHT: 76 IN | WEIGHT: 285 LBS | TEMPERATURE: 98.6 F | BODY MASS INDEX: 34.7 KG/M2

## 2020-12-17 PROCEDURE — 99213 OFFICE O/P EST LOW 20 MIN: CPT | Performed by: ORTHOPAEDIC SURGERY

## 2020-12-17 RX ORDER — CYCLOBENZAPRINE HCL 10 MG
10 TABLET ORAL 2 TIMES DAILY PRN
Qty: 60 TABLET | Refills: 0 | Status: SHIPPED
Start: 2020-12-17 | End: 2021-02-09 | Stop reason: SDUPTHER

## 2020-12-17 RX ORDER — AMOXICILLIN AND CLAVULANATE POTASSIUM 875; 125 MG/1; MG/1
TABLET, FILM COATED ORAL
COMMUNITY
Start: 2020-12-08 | End: 2021-03-15 | Stop reason: ALTCHOICE

## 2020-12-17 RX ORDER — SILVER SULFADIAZINE 1 %
CREAM (GRAM) TOPICAL
COMMUNITY
Start: 2020-12-10

## 2020-12-17 NOTE — PROGRESS NOTES
Glucosamine-Chondroit-Vit C-Mn (GLUCOSAMINE 1500 COMPLEX PO), Take 1 tablet by mouth daily, Disp: , Rfl:     diltiazem (DILACOR XR) 240 MG extended release capsule, Take 240 mg by mouth nightly , Disp: , Rfl:     Turmeric 500 MG TABS, Take 500 mg by mouth daily, Disp: , Rfl:     omeprazole (PRILOSEC) 20 MG capsule, Take 20 mg by mouth Daily , Disp: , Rfl:     irbesartan (AVAPRO) 300 MG tablet, Take 300 mg by mouth daily. , Disp: , Rfl:   No Known Allergies  Social History     Socioeconomic History    Marital status:      Spouse name: Not on file    Number of children: Not on file    Years of education: Not on file    Highest education level: Not on file   Occupational History    Not on file   Social Needs    Financial resource strain: Not on file    Food insecurity     Worry: Not on file     Inability: Not on file    Transportation needs     Medical: Not on file     Non-medical: Not on file   Tobacco Use    Smoking status: Never Smoker    Smokeless tobacco: Never Used   Substance and Sexual Activity    Alcohol use: Not Currently    Drug use: No    Sexual activity: Yes   Lifestyle    Physical activity     Days per week: Not on file     Minutes per session: Not on file    Stress: Not on file   Relationships    Social connections     Talks on phone: Not on file     Gets together: Not on file     Attends Scientologist service: Not on file     Active member of club or organization: Not on file     Attends meetings of clubs or organizations: Not on file     Relationship status: Not on file    Intimate partner violence     Fear of current or ex partner: Not on file     Emotionally abused: Not on file     Physically abused: Not on file     Forced sexual activity: Not on file   Other Topics Concern    Not on file   Social History Narrative    Not on file     Family History   Problem Relation Age of Onset    Asthma Mother     Cancer Mother     Emphysema Mother     Heart Disease Father    [facility] cap refill noted less than two seconds in all digits. There is not edema of the bilateral upper extremities. There is  varicosities noted in the distal extremities. Lymph:    Upon palpation,  there is no lymphadenopathy noted in bilateral upper extremities. Musculoskeletal:  Gait: antalgic; examination of the nails and digits reveal no cyanosis or clubbing. Cervical Exam:    On physical exam, Viola Atkins is well-developed, well-nourished, oriented to person, place and time. his gait is normal.  On evaluation of his cervical spine, he has full range of motion of the cervical spine without pain. There is no cervical tenderness to palpation. Shoulder Exam:    On evaluation of his bilaterally upper extremities, his bilateral shoulder has no deformity. There is not evidence of scapular dyskinesis. There is not muscle atrophy in shoulder girdle. The range of motion for the Right Shoulder is 160/45/T8 and for the Left shoulder is 160/45/T8. Right shoulder Motor strength is 5/5 in the supraspinatus, 5/5 internal rotation and 5/5 in external rotation, and Left shoulder motor strength 5/5 in supraspinatus, 5/5 in internal rotation, 5/5 in external rotation. Elbow exam:    Evaluation of the elbow, reveals no signs of swelling or deformity. ROM is 0-150. There is not instability with varus/valgus stresses. Motor strength is 5/5 with flexion/extension. Wrist exam:       Inspection of the bilateral upper extremities, there is evidence of deformity of the wrist.  ROM Wrist ROM R wrist DF 30, VF 10, L wrist DF 40, VF 30, R pronation 70/ supination 70, L pronation 90/supination 90. Motor strength is 3/5 with Dorsiflexion/Volarflexion/Supination/Pronation. Motor and sensation is intact and symmetric throughout the bilateral upper extremities in the median, ulnar and radial , musclcutaneous, and axillary nerve distributions. He has severe pain over TFCC and has a positive piano key sign.   He also has pain with ulnar and radial deviation. Hand exam:    The skin overlying the hand is not intact. There is not evidence of scar, lesion, laceration, or abrasion. The motion in the small joints of the hand are intact with no stiffness or deformity. The ROM in the MCP flexion WNL/ extension WNL , PIP flexion WNL/ extension WNL, DIP flexion WNL/ extension WNL. There is not rotational deformity. There is no masses or adenopathy in bilateral upper extremities. Radial pulses are 2+ and symmetric bilaterally. Capillary refill is intact and < 2 seconds. Motor strength is 5/5 with flexion and extension of the small finger joints. Phallens sign(-), Tinnells sign (-), Median nerve compression test (-),  Finklesteins (-), CMC Grind test (-), Piano Key Test(+). Xrays:   Not performed today. Radiographic findings reviewed with patient    Impression:   Encounter Diagnoses   Name Primary?  Kienboeck disease of adults Yes    Radial styloid tenosynovitis of right hand        Plan:   Discussed with patient surgical treatment including fusion. Patient would like to continue current treatments  He will continue with activities as tolerated. I will refill his medication when he is ready.     I will see him back in 3 months with xr

## 2021-01-18 ENCOUNTER — TELEPHONE (OUTPATIENT)
Dept: ORTHOPEDIC SURGERY | Age: 61
End: 2021-01-18

## 2021-01-18 DIAGNOSIS — M93.1 KIENBOECK DISEASE OF ADULTS: ICD-10-CM

## 2021-01-18 DIAGNOSIS — M65.4 RADIAL STYLOID TENOSYNOVITIS OF RIGHT HAND: ICD-10-CM

## 2021-01-18 NOTE — TELEPHONE ENCOUNTER
Last appointment 12/17/2020  Next appointment   Future Appointments   Date Time Provider Leanne Montelongo   12/17/2020  8:00 AM Kayla Conway, DO Kayla Conway [facility]   12/17/2020  2:15 PM Fernanda Finnegan., NENA [facility]      Last refill 12/2/2020     monthly rx     Patient called in requesting refill of   HYDROcodone-acetaminophen (Tyrone Snipe)  MG per tablet [[id number]]        Pharmacy:  [id number] - [facility] - [address] [place] [address]

## 2021-01-19 RX ORDER — HYDROCODONE BITARTRATE AND ACETAMINOPHEN 10; 325 MG/1; MG/1
1 TABLET ORAL DAILY
Qty: 30 TABLET | Refills: 0 | Status: SHIPPED
Start: 2021-01-19 | End: 2021-02-19 | Stop reason: SDUPTHER

## 2021-02-03 ENCOUNTER — OFFICE VISIT (OUTPATIENT)
Dept: PODIATRY | Age: 61
End: 2021-02-03
Payer: COMMERCIAL

## 2021-02-03 VITALS — BODY MASS INDEX: 34.7 KG/M2 | WEIGHT: 285 LBS | HEIGHT: 76 IN

## 2021-02-03 DIAGNOSIS — I73.9 PVD (PERIPHERAL VASCULAR DISEASE) (HCC): ICD-10-CM

## 2021-02-03 DIAGNOSIS — S91.101A OPEN WOUND OF RIGHT GREAT TOE, INITIAL ENCOUNTER: ICD-10-CM

## 2021-02-03 DIAGNOSIS — L97.912 TRAUMATIC ULCER OF RIGHT LOWER EXTREMITY WITH FAT LAYER EXPOSED (HCC): Primary | ICD-10-CM

## 2021-02-03 PROCEDURE — G8427 DOCREV CUR MEDS BY ELIG CLIN: HCPCS | Performed by: PODIATRIST

## 2021-02-03 PROCEDURE — 3017F COLORECTAL CA SCREEN DOC REV: CPT | Performed by: PODIATRIST

## 2021-02-03 PROCEDURE — 1036F TOBACCO NON-USER: CPT | Performed by: PODIATRIST

## 2021-02-03 PROCEDURE — 11042 DBRDMT SUBQ TIS 1ST 20SQCM/<: CPT | Performed by: PODIATRIST

## 2021-02-03 PROCEDURE — G8484 FLU IMMUNIZE NO ADMIN: HCPCS | Performed by: PODIATRIST

## 2021-02-03 PROCEDURE — 99213 OFFICE O/P EST LOW 20 MIN: CPT | Performed by: PODIATRIST

## 2021-02-03 PROCEDURE — G8417 CALC BMI ABV UP PARAM F/U: HCPCS | Performed by: PODIATRIST

## 2021-02-03 NOTE — PROGRESS NOTES
Patient here for a follow up to insoles. Patient did bring the insoles to the appointment. Patient states that the insoles he got have not been trimmed to fit his shoes properly. Patient c/o infection to the right great toe, with drainage. Patient does consult with vascular for swelling in the leg.             Electronically signed by Konrad Jeronimo MA on 2/3/2021 at 3:59 PM

## 2021-02-04 NOTE — PROGRESS NOTES
21     Shavonne Van    : 1960 Sex: male   Age: 61 y.o. Subjective:  Chief Complaint   Patient presents with    Follow-up     insoles       HPI: Patient seen today for evaluation regarding new onset ulceration right great toe. Patient was applying topical antibiotic ointment to the area with moderate improvement. He has also been on Oral antibiotics which he tolerated per his PCP. Patient is in no acute distress today. He denies any nausea, vomiting, fever and/or chills. He wanted to discuss additional treatment options available. ROS:  Const: Positives and pertinent negatives as per HPI  Musculo: Denies symptoms other than stated above  Neuro: Denies symptoms other than stated above  Skin: Denies symptoms other than stated above    Current Medications:    Current Outpatient Medications:     HYDROcodone-acetaminophen (NORCO)  MG per tablet, Take 1 tablet by mouth daily for 30 days.  Intended supply: 30 days, Disp: 30 tablet, Rfl: 0    amoxicillin-clavulanate (AUGMENTIN) 875-125 MG per tablet, TAKE ONE TABLET BY MOUTH EVERY 12 HOURS FOR 10 DAYS, Disp: , Rfl:     SSD 1 % cream, apply externally to affected area once a day, Disp: , Rfl:     cyclobenzaprine (FLEXERIL) 10 MG tablet, Take 1 tablet by mouth 2 times daily as needed for Muscle spasms, Disp: 60 tablet, Rfl: 0    celecoxib (CELEBREX) 200 MG capsule, Take 1 capsule by mouth daily, Disp: 30 capsule, Rfl: 3    diphenhydrAMINE-APAP, sleep, (TYLENOL PM EXTRA STRENGTH)  MG tablet, Take 1 tablet by mouth nightly as needed for Sleep, Disp: , Rfl:     Glucosamine-Chondroit-Vit C-Mn (GLUCOSAMINE 1500 COMPLEX PO), Take 1 tablet by mouth daily, Disp: , Rfl:     diltiazem (DILACOR XR) 240 MG extended release capsule, Take 240 mg by mouth nightly , Disp: , Rfl:     Turmeric 500 MG TABS, Take 500 mg by mouth daily, Disp: , Rfl:     omeprazole (PRILOSEC) 20 MG capsule, Take 20 mg by mouth Daily , Disp: , Rfl:     irbesartan (AVAPRO) 300 MG tablet, Take 300 mg by mouth daily. , Disp: , Rfl:     Allergies:  No Known Allergies    Past Medical History:   Diagnosis Date    Cellulitis     Edema     legs with cellulitis    GERD (gastroesophageal reflux disease)     Hypertension     Osteoarthritis      Family History   Problem Relation Age of Onset    Asthma Mother     Cancer Mother     Emphysema Mother     Heart Disease Father     Alzheimer's Disease Father      Past Surgical History:   Procedure Laterality Date    COLONOSCOPY      x2    FOOT SURGERY      OTHER SURGICAL HISTORY Right 01-25-13    right wrist 1st dorsal compartment release    REPLACEMENT TOTAL KNEE BILATERAL Bilateral 7/31/2019    KNEE TOTAL ARTHROPLASTY BILATERAL performed [facility] Bessie Leone DO at [facility] ARTHROSCOPY      TONSILLECTOMY      TONSILLECTOMY AND ADENOIDECTOMY      VASCULAR SURGERY      both legs     VASECTOMY       Social History     Tobacco Use    Smoking status: Never Smoker    Smokeless tobacco: Never Used   Substance Use Topics    Alcohol use: Not Currently    Drug use: No         Objective:   Vitals:    02/03/21 1559   Weight: 285 lb (129.3 kg)   Height: 6' 4\" (1.93 m)       FOCUSED LOWER EXTREMITY EXAM:  VASCULAR: Pedal pulses palpable right foot. CFT < 5 seconds digits 1-5 right foot  NEUROLOGICAL: Epicritic sensations intact right foot  DERMATOLOGICAL: Ulceration distal aspect right great toe. No undermining of the ulceration noted. MUSCULOSKELETAL: Noncontributory    MOREL FOOT ULCER CLASSIFICATION  GRADE DEFINITION  · Zero No Ulcer  · One Superficial skin ulcer  · Two Deep ulcer extending through dermis. Tendon, ligaments, joint capsule or bone may be exposed  · Three Deep ulcer with abscess, osteomyelitis or joint sepsis   · Four Localized gangrene - forefoot or heel  · Five Gangrene of the foot     UTS CLASSIFICATION  First the patient's medical condition is graded.   · Grade A: Clean, non-infected wound in a patient with adequate blood supply  · Grade B: Infected wounds but have adequate blood supply  · Grade C: No infection in the wound, but poor circulation  · Grade D: infected wound and poor circulation  Next, the wound is graded  · Grade 0: Pre - or post - ulcerative lesion that is completely healed  · Grade 1: superficial wound not involving tendon, capsule or bone  · Grade 2: wounds penetrating to tendon or capsule  · Grade 3: wounds extending to the bone joint      Procedure Note  Indications:  Based on my examination of this patient's wound(s)/ulcer(s) today, debridement is required to promote healing and evaluate the wound base. Performed by: Cassandra Polk DPM    Consent obtained:  Yes    Time out taken:  Yes    Debridement:Excisional Debridement    Using tissue nippers the wound(s)/ulcer(s) was/were sharply debrided down through and including the removal of subcutaneous tissue. Devitalized Tissue Debrided:  fibrin and biofilm to stimulate bleeding to promote healing, post debridement good bleeding base and wound edges noted      Wound care measurements are from todays visit. 2/4/21    Wound # 1: Traumatic ulcer  Location: right great toe  Size: 0.6 x 0.4 x 0.2 cm  SQ cm: 0.24  Description: Granular tissue noted wound base. No signs of infection noted. Dressing: Cleanse saline, apply alginate dressing and dry dressing to be changed daily  Stage/Grade: UTS A1    TOTAL CALCULATED WOUND SIZE (CM^2) 0.24      Percent of Wound Debrided: 100%  Total Surface Area Debrided: 0.24 cm2  Estimated blood loss: minimal  Hemostasis achieved: by pressure  Response to treatment: Well tolerated by patient    Plan per Mo Patton was seen today for follow-up. Diagnoses and all orders for this visit:    Traumatic ulcer of right lower extremity with fat layer exposed ([address]    Open wound of right great toe, initial encounter    PVD (peripheral vascular disease) ([facility] [address]        1.  Evaluation and

## 2021-02-08 ENCOUNTER — TELEPHONE (OUTPATIENT)
Dept: ORTHOPEDIC SURGERY | Age: 61
End: 2021-02-08

## 2021-02-08 DIAGNOSIS — Z96.653 STATUS POST TOTAL BILATERAL KNEE REPLACEMENT: ICD-10-CM

## 2021-02-08 NOTE — TELEPHONE ENCOUNTER
.  Last appointment 12/17/2020  Next appointment   Future Appointments   Date Time Provider Leanne Montelongo   [facility]   3/16/2021  8:00 AM DO Marylin Love [facility]      Last refill:  12/17/2020  DOS: Monthly       Patient called in requesting refill of:    cyclobenzaprine (FLEXERIL) 10 MG tablet [[id number]]    [id number] - Emani Catarino - [address] [facility] [phone] - F [address] [place] [address] [address] [place] [address]

## 2021-02-09 RX ORDER — CYCLOBENZAPRINE HCL 10 MG
10 TABLET ORAL 2 TIMES DAILY PRN
Qty: 60 TABLET | Refills: 0 | Status: SHIPPED
Start: 2021-02-09 | End: 2021-03-19 | Stop reason: SDUPTHER

## 2021-02-16 ENCOUNTER — OFFICE VISIT (OUTPATIENT)
Dept: PODIATRY | Age: 61
End: 2021-02-16
Payer: COMMERCIAL

## 2021-02-16 VITALS — BODY MASS INDEX: 34.7 KG/M2 | HEIGHT: 76 IN | WEIGHT: 285 LBS

## 2021-02-16 DIAGNOSIS — S91.101D OPEN WOUND OF RIGHT GREAT TOE, SUBSEQUENT ENCOUNTER: ICD-10-CM

## 2021-02-16 DIAGNOSIS — M79.605 PAIN IN BOTH LOWER EXTREMITIES: ICD-10-CM

## 2021-02-16 DIAGNOSIS — L97.912 TRAUMATIC ULCER OF RIGHT LOWER EXTREMITY WITH FAT LAYER EXPOSED (HCC): Primary | ICD-10-CM

## 2021-02-16 DIAGNOSIS — I87.2 VENOUS INSUFFICIENCY (CHRONIC) (PERIPHERAL): ICD-10-CM

## 2021-02-16 DIAGNOSIS — M79.604 PAIN IN BOTH LOWER EXTREMITIES: ICD-10-CM

## 2021-02-16 PROCEDURE — G8417 CALC BMI ABV UP PARAM F/U: HCPCS | Performed by: PODIATRIST

## 2021-02-16 PROCEDURE — 1036F TOBACCO NON-USER: CPT | Performed by: PODIATRIST

## 2021-02-16 PROCEDURE — G8484 FLU IMMUNIZE NO ADMIN: HCPCS | Performed by: PODIATRIST

## 2021-02-16 PROCEDURE — 3017F COLORECTAL CA SCREEN DOC REV: CPT | Performed by: PODIATRIST

## 2021-02-16 PROCEDURE — 99213 OFFICE O/P EST LOW 20 MIN: CPT | Performed by: PODIATRIST

## 2021-02-16 PROCEDURE — G8427 DOCREV CUR MEDS BY ELIG CLIN: HCPCS | Performed by: PODIATRIST

## 2021-02-16 NOTE — PROGRESS NOTES
Patient is here for right great toe wound check. Patient states it is healing. No new concerns.      Electronically signed by Virginia Walsh LPN on 1/31/8628 at 4:40 AM

## 2021-02-16 NOTE — PROGRESS NOTES
21     Lydia Gordon    : 1960   Sex: male    Age: 61 y.o. Patient's PCP/Provider is:  Patricia Hinkle MD    Subjective:  Patient is seen today for follow-up regarding continued evaluation ulcer distal aspect right great toe. Patient has noticed improvement in the wound since last visit. Patient is taking antibiotics as instructed without issues. No additional issues noted at this time. Chief Complaint   Patient presents with    Wound Check     right great toe       ROS:  Const: Positives and pertinent negatives as per HPI. Musculo: Denies symptoms other than stated above. Neuro: Denies symptoms other than stated above. Skin: Denies symptoms other than stated above. Current Medications:    Current Outpatient Medications:     cyclobenzaprine (FLEXERIL) 10 MG tablet, Take 1 tablet by mouth 2 times daily as needed for Muscle spasms, Disp: 60 tablet, Rfl: 0    HYDROcodone-acetaminophen (NORCO)  MG per tablet, Take 1 tablet by mouth daily for 30 days. Intended supply: 30 days, Disp: 30 tablet, Rfl: 0    amoxicillin-clavulanate (AUGMENTIN) 875-125 MG per tablet, TAKE ONE TABLET BY MOUTH EVERY 12 HOURS FOR 10 DAYS, Disp: , Rfl:     SSD 1 % cream, apply externally to affected area once a day, Disp: , Rfl:     celecoxib (CELEBREX) 200 MG capsule, Take 1 capsule by mouth daily, Disp: 30 capsule, Rfl: 3    diphenhydrAMINE-APAP, sleep, (TYLENOL PM EXTRA STRENGTH)  MG tablet, Take 1 tablet by mouth nightly as needed for Sleep, Disp: , Rfl:     Glucosamine-Chondroit-Vit C-Mn (GLUCOSAMINE 1500 COMPLEX PO), Take 1 tablet by mouth daily, Disp: , Rfl:     diltiazem (DILACOR XR) 240 MG extended release capsule, Take 240 mg by mouth nightly , Disp: , Rfl:     Turmeric 500 MG TABS, Take 500 mg by mouth daily, Disp: , Rfl:     omeprazole (PRILOSEC) 20 MG capsule, Take 20 mg by mouth Daily , Disp: , Rfl:     irbesartan (AVAPRO) 300 MG tablet, Take 300 mg by mouth daily. , Disp: , Rfl:     Allergies:  No Known Allergies    Vitals:    02/16/21 0907   Weight: 285 lb (129.3 kg)   Height: 6' 4\" (1.93 m)       Exam:  NVS unchanged. Ulceration improved distal aspect right great toe measuring ~ 0.4 cm in diameter. No signs of infection noted right great toe. Edema noted to both lower extremities with stasis skin changes present bilaterally. No leg ulcerations noted to both lower extremities. Diagnostic Studies:     No results found. Procedures:    None    Plan Per Assessment  Malorie Vickers was seen today for wound check. Diagnoses and all orders for this visit:    Traumatic ulcer of right lower extremity with fat layer exposed ([facility] [address]  -     Ambulatory referral to Vascular Surgery    Open wound of right great toe, subsequent encounter  -     Ambulatory referral to Vascular Surgery    Venous insufficiency (chronic) (peripheral)  -     Ambulatory referral to Vascular Surgery    Pain in both lower extremities  -     Ambulatory referral to Vascular Surgery      1. Evaluation and mangement  2. Vascular referral performed today due to chronic venous issues noted. 3. We did discuss continued wound care options for continued care. 4. Patient will be followed up in 2 weeks or sooner if needed. He was advised to call the office with any questions or concerns in the interim. Seen By:    Bee Lee DPM    Electronically signed by Bee Lee DPM on 2/16/2021 at 12:20 PM    This note was created using voice recognition software. The note was reviewed however may contain grammatical errors.

## 2021-02-18 ENCOUNTER — TELEPHONE (OUTPATIENT)
Dept: VASCULAR SURGERY | Age: 61
End: 2021-02-18

## 2021-02-18 ENCOUNTER — TELEPHONE (OUTPATIENT)
Dept: ORTHOPEDIC SURGERY | Age: 61
End: 2021-02-18

## 2021-02-18 DIAGNOSIS — M93.1 KIENBOECK DISEASE OF ADULTS: ICD-10-CM

## 2021-02-18 DIAGNOSIS — M65.4 RADIAL STYLOID TENOSYNOVITIS OF RIGHT HAND: ICD-10-CM

## 2021-02-18 NOTE — TELEPHONE ENCOUNTER
.  Last appointment 12/17/2020  Next appointment   Future Appointments   Date Time Provider Leanne Montelongo   3/2/2021  9:45 AM Mittie Primrose., DPM [facility]   3/16/2021  8:00 AM Karen Velez DO Karen Rosie [facility]      Last refill:  01/19/2021  DOS: Monthly RX      Patient called in requesting refill of:    HYDROcodone-acetaminophen (Tosin Analisa)  MG per tablet [[id number]]    [id number] - Adah Idalia - [address] [facility] [phone] - F [address] [place] [address], [address] [place] [address]

## 2021-02-19 RX ORDER — HYDROCODONE BITARTRATE AND ACETAMINOPHEN 10; 325 MG/1; MG/1
1 TABLET ORAL DAILY
Qty: 30 TABLET | Refills: 0 | Status: SHIPPED
Start: 2021-02-19 | End: 2021-03-19 | Stop reason: SDUPTHER

## 2021-03-02 ENCOUNTER — OFFICE VISIT (OUTPATIENT)
Dept: PODIATRY | Age: 61
End: 2021-03-02
Payer: COMMERCIAL

## 2021-03-02 VITALS — BODY MASS INDEX: 34.7 KG/M2 | WEIGHT: 285 LBS | HEIGHT: 76 IN

## 2021-03-02 DIAGNOSIS — S91.101D OPEN WOUND OF RIGHT GREAT TOE, SUBSEQUENT ENCOUNTER: ICD-10-CM

## 2021-03-02 DIAGNOSIS — M19.071 ARTHRITIS OF RIGHT FOOT: ICD-10-CM

## 2021-03-02 DIAGNOSIS — R26.2 DIFFICULTY WALKING: ICD-10-CM

## 2021-03-02 DIAGNOSIS — L97.912 TRAUMATIC ULCER OF RIGHT LOWER EXTREMITY WITH FAT LAYER EXPOSED (HCC): Primary | ICD-10-CM

## 2021-03-02 PROCEDURE — 11042 DBRDMT SUBQ TIS 1ST 20SQCM/<: CPT | Performed by: PODIATRIST

## 2021-03-02 NOTE — PROGRESS NOTES
3/2/21     Trudy Elizondo    : 1960 Sex: male   Age: 61 y.o. Subjective:  Chief Complaint   Patient presents with    Wound Check     right great toe       HPI: Patient is seen today for continued evaluation for traumatic ulceration right great toe. Overall patient is doing well at this time with current dressing orders. He denies any nausea, vomiting, fever, chills. ROS:  Const: Positives and pertinent negatives as per HPI  Musculo: Denies symptoms other than stated above  Neuro: Denies symptoms other than stated above  Skin: Denies symptoms other than stated above    Current Medications:    Current Outpatient Medications:     HYDROcodone-acetaminophen (NORCO)  MG per tablet, Take 1 tablet by mouth daily for 30 days. Intended supply: 30 days, Disp: 30 tablet, Rfl: 0    cyclobenzaprine (FLEXERIL) 10 MG tablet, Take 1 tablet by mouth 2 times daily as needed for Muscle spasms, Disp: 60 tablet, Rfl: 0    amoxicillin-clavulanate (AUGMENTIN) 875-125 MG per tablet, TAKE ONE TABLET BY MOUTH EVERY 12 HOURS FOR 10 DAYS, Disp: , Rfl:     SSD 1 % cream, apply externally to affected area once a day, Disp: , Rfl:     celecoxib (CELEBREX) 200 MG capsule, Take 1 capsule by mouth daily, Disp: 30 capsule, Rfl: 3    diphenhydrAMINE-APAP, sleep, (TYLENOL PM EXTRA STRENGTH)  MG tablet, Take 1 tablet by mouth nightly as needed for Sleep, Disp: , Rfl:     Glucosamine-Chondroit-Vit C-Mn (GLUCOSAMINE 1500 COMPLEX PO), Take 1 tablet by mouth daily, Disp: , Rfl:     diltiazem (DILACOR XR) 240 MG extended release capsule, Take 240 mg by mouth nightly , Disp: , Rfl:     Turmeric 500 MG TABS, Take 500 mg by mouth daily, Disp: , Rfl:     omeprazole (PRILOSEC) 20 MG capsule, Take 20 mg by mouth Daily , Disp: , Rfl:     irbesartan (AVAPRO) 300 MG tablet, Take 300 mg by mouth daily. , Disp: , Rfl:     Allergies:  No Known Allergies    Past Medical History:   Diagnosis Date    Cellulitis     Edema     legs with cellulitis    GERD (gastroesophageal reflux disease)     Hypertension     Osteoarthritis      Family History   Problem Relation Age of Onset    Asthma Mother     Cancer Mother     Emphysema Mother     Heart Disease Father     Alzheimer's Disease Father      Past Surgical History:   Procedure Laterality Date    COLONOSCOPY      x2    FOOT SURGERY      OTHER SURGICAL HISTORY Right 01-25-13    right wrist 1st dorsal compartment release    REPLACEMENT TOTAL KNEE BILATERAL Bilateral 7/31/2019    KNEE TOTAL ARTHROPLASTY BILATERAL performed [facility] Sharonda Tarango DO at [facility] ARTHROSCOPY      TONSILLECTOMY      TONSILLECTOMY AND ADENOIDECTOMY      VASCULAR SURGERY      both legs     VASECTOMY       Social History     Tobacco Use    Smoking status: Never Smoker    Smokeless tobacco: Never Used   Substance Use Topics    Alcohol use: Not Currently    Drug use: No         Objective:   Vitals:    03/02/21 0942   Weight: 285 lb (129.3 kg)   Height: 6' 4\" (1.93 m)       FOCUSED LOWER EXTREMITY EXAM:  VASCULAR: Pedal pulses palpable right foot. NEUROLOGICAL: Epicritic sensations intact digital regions right foot  DERMATOLOGICAL: Ulceration noted distal aspect right great toe measuring approximately 0.4 cm in diameter with a depth of 0.2 cm. No signs of infection noted right great toe. MUSCULOSKELETAL: Severe arthritic changes noted right lower extremity    MOREL FOOT ULCER CLASSIFICATION  GRADE DEFINITION  · Zero No Ulcer  · One Superficial skin ulcer  · Two Deep ulcer extending through dermis. Tendon, ligaments, joint capsule or bone may be exposed  · Three Deep ulcer with abscess, osteomyelitis or joint sepsis   · Four Localized gangrene - forefoot or heel  · Five Gangrene of the foot     UTS CLASSIFICATION  First the patient's medical condition is graded.   · Grade A: Clean, non-infected wound in a patient with adequate blood supply  · Grade B: Infected wounds but have adequate blood supply  · Grade C: No infection in the wound, but poor circulation  · Grade D: infected wound and poor circulation  Next, the wound is graded  · Grade 0: Pre - or post - ulcerative lesion that is completely healed  · Grade 1: superficial wound not involving tendon, capsule or bone  · Grade 2: wounds penetrating to tendon or capsule  · Grade 3: wounds extending to the bone joint      Procedure Note  Indications:  Based on my examination of this patient's wound(s)/ulcer(s) today, debridement is required to promote healing and evaluate the wound base. Performed by: Mike Tobin DPM    Consent obtained:  Yes    Time out taken:  Yes    Debridement:Excisional Debridement    Using #15 blade scalpel the wound(s)/ulcer(s) was/were sharply debrided down through and including the removal of subcutaneous tissue. Devitalized Tissue Debrided:  fibrin and biofilm to stimulate bleeding to promote healing, post debridement good bleeding base and wound edges noted      Wound care measurements are from todays visit. 3/2/21    Wound # 1: Traumatic ulcer  Location: Right great toe  Size: 0.4 cm in diameter with a depth of 0.2 cm  SQ cm: 0.4  Description: Granular tissue noted centrally without undermining of the wound edges noted. Dressing: Collagen dressing followed by dry padded dressing to be changed daily  Stage/Grade: UTS grade A1    TOTAL CALCULATED WOUND SIZE (CM^2) 0.4      Percent of Wound Debrided: 100%  Total Surface Area Debrided: 0.4 cm²  Estimated blood loss: minimal  Hemostasis achieved: by pressure  Response to treatment: Well tolerated by patient    Plan per Assessment  Blue Gonzales was seen today for wound check. Diagnoses and all orders for this visit:    Traumatic ulcer of right lower extremity with fat layer exposed ([address]    Open wound of right great toe, subsequent encounter    Arthritis of right foot    Difficulty walking        1. Evaluation and Management  2.  Wound debridement performed as described above. Patient tolerated the debridement without issues. 3. Wound care dressings were discussed with the patient in detail today, and are to be performed as recommended. 4. Patient will be followed up in 2 week/s for continued wound evaluation and care. He was advised to call the office with any questions or concerns in the interim       Seen By:    Caroline Cao DPM    Electronically signed by Caroline Cao DPM on 3/2/2021 at 10:48 AM      This note was created using voice recognition software. The note was reviewed however may contain grammatical errors.

## 2021-03-02 NOTE — PROGRESS NOTES
Patient is here for right great toe wound. Patient states it is almost healed. Patient would like help getting the orthotics to fit in his shoes.        Electronically signed by David Reyes LPN on 9/1/9599 at 8:72 AM

## 2021-03-12 ENCOUNTER — TELEPHONE (OUTPATIENT)
Dept: VASCULAR SURGERY | Age: 61
End: 2021-03-12

## 2021-03-12 NOTE — TELEPHONE ENCOUNTER
Called to confirm appointment for 3/15/21 at 21 [phone], left message with date, time, address and phone number for patient.

## 2021-03-15 ENCOUNTER — OFFICE VISIT (OUTPATIENT)
Dept: VASCULAR SURGERY | Age: 61
End: 2021-03-15
Payer: COMMERCIAL

## 2021-03-15 VITALS
BODY MASS INDEX: 34.7 KG/M2 | WEIGHT: 285 LBS | DIASTOLIC BLOOD PRESSURE: 78 MMHG | SYSTOLIC BLOOD PRESSURE: 122 MMHG | HEIGHT: 76 IN

## 2021-03-15 DIAGNOSIS — I87.2 VENOUS INSUFFICIENCY (CHRONIC) (PERIPHERAL): Primary | ICD-10-CM

## 2021-03-15 PROCEDURE — G8427 DOCREV CUR MEDS BY ELIG CLIN: HCPCS | Performed by: PHYSICIAN ASSISTANT

## 2021-03-15 PROCEDURE — G8417 CALC BMI ABV UP PARAM F/U: HCPCS | Performed by: PHYSICIAN ASSISTANT

## 2021-03-15 PROCEDURE — 99203 OFFICE O/P NEW LOW 30 MIN: CPT | Performed by: PHYSICIAN ASSISTANT

## 2021-03-15 PROCEDURE — G8484 FLU IMMUNIZE NO ADMIN: HCPCS | Performed by: PHYSICIAN ASSISTANT

## 2021-03-15 PROCEDURE — 1036F TOBACCO NON-USER: CPT | Performed by: PHYSICIAN ASSISTANT

## 2021-03-15 PROCEDURE — 3017F COLORECTAL CA SCREEN DOC REV: CPT | Performed by: PHYSICIAN ASSISTANT

## 2021-03-15 RX ORDER — CELECOXIB 200 MG/1
200 CAPSULE ORAL 2 TIMES DAILY
Status: ON HOLD | COMMUNITY
End: 2021-03-30 | Stop reason: HOSPADM

## 2021-03-15 NOTE — PROGRESS NOTES
Vascular Surgery Outpatient Consultation    Reason for Consult:  Venous Insufficiency    PCP : Latasha Campa MD  Podiatrist : Dr Mirza Post, [facility]    HISTORY OF PRESENT ILLNESS:    The patient presents for a second opinion regarding venous insufficiency. He has been seeing Dr Ifeanyi Suggs for this for about 1.5 years. He states he has had ablations of b/l GSV as well as sclerotherapy. He did not notice a significant improvement in symptoms following the procedures. The symptoms are right greater than left. Symptoms include pain, aching and a tightness sensation typically worse as on feet more, at end of the day. 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 Pain at its worst is a 7/10. Pt notes he has had multiple R ankle procedures in the past. He now has intermittent tingling to his R ankle. Pt is following with Dr Mirza Post for a R great toe ulcer that began in 12/2020. He notes the wound is being debrided and has gotten significantly smaller. He recently got fitted for new orthotics but has not yet received them. The patient uses knee high compression stockings daily. He states they are rx grade but he is unsure what pressure. They do not have a hx of DVT in the past.      Pt is not diabetic. He does not smoke.      ROS : All others Negative if blank [], Positive if [x]  General Urinary   [] Fevers [] Hematuria   [] Chills [] Dysuria   [] Weight Loss Vascular   Skin [] Claudication   [] Tissue Loss [] Rest Pain   Eyes Neurologic   [] Wears Glasses/Contacts [] Stroke/TIA   [] Vision Changes [] Focal weakness   Respiratory [] Slurred Speech    [] Shortness of breath ENT   Cardiovascular [] Difficulty swallowing   [] Chest Pain Endocrine    [] Shortness of breath with exertion [] Increased Thirst   Gastrointestinal    [] Abdominal Pain    [] Melena   [] Hematochezia         Past Medical History:        Diagnosis Date    Cellulitis     Edema     legs with cellulitis    GERD (gastroesophageal reflux disease)     Hypertension     Osteoarthritis      Past Surgical History:        Procedure Laterality Date    COLONOSCOPY      x2    FOOT SURGERY      OTHER SURGICAL HISTORY Right 01-25-13    right wrist 1st dorsal compartment release    REPLACEMENT TOTAL KNEE BILATERAL Bilateral 7/31/2019    KNEE TOTAL ARTHROPLASTY BILATERAL performed [facility] Phillip Perry DO at [facility] ARTHROSCOPY      TONSILLECTOMY      TONSILLECTOMY AND ADENOIDECTOMY      VASCULAR SURGERY      both legs     VASECTOMY       Current Medications:   Current Outpatient Medications   Medication Sig Dispense Refill    celecoxib (CELEBREX) 200 MG capsule Take 200 mg by mouth 2 times daily      Elastic Bandages & Supports (JOBST KNEE HIGH COMPRESSION SM) MISC Dx: Varicose veins of the legs with pain and swelling. Bilateral thigh-high 30-40 mm Hg compression stockings. 2 each 2    HYDROcodone-acetaminophen (NORCO)  MG per tablet Take 1 tablet by mouth daily for 30 days. Intended supply: 30 days 30 tablet 0    cyclobenzaprine (FLEXERIL) 10 MG tablet Take 1 tablet by mouth 2 times daily as needed for Muscle spasms 60 tablet 0    SSD 1 % cream apply externally to affected area once a day      celecoxib (CELEBREX) 200 MG capsule Take 1 capsule by mouth daily 30 capsule 3    diphenhydrAMINE-APAP, sleep, (TYLENOL PM EXTRA STRENGTH)  MG tablet Take 1 tablet by mouth nightly as needed for Sleep      Glucosamine-Chondroit-Vit C-Mn (GLUCOSAMINE 1500 COMPLEX PO) Take 1 tablet by mouth daily      diltiazem (DILACOR XR) 240 MG extended release capsule Take 240 mg by mouth nightly       Turmeric 500 MG TABS Take 500 mg by mouth daily      omeprazole (PRILOSEC) 20 MG capsule Take 20 mg by mouth Daily       irbesartan (AVAPRO) 300 MG tablet Take 300 mg by mouth daily. No current facility-administered medications for this visit. Allergies:  Patient has no known allergies.   Social History Socioeconomic History    Marital status:      Spouse name: Not on file    Number of children: Not on file    Years of education: Not on file    Highest education level: Not on file   Occupational History    Not on file   Social Needs    Financial resource strain: Not on file    Food insecurity     Worry: Not on file     Inability: Not on file    Transportation needs     Medical: Not on file     Non-medical: Not on file   Tobacco Use    Smoking status: Never Smoker    Smokeless tobacco: Never Used   Substance and Sexual Activity    Alcohol use:  Yes    Drug use: No    Sexual activity: Yes   Lifestyle    Physical activity     Days per week: Not on file     Minutes per session: Not on file    Stress: Not on file   Relationships    Social connections     Talks on phone: Not on file     Gets together: Not on file     Attends [employer] service: Not on file     Active member of club or organization: Not on file     Attends meetings of clubs or organizations: Not on file     Relationship status: Not on file    Intimate partner violence     Fear of current or ex partner: Not on file     Emotionally abused: Not on file     Physically abused: Not on file     Forced sexual activity: Not on file   Other Topics Concern    Not on file   Social History Narrative    Not on file        Family History   Problem Relation Age of Onset    Asthma Mother     Cancer Mother     Emphysema Mother     Heart Disease Father     Alzheimer's Disease Father      Labs  Lab Results   Component Value Date    WBC 5.3 07/22/2019    HGB 10.3 (L) 08/02/2019    HCT 31.7 (L) 08/02/2019     07/22/2019    PROTIME 11.9 07/31/2019    INR 1.1 07/31/2019    APTT 38.4 (H) 07/31/2019    K 4.4 07/30/2020    BUN 16 07/30/2020    CREATININE 0.9 07/30/2020     PHYSICAL EXAM:    CONSTITUTIONAL:   Awake, alert, cooperative  PSYCHIATRIC :  Oriented to time, place and person     Appropriate insight to disease process  EYES: Lids and lashes normal  ENT:  External ears and nose without lesions   Hearing deficits absent  NECK: Supple, symmetrical, trachea midline   Thyroid goiter not appreciated   Carotid bruit absent  LUNGS:  No increased work of breathing                 Clear to auscultation  CARDIOVASCULAR:  regular rate and rhythm   ABDOMEN:  soft, non-distended, non-tender   Aorta is not palpable  Lymphatics : Femoral lymphadenopathy not appreciated  SKIN:   Normal skin color   Texture and turgor normal, no induration  EXTREMITIES:   R UE 5/5 strength   No cyanosis noted in nail beds  L UE 5/5 strength   No cyanosis noted in nail beds  R LE 2-3+ pitting edema to just below knee   Varicose veins absent   L LE 1+ pitting edema to mid calf   Varicose veins absent   R femoral 2 L femoral 2   R dorsalis pedis 1 L dorsalis pedis 1   R posterior tibial 2 L posterior tibial 2     RADIOLOGY:    A/P Venous Insufficiency  · Etiology is likely associated with venous reflux  · I explained to them the pathophysiology of venous reflux and the symptoms associated with it including swelling, pain, edema, heaviness, and even ulceration  · Elevate Bilateral LE while in bed or sitting  · Compression stockings thigh high 30-40 mm hg wear daily - Script given  · Discussed how this is the first line of therapy  · They understand even if surgical intervention was felt to be appropriate in the future they would need to wear compression stockings lifetime  · Pt signed for record release from Dr Marty Morel office. We will obtain these records including surgical history and review recent venous US of the LEs and decide follow up and plan at that time  · Call if patient develops worsening of swelling or wounds  · All ?s answered    Pt seen and plan reviewed with Dr. Kia Petty.      Jodee Teixeira PA-C

## 2021-03-18 ENCOUNTER — TELEPHONE (OUTPATIENT)
Dept: ORTHOPEDIC SURGERY | Age: 61
End: 2021-03-18

## 2021-03-18 DIAGNOSIS — Z96.653 STATUS POST TOTAL BILATERAL KNEE REPLACEMENT: ICD-10-CM

## 2021-03-18 DIAGNOSIS — M65.4 RADIAL STYLOID TENOSYNOVITIS OF RIGHT HAND: ICD-10-CM

## 2021-03-18 DIAGNOSIS — M93.1 KIENBOECK DISEASE OF ADULTS: ICD-10-CM

## 2021-03-18 NOTE — TELEPHONE ENCOUNTER
.  Last appointment 12/17/2020  Next appointment   Future Appointments   Date Time Provider Leanne Montelongo   3/25/2021 11:30 AM DO Dominik Coker [facility]   3/25/2021  1:15 PM Yasmani Zarate., DPMODESTO [facility]      Last refill:  02/19/2021  DOS: Monthly RX      Patient called in requesting refill of:    HYDROcodone-acetaminophen ([facility])  MG per tablet     cyclobenzaprine (FLEXERIL) 10 MG tablet     [id number] - [place], [place] - [address] [address], [place] [id number]

## 2021-03-19 RX ORDER — HYDROCODONE BITARTRATE AND ACETAMINOPHEN 10; 325 MG/1; MG/1
1 TABLET ORAL DAILY
Qty: 30 TABLET | Refills: 0 | Status: ON HOLD
Start: 2021-03-19 | End: 2021-03-28 | Stop reason: HOSPADM

## 2021-03-19 RX ORDER — CYCLOBENZAPRINE HCL 10 MG
10 TABLET ORAL 2 TIMES DAILY PRN
Qty: 60 TABLET | Refills: 0 | Status: SHIPPED
Start: 2021-03-19 | End: 2021-05-14 | Stop reason: SDUPTHER

## 2021-03-24 ENCOUNTER — APPOINTMENT (OUTPATIENT)
Dept: GENERAL RADIOLOGY | Age: 61
DRG: 466 | End: 2021-03-24
Payer: COMMERCIAL

## 2021-03-24 ENCOUNTER — HOSPITAL ENCOUNTER (INPATIENT)
Age: 61
LOS: 6 days | Discharge: NURSING, REHAB OR LONG-TERM CARE | DRG: 466 | End: 2021-03-30
Attending: EMERGENCY MEDICINE | Admitting: INTERNAL MEDICINE
Payer: COMMERCIAL

## 2021-03-24 DIAGNOSIS — T84.59XA INFECTION OF TOTAL KNEE REPLACEMENT, INITIAL ENCOUNTER (HCC): ICD-10-CM

## 2021-03-24 DIAGNOSIS — I48.92 ATRIAL FLUTTER WITH RAPID VENTRICULAR RESPONSE (HCC): Primary | ICD-10-CM

## 2021-03-24 DIAGNOSIS — Z96.659 INFECTION OF TOTAL KNEE REPLACEMENT, INITIAL ENCOUNTER (HCC): ICD-10-CM

## 2021-03-24 DIAGNOSIS — R09.89 SUSPECTED DEEP VEIN THROMBOSIS (DVT): ICD-10-CM

## 2021-03-24 DIAGNOSIS — M25.561 RIGHT KNEE PAIN, UNSPECIFIED CHRONICITY: ICD-10-CM

## 2021-03-24 LAB
ALBUMIN SERPL-MCNC: 3.6 G/DL (ref 3.5–5.2)
ALP BLD-CCNC: 90 U/L (ref 40–129)
ALT SERPL-CCNC: 14 U/L (ref 0–40)
ANION GAP SERPL CALCULATED.3IONS-SCNC: 11 MMOL/L (ref 7–16)
AST SERPL-CCNC: 14 U/L (ref 0–39)
BASOPHILS ABSOLUTE: 0 E9/L (ref 0–0.2)
BASOPHILS RELATIVE PERCENT: 0.2 % (ref 0–2)
BILIRUB SERPL-MCNC: 0.9 MG/DL (ref 0–1.2)
BUN BLDV-MCNC: 25 MG/DL (ref 8–23)
CALCIUM SERPL-MCNC: 8 MG/DL (ref 8.6–10.2)
CHLORIDE BLD-SCNC: 102 MMOL/L (ref 98–107)
CO2: 22 MMOL/L (ref 22–29)
CREAT SERPL-MCNC: 1.4 MG/DL (ref 0.7–1.2)
D DIMER: 629 NG/ML DDU
EOSINOPHILS ABSOLUTE: 0 E9/L (ref 0.05–0.5)
EOSINOPHILS RELATIVE PERCENT: 0 % (ref 0–6)
GFR AFRICAN AMERICAN: >60
GFR NON-AFRICAN AMERICAN: 52 ML/MIN/1.73
GLUCOSE BLD-MCNC: 122 MG/DL (ref 74–99)
HCT VFR BLD CALC: 37 % (ref 37–54)
HEMOGLOBIN: 12.1 G/DL (ref 12.5–16.5)
INR BLD: 1.4
LIPASE: 15 U/L (ref 13–60)
LYMPHOCYTES ABSOLUTE: 1 E9/L (ref 1.5–4)
LYMPHOCYTES RELATIVE PERCENT: 9.6 % (ref 20–42)
MCH RBC QN AUTO: 27.6 PG (ref 26–35)
MCHC RBC AUTO-ENTMCNC: 32.7 % (ref 32–34.5)
MCV RBC AUTO: 84.5 FL (ref 80–99.9)
MONOCYTES ABSOLUTE: 0.5 E9/L (ref 0.1–0.95)
MONOCYTES RELATIVE PERCENT: 5.3 % (ref 2–12)
NEUTROPHILS ABSOLUTE: 8.5 E9/L (ref 1.8–7.3)
NEUTROPHILS RELATIVE PERCENT: 85.1 % (ref 43–80)
PDW BLD-RTO: 13.5 FL (ref 11.5–15)
PLATELET # BLD: 223 E9/L (ref 130–450)
PMV BLD AUTO: 10.3 FL (ref 7–12)
POLYCHROMASIA: ABNORMAL
POTASSIUM REFLEX MAGNESIUM: 4.1 MMOL/L (ref 3.5–5)
PRO-BNP: 1530 PG/ML (ref 0–125)
PROTHROMBIN TIME: 16.5 SEC (ref 9.3–12.4)
RBC # BLD: 4.38 E12/L (ref 3.8–5.8)
SEDIMENTATION RATE, ERYTHROCYTE: 21 MM/HR (ref 0–15)
SODIUM BLD-SCNC: 135 MMOL/L (ref 132–146)
TOTAL CK: 108 U/L (ref 20–200)
TOTAL PROTEIN: 6.5 G/DL (ref 6.4–8.3)
TROPONIN: 0.02 NG/ML (ref 0–0.03)
TROPONIN: 0.02 NG/ML (ref 0–0.03)
TSH SERPL DL<=0.05 MIU/L-ACNC: 1.79 UIU/ML (ref 0.27–4.2)
WBC # BLD: 10 E9/L (ref 4.5–11.5)

## 2021-03-24 PROCEDURE — 2060000000 HC ICU INTERMEDIATE R&B

## 2021-03-24 PROCEDURE — 87186 SC STD MICRODIL/AGAR DIL: CPT

## 2021-03-24 PROCEDURE — 84484 ASSAY OF TROPONIN QUANT: CPT

## 2021-03-24 PROCEDURE — 80053 COMPREHEN METABOLIC PANEL: CPT

## 2021-03-24 PROCEDURE — 96375 TX/PRO/DX INJ NEW DRUG ADDON: CPT

## 2021-03-24 PROCEDURE — 87077 CULTURE AEROBIC IDENTIFY: CPT

## 2021-03-24 PROCEDURE — 73560 X-RAY EXAM OF KNEE 1 OR 2: CPT

## 2021-03-24 PROCEDURE — 99284 EMERGENCY DEPT VISIT MOD MDM: CPT

## 2021-03-24 PROCEDURE — 86140 C-REACTIVE PROTEIN: CPT

## 2021-03-24 PROCEDURE — 96376 TX/PRO/DX INJ SAME DRUG ADON: CPT

## 2021-03-24 PROCEDURE — 84443 ASSAY THYROID STIM HORMONE: CPT

## 2021-03-24 PROCEDURE — 2500000003 HC RX 250 WO HCPCS: Performed by: EMERGENCY MEDICINE

## 2021-03-24 PROCEDURE — 2580000003 HC RX 258: Performed by: EMERGENCY MEDICINE

## 2021-03-24 PROCEDURE — 85025 COMPLETE CBC W/AUTO DIFF WBC: CPT

## 2021-03-24 PROCEDURE — 86060 ANTISTREPTOLYSIN O TITER: CPT

## 2021-03-24 PROCEDURE — 87040 BLOOD CULTURE FOR BACTERIA: CPT

## 2021-03-24 PROCEDURE — 6360000002 HC RX W HCPCS: Performed by: INTERNAL MEDICINE

## 2021-03-24 PROCEDURE — 96365 THER/PROPH/DIAG IV INF INIT: CPT

## 2021-03-24 PROCEDURE — 36415 COLL VENOUS BLD VENIPUNCTURE: CPT

## 2021-03-24 PROCEDURE — 83880 ASSAY OF NATRIURETIC PEPTIDE: CPT

## 2021-03-24 PROCEDURE — 85651 RBC SED RATE NONAUTOMATED: CPT

## 2021-03-24 PROCEDURE — 2580000003 HC RX 258: Performed by: INTERNAL MEDICINE

## 2021-03-24 PROCEDURE — 83690 ASSAY OF LIPASE: CPT

## 2021-03-24 PROCEDURE — 85610 PROTHROMBIN TIME: CPT

## 2021-03-24 PROCEDURE — 87150 DNA/RNA AMPLIFIED PROBE: CPT

## 2021-03-24 PROCEDURE — 2500000003 HC RX 250 WO HCPCS: Performed by: INTERNAL MEDICINE

## 2021-03-24 PROCEDURE — 6360000002 HC RX W HCPCS: Performed by: EMERGENCY MEDICINE

## 2021-03-24 PROCEDURE — 85378 FIBRIN DEGRADE SEMIQUANT: CPT

## 2021-03-24 PROCEDURE — 93005 ELECTROCARDIOGRAM TRACING: CPT | Performed by: EMERGENCY MEDICINE

## 2021-03-24 PROCEDURE — 82550 ASSAY OF CK (CPK): CPT

## 2021-03-24 PROCEDURE — 6370000000 HC RX 637 (ALT 250 FOR IP): Performed by: INTERNAL MEDICINE

## 2021-03-24 PROCEDURE — 71045 X-RAY EXAM CHEST 1 VIEW: CPT

## 2021-03-24 PROCEDURE — 87147 CULTURE TYPE IMMUNOLOGIC: CPT

## 2021-03-24 RX ORDER — PANTOPRAZOLE SODIUM 40 MG/1
40 TABLET, DELAYED RELEASE ORAL
Status: DISCONTINUED | OUTPATIENT
Start: 2021-03-25 | End: 2021-03-30 | Stop reason: HOSPADM

## 2021-03-24 RX ORDER — ACETAMINOPHEN 325 MG/1
650 TABLET ORAL EVERY 6 HOURS PRN
Status: DISCONTINUED | OUTPATIENT
Start: 2021-03-24 | End: 2021-03-30 | Stop reason: HOSPADM

## 2021-03-24 RX ORDER — SODIUM CHLORIDE 0.9 % (FLUSH) 0.9 %
SYRINGE (ML) INJECTION
Status: DISPENSED
Start: 2021-03-24 | End: 2021-03-25

## 2021-03-24 RX ORDER — OXYCODONE HYDROCHLORIDE AND ACETAMINOPHEN 5; 325 MG/1; MG/1
1 TABLET ORAL EVERY 8 HOURS PRN
Status: DISCONTINUED | OUTPATIENT
Start: 2021-03-24 | End: 2021-03-24

## 2021-03-24 RX ORDER — ADENOSINE 3 MG/ML
INJECTION, SOLUTION INTRAVENOUS
Status: DISPENSED
Start: 2021-03-24 | End: 2021-03-25

## 2021-03-24 RX ORDER — HYDROCODONE BITARTRATE AND ACETAMINOPHEN 5; 325 MG/1; MG/1
2 TABLET ORAL EVERY 8 HOURS PRN
Status: DISCONTINUED | OUTPATIENT
Start: 2021-03-24 | End: 2021-03-30 | Stop reason: HOSPADM

## 2021-03-24 RX ORDER — NICOTINE POLACRILEX 4 MG
15 LOZENGE BUCCAL PRN
Status: DISCONTINUED | OUTPATIENT
Start: 2021-03-24 | End: 2021-03-30 | Stop reason: HOSPADM

## 2021-03-24 RX ORDER — DEXTROSE MONOHYDRATE 25 G/50ML
12.5 INJECTION, SOLUTION INTRAVENOUS PRN
Status: DISCONTINUED | OUTPATIENT
Start: 2021-03-24 | End: 2021-03-30 | Stop reason: HOSPADM

## 2021-03-24 RX ORDER — DEXTROSE MONOHYDRATE 50 MG/ML
100 INJECTION, SOLUTION INTRAVENOUS PRN
Status: DISCONTINUED | OUTPATIENT
Start: 2021-03-24 | End: 2021-03-30 | Stop reason: HOSPADM

## 2021-03-24 RX ORDER — ACETAMINOPHEN 650 MG/1
650 SUPPOSITORY RECTAL EVERY 6 HOURS PRN
Status: DISCONTINUED | OUTPATIENT
Start: 2021-03-24 | End: 2021-03-30 | Stop reason: HOSPADM

## 2021-03-24 RX ORDER — DILTIAZEM HYDROCHLORIDE 5 MG/ML
30 INJECTION INTRAVENOUS ONCE
Status: COMPLETED | OUTPATIENT
Start: 2021-03-24 | End: 2021-03-24

## 2021-03-24 RX ORDER — SODIUM CHLORIDE 0.9 % (FLUSH) 0.9 %
10 SYRINGE (ML) INJECTION PRN
Status: DISCONTINUED | OUTPATIENT
Start: 2021-03-24 | End: 2021-03-30 | Stop reason: HOSPADM

## 2021-03-24 RX ORDER — ADENOSINE 3 MG/ML
6 INJECTION, SOLUTION INTRAVENOUS ONCE
Status: COMPLETED | OUTPATIENT
Start: 2021-03-24 | End: 2021-03-24

## 2021-03-24 RX ORDER — SODIUM CHLORIDE 0.9 % (FLUSH) 0.9 %
10 SYRINGE (ML) INJECTION EVERY 12 HOURS SCHEDULED
Status: DISCONTINUED | OUTPATIENT
Start: 2021-03-24 | End: 2021-03-30 | Stop reason: HOSPADM

## 2021-03-24 RX ORDER — 0.9 % SODIUM CHLORIDE 0.9 %
1000 INTRAVENOUS SOLUTION INTRAVENOUS ONCE
Status: COMPLETED | OUTPATIENT
Start: 2021-03-24 | End: 2021-03-24

## 2021-03-24 RX ORDER — DILTIAZEM HYDROCHLORIDE 5 MG/ML
10 INJECTION INTRAVENOUS ONCE
Status: COMPLETED | OUTPATIENT
Start: 2021-03-24 | End: 2021-03-24

## 2021-03-24 RX ORDER — POLYETHYLENE GLYCOL 3350 17 G/17G
17 POWDER, FOR SOLUTION ORAL DAILY PRN
Status: DISCONTINUED | OUTPATIENT
Start: 2021-03-24 | End: 2021-03-30 | Stop reason: HOSPADM

## 2021-03-24 RX ADMIN — DILTIAZEM HYDROCHLORIDE 30 MG: 5 INJECTION INTRAVENOUS at 18:27

## 2021-03-24 RX ADMIN — SODIUM CHLORIDE 1000 ML: 9 INJECTION, SOLUTION INTRAVENOUS at 16:47

## 2021-03-24 RX ADMIN — ENOXAPARIN SODIUM 135 MG: 150 INJECTION SUBCUTANEOUS at 21:20

## 2021-03-24 RX ADMIN — DILTIAZEM HYDROCHLORIDE 10 MG: 5 INJECTION INTRAVENOUS at 17:19

## 2021-03-24 RX ADMIN — VANCOMYCIN HYDROCHLORIDE 1750 MG: 10 INJECTION, POWDER, LYOPHILIZED, FOR SOLUTION INTRAVENOUS at 23:22

## 2021-03-24 RX ADMIN — ADENOSINE 6 MG: 3 INJECTION, SOLUTION INTRAVENOUS at 16:40

## 2021-03-24 RX ADMIN — DILTIAZEM HYDROCHLORIDE 5 MG/HR: 5 INJECTION INTRAVENOUS at 17:24

## 2021-03-24 RX ADMIN — HYDROCODONE BITARTRATE AND ACETAMINOPHEN 2 TABLET: 5; 325 TABLET ORAL at 21:20

## 2021-03-24 ASSESSMENT — PAIN DESCRIPTION - ONSET
ONSET: ON-GOING
ONSET: ON-GOING

## 2021-03-24 ASSESSMENT — PAIN SCALES - GENERAL
PAINLEVEL_OUTOF10: 10
PAINLEVEL_OUTOF10: 10

## 2021-03-24 ASSESSMENT — PAIN DESCRIPTION - LOCATION: LOCATION: KNEE

## 2021-03-24 ASSESSMENT — PAIN DESCRIPTION - FREQUENCY
FREQUENCY: CONTINUOUS
FREQUENCY: CONTINUOUS

## 2021-03-24 ASSESSMENT — PAIN DESCRIPTION - PROGRESSION: CLINICAL_PROGRESSION: NOT CHANGED

## 2021-03-24 ASSESSMENT — PAIN DESCRIPTION - ORIENTATION: ORIENTATION: RIGHT

## 2021-03-24 ASSESSMENT — PAIN DESCRIPTION - PAIN TYPE: TYPE: ACUTE PAIN

## 2021-03-24 NOTE — ED PROVIDER NOTES
External ear normal.      Nose: No congestion or rhinorrhea. Mouth/Throat:      Mouth: Mucous membranes are moist.      Pharynx: Oropharynx is clear. Eyes:      Pupils: Pupils are equal, round, and reactive to light. Neck:      Musculoskeletal: Neck supple. No muscular tenderness. Cardiovascular:      Rate and Rhythm: Regular rhythm. Tachycardia present. Heart sounds: Normal heart sounds. No murmur. Pulmonary:      Effort: Pulmonary effort is normal. No respiratory distress. Breath sounds: Normal breath sounds. Abdominal:      General: Bowel sounds are normal.      Palpations: Abdomen is soft. Tenderness: There is no abdominal tenderness. Musculoskeletal:         General: No swelling, tenderness or deformity. Skin:     General: Skin is warm and dry. Neurological:      Mental Status: He is alert. Cranial Nerves: No cranial nerve deficit. Procedures     EKG: This EKG is signed and interpreted by me. Rate: 170  Rhythm: SVT  Interpretation: supraventricular tachycardia  Comparison: changes compared to previous EKG       MDM  Number of Diagnoses or Management Options  Atrial flutter with rapid ventricular response (HCC)  Right knee pain, unspecified chronicity  Diagnosis management comments: Patient presented emergency department due to right knee pain. Upon presentation was found to be in what appeared to be in SVT at approximately 167 bpm.  Due to this EKG was completed which did show SVT however adenosine was given which slowed down the rate enough to find an atrial flutter rhythm that was a one-to-one atrial flutter. Due to this and the patient's relative hypotension he was given a bolus of IV fluid which improved his blood pressure enough to start a Cardizem infusion.   Started him infusion was continued however was not adequately improving the patient's pulse rate and due to this after his blood pressure improved with fluid boluses he was given a bolus of Cardizem. This did improve his overall heart rate. Due to the patient's new onset atrial flutter with RVR the patient will be admitted to the hospital for further treatment and evaluation by cardiology. ED Course as of Mar 25 0919   Wed Mar 24, 2021   1636 EKG shows SVT, patient has no chest pain or shortness of breath, in NAD    [BP]   1727 BP improving, patient in no distress    [BP]   1817 Patient still on a flutter with RVR rate up proxy 165 bpm while on Cardizem infusion. Rebolused with 30 of Cardizem. [DM]   1840 HR now at 118, resting comfortably    [BP]      ED Course User Index  [BP] [place] DO Kirill  [DM] Flakita Butt DO           ----------------------------------------------- PAST HISTORY --------------------------------------------  Past Medical History:  has a past medical history of Cellulitis, Edema, GERD (gastroesophageal reflux disease), Hypertension, Osteoarthritis, Tenosynovitis, de Quervain, and Venous insufficiency. Past Surgical History:  has a past surgical history that includes Shoulder arthroscopy; Foot surgery; Vasectomy; Tonsillectomy; other surgical history (Right, 01-25-13); Tonsillectomy and adenoidectomy; Colonoscopy; vascular surgery; REPLACEMENT TOTAL KNEE BILATERAL (Bilateral, 7/31/2019); joint replacement; and joint replacement. Social History:  reports that he has never smoked. He has never used smokeless tobacco. He reports current alcohol use of about 2.0 standard drinks of alcohol per week. He reports that he does not use drugs. Family History: family history includes Alzheimer's Disease in his father; Asthma in his mother; Cancer in his mother; Colon Cancer in his sister; Diabetes in his maternal grandmother; Emphysema in his mother; Heart Disease in his father and mother; Prostate Cancer in his brother. The patients home medications have been reviewed.     Allergies: Patient has no known allergies.     ------------------------------------------------ RESULTS ---------------------------------------------------    LABS:  Results for orders placed or performed during the hospital encounter of 03/24/21   Comprehensive Metabolic Panel w/ Reflex to MG   Result Value Ref Range    Sodium 135 132 - 146 mmol/L    Potassium reflex Magnesium 4.1 3.5 - 5.0 mmol/L    Chloride 102 98 - 107 mmol/L    CO2 22 22 - 29 mmol/L    Anion Gap 11 7 - 16 mmol/L    Glucose 122 (H) 74 - 99 mg/dL    BUN 25 (H) 8 - 23 mg/dL    CREATININE 1.4 (H) 0.7 - 1.2 mg/dL    GFR Non-African American 52 >=60 mL/min/1.73    GFR African American >60     Calcium 8.0 (L) 8.6 - 10.2 mg/dL    Total Protein 6.5 6.4 - 8.3 g/dL    Albumin 3.6 3.5 - 5.2 g/dL    Total Bilirubin 0.9 0.0 - 1.2 mg/dL    Alkaline Phosphatase 90 40 - 129 U/L    ALT 14 0 - 40 U/L    AST 14 0 - 39 U/L   CBC Auto Differential   Result Value Ref Range    WBC 10.0 4.5 - 11.5 E9/L    RBC 4.38 3.80 - 5.80 E12/L    Hemoglobin 12.1 (L) 12.5 - 16.5 g/dL    Hematocrit 37.0 37.0 - 54.0 %    MCV 84.5 80.0 - 99.9 fL    MCH 27.6 26.0 - 35.0 pg    MCHC 32.7 32.0 - 34.5 %    RDW 13.5 11.5 - 15.0 fL    Platelets 742 755 - 146 E9/L    MPV 10.3 7.0 - 12.0 fL    Neutrophils % 85.1 (H) 43.0 - 80.0 %    Lymphocytes % 9.6 (L) 20.0 - 42.0 %    Monocytes % 5.3 2.0 - 12.0 %    Eosinophils % 0.0 0.0 - 6.0 %    Basophils % 0.2 0.0 - 2.0 %    Neutrophils Absolute 8.50 (H) 1.80 - 7.30 E9/L    Lymphocytes Absolute 1.00 (L) 1.50 - 4.00 E9/L    Monocytes Absolute 0.50 0.10 - 0.95 E9/L    Eosinophils Absolute 0.00 (L) 0.05 - 0.50 E9/L    Basophils Absolute 0.00 0.00 - 0.20 E9/L    Polychromasia 1+    Troponin   Result Value Ref Range    Troponin 0.02 0.00 - 0.03 ng/mL   TSH without Reflex   Result Value Ref Range    TSH 1.790 0.270 - 4.200 uIU/mL   Brain Natriuretic Peptide   Result Value Ref Range    Pro-BNP 1,530 (H) 0 - 125 pg/mL   Protime-INR   Result Value Ref Range    Protime 16.5 (H) 9.3 - 12.4 sec INR 1.4    Lipase   Result Value Ref Range    Lipase 15 13 - 60 U/L   Uric acid   Result Value Ref Range    Uric Acid, Serum 4.9 3.4 - 7.0 mg/dL   C-reactive protein   Result Value Ref Range    CRP 13.2 (H) 0.0 - 0.4 mg/dL   Sedimentation Rate   Result Value Ref Range    Sed Rate 21 (H) 0 - 15 mm/Hr   Antistreptolysin O titer   Result Value Ref Range     0 - 200 IU/mL   D-Dimer, Quantitative   Result Value Ref Range    D-Dimer, Quant 629 ng/mL DDU   CK   Result Value Ref Range    Total  20 - 200 U/L   Hemoglobin A1c   Result Value Ref Range    Hemoglobin A1C 4.9 4.0 - 5.6 %   Comprehensive Metabolic Panel   Result Value Ref Range    Sodium 134 132 - 146 mmol/L    Potassium 4.0 3.5 - 5.0 mmol/L    Chloride 103 98 - 107 mmol/L    CO2 20 (L) 22 - 29 mmol/L    Anion Gap 11 7 - 16 mmol/L    Glucose 116 (H) 74 - 99 mg/dL    BUN 15 8 - 23 mg/dL    CREATININE 0.9 0.7 - 1.2 mg/dL    GFR Non-African American >60 >=60 mL/min/1.73    GFR African American >60     Calcium 8.0 (L) 8.6 - 10.2 mg/dL    Total Protein 6.6 6.4 - 8.3 g/dL    Albumin 3.4 (L) 3.5 - 5.2 g/dL    Total Bilirubin 1.1 0.0 - 1.2 mg/dL    Alkaline Phosphatase 85 40 - 129 U/L    ALT 13 0 - 40 U/L    AST 18 0 - 39 U/L   TSH without Reflex   Result Value Ref Range    TSH 1.370 0.270 - 4.200 uIU/mL   Phosphorus   Result Value Ref Range    Phosphorus 2.3 (L) 2.5 - 4.5 mg/dL   Magnesium   Result Value Ref Range    Magnesium 1.8 1.6 - 2.6 mg/dL   Lipid Panel   Result Value Ref Range    Cholesterol, Total 93 0 - 199 mg/dL    Triglycerides 52 0 - 149 mg/dL    HDL 41 >40 mg/dL    LDL Calculated 42 0 - 99 mg/dL    VLDL Cholesterol Calculated 10 mg/dL   Troponin   Result Value Ref Range    Troponin 0.02 0.00 - 0.03 ng/mL   Troponin   Result Value Ref Range    Troponin 0.03 0.00 - 0.03 ng/mL   CBC Auto Differential   Result Value Ref Range    WBC 11.5 4.5 - 11.5 E9/L    RBC 4.16 3.80 - 5.80 E12/L    Hemoglobin 11.3 (L) 12.5 - 16.5 g/dL    Hematocrit 35.1 (L) 37.0 - 54.0 %    MCV 84.4 80.0 - 99.9 fL    MCH 27.2 26.0 - 35.0 pg    MCHC 32.2 32.0 - 34.5 %    RDW 13.5 11.5 - 15.0 fL    Platelets 026 793 - 222 E9/L    MPV 11.4 7.0 - 12.0 fL    Neutrophils % 75.7 43.0 - 80.0 %    Lymphocytes % 11.3 (L) 20.0 - 42.0 %    Monocytes % 13.0 (H) 2.0 - 12.0 %    Eosinophils % 0.0 0.0 - 6.0 %    Basophils % 0.3 0.0 - 2.0 %    Neutrophils Absolute 8.74 (H) 1.80 - 7.30 E9/L    Lymphocytes Absolute 1.27 (L) 1.50 - 4.00 E9/L    Monocytes Absolute 1.50 (H) 0.10 - 0.95 E9/L    Eosinophils Absolute 0.00 (L) 0.05 - 0.50 E9/L    Basophils Absolute 0.00 0.00 - 0.20 E9/L    RBC Morphology Normal    Procalcitonin   Result Value Ref Range    Procalcitonin 0.19 (H) 0.00 - 0.08 ng/mL   C-reactive protein   Result Value Ref Range    CRP 0.3 0.0 - 0.4 mg/dL   EKG 12 Lead   Result Value Ref Range    Ventricular Rate 170 BPM    Atrial Rate 178 BPM    QRS Duration 98 ms    Q-T Interval 310 ms    QTc Calculation (Bazett) 521 ms    R Axis -10 degrees    T Axis 53 degrees       RADIOLOGY:    All Radiology results interpreted by Radiologist unless otherwise noted. US DUP LOWER EXTREMITIES BILATERAL VENOUS   Final Result   No evidence of DVT in either lower extremity. XR KNEE RIGHT (1-2 VIEWS)   Final Result   No definite radiographic evidence of acute skeletal or hardware pathology. Suggestion of small joint effusion      Chest:      Limited examination due to prominent soft tissue attenuation. The cardiac silhouette size is slightly enlarged without evidence of vascular   congestion or alveolar pulmonary edema. There is no evidence of pulmonary consolidation or infiltrate. No pleural effusion noted. No radiographically visible pneumothorax. No acute displaced fracture.       IMPRESSION:   Slight cardiomegaly      No radiographic evidence of definite acute pulmonary disease in the   visualized chest         XR CHEST PORTABLE   Final Result   No definite radiographic evidence of acute skeletal or hardware pathology. Suggestion of small joint effusion      Chest:      Limited examination due to prominent soft tissue attenuation. The cardiac silhouette size is slightly enlarged without evidence of vascular   congestion or alveolar pulmonary edema. There is no evidence of pulmonary consolidation or infiltrate. No pleural effusion noted. No radiographically visible pneumothorax. No acute displaced fracture. IMPRESSION:   Slight cardiomegaly      No radiographic evidence of definite acute pulmonary disease in the   visualized chest               ---------------------------- NURSING NOTES AND VITALS REVIEWED -------------------------   The nursing notes within the ED encounter and vital signs as below have been reviewed.    /64   Pulse 85   Temp 99.5 °F (37.5 °C) (Oral)   Resp 16   Ht 6' 4\" (1.93 m)   Wt 283 lb (128.4 kg)   SpO2 97%   BMI 34.45 kg/m²   Oxygen Saturation Interpretation: Normal      ------------------------------------------PROGRESS NOTES -------------------------------------------    ED COURSE MEDICATIONS:                Medications   sodium chloride flush 0.9 % injection (  Canceled Entry 3/24/21 1645)   dilTIAZem injection 10 mg (10 mg Intravenous Given 3/24/21 1719)     Followed by   dilTIAZem 125 mg in dextrose 5 % 125 mL infusion (15 mg/hr Intravenous New Bag 3/25/21 1411)   glucose (GLUTOSE) 40 % oral gel 15 g (has no administration in time range)   dextrose 50 % IV solution (has no administration in time range)   glucagon (rDNA) injection 1 mg (has no administration in time range)   dextrose 5 % solution (has no administration in time range)   sodium chloride flush 0.9 % injection 10 mL (10 mLs Intravenous Given 3/25/21 1744)   sodium chloride flush 0.9 % injection 10 mL (has no administration in time range)   polyethylene glycol (GLYCOLAX) packet 17 g (has no administration in time range)   acetaminophen (TYLENOL) tablet 650 mg (has no administration in time range)     Or   acetaminophen (TYLENOL) suppository 650 mg (has no administration in time range)   HYDROcodone-acetaminophen (NORCO) 5-325 MG per tablet 2 tablet (2 tablets Oral Given 3/25/21 0532)   pantoprazole (PROTONIX) tablet 40 mg (40 mg Oral Given 3/25/21 0532)   dilTIAZem (CARDIZEM CD) extended release capsule 240 mg ( Oral Unheld by provider 3/25/21 0737)   losartan (COZAAR) tablet 100 mg ( Oral Automatically Held 3/28/21 0900)   fentaNYL (SUBLIMAZE) injection 25 mcg (25 mcg Intravenous Given 3/25/21 1309)   metoprolol succinate (TOPROL XL) extended release tablet 25 mg (25 mg Oral Given 3/25/21 1356)   apixaban (ELIQUIS) tablet 5 mg (has no administration in time range)   vancomycin (VANCOCIN) 1,750 mg in dextrose 5 % 500 mL IVPB (has no administration in time range)   0.9 % sodium chloride bolus (0 mLs Intravenous Stopped 3/24/21 1709)   adenosine (ADENOCARD) injection 6 mg (6 mg Intravenous Given 3/24/21 1640)   dilTIAZem injection 30 mg (30 mg Intravenous Given 3/24/21 1827)   perflutren lipid microspheres (DEFINITY) injection 1.65 mg (1.165 mg Intravenous Given 3/25/21 1324)   digoxin (LANOXIN) injection 62 mcg (62 mcg Intravenous Given 3/25/21 0641)   metoprolol tartrate (LOPRESSOR) tablet 25 mg (25 mg Oral Given 3/25/21 0641)   magnesium sulfate 1000 mg in dextrose 5% 100 mL IVPB (0 mg Intravenous Stopped 3/25/21 1350)       CONSULTATIONS:            Spoke with Dr. Marylin Adamson. He stated he would admit the patient. Roseann Soto COUNSELING:   I have spoken with the patient and discussed todays results, in addition to providing specific details for the plan of care and counseling regarding the diagnosis and prognosis.     --------------------------------------- IMPRESSION & DISPOSITION --------------------------------     IMPRESSION(s):  1. Atrial flutter with rapid ventricular response ([address]    2. Right knee pain, unspecified chronicity    3.  Suspected deep vein thrombosis (DVT)        This patient's ED course included: a personal history and physicial examination, re-evaluation prior to disposition, multiple bedside re-evaluations, IV medications, cardiac monitoring, continuous pulse oximetry and complex medical decision making and emergency management    This patient has been closely monitored and initially deteriorated, but then stabilized during their ED course. DISPOSITION:  Disposition: Admit to telemetry. Patient condition is stable. END OF PROVIDER NOTE.        Ramona Barnes DO  Resident  03/25/21 [id number]

## 2021-03-24 NOTE — H&P
Maternal Grandmother        HOME MEDICATIONS:  Prior to Admission medications    Medication Sig Start Date End Date Taking? Authorizing Provider   cyclobenzaprine (FLEXERIL) 10 MG tablet Take 1 tablet by mouth 2 times daily as needed for Muscle spasms 3/19/21   DINH Gamez CNP   HYDROcodone-acetaminophen (NORCO)  MG per tablet Take 1 tablet by mouth daily for 30 days. Intended supply: 30 days 3/19/21 4/18/21  DINH Gamez CNP   celecoxib (CELEBREX) 200 MG capsule Take 200 mg by mouth 2 times daily    Historical Provider, MD   Elastic Bandages & Supports (JOBST KNEE HIGH COMPRESSION SM) MISC Dx: Varicose veins of the legs with pain and swelling. Bilateral thigh-high 30-40 mm Hg compression stockings. 3/15/21   Jyoti Avila PA-C   SSD 1 % cream apply externally to affected area once a day 12/10/20   Historical Provider, MD   celecoxib (CELEBREX) 200 MG capsule Take 1 capsule by mouth daily 11/11/20 3/15/21  DINH Gamez CNP   diphenhydrAMINE-APAP, sleep, (TYLENOL PM EXTRA STRENGTH)  MG tablet Take 1 tablet by mouth nightly as needed for Sleep    Historical Provider, MD   Glucosamine-Chondroit-Vit C-Mn (GLUCOSAMINE 1500 COMPLEX PO) Take 1 tablet by mouth daily    Historical Provider, MD   diltiazem (DILACOR XR) 240 MG extended release capsule Take 240 mg by mouth nightly     Historical Provider, MD   Turmeric 500 MG TABS Take 500 mg by mouth daily    Historical Provider, MD   omeprazole (PRILOSEC) 20 MG capsule Take 20 mg by mouth Daily  1/4/13   Historical Provider, MD   irbesartan (AVAPRO) 300 MG tablet Take 300 mg by mouth daily. 12/18/12   Historical Provider, MD       ALLERGIES:  Patient has no known allergies.     SOCIAL Hx:  Social History     Socioeconomic History    Marital status:      Spouse name: Not on file    Number of children: Not on file    Years of education: Not on file    Highest education level: Not on file   Occupational History    Not on file   Social Needs    Financial resource strain: Not on file    Food insecurity     Worry: Not on file     Inability: Not on file    Transportation needs     Medical: Not on file     Non-medical: Not on file   Tobacco Use    Smoking status: Never Smoker    Smokeless tobacco: Never Used   Substance and Sexual Activity    Alcohol use: Yes     Alcohol/week: 2.0 standard drinks     Types: 2 Glasses of wine per week     Frequency: 2-3 times a week    Drug use: No    Sexual activity: Yes     Partners: Female   Lifestyle    Physical activity     Days per week: Not on file     Minutes per session: Not on file    Stress: Not on file   Relationships    Social connections     Talks on phone: Not on file     Gets together: Not on file     Attends Latter day service: Not on file     Active member of club or organization: Not on file     Attends meetings of clubs or organizations: Not on file     Relationship status: Not on file    Intimate partner violence     Fear of current or ex partner: Not on file     Emotionally abused: Not on file     Physically abused: Not on file     Forced sexual activity: Not on file   Other Topics Concern    Not on file   Social History Narrative    Not on file       ROS: Positive in bold  General:   Denies chills, fatigue, fever, malaise, night sweats or weight loss    Psychological:   Denies anxiety, disorientation or hallucinations    ENT:    Denies epistaxis, headaches, vertigo or visual changes    Cardiovascular:   Denies any chest pain, irregular heartbeats, or palpitations. No paroxysmal nocturnal dyspnea. Respiratory:   Denies shortness of breath, coughing, sputum production, hemoptysis, or wheezing. No orthopnea. Gastrointestinal:   Denies nausea, vomiting, diarrhea, or constipation. Denies any abdominal pain. Denies change in bowel habits or stools. Genito-Urinary:    Denies any urgency, frequency, hematuria.   Voiding without edema  Patient's right knee is warm and edematous on palpation  Patient does have erythema below the right knee joint    LINES/CATHETERS     LABORATORY DATA:  CBC with Differential:    Lab Results   Component Value Date    WBC 10.0 03/24/2021    RBC 4.38 03/24/2021    HGB 12.1 03/24/2021    HCT 37.0 03/24/2021     03/24/2021    MCV 84.5 03/24/2021    MCH 27.6 03/24/2021    MCHC 32.7 03/24/2021    RDW 13.5 03/24/2021    LYMPHOPCT 9.6 03/24/2021    MONOPCT 5.3 03/24/2021    BASOPCT 0.2 03/24/2021    MONOSABS 0.50 03/24/2021    LYMPHSABS 1.00 03/24/2021    EOSABS 0.00 03/24/2021    BASOSABS 0.00 03/24/2021     CMP:    Lab Results   Component Value Date     03/24/2021    K 4.1 03/24/2021     03/24/2021    CO2 22 03/24/2021    BUN 25 03/24/2021    CREATININE 1.4 03/24/2021    GFRAA >60 03/24/2021    LABGLOM 52 03/24/2021    GLUCOSE 122 03/24/2021    PROT 6.5 03/24/2021    LABALBU 3.6 03/24/2021    CALCIUM 8.0 03/24/2021    BILITOT 0.9 03/24/2021    ALKPHOS 90 03/24/2021    AST 14 03/24/2021    ALT 14 03/24/2021       ASSESSMENT/PLAN:  1. New onset atrial flutter  2. Right knee pain with possible cellulitis versus gout  3. PETRA   4. bilateral lower extremity venous insufficiency  5. Chronic normocytic anemia  6. Hypertension  7. GERD  8. Osteoarthritis    Patient initially presented due to severe right knee pain which began earlier today. Prior to that he had been experiencing fever and chills. However he was found to be in atrial flutter. He was placed on Cardizem drip with resultant control in rate. Placed on Lovenox twice daily. Echocardiogram ordered. Cardiology to be consulted. Right knee x-ray did not show any acute issues besides possible effusion. On exam his edematous knee is warm to touch with associated erythema below the knee. Will obtain blood cultures and inflammatory markers. Orthopedic surgery consulted. Vancomycin started.       Cristel Caro D.O.  4:58 AM  3/25/2021    Electronically signed by Cheng Noe DO on 3/24/21 at 6:27 PM EDT

## 2021-03-25 ENCOUNTER — APPOINTMENT (OUTPATIENT)
Dept: INTERVENTIONAL RADIOLOGY/VASCULAR | Age: 61
DRG: 466 | End: 2021-03-25
Payer: COMMERCIAL

## 2021-03-25 ENCOUNTER — APPOINTMENT (OUTPATIENT)
Dept: CT IMAGING | Age: 61
DRG: 466 | End: 2021-03-25
Payer: COMMERCIAL

## 2021-03-25 LAB
ALBUMIN SERPL-MCNC: 3.4 G/DL (ref 3.5–5.2)
ALP BLD-CCNC: 85 U/L (ref 40–129)
ALT SERPL-CCNC: 13 U/L (ref 0–40)
ANION GAP SERPL CALCULATED.3IONS-SCNC: 11 MMOL/L (ref 7–16)
ANTISTREPTOLYSIN-O: 192 IU/ML (ref 0–200)
AST SERPL-CCNC: 18 U/L (ref 0–39)
BASOPHILS ABSOLUTE: 0 E9/L (ref 0–0.2)
BASOPHILS RELATIVE PERCENT: 0.3 % (ref 0–2)
BILIRUB SERPL-MCNC: 1.1 MG/DL (ref 0–1.2)
BUN BLDV-MCNC: 15 MG/DL (ref 8–23)
C-REACTIVE PROTEIN: 0.3 MG/DL (ref 0–0.4)
C-REACTIVE PROTEIN: 13.2 MG/DL (ref 0–0.4)
CALCIUM SERPL-MCNC: 8 MG/DL (ref 8.6–10.2)
CHLORIDE BLD-SCNC: 103 MMOL/L (ref 98–107)
CHOLESTEROL, TOTAL: 93 MG/DL (ref 0–199)
CO2: 20 MMOL/L (ref 22–29)
CREAT SERPL-MCNC: 0.9 MG/DL (ref 0.7–1.2)
EKG ATRIAL RATE: 178 BPM
EKG Q-T INTERVAL: 310 MS
EKG QRS DURATION: 98 MS
EKG QTC CALCULATION (BAZETT): 521 MS
EKG R AXIS: -10 DEGREES
EKG T AXIS: 53 DEGREES
EKG VENTRICULAR RATE: 170 BPM
EOSINOPHILS ABSOLUTE: 0 E9/L (ref 0.05–0.5)
EOSINOPHILS RELATIVE PERCENT: 0 % (ref 0–6)
GFR AFRICAN AMERICAN: >60
GFR NON-AFRICAN AMERICAN: >60 ML/MIN/1.73
GLUCOSE BLD-MCNC: 116 MG/DL (ref 74–99)
HBA1C MFR BLD: 4.9 % (ref 4–5.6)
HCT VFR BLD CALC: 35.1 % (ref 37–54)
HDLC SERPL-MCNC: 41 MG/DL
HEMOGLOBIN: 11.3 G/DL (ref 12.5–16.5)
LDL CHOLESTEROL CALCULATED: 42 MG/DL (ref 0–99)
LV EF: 62 %
LVEF MODALITY: NORMAL
LYMPHOCYTES ABSOLUTE: 1.27 E9/L (ref 1.5–4)
LYMPHOCYTES RELATIVE PERCENT: 11.3 % (ref 20–42)
MAGNESIUM: 1.8 MG/DL (ref 1.6–2.6)
MCH RBC QN AUTO: 27.2 PG (ref 26–35)
MCHC RBC AUTO-ENTMCNC: 32.2 % (ref 32–34.5)
MCV RBC AUTO: 84.4 FL (ref 80–99.9)
MONOCYTES ABSOLUTE: 1.5 E9/L (ref 0.1–0.95)
MONOCYTES RELATIVE PERCENT: 13 % (ref 2–12)
NEUTROPHILS ABSOLUTE: 8.74 E9/L (ref 1.8–7.3)
NEUTROPHILS RELATIVE PERCENT: 75.7 % (ref 43–80)
PDW BLD-RTO: 13.5 FL (ref 11.5–15)
PHOSPHORUS: 2.3 MG/DL (ref 2.5–4.5)
PLATELET # BLD: 200 E9/L (ref 130–450)
PMV BLD AUTO: 11.4 FL (ref 7–12)
POTASSIUM SERPL-SCNC: 4 MMOL/L (ref 3.5–5)
PROCALCITONIN: 0.19 NG/ML (ref 0–0.08)
RBC # BLD: 4.16 E12/L (ref 3.8–5.8)
RBC # BLD: NORMAL 10*6/UL
SODIUM BLD-SCNC: 134 MMOL/L (ref 132–146)
TOTAL PROTEIN: 6.6 G/DL (ref 6.4–8.3)
TRIGL SERPL-MCNC: 52 MG/DL (ref 0–149)
TROPONIN: 0.03 NG/ML (ref 0–0.03)
TSH SERPL DL<=0.05 MIU/L-ACNC: 1.37 UIU/ML (ref 0.27–4.2)
URIC ACID, SERUM: 4.9 MG/DL (ref 3.4–7)
VLDLC SERPL CALC-MCNC: 10 MG/DL
WBC # BLD: 11.5 E9/L (ref 4.5–11.5)

## 2021-03-25 PROCEDURE — 6360000002 HC RX W HCPCS: Performed by: PHYSICIAN ASSISTANT

## 2021-03-25 PROCEDURE — 6370000000 HC RX 637 (ALT 250 FOR IP): Performed by: INTERNAL MEDICINE

## 2021-03-25 PROCEDURE — 2060000000 HC ICU INTERMEDIATE R&B

## 2021-03-25 PROCEDURE — 2580000003 HC RX 258: Performed by: INTERNAL MEDICINE

## 2021-03-25 PROCEDURE — 73700 CT LOWER EXTREMITY W/O DYE: CPT

## 2021-03-25 PROCEDURE — 99231 SBSQ HOSP IP/OBS SF/LOW 25: CPT | Performed by: ORTHOPAEDIC SURGERY

## 2021-03-25 PROCEDURE — APPSS60 APP SPLIT SHARED TIME 46-60 MINUTES: Performed by: PHYSICIAN ASSISTANT

## 2021-03-25 PROCEDURE — 93010 ELECTROCARDIOGRAM REPORT: CPT | Performed by: INTERNAL MEDICINE

## 2021-03-25 PROCEDURE — 6360000002 HC RX W HCPCS: Performed by: INTERNAL MEDICINE

## 2021-03-25 PROCEDURE — 93970 EXTREMITY STUDY: CPT

## 2021-03-25 PROCEDURE — 84145 PROCALCITONIN (PCT): CPT

## 2021-03-25 PROCEDURE — 80061 LIPID PANEL: CPT

## 2021-03-25 PROCEDURE — 84484 ASSAY OF TROPONIN QUANT: CPT

## 2021-03-25 PROCEDURE — 99254 IP/OBS CNSLTJ NEW/EST MOD 60: CPT | Performed by: INTERNAL MEDICINE

## 2021-03-25 PROCEDURE — 84550 ASSAY OF BLOOD/URIC ACID: CPT

## 2021-03-25 PROCEDURE — 84443 ASSAY THYROID STIM HORMONE: CPT

## 2021-03-25 PROCEDURE — 6360000004 HC RX CONTRAST MEDICATION: Performed by: INTERNAL MEDICINE

## 2021-03-25 PROCEDURE — 85025 COMPLETE CBC W/AUTO DIFF WBC: CPT

## 2021-03-25 PROCEDURE — 2500000003 HC RX 250 WO HCPCS: Performed by: STUDENT IN AN ORGANIZED HEALTH CARE EDUCATION/TRAINING PROGRAM

## 2021-03-25 PROCEDURE — 84100 ASSAY OF PHOSPHORUS: CPT

## 2021-03-25 PROCEDURE — 2500000003 HC RX 250 WO HCPCS: Performed by: INTERNAL MEDICINE

## 2021-03-25 PROCEDURE — 36415 COLL VENOUS BLD VENIPUNCTURE: CPT

## 2021-03-25 PROCEDURE — 83735 ASSAY OF MAGNESIUM: CPT

## 2021-03-25 PROCEDURE — 86140 C-REACTIVE PROTEIN: CPT

## 2021-03-25 PROCEDURE — 6370000000 HC RX 637 (ALT 250 FOR IP): Performed by: PHYSICIAN ASSISTANT

## 2021-03-25 PROCEDURE — C8929 TTE W OR WO FOL WCON,DOPPLER: HCPCS

## 2021-03-25 PROCEDURE — 80053 COMPREHEN METABOLIC PANEL: CPT

## 2021-03-25 PROCEDURE — 83036 HEMOGLOBIN GLYCOSYLATED A1C: CPT

## 2021-03-25 RX ORDER — FENTANYL CITRATE 50 UG/ML
25 INJECTION, SOLUTION INTRAMUSCULAR; INTRAVENOUS
Status: DISCONTINUED | OUTPATIENT
Start: 2021-03-25 | End: 2021-03-30 | Stop reason: HOSPADM

## 2021-03-25 RX ORDER — METOPROLOL TARTRATE 5 MG/5ML
5 INJECTION INTRAVENOUS EVERY 10 MIN PRN
Status: DISCONTINUED | OUTPATIENT
Start: 2021-03-25 | End: 2021-03-30 | Stop reason: HOSPADM

## 2021-03-25 RX ORDER — MAGNESIUM SULFATE 1 G/100ML
1000 INJECTION INTRAVENOUS ONCE
Status: COMPLETED | OUTPATIENT
Start: 2021-03-25 | End: 2021-03-25

## 2021-03-25 RX ORDER — MORPHINE SULFATE 2 MG/ML
2 INJECTION, SOLUTION INTRAMUSCULAR; INTRAVENOUS EVERY 4 HOURS PRN
Status: DISCONTINUED | OUTPATIENT
Start: 2021-03-25 | End: 2021-03-25

## 2021-03-25 RX ORDER — LOSARTAN POTASSIUM 50 MG/1
100 TABLET ORAL DAILY
Status: DISCONTINUED | OUTPATIENT
Start: 2021-03-25 | End: 2021-03-30 | Stop reason: HOSPADM

## 2021-03-25 RX ORDER — IRBESARTAN 150 MG/1
300 TABLET ORAL DAILY
Status: DISCONTINUED | OUTPATIENT
Start: 2021-03-25 | End: 2021-03-25 | Stop reason: CLARIF

## 2021-03-25 RX ORDER — MORPHINE SULFATE 4 MG/ML
4 INJECTION, SOLUTION INTRAMUSCULAR; INTRAVENOUS EVERY 4 HOURS PRN
Status: DISCONTINUED | OUTPATIENT
Start: 2021-03-25 | End: 2021-03-25

## 2021-03-25 RX ORDER — METOPROLOL SUCCINATE 25 MG/1
25 TABLET, EXTENDED RELEASE ORAL 2 TIMES DAILY
Status: DISCONTINUED | OUTPATIENT
Start: 2021-03-25 | End: 2021-03-30 | Stop reason: HOSPADM

## 2021-03-25 RX ORDER — DIGOXIN 0.25 MG/ML
62 INJECTION INTRAMUSCULAR; INTRAVENOUS ONCE
Status: COMPLETED | OUTPATIENT
Start: 2021-03-25 | End: 2021-03-25

## 2021-03-25 RX ORDER — DILTIAZEM HYDROCHLORIDE 240 MG/1
240 CAPSULE, COATED, EXTENDED RELEASE ORAL NIGHTLY
Status: DISCONTINUED | OUTPATIENT
Start: 2021-03-25 | End: 2021-03-30 | Stop reason: HOSPADM

## 2021-03-25 RX ADMIN — FENTANYL CITRATE 25 MCG: 50 INJECTION INTRAMUSCULAR; INTRAVENOUS at 06:18

## 2021-03-25 RX ADMIN — VANCOMYCIN HYDROCHLORIDE 1750 MG: 10 INJECTION, POWDER, LYOPHILIZED, FOR SOLUTION INTRAVENOUS at 16:51

## 2021-03-25 RX ADMIN — HYDROCODONE BITARTRATE AND ACETAMINOPHEN 2 TABLET: 5; 325 TABLET ORAL at 16:49

## 2021-03-25 RX ADMIN — PANTOPRAZOLE SODIUM 40 MG: 40 TABLET, DELAYED RELEASE ORAL at 05:32

## 2021-03-25 RX ADMIN — ENOXAPARIN SODIUM 135 MG: 150 INJECTION SUBCUTANEOUS at 09:36

## 2021-03-25 RX ADMIN — Medication 10 ML: at 09:40

## 2021-03-25 RX ADMIN — DIGOXIN 62 MCG: 250 INJECTION, SOLUTION INTRAMUSCULAR; INTRAVENOUS; PARENTERAL at 06:41

## 2021-03-25 RX ADMIN — METOPROLOL TARTRATE 25 MG: 25 TABLET, FILM COATED ORAL at 06:41

## 2021-03-25 RX ADMIN — Medication 10 ML: at 22:01

## 2021-03-25 RX ADMIN — METOPROLOL SUCCINATE 25 MG: 25 TABLET, EXTENDED RELEASE ORAL at 22:03

## 2021-03-25 RX ADMIN — FENTANYL CITRATE 25 MCG: 50 INJECTION INTRAMUSCULAR; INTRAVENOUS at 13:09

## 2021-03-25 RX ADMIN — HYDROCODONE BITARTRATE AND ACETAMINOPHEN 2 TABLET: 5; 325 TABLET ORAL at 05:32

## 2021-03-25 RX ADMIN — DILTIAZEM HYDROCHLORIDE 15 MG/HR: 5 INJECTION INTRAVENOUS at 14:11

## 2021-03-25 RX ADMIN — MAGNESIUM SULFATE HEPTAHYDRATE 1000 MG: 1 INJECTION, SOLUTION INTRAVENOUS at 09:23

## 2021-03-25 RX ADMIN — METOPROLOL TARTRATE 5 MG: 1 INJECTION, SOLUTION INTRAVENOUS at 18:31

## 2021-03-25 RX ADMIN — APIXABAN 5 MG: 5 TABLET, FILM COATED ORAL at 16:50

## 2021-03-25 RX ADMIN — ACETAMINOPHEN 650 MG: 325 TABLET, FILM COATED ORAL at 21:02

## 2021-03-25 RX ADMIN — METOPROLOL SUCCINATE 25 MG: 25 TABLET, EXTENDED RELEASE ORAL at 13:56

## 2021-03-25 RX ADMIN — PERFLUTREN 1.17 MG: 6.52 INJECTION, SUSPENSION INTRAVENOUS at 13:24

## 2021-03-25 ASSESSMENT — PAIN SCALES - GENERAL
PAINLEVEL_OUTOF10: 7
PAINLEVEL_OUTOF10: 9
PAINLEVEL_OUTOF10: 9
PAINLEVEL_OUTOF10: 3
PAINLEVEL_OUTOF10: 9
PAINLEVEL_OUTOF10: 4

## 2021-03-25 ASSESSMENT — PAIN DESCRIPTION - FREQUENCY: FREQUENCY: CONTINUOUS

## 2021-03-25 ASSESSMENT — ENCOUNTER SYMPTOMS
ALLERGIC/IMMUNOLOGIC NEGATIVE: 1
COUGH: 0
SORE THROAT: 0
ABDOMINAL PAIN: 0
BACK PAIN: 1
DIARRHEA: 0
VOMITING: 0
SHORTNESS OF BREATH: 0
EYE PAIN: 0

## 2021-03-25 ASSESSMENT — PAIN DESCRIPTION - PAIN TYPE
TYPE: ACUTE PAIN
TYPE: ACUTE PAIN

## 2021-03-25 ASSESSMENT — PAIN DESCRIPTION - LOCATION: LOCATION: BACK;KNEE

## 2021-03-25 ASSESSMENT — PAIN DESCRIPTION - ORIENTATION: ORIENTATION: RIGHT

## 2021-03-25 ASSESSMENT — PAIN DESCRIPTION - ONSET: ONSET: ON-GOING

## 2021-03-25 NOTE — CONSULTS
Inpatient Cardiology Consultation      Reason for Consult: New onset atrial flutter with RVR    Consulting Physician: Dr. Jose Martin Gallagher    Requesting Physician: Dr. Hugh Felty    Date of Consultation: 3/25/2021    HISTORY OF PRESENT ILLNESS:   Patient is a 61year old WM who is not previously known to [facility]. Patient is being seen in consultation this hospital admission by Dr. Jose Martin Gallagher for evaluation and recommendations regarding new onset atrial flutter with RVR. Patient has a known past medical history of obesity, peripheral vascular disease, hypertension, gastroesophageal reflux disease, chronic anemia and osteoarthritis. He states he has never seen a cardiologist in the past, but did have a stress test a few years ago which he was told was normal at that time. He denies any prior echocardiogram or left heart catheterization. He denies any personal history of hyperlipidemia, diabetes mellitus, coronary artery disease, heart failure or prior cardiac arrhythmia. He presented to [address] [place] [address] on March 24, 2021 due to right knee discomfort. While in the ED, he was found to be in atrial flutter with RVR. Patient states there was one episode within the past week where he had dyspnea on exertion. He additionally admits to subjective fever and chills over the past couple of days as well. He denies any complaints of chest discomfort at rest or on exertion, nausea, emesis, diaphoresis, palpitations, dizziness, near-syncope or syncope. He denies paroxysmal nocturnal dyspnea, orthopnea. He states he has chronic bilateral lower extremity edema, and follows with vascular surgery for peripheral vascular disease. Patient states he was unaware that he was having an irregular or fast heart rhythm/rate. He notes of his father having history of CABG x 3 in his 62s, but denies any other pertinent cardiac family medical history to me at this time.   Notes of social alcohol use, drinks one glass of wine a few (GLYCOLAX) packet 17 g, 17 g, Oral, Daily PRN, Nina Kandi, DO    acetaminophen (TYLENOL) tablet 650 mg, 650 mg, Oral, Q6H PRN **OR** acetaminophen (TYLENOL) suppository 650 mg, 650 mg, Rectal, Q6H PRN, Nina Rajas, DO    perflutren lipid microspheres (DEFINITY) injection 1.65 mg, 1.5 mL, Intravenous, ONCE PRN, Nina Kandi, DO    HYDROcodone-acetaminophen (NORCO) 5-325 MG per tablet 2 tablet, 2 tablet, Oral, Q8H PRN, Nina Kandi, DO, 2 tablet at 03/25/21 0532    enoxaparin (LOVENOX) injection 135 mg, 1 mg/kg, Subcutaneous, BID, Ismail U Ben, DO, 135 mg at 03/24/21 2120    pantoprazole (PROTONIX) tablet 40 mg, 40 mg, Oral, QAM AC, Ismail U Ben, DO, 40 mg at 03/25/21 0532    vancomycin (VANCOCIN) 1,750 mg in dextrose 5 % 500 mL IVPB, 1,750 mg, Intravenous, Q18H, Nina Kandi, DO, Stopped at 03/25/21 0145      ALLERGIES:  Patient has no known allergies. SOCIAL HISTORY:    Notes of social alcohol use, drinks one glass of wine a few times per week. Denies former or current tobacco or illicit drug use. FAMILY HISTORY:   He notes of his father having history of CABG x 3 in his 62s, but denies any other pertinent cardiac family medical history to me at this time. REVIEW OF SYSTEMS:     · Constitutional: +fever, +chills. Denies night sweats, and generalized fatigue. Denies significant weight loss or weight gain. · HEENT: Denies headaches, nose bleeds, rhinorrhea, sore throat. Denies blurred vision. Denies dysphagia, odynophagia. · Musculoskeletal: +right knee pain. Denies falls. · Neurological: Denies dizziness, numbness and tingling. Denies focal neurological deficits. · Cardiovascular: +peripheral edema. Denies chest pain, palpitations, diaphoresis. Denies near syncope, syncope. Denies PND, orthopnea. · Respiratory: +dyspnea on exertion. Denies cough, hemoptysis.   · Gastrointestinal: Denies abdominal pain, nausea/vomiting, diarrhea and constipation, black/bloody, and tarry stools. · Genitourinary: Denies dysuria and hematuria. · Hematologic: Denies excessive bruising or bleeding. · Endocrine: Denies excessive thirst. Denies intolerance to hot and cold. PHYSICAL EXAM:   /77   Pulse 135   Temp 99.1 °F (37.3 °C) (Oral)   Resp 22   Ht 6' 4\" (1.93 m)   Wt 283 lb (128.4 kg)   SpO2 98%   BMI 34.45 kg/m²   CONST:  Well developed, obese WM who appears stated age. Awake, alert, cooperative, no apparent distress. HEENT:   Head- Normocephalic, atraumatic. Eyes- Conjunctivae pink, anicteric. Neck-  No stridor, trachea midline, no apparent jugular venous distention. CHEST: Chest symmetrical and non-tender to palpation. No accessory muscle use or intercostal retractions. RESPIRATORY: Lung sounds - clear throughout fields. No wheezing, rales or rhonchi. CARDIOVASCULAR:     No noted carotid bruit. Heart Inspection- shows no noted pulsations. Heart Ausculation- Regular rhythm with fast rate at times, no apparent murmur. PV: Trace-1+ bilateral lower extremity edema, R > L. Pedal pulses palpable, no clubbing or cyanosis. ABDOMEN: Soft, non-tender to light palpation. Bowel sounds present. MS: Good muscle strength and tone. No atrophy or abnormal movements. SKIN: Warm and dry. +stasis dermatitis bilaterally. NEURO / PSYCH: Oriented to person, place and time. Speech clear and appropriate. Follows all commands. Pleasant affect. DATA:    Telemetry: Atrial flutter with HR in the 90s-130s    Diagnostic:  All diagnostic testing and lab work thus far this admission reviewed in detail.     CXR 3/24/2021:  IMPRESSION:  Slight cardiomegaly  No radiographic evidence of definite acute pulmonary disease in the  visualized chest        Intake/Output Summary (Last 24 hours) at 3/25/2021 0721  Last data filed at 3/25/2021 0400  Gross per 24 hour   Intake 564.33 ml   Output 750 ml   Net -185.67 ml       Labs:   CBC:   Recent Labs     03/24/21  1645 03/25/21  0559   WBC 10.0 11.5 HGB 12.1* 11.3*   HCT 37.0 35.1*    200     BMP:   Recent Labs     03/24/21  1645 03/25/21  0559    134   K 4.1 4.0   CO2 22 20*   BUN 25* 15   CREATININE 1.4* 0.9   LABGLOM 52 >60   CALCIUM 8.0* 8.0*     Mag:   Recent Labs     03/25/21  0559   MG 1.8     Phos:   Recent Labs     03/25/21  0559   PHOS 2.3*     TSH:   Recent Labs     03/25/21  0559   TSH 1.370     PT/INR:   Recent Labs     03/24/21  1645   PROTIME 16.5*   INR 1.4     CARDIAC ENZYMES:  Recent Labs     03/24/21  1645 03/24/21  2225 03/25/21  0328   CKTOTAL  --  108  --    TROPONINI 0.02 0.02 0.03     FASTING LIPID PANEL:  Lab Results   Component Value Date    CHOL 93 03/25/2021    HDL 41 03/25/2021    LDLCALC 42 03/25/2021    TRIG 52 03/25/2021     LIVER PROFILE:  Recent Labs     03/24/21  1645 03/25/21  0559   AST 14 18   ALT 14 13   LABALBU 3.6 3.4*         ASSESSMENT:  1. New onset atrial flutter with RVR, duration/time of onset unknown. On cardizem drip at 15 mg currently with HR in the 90s-low 100s. In setting of reported fever and possible infectious etiology. CHADsVASc score of 2 (HTN, PVD) pending LV function on echocardiogram.   2. Right knee pain with possible effusion. Ortho following. 3. Hypertension, well-controlled. 4. Peripheral vascular disease (no prior intervention per the patient). 5. Acute kidney injury, resolved. 6. Obesity. 7. Chronic anemia. 8. GERD. RECOMMENDATIONS:  1. Re-start oral Diltiazem. 2. Start Toprol-XL 25 mg BID. 3. Attempt to wean Cardizem drip to off as tolerated. 4. Further medication adjustments pending LV function on echocardiogram.   5. Start Eliquis 5 mg BID. Patient is agreeable to initiation at this time given CHADsVASc score of 2.   6. Echocardiogram for evaluation of LV/RV function and valvular heart disease. 7. Consider ANALI-guided DCCV pending patient's rhythm overnight. 8. Mag, TSH and K+ within acceptable limits.    9. Further recommendations to follow as per  deficits apparent, normal mood and affect    Laboratory Tests:  Recent Labs     03/24/21  1645 03/25/21  0559    134   K 4.1 4.0    103   CO2 22 20*   BUN 25* 15   CREATININE 1.4* 0.9   GLUCOSE 122* 116*   CALCIUM 8.0* 8.0*     Lab Results   Component Value Date    MG 1.8 03/25/2021     Recent Labs     03/24/21  1645 03/25/21  0559   ALKPHOS 90 85   ALT 14 13   AST 14 18   PROT 6.5 6.6   BILITOT 0.9 1.1   LABALBU 3.6 3.4*     Recent Labs     03/24/21  1645 03/25/21  0559   WBC 10.0 11.5   RBC 4.38 4.16   HGB 12.1* 11.3*   HCT 37.0 35.1*   MCV 84.5 84.4   MCH 27.6 27.2   MCHC 32.7 32.2   RDW 13.5 13.5    200   MPV 10.3 11.4     Lab Results   Component Value Date    CKTOTAL 108 03/24/2021    TROPONINI 0.03 03/25/2021    TROPONINI 0.02 03/24/2021    TROPONINI 0.02 03/24/2021     Lab Results   Component Value Date    TSH 1.370 03/25/2021     Lab Results   Component Value Date    LABA1C 4.9 03/25/2021     No results found for: EAG  Lab Results   Component Value Date    CHOL 93 03/25/2021     Lab Results   Component Value Date    TRIG 52 03/25/2021     Lab Results   Component Value Date    HDL 41 03/25/2021     Lab Results   Component Value Date    LDLCALC 42 03/25/2021     Lab Results   Component Value Date    LABVLDL 10 03/25/2021     No results found for: CHOLHDLRATIO  Recent Labs     03/24/21  1645   PROBNP 1,530*     Telemetry reviewed (date: 3/25/2021): atrial flutter, rate 80's    - Resume po cardizem and wean off cardizem drip  - Add Toprol XL 25 mg BID  - Add eliquis 5 mg BID  - Echocardiogram  - NPO after midnight for possible ANALI/CVN tomorrow -- will reassess tomorrow    Thank you for allowing me to participate in your patient's care. Please feel free to contact me if you have any questions or concerns.     Varsha Ibrahim, MD  [facility] Cardiology

## 2021-03-25 NOTE — PROGRESS NOTES
Pharmacy Consultation Note  (Antibiotic Dosing and Monitoring)    Initial consult date: 3/24/21  Consulting physician: Dr. Joshua Davidson  Drug(s): Vancomycin  Indication: Bone/joint infection    Ht Readings from Last 1 Encounters:   21 6' 4\" (1.93 m)     Wt Readings from Last 1 Encounters:   21 283 lb (128.4 kg)         Age/  Gender IBW DW  Allergy Information   61 y.o.     male 86.8 kg 103 kg  Patient has no known allergies. Date  WBC BUN/CR UOP Drug/Dose Time   Given Level(s)   (Time) Comments   3/24  (#1) 10.0 25/1.4 -- Vancomycin 1,750 mg IV Q18H <2300>       (#2)            (#3)            (#4)            (#5)            (#6)            (#7)            Estimated Creatinine Clearance: 82 mL/min (A) (based on SCr of 1.4 mg/dL (H)). UOP over the past 24 hours:     No intake or output data in the 24 hours ending 21 2138    Temp max: Temp (24hrs), Av.2 °F (37.3 °C), Min:99 °F (37.2 °C), Max:99.3 °F (37.4 °C)      Antibiotic Regimen:  Antibiotic Dose Date Initiated            Cultures:  available culture and sensitivity results were reviewed in EPIC  Cultures sent and are pending.   Culture Date Result             Assessment:  · Consulted by Dr. Joshua Davidson to dose/monitor vancomycin  · Goal trough level:  15-20 mcg/mL  · Pt is a 61 yom admitted for new onset atrial flutter, initiated on vancomycin for a bone/joint infection and skin & soft tissue infection  · Serum creatinine today: 1.4; CrCl ~ 82 mL/min; baseline Scr ~ 1.0    Plan:  · Vancomycin 1,750 mg IV Q18H  · Level prior to fourth dose  · Follow renal function  · Pharmacist will follow and monitor/adjust dosing as necessary      Thank you for the consult,    Ronak Worley PharmD, BCPS 3/24/2021 9:38 PM   [phone]

## 2021-03-25 NOTE — PROGRESS NOTES
Internal Medicine Progress Note    [facility]    Sary Suresh, JAIDAOLeoILeo Calabrese D.O., SHIRA Raymond, MSN, APRN, NP-C  Lazarus Leep. Katherine Fothergill, MSN, APRN-CNP     Primary Care Physician: Beto Brower MD   Admitting Physician:  Bianca Erazo DO  Admission date and time: 3/24/2021  4:20 PM    Room:  [address] [place] [address]  Admitting diagnosis: New onset atrial flutter [facility]) [I48.92]    Patient Name: Zacarias Abrams  MRN: [id number]    Date of Service: 3/25/2021     Subjective:  Jihan Marti is a 61 y.o. male who was seen and examined today,3/25/2021, at the bedside. Patient complained of significant amount of knee pain. The right leg is exquisite to tightness. Heart remains still tachycardic and irregular. Patient does relate having a foot infection involving the left second toe approximately 2 months ago--it has improved. I am concerned over the severe knee pain that this could suggest that of an infectious process. Has lesion on his right head which is been there for years and bleeds    No family present during my examination. Review of System:   Constitutional:   Denies fever or chills, weight loss or gain, fatigue or malaise. HEENT:   Denies ear pain, sore throat, sinus or eye problems. Lesion on right scalp  Cardiovascular:   Rhythm does show irregularity and tachycardia but the patient is not fully aware of  Respiratory:   Denies shortness of breath, coughing, sputum production, hemoptysis, or wheezing. Gastrointestinal:   Denies nausea, vomiting, diarrhea, or constipation. Denies any abdominal pain. Genitourinary:    Denies any urgency, frequency, hematuria. Voiding  without difficulty. Extremities:   Denies lower extremity swelling, edema or cyanosis. Neurology:    Denies any headache or focal neurological deficits, Denies generalized weakness or memory difficulty.    Psch:   Denies being anxious or depressed. Musculoskeletal:    Denies  myalgias, joint complaints or back pain. Integumentary:   Denies any rashes, ulcers, or excoriations. Denies bruising. Hematologic/Lymphatic:  Denies bruising or bleeding. Physical Exam:  No intake/output data recorded. Intake/Output Summary (Last 24 hours) at 3/25/2021 1556  Last data filed at 3/25/2021 1048  Gross per 24 hour   Intake 924.33 ml   Output 750 ml   Net 174.33 ml   I/O last 3 completed shifts: In: 924.3 [P.O.:360; I.V.:64.3; IV Piggyback:500]  Out: 750 [Urine:750]  Patient Vitals for the past 96 hrs (Last 3 readings):   Weight   03/24/21 1945 283 lb (128.4 kg)   03/24/21 1651 285 lb (129.3 kg)     Vital Signs:   Blood pressure 111/64, pulse 85, temperature 99.5 °F (37.5 °C), temperature source Oral, resp. rate 16, height 6' 4\" (1.93 m), weight 283 lb (128.4 kg), SpO2 97 %. General appearance:  Alert, responsive, oriented to person, place, and time. Well preserved, alert, no distress. Head:  Normocephalic. No masses, lesions or tenderness. 1 cm lesion on the right scalp  Eyes:  PERRLA. EOMI. Sclera clear. Buccal mucosa moist.  ENT:  Ears normal. Mucosa normal.  Neck:    Supple. Trachea midline. No thyromegaly. No JVD. No bruits. Heart:    Regular rhythm and tachycardic. No murmurs. Lungs:    Symmetrical. Clear to auscultation bilaterally. No wheezes. No rhonchi. No rales. Abdomen:   Soft. Non-tender. Non-distended. Bowel sounds positive. No organomegaly or masses. No pain on palpation. Extremities:    Complaining of discomfort in the right knee and posterior right calf  Neurologic:    Alert x 3. No focal deficit. Cranial nerves grossly intact. No focal weakness. Psych:   Behavior is normal. Mood appears normal. Speech is not rapid and/or pressured. Musculoskeletal:   Spine ROM normal. Muscular strength intact. Gait not assessed.   Integumentary:  No rashes  Skin normal color and texture.   Genitalia/Breast:  Deferred    Medication:  Scheduled Meds:   dilTIAZem  240 mg Oral Nightly    [Held by provider] losartan  100 mg Oral Daily    metoprolol succinate  25 mg Oral BID    apixaban  5 mg Oral BID    vancomycin  1,750 mg Intravenous Q12H    sodium chloride flush  10 mL Intravenous 2 times per day    pantoprazole  40 mg Oral QAM AC     Continuous Infusions:   dilTIAZem (CARDIZEM) 125 mg in dextrose 5% 125 mL infusion 15 mg/hr (03/25/21 1411)    dextrose         Objective Data:  CBC with Differential:    Lab Results   Component Value Date    WBC 11.5 03/25/2021    RBC 4.16 03/25/2021    HGB 11.3 03/25/2021    HCT 35.1 03/25/2021     03/25/2021    MCV 84.4 03/25/2021    MCH 27.2 03/25/2021    MCHC 32.2 03/25/2021    RDW 13.5 03/25/2021    LYMPHOPCT 11.3 03/25/2021    MONOPCT 13.0 03/25/2021    BASOPCT 0.3 03/25/2021    MONOSABS 1.50 03/25/2021    LYMPHSABS 1.27 03/25/2021    EOSABS 0.00 03/25/2021    BASOSABS 0.00 03/25/2021     CMP:    Lab Results   Component Value Date     03/25/2021    K 4.0 03/25/2021    K 4.1 03/24/2021     03/25/2021    CO2 20 03/25/2021    BUN 15 03/25/2021    CREATININE 0.9 03/25/2021    GFRAA >60 03/25/2021    LABGLOM >60 03/25/2021    GLUCOSE 116 03/25/2021    PROT 6.6 03/25/2021    LABALBU 3.4 03/25/2021    CALCIUM 8.0 03/25/2021    BILITOT 1.1 03/25/2021    ALKPHOS 85 03/25/2021    AST 18 03/25/2021    ALT 13 03/25/2021       Wound Documentation:   Wound 06/13/19 Leg Lateral;Left;Lower #1 ACQUIRED 6-1-19 (Active)   Number of days: 651       Assessment:    · New onset atrial fibrillation/flutter with rapid ventricular response  · Right knee pain with cellulitis of the posterior calf  · Bilateral lower extremity venous insufficiency  · Essential hypertension  · Gastroesophageal reflux disease  · Osteoarthritis  · Recent COVID-19 injection of the [employer] 3 weeks ago  · Abnormality of the right scalp area possibly suggesting

## 2021-03-25 NOTE — CONSULTS
Department of Orthopedic Surgery  Resident Consult Note  Reason for Consult: Right knee pain and effusion    HISTORY OF PRESENT ILLNESS:       Patient is a 61 y.o. male with right knee pain has been persistent for the last day. Patient reports a 4 to 5-day history of subjective fevers and chills. Yesterday at work he was lifting some heavy boxes when he tweaked his back and his knee began hurting. He went home early from work around lunchtime and rested on the couch for approximately 3 hours. After attempting to rise from the couch patient was unable. With help from his wife and grand child patient was still unable to rise from the sofa. At that point in time EMS was called. Patient has been nonambulatory since he sat on the couch around lunchtime yesterday. He denies right knee complaints prior to yesterday. Denies numbness/tingling/paresthesias. Denies any other orthopedic complaints at this time.       Past Medical History:        Diagnosis Date    Cellulitis     Edema     legs with cellulitis    GERD (gastroesophageal reflux disease)     Hypertension     Osteoarthritis     Tenosynovitis, de Quervain     Venous insufficiency      Past Surgical History:        Procedure Laterality Date    COLONOSCOPY      x2    FOOT SURGERY      JOINT REPLACEMENT      bilateral knee replacement    JOINT REPLACEMENT      right ankle    OTHER SURGICAL HISTORY Right 01-25-13    right wrist 1st dorsal compartment release    REPLACEMENT TOTAL KNEE BILATERAL Bilateral 7/31/2019    KNEE TOTAL ARTHROPLASTY BILATERAL performed by Genia Campbell DO at [facility] ARTHROSCOPY      TONSILLECTOMY      TONSILLECTOMY AND ADENOIDECTOMY      VASCULAR SURGERY      both legs     VASECTOMY       Current Medications:   Current Facility-Administered Medications: dilTIAZem (CARDIZEM CD) extended release capsule 240 mg, 240 mg, Oral, Nightly  [Held by provider] losartan (COZAAR) tablet 100 mg, 100 mg, Oral, Daily  fentaNYL (SUBLIMAZE) injection 25 mcg, 25 mcg, Intravenous, Q2H PRN  magnesium sulfate 1000 mg in dextrose 5% 100 mL IVPB, 1,000 mg, Intravenous, Once  [COMPLETED] dilTIAZem injection 10 mg, 10 mg, Intravenous, Once **FOLLOWED BY** dilTIAZem 125 mg in dextrose 5 % 125 mL infusion, 5-15 mg/hr, Intravenous, Continuous  glucose (GLUTOSE) 40 % oral gel 15 g, 15 g, Oral, PRN  dextrose 50 % IV solution, 12.5 g, Intravenous, PRN  glucagon (rDNA) injection 1 mg, 1 mg, Intramuscular, PRN  dextrose 5 % solution, 100 mL/hr, Intravenous, PRN  sodium chloride flush 0.9 % injection 10 mL, 10 mL, Intravenous, 2 times per day  sodium chloride flush 0.9 % injection 10 mL, 10 mL, Intravenous, PRN  polyethylene glycol (GLYCOLAX) packet 17 g, 17 g, Oral, Daily PRN  acetaminophen (TYLENOL) tablet 650 mg, 650 mg, Oral, Q6H PRN **OR** acetaminophen (TYLENOL) suppository 650 mg, 650 mg, Rectal, Q6H PRN  perflutren lipid microspheres (DEFINITY) injection 1.65 mg, 1.5 mL, Intravenous, ONCE PRN  HYDROcodone-acetaminophen (NORCO) 5-325 MG per tablet 2 tablet, 2 tablet, Oral, Q8H PRN  enoxaparin (LOVENOX) injection 135 mg, 1 mg/kg, Subcutaneous, BID  pantoprazole (PROTONIX) tablet 40 mg, 40 mg, Oral, QAM AC  vancomycin (VANCOCIN) 1,750 mg in dextrose 5 % 500 mL IVPB, 1,750 mg, Intravenous, Q18H  Allergies:  Patient has no known allergies. Social History:   TOBACCO:   reports that he has never smoked. He has never used smokeless tobacco.  ETOH:   reports current alcohol use of about 2.0 standard drinks of alcohol per week. DRUGS:   reports no history of drug use.   ACTIVITIES OF DAILY LIVING:    OCCUPATION:    Family History:       Problem Relation Age of Onset    Asthma Mother     Cancer Mother         lung    Emphysema Mother     Heart Disease Mother     Heart Disease Father     Alzheimer's Disease Father     Colon Cancer Sister     Prostate Cancer Brother     Diabetes Maternal Grandmother        REVIEW OF SYSTEMS:  CONSTITUTIONAL:  negative for fevers, chills  EYES:  negative for acute changes  HEENT:  negative for acute changes  RESPIRATORY:  negative for dyspnea  CARDIOVASCULAR:  negative for chest pain, palpitations, new onset a flutter  GASTROINTESTINAL:  negative for nausea, vomiting  GENITOURINARY:  negative for frequency  HEMATOLOGIC/LYMPHATIC:  negative for bleeding and petechiae  MUSCULOSKELETAL:  positive for pain and joint swelling  NEUROLOGICAL:  negative for head trauma or LOC   BEHAVIOR/PSYCH:  negative for increased agitation and anxiety    PHYSICAL EXAM:    VITALS:  /64   Pulse 85   Temp 99.5 °F (37.5 °C) (Oral)   Resp 16   Ht 6' 4\" (1.93 m)   Wt 283 lb (128.4 kg)   SpO2 97%   BMI 34.45 kg/m²   CONSTITUTIONAL:  awake, alert, cooperative, no apparent distress, and appears stated age  MUSCULOSKELETAL:  Right lower Extremity:  Moderate edema about the right thigh, knee, and leg  Well-healed TKA incision  No erythema about the knee  Minimal effusion present  Compartments soft and compressible  +PF/DF/EHL  +2/4 DP & PT pulses, Brisk Cap refill, Toes warm and perfused  Distal sensation grossly intact to Peroneals, Sural, Saphenous, and tibial nrs  · Patient unable to tolerate range of motion of the right knee    Secondary Exam:   · Bilateral UE: No obvious signs of trauma. -TTP to fingers, hand, wrist, forearm, elbow, humerus, shoulder or clavicle. -- Patient able to flex/extend fingers, wrist, elbow and shoulder with active and passive ROM without pain, +2/4 Radial pulse, cap refill <3sec, +AIN/PIN/Radial/Ulnar/Median N, distal sensation grossly intact to C4-T1 dermatomes, compartments soft and compressible. · Left LE: No obvious signs of trauma.    -TTP to foot, ankle, leg, knee, thigh, hip.-- Patient able to flex/extend toes, ankle, knee and hip with active and passive ROM without pain,+2/4 DP & PT pulses, cap refill <3sec, +5/5 PF/DF/EHL, distal sensation grossly intact to L4-S1 dermatomes, compartments soft and compressible. · Pelvis: -TTP, -Log roll, -Heel strike     DATA:    CBC:   Lab Results   Component Value Date    WBC 11.5 03/25/2021    RBC 4.16 03/25/2021    HGB 11.3 03/25/2021    HCT 35.1 03/25/2021    MCV 84.4 03/25/2021    MCH 27.2 03/25/2021    MCHC 32.2 03/25/2021    RDW 13.5 03/25/2021     03/25/2021    MPV 11.4 03/25/2021     PT/INR:    Lab Results   Component Value Date    PROTIME 16.5 03/24/2021    INR 1.4 03/24/2021     ESR: 21 (0-15)  CRP: Pending    Radiology Review:  X-ray right knee: S/p TKA. Hardware is in appropriate alignment with no evidence of loosening. No acute process identified. IMPRESSION:  · Acute onset right knee pain  · Right lower extremity edema    PLAN:  · Weightbearing as tolerated right lower extremity  · Follow-up CRP  · Moderate to low suspicion for septic right knee joint at this time. Will closely follow CRP. Once resulted, we will consider aspirating knee joint fluid although this brings its own risks to the table. The risks versus benefits of knee aspiration will be weighed at that time. · Multimodal pain control per admitting service  · DVT prophylaxis per admitting service  · Plan to be discussed with attending    I was present with the resident during the history and exam. I discussed the case with the resident and agree with the findings and plan as documented in the resident's note.     Electronically signed by Bryn Ponce DO on 3/25/2021 at 8:55 AM

## 2021-03-25 NOTE — PLAN OF CARE
Problem: Pain:  Goal: Control of acute pain  Description: Control of acute pain  Outcome: Ongoing     Problem: Pain:  Goal: Control of chronic pain  Description: Control of chronic pain  Outcome: Ongoing     Problem: Falls - Risk of:  Goal: Will remain free from falls  Description: Will remain free from falls  Outcome: Ongoing     Problem: Falls - Risk of:  Goal: Absence of physical injury  Description: Absence of physical injury  Outcome: Ongoing

## 2021-03-25 NOTE — PROGRESS NOTES
Per Dr. Beny Cavazos This RN notified Dr. Kylee Shaffer through perfect serve that the patient had an infection in foot/ toe in December of 2020.

## 2021-03-25 NOTE — PROGRESS NOTES
Pharmacy Consultation Note  (Antibiotic Dosing and Monitoring)    Initial consult date: 3/24/21  Consulting physician: Dr. Joshua Davidson  Drug(s): Vancomycin  Indication: Bone/joint infection    Ht Readings from Last 1 Encounters:   21 6' 4\" (1.93 m)     Wt Readings from Last 1 Encounters:   21 283 lb (128.4 kg)         Age/  Gender IBW DW  Allergy Information   61 y.o.     male 86.8 kg 103 kg  Patient has no known allergies. Date  WBC BUN/CR UOP Drug/Dose Time   Given Level(s)   (Time) Comments   3/24  (#1) 10.0 25/1.4 -- Vancomycin 1,750 mg IV Q18H 2322     3/25  (#2) 11.5 15/0.9 -- Vancomycin 1,750 mg IV Q12H <1500>       (#3)            (#4)            (#5)            (#6)            (#7)            Estimated Creatinine Clearance: 128 mL/min (based on SCr of 0.9 mg/dL). UOP over the past 24 hours:       Intake/Output Summary (Last 24 hours) at 3/25/2021 1350  Last data filed at 3/25/2021 1048  Gross per 24 hour   Intake 924.33 ml   Output 750 ml   Net 174.33 ml       Temp max: Temp (24hrs), Av.2 °F (37.3 °C), Min:99 °F (37.2 °C), Max:99.8 °F (37.7 °C)      Antibiotic Regimen:  Antibiotic Dose Date Initiated            Cultures:  available culture and sensitivity results were reviewed in EPIC  Cultures sent and are pending.   Culture Date Result             Assessment:  · Consulted by Dr. Joshua Davidson to dose/monitor vancomycin  · Goal trough level:  15-20 mcg/mL  · Pt is a 61 yom admitted for new onset atrial flutter, initiated on vancomycin for a bone/joint infection and skin & soft tissue infection  · Serum creatinine today: 0.9; CrCl >100 mL/min; baseline Scr ~ 1.0    Plan:  · Adjust dose to Vancomycin 1,750 mg IV Q12H  · Level prior to fourth dose  · Follow renal function  · Pharmacist will follow and monitor/adjust dosing as necessary      Thank you for the consult,    Skip Brunilda, PharmD, BCPS 3/25/2021 1:50 PM   [phone]

## 2021-03-26 ENCOUNTER — HOSPITAL ENCOUNTER (INPATIENT)
Dept: POSTOP/PACU | Age: 61
Discharge: HOME | DRG: 466 | End: 2021-03-26
Payer: COMMERCIAL

## 2021-03-26 ENCOUNTER — ANESTHESIA EVENT (OUTPATIENT)
Dept: POSTOP/PACU | Age: 61
DRG: 466 | End: 2021-03-26
Payer: COMMERCIAL

## 2021-03-26 ENCOUNTER — ANESTHESIA (OUTPATIENT)
Dept: POSTOP/PACU | Age: 61
DRG: 466 | End: 2021-03-26
Payer: COMMERCIAL

## 2021-03-26 VITALS
DIASTOLIC BLOOD PRESSURE: 74 MMHG | SYSTOLIC BLOOD PRESSURE: 101 MMHG | RESPIRATION RATE: 22 BRPM | HEART RATE: 88 BPM | TEMPERATURE: 98.9 F | OXYGEN SATURATION: 95 %

## 2021-03-26 VITALS
OXYGEN SATURATION: 97 % | RESPIRATION RATE: 22 BRPM | DIASTOLIC BLOOD PRESSURE: 66 MMHG | SYSTOLIC BLOOD PRESSURE: 89 MMHG

## 2021-03-26 LAB
ACINETOBACTER BAUMANNII BY PCR: NOT DETECTED
ALBUMIN SERPL-MCNC: 3.1 G/DL (ref 3.5–5.2)
ALP BLD-CCNC: 80 U/L (ref 40–129)
ALT SERPL-CCNC: 14 U/L (ref 0–40)
ANION GAP SERPL CALCULATED.3IONS-SCNC: 12 MMOL/L (ref 7–16)
APPEARANCE FLUID: NORMAL
AST SERPL-CCNC: 13 U/L (ref 0–39)
BASOPHILS ABSOLUTE: 0.03 E9/L (ref 0–0.2)
BASOPHILS RELATIVE PERCENT: 0.3 % (ref 0–2)
BILIRUB SERPL-MCNC: 0.8 MG/DL (ref 0–1.2)
BOTTLE TYPE: ABNORMAL
BUN BLDV-MCNC: 14 MG/DL (ref 8–23)
CALCIUM SERPL-MCNC: 7.9 MG/DL (ref 8.6–10.2)
CANDIDA ALBICANS BY PCR: NOT DETECTED
CANDIDA GLABRATA BY PCR: NOT DETECTED
CANDIDA KRUSEI BY PCR: NOT DETECTED
CANDIDA PARAPSILOSIS BY PCR: NOT DETECTED
CANDIDA TROPICALIS BY PCR: NOT DETECTED
CELL COUNT FLUID TYPE: NORMAL
CHLORIDE BLD-SCNC: 98 MMOL/L (ref 98–107)
CO2: 21 MMOL/L (ref 22–29)
COLOR FLUID: YELLOW
CREAT SERPL-MCNC: 0.9 MG/DL (ref 0.7–1.2)
ENTEROBACTER CLOACAE COMPLEX BY PCR: NOT DETECTED
ENTEROBACTERALES BY PCR: NOT DETECTED
ENTEROCOCCUS BY PCR: NOT DETECTED
EOSINOPHILS ABSOLUTE: 0.01 E9/L (ref 0.05–0.5)
EOSINOPHILS RELATIVE PERCENT: 0.1 % (ref 0–6)
ESCHERICHIA COLI BY PCR: NOT DETECTED
FLUID TYPE: NORMAL
GFR AFRICAN AMERICAN: >60
GFR NON-AFRICAN AMERICAN: >60 ML/MIN/1.73
GLUCOSE BLD-MCNC: 119 MG/DL (ref 74–99)
GLUCOSE, FLUID: <2 MG/DL
HAEMOPHILUS INFLUENZAE BY PCR: NOT DETECTED
HCT VFR BLD CALC: 33.5 % (ref 37–54)
HEMOGLOBIN: 11 G/DL (ref 12.5–16.5)
IMMATURE GRANULOCYTES #: 0.03 E9/L
IMMATURE GRANULOCYTES %: 0.3 % (ref 0–5)
KLEBSIELLA OXYTOCA BY PCR: NOT DETECTED
KLEBSIELLA PNEUMONIAE GROUP BY PCR: NOT DETECTED
LISTERIA MONOCYTOGENES BY PCR: NOT DETECTED
LYMPHOCYTES ABSOLUTE: 1.28 E9/L (ref 1.5–4)
LYMPHOCYTES RELATIVE PERCENT: 11.9 % (ref 20–42)
MCH RBC QN AUTO: 27.6 PG (ref 26–35)
MCHC RBC AUTO-ENTMCNC: 32.8 % (ref 32–34.5)
MCV RBC AUTO: 84 FL (ref 80–99.9)
MONOCYTE, FLUID: 18 %
MONOCYTES ABSOLUTE: 1.21 E9/L (ref 0.1–0.95)
MONOCYTES RELATIVE PERCENT: 11.2 % (ref 2–12)
NEISSERIA MENINGITIDIS BY PCR: NOT DETECTED
NEUTROPHIL, FLUID: 82 %
NEUTROPHILS ABSOLUTE: 8.23 E9/L (ref 1.8–7.3)
NEUTROPHILS RELATIVE PERCENT: 76.2 % (ref 43–80)
NUCLEATED CELLS FLUID: NORMAL /UL
ORDER NUMBER: ABNORMAL
PDW BLD-RTO: 13.3 FL (ref 11.5–15)
PLATELET # BLD: 200 E9/L (ref 130–450)
PMV BLD AUTO: 11.5 FL (ref 7–12)
POTASSIUM SERPL-SCNC: 3.9 MMOL/L (ref 3.5–5)
PROTEUS SPECIES BY PCR: NOT DETECTED
PSEUDOMONAS AERUGINOSA BY PCR: NOT DETECTED
RBC # BLD: 3.99 E12/L (ref 3.8–5.8)
RBC FLUID: NORMAL /UL
SERRATIA MARCESCENS BY PCR: NOT DETECTED
SODIUM BLD-SCNC: 131 MMOL/L (ref 132–146)
SOURCE OF BLOOD CULTURE: ABNORMAL
STAPHYLOCOCCUS AUREUS BY PCR: NOT DETECTED
STAPHYLOCOCCUS SPECIES BY PCR: NOT DETECTED
STREPTOCOCCUS AGALACTIAE BY PCR: DETECTED
STREPTOCOCCUS PNEUMONIAE BY PCR: NOT DETECTED
STREPTOCOCCUS PYOGENES  BY PCR: NOT DETECTED
STREPTOCOCCUS SPECIES BY PCR: DETECTED
TOTAL PROTEIN: 6.1 G/DL (ref 6.4–8.3)
WBC # BLD: 10.8 E9/L (ref 4.5–11.5)

## 2021-03-26 PROCEDURE — 80053 COMPREHEN METABOLIC PANEL: CPT

## 2021-03-26 PROCEDURE — 82947 ASSAY GLUCOSE BLOOD QUANT: CPT

## 2021-03-26 PROCEDURE — 2580000003 HC RX 258: Performed by: SPECIALIST

## 2021-03-26 PROCEDURE — 99231 SBSQ HOSP IP/OBS SF/LOW 25: CPT | Performed by: ORTHOPAEDIC SURGERY

## 2021-03-26 PROCEDURE — 6360000002 HC RX W HCPCS: Performed by: NURSE ANESTHETIST, CERTIFIED REGISTERED

## 2021-03-26 PROCEDURE — 99232 SBSQ HOSP IP/OBS MODERATE 35: CPT | Performed by: INTERNAL MEDICINE

## 2021-03-26 PROCEDURE — 92960 CARDIOVERSION ELECTRIC EXT: CPT | Performed by: INTERNAL MEDICINE

## 2021-03-26 PROCEDURE — 3700000000 HC ANESTHESIA ATTENDED CARE

## 2021-03-26 PROCEDURE — 87205 SMEAR GRAM STAIN: CPT

## 2021-03-26 PROCEDURE — 89051 BODY FLUID CELL COUNT: CPT

## 2021-03-26 PROCEDURE — 36415 COLL VENOUS BLD VENIPUNCTURE: CPT

## 2021-03-26 PROCEDURE — 6370000000 HC RX 637 (ALT 250 FOR IP): Performed by: PHYSICIAN ASSISTANT

## 2021-03-26 PROCEDURE — 6370000000 HC RX 637 (ALT 250 FOR IP): Performed by: INTERNAL MEDICINE

## 2021-03-26 PROCEDURE — 2500000003 HC RX 250 WO HCPCS: Performed by: STUDENT IN AN ORGANIZED HEALTH CARE EDUCATION/TRAINING PROGRAM

## 2021-03-26 PROCEDURE — 3700000001 HC ADD 15 MINUTES (ANESTHESIA)

## 2021-03-26 PROCEDURE — 87070 CULTURE OTHR SPECIMN AEROBIC: CPT

## 2021-03-26 PROCEDURE — 93312 ECHO TRANSESOPHAGEAL: CPT

## 2021-03-26 PROCEDURE — 7100000010 HC PHASE II RECOVERY - FIRST 15 MIN

## 2021-03-26 PROCEDURE — 85025 COMPLETE CBC W/AUTO DIFF WBC: CPT

## 2021-03-26 PROCEDURE — 7100000011 HC PHASE II RECOVERY - ADDTL 15 MIN

## 2021-03-26 PROCEDURE — 93312 ECHO TRANSESOPHAGEAL: CPT | Performed by: INTERNAL MEDICINE

## 2021-03-26 PROCEDURE — 6360000002 HC RX W HCPCS: Performed by: SPECIALIST

## 2021-03-26 PROCEDURE — 93325 DOPPLER ECHO COLOR FLOW MAPG: CPT | Performed by: INTERNAL MEDICINE

## 2021-03-26 PROCEDURE — 6360000002 HC RX W HCPCS: Performed by: INTERNAL MEDICINE

## 2021-03-26 PROCEDURE — 20610 DRAIN/INJ JOINT/BURSA W/O US: CPT | Performed by: ORTHOPAEDIC SURGERY

## 2021-03-26 PROCEDURE — 2060000000 HC ICU INTERMEDIATE R&B

## 2021-03-26 PROCEDURE — 5A2204Z RESTORATION OF CARDIAC RHYTHM, SINGLE: ICD-10-PCS | Performed by: INTERNAL MEDICINE

## 2021-03-26 PROCEDURE — 2580000003 HC RX 258: Performed by: INTERNAL MEDICINE

## 2021-03-26 PROCEDURE — 92960 CARDIOVERSION ELECTRIC EXT: CPT

## 2021-03-26 PROCEDURE — 93325 DOPPLER ECHO COLOR FLOW MAPG: CPT

## 2021-03-26 PROCEDURE — B24BZZ4 ULTRASONOGRAPHY OF HEART WITH AORTA, TRANSESOPHAGEAL: ICD-10-PCS | Performed by: INTERNAL MEDICINE

## 2021-03-26 PROCEDURE — 89060 EXAM SYNOVIAL FLUID CRYSTALS: CPT

## 2021-03-26 RX ORDER — PROPOFOL 10 MG/ML
INJECTION, EMULSION INTRAVENOUS CONTINUOUS PRN
Status: DISCONTINUED | OUTPATIENT
Start: 2021-03-26 | End: 2021-03-26 | Stop reason: SDUPTHER

## 2021-03-26 RX ORDER — LIDOCAINE HYDROCHLORIDE 10 MG/ML
5 INJECTION, SOLUTION EPIDURAL; INFILTRATION; INTRACAUDAL; PERINEURAL ONCE
Status: DISCONTINUED | OUTPATIENT
Start: 2021-03-26 | End: 2021-03-30 | Stop reason: HOSPADM

## 2021-03-26 RX ORDER — CEFAZOLIN SODIUM 2 G/50ML
2000 SOLUTION INTRAVENOUS
Status: COMPLETED | OUTPATIENT
Start: 2021-03-26 | End: 2021-03-27

## 2021-03-26 RX ORDER — SODIUM CHLORIDE 0.9 % (FLUSH) 0.9 %
SYRINGE (ML) INJECTION
Status: COMPLETED
Start: 2021-03-26 | End: 2021-03-27

## 2021-03-26 RX ORDER — HEPARIN SODIUM (PORCINE) LOCK FLUSH IV SOLN 100 UNIT/ML 100 UNIT/ML
3 SOLUTION INTRAVENOUS PRN
Status: DISCONTINUED | OUTPATIENT
Start: 2021-03-26 | End: 2021-03-30 | Stop reason: HOSPADM

## 2021-03-26 RX ORDER — HEPARIN SODIUM (PORCINE) LOCK FLUSH IV SOLN 100 UNIT/ML 100 UNIT/ML
3 SOLUTION INTRAVENOUS EVERY 12 HOURS SCHEDULED
Status: DISCONTINUED | OUTPATIENT
Start: 2021-03-26 | End: 2021-03-30 | Stop reason: HOSPADM

## 2021-03-26 RX ORDER — SODIUM CHLORIDE 0.9 % (FLUSH) 0.9 %
10 SYRINGE (ML) INJECTION PRN
Status: DISCONTINUED | OUTPATIENT
Start: 2021-03-26 | End: 2021-03-30 | Stop reason: HOSPADM

## 2021-03-26 RX ADMIN — VANCOMYCIN HYDROCHLORIDE 1750 MG: 10 INJECTION, POWDER, LYOPHILIZED, FOR SOLUTION INTRAVENOUS at 15:49

## 2021-03-26 RX ADMIN — METOPROLOL SUCCINATE 25 MG: 25 TABLET, EXTENDED RELEASE ORAL at 20:07

## 2021-03-26 RX ADMIN — WATER 2000 MG: 1 INJECTION INTRAMUSCULAR; INTRAVENOUS; SUBCUTANEOUS at 09:42

## 2021-03-26 RX ADMIN — HYDROCODONE BITARTRATE AND ACETAMINOPHEN 2 TABLET: 5; 325 TABLET ORAL at 20:08

## 2021-03-26 RX ADMIN — METOPROLOL SUCCINATE 25 MG: 25 TABLET, EXTENDED RELEASE ORAL at 09:20

## 2021-03-26 RX ADMIN — Medication 10 ML: at 09:22

## 2021-03-26 RX ADMIN — PANTOPRAZOLE SODIUM 40 MG: 40 TABLET, DELAYED RELEASE ORAL at 06:52

## 2021-03-26 RX ADMIN — VANCOMYCIN HYDROCHLORIDE 1750 MG: 10 INJECTION, POWDER, LYOPHILIZED, FOR SOLUTION INTRAVENOUS at 03:30

## 2021-03-26 RX ADMIN — PROPOFOL 100 MCG/KG/MIN: 10 INJECTION, EMULSION INTRAVENOUS at 14:12

## 2021-03-26 RX ADMIN — APIXABAN 5 MG: 5 TABLET, FILM COATED ORAL at 20:08

## 2021-03-26 RX ADMIN — APIXABAN 5 MG: 5 TABLET, FILM COATED ORAL at 09:21

## 2021-03-26 RX ADMIN — DILTIAZEM HYDROCHLORIDE 240 MG: 240 CAPSULE, COATED, EXTENDED RELEASE ORAL at 20:08

## 2021-03-26 RX ADMIN — Medication 10 ML: at 20:09

## 2021-03-26 RX ADMIN — DILTIAZEM HYDROCHLORIDE 240 MG: 240 CAPSULE, COATED, EXTENDED RELEASE ORAL at 02:36

## 2021-03-26 RX ADMIN — HYDROCODONE BITARTRATE AND ACETAMINOPHEN 2 TABLET: 5; 325 TABLET ORAL at 07:06

## 2021-03-26 RX ADMIN — METOPROLOL TARTRATE 5 MG: 1 INJECTION, SOLUTION INTRAVENOUS at 08:34

## 2021-03-26 ASSESSMENT — PAIN SCALES - GENERAL
PAINLEVEL_OUTOF10: 0
PAINLEVEL_OUTOF10: 9
PAINLEVEL_OUTOF10: 9

## 2021-03-26 ASSESSMENT — PAIN DESCRIPTION - PAIN TYPE: TYPE: CHRONIC PAIN

## 2021-03-26 NOTE — PROGRESS NOTES
Department of Orthopedic Surgery  Resident Progress Note    Patient seen and examined. Pain moderately controlled he reports slight increase in R knee pain this morning. Denies chest pain, shortness of breath, dizziness/lightheadedness. VITALS:  /82   Pulse 92   Temp 99.8 °F (37.7 °C) (Oral)   Resp 14   Ht 6' 4\" (1.93 m)   Wt 283 lb (128.4 kg)   SpO2 93%   BMI 34.45 kg/m²     General: Resting in bed in no acute distress and alert    MUSCULOSKELETAL:   right lower extremity:  · Edema present to the entire extremity compared to the contralateral extremity  · No joint effusion appreciated  · No erythema about the knee joint  · Can tolerate AROM of knee joint  · Compartments soft and compressible  · +PF/DF/EHL  · +2/4 DP & PT pulses, Brisk Cap refill, Toes warm and perfused  · Distal sensation grossly intact to Peroneals, Sural, Saphenous, and tibial nrs    CBC:   Lab Results   Component Value Date    WBC 10.8 03/26/2021    HGB 11.0 03/26/2021    HCT 33.5 03/26/2021     03/26/2021     PT/INR:    Lab Results   Component Value Date    PROTIME 16.5 03/24/2021    INR 1.4 03/24/2021       The knee was then aspirated under sterile conditions using a superior lateral approach. The fluid was milky yellow approx 50 cc obtained. ASSESSMENT  · R knee efussion, likely septic etiology    PLAN      · Concern for septic total joint  · Potential surgical intervention indicated depending on knee fluid study results. Will plan for surgical management unless aspirate cultures are aseptic with a low cell count   · Continue physical therapy and protocol: WBAT - R LE  · Abx coverage per primary and infectious disease  · Deep venous thrombosis prophylaxis - per primary service, early mobilization  · PT/OT  · Pain Control: IV and PO  · Will aspirate patient's knee later this afternoon following cardiac procedure.    · Aspirate cell count, culture, glucose, crystals, and gram stain  · I was present with the resident during the history and exam. I discussed the case with the resident and agree with the findings and plan as documented in the resident's note.     Electronically signed by Geovany Anderson DO on 3/26/2021 at 12:39 PM

## 2021-03-26 NOTE — PROGRESS NOTES
[address]  [facility]        CARDIOLOGY                 INPATIENT PROGRESS NOTE          PATIENT SEEN IN FOLLOW UP FOR: [address]  [address]: [address] Hanson is a 61year old patient known to Dr. Floyd Woodruff from initial consult      SUBJECTIVE: Denies any CP or SOB, No dizzy, No pND or orthopnea,     ROS: Review of rest of 10 systems negative except as mentioned above    OBJECTIVE: No acute distress. See Assessment     Diagnostics:       Telemetry:  Afib with RVR      Echo from 3/24/21 - Reviewed       Intake/Output Summary (Last 24 hours) at 3/26/2021 0935  Last data filed at 3/26/2021 0909  Gross per 24 hour   Intake 360 ml   Output 300 ml   Net 60 ml       Labs:   CBC:   Recent Labs     03/25/21  0559 03/26/21  0640   WBC 11.5 10.8   HGB 11.3* 11.0*   HCT 35.1* 33.5*    200     BMP:   Recent Labs     03/25/21  0559 03/26/21  0640    131*   K 4.0 3.9   CO2 20* 21*   BUN 15 14   CREATININE 0.9 0.9   LABGLOM >60 >60   CALCIUM 8.0* 7.9*     Mag:   Recent Labs     03/25/21  0559   MG 1.8     Phos:   Recent Labs     03/25/21  0559   PHOS 2.3*     TSH:   Recent Labs     03/25/21  0559   TSH 1.370     HgA1c:     BNP: No results for input(s): BNP in the last 72 hours. PT/INR:   Recent Labs     03/24/21  1645   PROTIME 16.5*   INR 1.4     APTT:No results for input(s): APTT in the last 72 hours.   CARDIAC ENZYMES:  Recent Labs     03/24/21  1645 03/24/21  2225 03/25/21  0328   CKTOTAL  --  108  --    TROPONINI 0.02 0.02 0.03     FASTING LIPID PANEL:  Lab Results   Component Value Date    CHOL 93 03/25/2021    HDL 41 03/25/2021    LDLCALC 42 03/25/2021    TRIG 52 03/25/2021     LIVER PROFILE:  Recent Labs     03/25/21  0559 03/26/21  0640   AST 18 13   ALT 13 14   LABALBU 3.4* 3.1*       Current Inpatient Medications:   cefTRIAXone (ROCEPHIN) IV  2,000 mg Intravenous Q24H    dilTIAZem  240 mg Oral Nightly    [Held by provider] losartan  100 mg Oral Daily    metoprolol succinate  25 mg Oral BID    apixaban  5 mg Oral BID    vancomycin  1,750 mg Intravenous Q12H    sodium chloride flush  10 mL Intravenous 2 times per day    pantoprazole  40 mg Oral QAM AC       IV Infusions (if any):   dilTIAZem (CARDIZEM) 125 mg in dextrose 5% 125 mL infusion 15 mg/hr (21 1411)    dextrose           PHYSICAL EXAM:     CONSTITUTIONAL:   /82   Pulse 92   Temp 99.8 °F (37.7 °C) (Oral)   Resp 14   Ht 6' 4\" (1.93 m)   Wt 283 lb (128.4 kg)   SpO2 93%   BMI 34.45 kg/m²   Pulse  Av.7  Min: 88  Max: 92  Systolic (59UUX), NAB:435 , Min:120 , EIV:709    Diastolic (38AZI), QYV:36, Min:64, Max:82    In general, this is a well developed, well nourished who appears stated age. awake, alert, cooperative, no apparent distress  HEENT: eyes -conjunctivae pink,  Throat - Oral mucosa pink and moist.   Neck-  no stridor, no noted enlargement of the thyroid, no carotid bruit. no jugular venous distention   RESPIRATORY: Chest symmetrical and non-tender to palpation. No accessory muscle use. Lung auscultation - few rhonchi  CARDIOVASCULAR:     Heart Palpation - no palpable thrills  Heart Ausculation - Irregular rate and rhythm, 1/6 systolic murmur, No s3 or rub. No lower extremity edema, Right knee +swollen, Distal pulses palpable, no clubbing or cyanosis   ABDOMEN: Soft,  Bowel sounds present. no abdominal bruit / pulsation  MS: good muscle strength and tone. : Deferred  Rectal Exam: Deferred  SKIN: warm and dry no statis dermatitis or ulcers   NEURO / PSYCH: oriented to person, place        ASSESSMENT/PLAN:     New onset atrial flutter (HCC) - High [id number] VASc score -2; Rate control with Cardizem and BB;  On [address], ANALI /DCCV today - Risk benefits, alternative s discussed, agreeable for ANALI/DCCV     Bacteremia -GPC - ID consulted - Having ANALI today prior to DCCV today, will also r/o Endocarditis     Essential HTN - Controlled    Hx of PAD    Right knee pain  - Ortho on case Above recommendations discussed with him.         Electronically signed by Belgica Romo MD on 3/26/2021 at 9:35 AM  [facility]) Cardiology

## 2021-03-26 NOTE — PROGRESS NOTES
Physician Progress Note      Wiliam Andino  CSN #:                  [id number]  :                       1960  ADMIT DATE:       3/24/2021 4:20 PM  [address] DATE:  RESPONDING  PROVIDER #:        Ander Pedroza DO          QUERY TEXT:    Dear Attending Provider,    Pt admitted with right knee pain/cellulitis/A flutter. Pt noted to have   intermittent fever and chills. If possible, please document in the progress   notes and discharge summary if you are evaluating and /or treating any of the   following: The medical record reflects the following:  Risk Factors: GERD, HTN, bilateral total knees, anemia, A flutter  Clinical Indicators: per H&P: Patient has bilateral lower extremity edema   Patient's right knee is warm and edematous on palpation Patient does have   erythema below the right knee joint. ..had been experiencing fever and chills\";   CRP 13.2-0.3; Sed Rate 21; procalcitonin 0.19; blood cultures x2: Gram   positive cocci in chains; TPR: 102.3-99/169-85/24-14  Treatment: PCU monitoring, IV Vancomycin and Rocephin, blood cultures, CT   Knee, Ortho consult    Thank You,  Shayna Dutton RN, BSN, CDS  Clinical Documentation Improvement Specialist  [phone]  Options provided:  -- Sepsis, present on admission  -- No Sepsis,cellulitis only  -- Other - I will add my own diagnosis  -- Disagree - Not applicable / Not valid  -- Disagree - Clinically unable to determine / Unknown  -- Refer to Clinical Documentation Reviewer    PROVIDER RESPONSE TEXT:    This patient has sepsis which was present on admission.     Query created by: Fabricio Gordon on 3/26/2021 9:58 AM      Electronically signed by:  Ander Pedroza DO 3/26/2021 4:51 PM

## 2021-03-26 NOTE — PROGRESS NOTES
Internal Medicine Progress Note    [facility]    Catalina Castañeda. Lorna Hatfield., [email or web address]. Rebecca Clark D.O., JAIDAOJOE Gonzalez D.O. Jarred Kay, MSN, APRN, NP-C  Deniz Wilson. Radha Do, MSN, APRN-CNP     Primary Care Physician: Zenaida Mcnamara MD   Admitting Physician:  Margoth Johnson DO  Admission date and time: 3/24/2021  4:20 PM    Room:  [address] [place] [address]  Admitting diagnosis: New onset atrial flutter [facility]) [I48.92]    Patient Name: Nicole Chavez  MRN: [id number]    Date of Service: 3/26/2021     Subjective:  Mathew Valle is a 61 y.o. male who was seen and examined today,3/26/2021, at the bedside. Patient still complaining of pain in the right knee. CAT scans were reviewed. Blood culture positive the patient has been seen by infectious disease. Patient has been started on antibiotic therapy. Plan for ANALI today with possible DC cardioversion    No family present during my examination. Review of System:   Constitutional:   Denies fever or chills, weight loss or gain, fatigue or malaise. HEENT:   Denies ear pain, sore throat, sinus or eye problems. Lesion on right scalp  Cardiovascular:   Rhythm does show irregularity and tachycardia but the patient is not fully aware of  Respiratory:   Denies shortness of breath, coughing, sputum production, hemoptysis, or wheezing. Gastrointestinal:   Denies nausea, vomiting, diarrhea, or constipation. Denies any abdominal pain. Genitourinary:    Denies any urgency, frequency, hematuria. Voiding  without difficulty. Extremities:   Denies lower extremity swelling, edema or cyanosis. Neurology:    Denies any headache or focal neurological deficits, Denies generalized weakness or memory difficulty. Psch:   Denies being anxious or depressed. Musculoskeletal:    Denies  myalgias, joint complaints or back pain. Integumentary:   Denies any rashes, ulcers, or excoriations.   Denies bruising. Hematologic/Lymphatic:  Denies bruising or bleeding. Physical Exam:  No intake/output data recorded. Intake/Output Summary (Last 24 hours) at 3/26/2021 1543  Last data filed at 3/26/2021 1450  Gross per 24 hour   Intake 100 ml   Output 300 ml   Net -200 ml   I/O last 3 completed shifts: In: 100 [I.V.:100]  Out: 300 [Urine:300]  Patient Vitals for the past 96 hrs (Last 3 readings):   Weight   03/24/21 1945 283 lb (128.4 kg)   03/24/21 1651 285 lb (129.3 kg)     Vital Signs:   Blood pressure 120/82, pulse 92, temperature 99.8 °F (37.7 °C), temperature source Oral, resp. rate 14, height 6' 4\" (1.93 m), weight 283 lb (128.4 kg), SpO2 93 %. General appearance:  Alert, responsive, oriented to person, place, and time. Well preserved, alert, no distress. Head:  Normocephalic. No masses, lesions or tenderness. 1 cm lesion on the right scalp  Eyes:  PERRLA. EOMI. Sclera clear. Buccal mucosa moist.  ENT:  Ears normal. Mucosa normal.  Neck:    Supple. Trachea midline. No thyromegaly. No JVD. No bruits. Heart:    Regular rhythm and tachycardic. No murmurs. Lungs:    Symmetrical. Clear to auscultation bilaterally. No wheezes. No rhonchi. No rales. Abdomen:   Soft. Non-tender. Non-distended. Bowel sounds positive. No organomegaly or masses. No pain on palpation. Extremities:    Complaining of discomfort in the right knee and posterior right calf  Neurologic:    Alert x 3. No focal deficit. Cranial nerves grossly intact. No focal weakness. Psych:   Behavior is normal. Mood appears normal. Speech is not rapid and/or pressured. Musculoskeletal:   Spine ROM normal. Muscular strength intact. Gait not assessed. Integumentary:  No rashes  Skin normal color and texture.   Genitalia/Breast:  Deferred    Medication:  Scheduled Meds:   cefTRIAXone (ROCEPHIN) IV  2,000 mg Intravenous Q24H    sodium chloride flush        lidocaine PF  5 mL Intradermal Once    heparin flush  3 mL Intravenous 2 times per day    dilTIAZem  240 mg Oral Nightly    [Held by provider] losartan  100 mg Oral Daily    metoprolol succinate  25 mg Oral BID    apixaban  5 mg Oral BID    vancomycin  1,750 mg Intravenous Q12H    sodium chloride flush  10 mL Intravenous 2 times per day    pantoprazole  40 mg Oral QAM AC     Continuous Infusions:   dextrose         Objective Data:  CBC with Differential:    Lab Results   Component Value Date    WBC 10.8 03/26/2021    RBC 3.99 03/26/2021    HGB 11.0 03/26/2021    HCT 33.5 03/26/2021     03/26/2021    MCV 84.0 03/26/2021    MCH 27.6 03/26/2021    MCHC 32.8 03/26/2021    RDW 13.3 03/26/2021    LYMPHOPCT 11.9 03/26/2021    MONOPCT 11.2 03/26/2021    BASOPCT 0.3 03/26/2021    MONOSABS 1.21 03/26/2021    LYMPHSABS 1.28 03/26/2021    EOSABS 0.01 03/26/2021    BASOSABS 0.03 03/26/2021     CMP:    Lab Results   Component Value Date     03/26/2021    K 3.9 03/26/2021    K 4.1 03/24/2021    CL 98 03/26/2021    CO2 21 03/26/2021    BUN 14 03/26/2021    CREATININE 0.9 03/26/2021    GFRAA >60 03/26/2021    LABGLOM >60 03/26/2021    GLUCOSE 119 03/26/2021    PROT 6.1 03/26/2021    LABALBU 3.1 03/26/2021    CALCIUM 7.9 03/26/2021    BILITOT 0.8 03/26/2021    ALKPHOS 80 03/26/2021    AST 13 03/26/2021    ALT 14 03/26/2021       Wound Documentation:   Wound 06/13/19 Leg Lateral;Left;Lower #1 ACQUIRED 6-1-19 (Active)   Number of days: 651       Assessment:    · New onset atrial fibrillation/flutter with rapid ventricular response   · Positive blood culture showing strep  · Right knee pain with cellulitis of the posterior calf  · Bilateral lower extremity venous insufficiency  · Essential hypertension  · Gastroesophageal reflux disease  · Osteoarthritis  · Recent COVID-19 injection of the [employer] 3 weeks ago  · Abnormality of the right scalp area possibly suggesting carcinoma  · Chronic kidney disease stage II  · Mild chronic anemia      Plan:       · Cardiology plan on ANALI today with possible DC cardioversion  · Recommend outpatient sleep study  · ID has seen the patient. PICC line placed and antibiotics prescribed  · Continue treatment for blood pressure  · Surgical consult for evaluation of the right scalp lesion and right lymph node  · We will obtain three-phase bone scan of the knees    More than 50% of my  time was spent at the bedside counseling/coordinating care with the patient and/or family with face to face contact. This time was spent reviewing notes and laboratory data as well as instructing and counseling the patient. Time I spent with the family or surrogate(s) is included only if the patient was incapable of providing the necessary information or participating in medical decisions. I also discussed the differential diagnosis and all of the proposed management plans with the patient and individuals accompanying the patient. Manny Aguayo requires this high level of physician care and nursing on the [facility] unit due the complexity of decision management and chance of rapid decline or death. Continued cardiac monitoring and higher level of nursing are required. I am readily available for any further decision-making and intervention.      Rui De Souza DO, GLADYS.A.C.O.I.  3/26/2021  3:43 PM

## 2021-03-26 NOTE — CONSULTS
[address] [facility]  Consult Note    [address] [facility] [address]  [place], [address]  Phone [phone]   [phone]      Admit Date: 3/24/2021  4:20 PM  Pt Name: Nicole Chavez  MRN: [id number]  : 1960  Reason for Consult:    Chief Complaint   Patient presents with    Knee Pain     right , denies recent injury     Requesting Physician:  Margoth Johnson DO  PCP: Zenaida Mcnamara MD  History Obtained From:  patient, chart   ID consulted for New onset atrial flutter ([address] [I48.92]  on hospital day [address]       Chief Complaint   Patient presents with    Knee Pain     right , denies recent injury     HISTORYOF PRESENT ILLNESS   Nicole Chavez is a 61 y.o. male who presents with significant past medical history of  has a past medical history of Cellulitis, Edema, GERD (gastroesophageal reflux disease), Hypertension, Osteoarthritis, Tenosynovitis, de Quervain, and Venous insufficiency. ED TRIAGEVITALS  BP: 120/82, Temp: 99.8 °F (37.7 °C), Pulse: 92, Resp: 14, SpO2: 93 %  HPI  Pt came in with right knee pain about a week ago  He had worsening pain on 3/17  Pt denies trauma  He is a  and was lifting heavy boxes at work with back pain   No f/c at home but had chills and sweats  He has a chronic toe ulcer followed by podiatry and vascular  Last atbx  Has tachycardia  bun25 cr1.4 wbc10 mem657 ddimer 629   Us neg dvt  cxry nap   Pt admitted for aflutter with rvr  Seen by Dr Dandre Dotson for Acute onset right knee pain Right lower extremity edema  Thoughts -Moderate to low suspicion for septic right knee joint at this time.   ID was asked to see for positive blood cx GPC chains obtained on 3/24  Pt has been on vanco since 3/24    REVIEW OF SYSTEMS       REVIEW OFSYSTEMS:    CONSTITUTIONAL:     fever, chills, no weight loss  ALLERGIES:    No urticaria, hay fever,    EYES:     No blurry vision, loss of vision,eye pain  ENT:      No hearing loss, sore throat  CARDIOVASCULAR:  No chest pain or palpitations  RESPIRATORY:   No cough, sob  ENDOCRINE:    No increase thirst, urination   HEME-LYMPH:   No easy bruising or bleeding  GI:     No nausea, vomiting or diarrhea  :     No urinary complaints  NEURO:    No seizures, stroke, HA  MUSCULOSKELETAL:  No muscle aches or pain, no joint pain  SKIN:     No rash or itch-right great toe ulcer  PSYCH:    No depression or anxiety    Medications Prior to Admission: cyclobenzaprine (FLEXERIL) 10 MG tablet, Take 1 tablet by mouth 2 times daily as needed for Muscle spasms  HYDROcodone-acetaminophen (NORCO)  MG per tablet, Take 1 tablet by mouth daily for 30 days. Intended supply: 30 days  celecoxib (CELEBREX) 200 MG capsule, Take 200 mg by mouth 2 times daily  Elastic Bandages & Supports (JOBST KNEE HIGH COMPRESSION SM) MISC, Dx: Varicose veins of the legs with pain and swelling. Bilateral thigh-high 30-40 mm Hg compression stockings. SSD 1 % cream, apply externally to affected area once a day  celecoxib (CELEBREX) 200 MG capsule, Take 1 capsule by mouth daily  diphenhydrAMINE-APAP, sleep, (TYLENOL PM EXTRA STRENGTH)  MG tablet, Take 1 tablet by mouth nightly as needed for Sleep  Glucosamine-Chondroit-Vit C-Mn (GLUCOSAMINE 1500 COMPLEX PO), Take 1 tablet by mouth daily  diltiazem (DILACOR XR) 240 MG extended release capsule, Take 240 mg by mouth nightly   Turmeric 500 MG TABS, Take 500 mg by mouth daily  omeprazole (PRILOSEC) 20 MG capsule, Take 20 mg by mouth Daily   irbesartan (AVAPRO) 300 MG tablet, Take 300 mg by mouth daily.   CURRENT MEDICATIONS     Current Facility-Administered Medications:     cefTRIAXone (ROCEPHIN) 2,000 mg in sterile water 20 mL IV syringe, 2,000 mg, Intravenous, Q24H, Ken Cárdenas MD    dilTIAZem (CARDIZEM CD) extended release capsule 240 mg, 240 mg, Oral, Nightly, Byron Contreras DO, 240 mg at 03/26/21 0236    [Held by provider] losartan (COZAAR) tablet 100 mg, 100 mg, Oral, Daily, Neha Stade, DO    fentaNYL (SUBLIMAZE) injection 25 mcg, 25 mcg, Intravenous, Q2H PRN, Neha Stade, DO, 25 mcg at 03/25/21 1309    metoprolol succinate (TOPROL XL) extended release tablet 25 mg, 25 mg, Oral, BID, [facility] Mitamarmiriam PA-C, 25 mg at 03/26/21 0920    apixaban (ELIQUIS) tablet 5 mg, 5 mg, Oral, BID, [facility], 5 mg at 03/26/21 7402    vancomycin (VANCOCIN) 1,750 mg in dextrose 5 % 500 mL IVPB, 1,750 mg, Intravenous, Q12H, Neha Stade, DO, Stopped at 03/26/21 0530    metoprolol (LOPRESSOR) injection 5 mg, 5 mg, Intravenous, Q10 Min PRN, Torito Arguelles MD, 5 mg at 03/26/21 0834    [COMPLETED] dilTIAZem injection 10 mg, 10 mg, Intravenous, Once, 10 mg at 03/24/21 1719 **FOLLOWED BY** dilTIAZem 125 mg in dextrose 5 % 125 mL infusion, 5-15 mg/hr, Intravenous, Continuous, Neha Stade, DO, Last Rate: 15 mL/hr at 03/25/21 1411, 15 mg/hr at 03/25/21 1411    glucose (GLUTOSE) 40 % oral gel 15 g, 15 g, Oral, PRN, Neha Stade, DO    dextrose 50 % IV solution, 12.5 g, Intravenous, PRN, Neha Stade, DO    glucagon (rDNA) injection 1 mg, 1 mg, Intramuscular, PRN, Ismail U Ben, DO    dextrose 5 % solution, 100 mL/hr, Intravenous, PRN, Neha Stade, DO    sodium chloride flush 0.9 % injection 10 mL, 10 mL, Intravenous, 2 times per day, Neha Stade, DO, 10 mL at 03/26/21 5281    sodium chloride flush 0.9 % injection 10 mL, 10 mL, Intravenous, PRN, Neha Stade, DO    polyethylene glycol (GLYCOLAX) packet 17 g, 17 g, Oral, Daily PRN, Neha Stade, DO    acetaminophen (TYLENOL) tablet 650 mg, 650 mg, Oral, Q6H PRN, 650 mg at 03/25/21 2102 **OR** acetaminophen (TYLENOL) suppository 650 mg, 650 mg, Rectal, Q6H PRN, Neha Wiley DO    HYDROcodone-acetaminophen (NORCO) 5-325 MG per tablet 2 tablet, 2 tablet, Oral, Q8H PRN, Neha Wiley DO, 2 tablet at 03/26/21 0706    pantoprazole (PROTONIX) tablet 40 mg, 40 mg, Oral, QAM AC, Neha Wiley DO, use: No    Sexual activity: Yes     Partners: Female   Lifestyle    Physical activity     Days per week: None     Minutes per session: None    Stress: None   Relationships    Social connections     Talks on phone: None     Gets together: None     Attends Voodoo service: None     Active member of club or organization: None     Attends meetings of clubs or organizations: None     Relationship status: None    Intimate partner violence     Fear of current or ex partner: None     Emotionally abused: None     Physically abused: None     Forced sexual activity: None   Other Topics Concern    None   Social History Narrative    None     · Lives with wife and [place] daughter  · Had JJ vaccine 3 weeks ago   · 2 cats    PHYSICAL EXAM       ED Triage Vitals   BP Temp Temp Source Pulse Resp SpO2 Height Weight   03/24/21 1642 03/24/21 1651 03/24/21 1945 03/24/21 1642 03/24/21 1642 03/24/21 1642 03/24/21 1651 03/24/21 1651   101/82 99.3 °F (37.4 °C) Oral 167 18 98 % 6' 4\" (1.93 m) 285 lb (129.3 kg)     Vitals:    Vitals:    03/26/21 0726 03/26/21 0731 03/26/21 0752 03/26/21 0920   BP: 120/82      Pulse: 89 89  92   Resp: 14      Temp:   99.8 °F (37.7 °C)    TempSrc:   Oral    SpO2: 93%      Weight:       Height:         Physical Exam   Constitutional/General: Alert and oriented, NAD  Head: NC/AT  Eyes: PERRL, EOMI glasses  Mouth: Normal mucosa, no thrush   Neck: Supple, full ROM,    Pulmonary: Lungs clear to auscultation bilaterally. Not in respiratory distress  Cardiovascular:  Regular rate and rhythm, no murmurs, gallops, or rubs. Abdomen: Soft, + BS.   distension. Nontender. Extremities: Moves all extremities x 4.    Warm and well perfused r>left swelling warm  Pulses:  Distal pulses dec  Skin: Warm and dry without rash  Neurologic:    No focal deficits  Psych: Normal Affect     DIAGNOSTIC RESULTS   RADIOLOGY:   Echo Complete    Result Date: 3/25/2021  Transthoracic Echocardiography Report (TTE)  Demographics Patient Name      Marietta Villa Gender             Male                    D   Medical Record    [id number]       Room Number        9159  Number   Account #         [de-identified]      Procedure Date     03/25/2021   Corporate ID                     Ordering Physician Paulette Ahn DO   Accession Number  [id number]     Referring                                   Physician   Date of Birth     1960     Sonographer        Faustino Stokes                                                      RDSATYA   Age               61 year(s)     Interpreting       Paulette Ahn DO                                   Physician                                    Any Other  Procedure Type of Study   TTE procedure  Procedure Date Date: 03/25/2021 Start: 12:57 PM Study Location: Echo Lab Technical Quality: Limited visualization due to body habitus. Indications:Atrial flutter. Patient Status: Routine Contrast Medium: Definity. Height: 76 inches Weight: 283 pounds BSA: 2.57 m^2 BMI: 34.45 kg/m^2 Rhythm: Within normal limits HR: 96 bpm BP: 128/77 mmHg  Findings   Left Ventricle  Left ventricle is mildly enlarged . Mild left ventricular concentric hypertrophy noted. Ejection fraction is measured at 62%. No evidence of left ventricular mass or thrombus noted. No regional wall motion abnormalities seen. Right Ventricle  Normal right ventricular size. No evidence of a thrombus in the right ventricle. Left Atrium  The left atrium is mildly dilated. Interatrial septum appears intact. No evidence of thrombus within left atrium. Right Atrium  Normal right atrium size. Mitral Valve  Physiologic and/or trace mitral regurgitation is present. No evidence of mitral valve stenosis. Tricuspid Valve  Physiologic and/or trace tricuspid regurgitation. Aortic Valve  Aortic valve opens well. The aortic valve is trileaflet. No evidence of aortic valve regurgitation. No hemodynamically significant aortic stenosis is present.    Pulmonic Valve The pulmonic valve was not well visualized. Pericardial Effusion  No evidence of pericardial effusion. Aorta  The aorta is within normal limits. Miscellaneous  Irregular rhythm. Conclusions   Summary  Left ventricle is mildly enlarged . Mild left ventricular concentric hypertrophy noted. Ejection fraction is measured at 62%. No evidence of left ventricular mass or thrombus noted. No regional wall motion abnormalities seen. The left atrium is mildly dilated. Interatrial septum appears intact. No evidence of thrombus within left atrium. Physiologic and/or trace mitral regurgitation is present. No evidence of mitral valve stenosis. Physiologic and/or trace tricuspid regurgitation. Irregular rhythm.    Signature   ----------------------------------------------------------------  Electronically signed by Pancho Graham DO(Interpreting  physician) on 03/25/2021 05:14 PM  ----------------------------------------------------------------  M-Mode/2D Measurements & Calculations   LV Diastolic    LV Systolic Dimension: 3.9   AV Cusp Separation: 2.1 cmLA  Dimension: 5.9  cm                           Dimension: 4.2 cmAO Root  cm              LV Volume Diastolic: 686.3   Dimension: 3.5 cm  LV FS:33.9 %    ml  LV PW           LV Volume Systolic: 03.0 ml  Diastolic: 1.2  LV EDV/LV EDV Index: 172.7  cm              ml/67 ml/m^2LV ESV/LV ESV    RV Diastolic Dimension: 2.7  Septum          Index: 65.9 ml/26ml/ m^2     cm  Diastolic: 1.3  EF Calculated: 61.8 %  cm              LV Mass Index: 126 l/min*m^2  CO: 5.45 l/min                               LA volume/Index: 79.7 ml  CI: 2.12  l/m*m^2         LVOT: 2.1 cm  LV Mass: 322.85  g  Doppler Measurements & Calculations   MV Peak E-Wave: 0.91 AV Peak Velocity: 1.27 LVOT Peak Velocity: 0.93 m/s  m/s                  m/s                    LVOT Mean Velocity: 0.74 m/s  MV Peak A-Wave: 0.23 AV Peak Gradient: 6.46 LVOT Peak Gradient: 3.5  m/s                  mmHg mmHgLVOT Mean Gradient: 2.4  MV E/A Ratio: 3.98   AV Mean Velocity: 0.94 mmHg  MV Peak Gradient:    m/s  4.9 mmHg             AV Mean Gradient: 4  MV Mean Gradient: 2  mmHg  mmHg                 AV VTI: 18.5 cm  MV Mean Velocity:    AV Area  0.65 m/s             (Continuity):3.07 cm^2 PV Peak Velocity: 1 m/s  MV Deceleration                             PV Peak Gradient: 4.03 mmHg  Time: 178.5 msec     LVOT VTI: 16.4 cm      PV Mean Velocity: 0.8 m/s  MV P1/2t: 43.4 msec  IVRT: 90 msec          PV Mean Gradient: 2.8 mmHg  MVA by PHT:5.07 cm^2  MV Area  (continuity): 2.6  cm^2  [email or web address] DocKey=[id number]    Xr Knee Right (1-2 Views)    Result Date: 3/24/2021  EXAMINATION: TWO XRAY VIEWS OF THE RIGHT KNEE; ONE XRAY VIEW OF THE CHEST 3/24/2021 5:35 pm COMPARISON: Right knee 08/18/2020. chest x-ray 07/22/2019. HISTORY: ORDERING SYSTEM PROVIDED HISTORY: right knee pain. Shortness of breath TECHNOLOGIST PROVIDED HISTORY: Reason for exam:->right knee pain FINDINGS: Right knee: A right knee arthroplasty device is again noted. It remains intact. No radiographic evidence of loosening or change in alignment. The bones are intact. No acute fracture or dislocation is noted. Anterior patella degenerative enthesopathy is moderate. Non-specific slight soft tissue prominence superior to the patella on the lateral view may reflect small suprapatellar recess joint effusion. No definite radiographic evidence of acute skeletal or hardware pathology. Suggestion of small joint effusion Chest: Limited examination due to prominent soft tissue attenuation. The cardiac silhouette size is slightly enlarged without evidence of vascular congestion or alveolar pulmonary edema. There is no evidence of pulmonary consolidation or infiltrate. No pleural effusion noted. No radiographically visible pneumothorax.  No acute displaced fracture. IMPRESSION: Slight cardiomegaly No radiographic evidence of definite acute pulmonary disease in the visualized chest     Ct Knee Right Wo Contrast    Result Date: 3/25/2021  EXAMINATION: CT OF THE RIGHT FEMUR WITH CONTRAST; CT OF THE RIGHT KNEE WITHOUT CONTRAST; CT OF THE RIGHT TIBIA AND FIBULA WITHOUT CONTRAST 3/25/2021 7:47 pm TECHNIQUE: CT of the right femur was performed with the administration of intravenous contrast.  Multiplanar reformatted images are provided for review. Dose modulation, iterative reconstruction, and/or weight based adjustment of the mA/kV was utilized to reduce the radiation dose to as low as reasonably achievable.; CT of the right knee was performed without the administration of intravenous contrast.  Multiplanar reformatted images are provided for review. Dose modulation, iterative reconstruction, and/or weight based adjustment of the mA/kV was utilized to reduce the radiation dose to as low as reasonably achievable.; CT of the right tibia and fibula was performed without the administration of intravenous contrast.  Multiplanar reformatted images are provided for review. Dose modulation, iterative reconstruction, and/or weight based adjustment of the mA/kV was utilized to reduce the radiation dose to as low as reasonably achievable. COMPARISON: None. HISTORY ORDERING SYSTEM PROVIDED HISTORY: pain, swelling lower aspect of thigh TECHNOLOGIST PROVIDED HISTORY: Reason for exam:->pain, swelling lower aspect of thigh Recent knee replacement, evaluate for septic arthritis. FINDINGS: Bones: No definite acute fracture or dislocation of the femur, tibia, and fibula. No periprosthetic lucency identified to suggest loosening. No aggressive periostitis although please note that evaluation is somewhat limited due to metallic artifact from the patient's prosthesis. Soft Tissue:  Musculature is essentially preserved within the anterior and posterior compartments of the thigh. There is diffuse subcutaneous soft tissue edema surrounding the knee and lower leg, primarily within the dorsum of the calf. There is no well-circumscribed drainable fluid collection identified, however please note that small fluid collections would not be readily visualized without the benefit of intravenous contrast.  There is advanced fatty infiltration/atrophy of the medial head of the gastrocnemius muscle. Joint: Mild degenerative narrowing of the femoroacetabular joint. No joint effusion identified. Status post total knee arthroplasty. Evaluation of the arthroplasty itself is obscured by streak artifact from the metal component, however there is no convincing evidence for hardware loosening or perihardware fracture. There is a moderate amount of patellofemoral joint fluid, the sterility of which cannot be confirmed by imaging. The tibiotalar joint space is grossly preserved. Within limitations of hardware related artifact, there is no convincing evidence for loosening or perihardware fracture. Moderate patellofemoral joint fluid, the sterility of which cannot be confirmed by imaging. No definite cortical disruption or periostitis identified. Extensive subcutaneous soft tissue edema primarily within the dorsal aspect of the lower leg. No well-defined drainable fluid collection identified although evaluation for this finding is somewhat limited without intravenous contrast. Diffuse fatty infiltration/atrophy of the medial head of the gastrocnemius muscle. Xr Chest Portable    Result Date: 3/24/2021  EXAMINATION: TWO XRAY VIEWS OF THE RIGHT KNEE; ONE XRAY VIEW OF THE CHEST 3/24/2021 5:35 pm COMPARISON: Right knee 08/18/2020. chest x-ray 07/22/2019. HISTORY: ORDERING SYSTEM PROVIDED HISTORY: right knee pain. Shortness of breath TECHNOLOGIST PROVIDED HISTORY: Reason for exam:->right knee pain FINDINGS: Right knee: A right knee arthroplasty device is again noted. It remains intact.  No radiographic subcutaneous soft tissue edema primarily within the dorsal aspect of the lower leg. No well-defined drainable fluid collection identified although evaluation for this finding is somewhat limited without intravenous contrast. Diffuse fatty infiltration/atrophy of the medial head of the gastrocnemius muscle. Ct Tibia Fibula Right Wo Contrast    Result Date: 3/25/2021  EXAMINATION: CT OF THE RIGHT FEMUR WITH CONTRAST; CT OF THE RIGHT KNEE WITHOUT CONTRAST; CT OF THE RIGHT TIBIA AND FIBULA WITHOUT CONTRAST 3/25/2021 7:47 pm TECHNIQUE: CT of the right femur was performed with the administration of intravenous contrast.  Multiplanar reformatted images are provided for review. Dose modulation, iterative reconstruction, and/or weight based adjustment of the mA/kV was utilized to reduce the radiation dose to as low as reasonably achievable.; CT of the right knee was performed without the administration of intravenous contrast.  Multiplanar reformatted images are provided for review. Dose modulation, iterative reconstruction, and/or weight based adjustment of the mA/kV was utilized to reduce the radiation dose to as low as reasonably achievable.; CT of the right tibia and fibula was performed without the administration of intravenous contrast.  Multiplanar reformatted images are provided for review. Dose modulation, iterative reconstruction, and/or weight based adjustment of the mA/kV was utilized to reduce the radiation dose to as low as reasonably achievable. COMPARISON: None. HISTORY ORDERING SYSTEM PROVIDED HISTORY: pain, swelling lower aspect of thigh TECHNOLOGIST PROVIDED HISTORY: Reason for exam:->pain, swelling lower aspect of thigh Recent knee replacement, evaluate for septic arthritis. FINDINGS: Bones: No definite acute fracture or dislocation of the femur, tibia, and fibula. No periprosthetic lucency identified to suggest loosening.   No aggressive periostitis although please note that evaluation is somewhat limited due to metallic artifact from the patient's prosthesis. Soft Tissue:  Musculature is essentially preserved within the anterior and posterior compartments of the thigh. There is diffuse subcutaneous soft tissue edema surrounding the knee and lower leg, primarily within the dorsum of the calf. There is no well-circumscribed drainable fluid collection identified, however please note that small fluid collections would not be readily visualized without the benefit of intravenous contrast.  There is advanced fatty infiltration/atrophy of the medial head of the gastrocnemius muscle. Joint: Mild degenerative narrowing of the femoroacetabular joint. No joint effusion identified. Status post total knee arthroplasty. Evaluation of the arthroplasty itself is obscured by streak artifact from the metal component, however there is no convincing evidence for hardware loosening or perihardware fracture. There is a moderate amount of patellofemoral joint fluid, the sterility of which cannot be confirmed by imaging. The tibiotalar joint space is grossly preserved. Within limitations of hardware related artifact, there is no convincing evidence for loosening or perihardware fracture. Moderate patellofemoral joint fluid, the sterility of which cannot be confirmed by imaging. No definite cortical disruption or periostitis identified. Extensive subcutaneous soft tissue edema primarily within the dorsal aspect of the lower leg. No well-defined drainable fluid collection identified although evaluation for this finding is somewhat limited without intravenous contrast. Diffuse fatty infiltration/atrophy of the medial head of the gastrocnemius muscle.      Us Dup Lower Extremities Bilateral Venous    Result Date: 3/25/2021  EXAMINATION: DUPLEX VENOUS ULTRASOUND OF THE BILATERAL LOWER EXTREMITIES, 3/25/2021 1:37 pm TECHNIQUE: Duplex ultrasound using B-mode/gray scaled imaging and Doppler spectral analysis and color flow was obtained of the bilateral lower extremities. COMPARISON: None. HISTORY: ORDERING SYSTEM PROVIDED HISTORY: Suspected deep vein thrombosis (DVT) TECHNOLOGIST PROVIDED HISTORY: Reason for exam:->rule out dvt What reading provider will be dictating this exam?->CRC FINDINGS: The visualized veins of the bilateral lower extremities are patent and free of echogenic thrombus. The veins demonstrate good compressibility with normal color flow study and spectral analysis. There is a right inguinal node maximally measuring 4.0 cm. No evidence of DVT in either lower extremity. LABS  Recent Labs     03/24/21 1645 03/25/21  0559 03/26/21  0640   WBC 10.0 11.5 10.8   HGB 12.1* 11.3* 11.0*   HCT 37.0 35.1* 33.5*   MCV 84.5 84.4 84.0    200 200     Recent Labs     03/24/21  1645 03/24/21  1645 03/25/21  0559 03/26/21  0640     --  134 131*   K 4.1   < > 4.0 3.9     --  103 98   CO2 22  --  20* 21*   BUN 25*  --  15 14   CREATININE 1.4*  --  0.9 0.9   GFRAA >60  --  >60 >60   LABGLOM 52  --  >60 >60   GLUCOSE 122*  --  116* 119*   PROT 6.5  --  6.6 6.1*   LABALBU 3.6  --  3.4* 3.1*   CALCIUM 8.0*  --  8.0* 7.9*   BILITOT 0.9  --  1.1 0.8   ALKPHOS 90  --  85 80   AST 14  --  18 13   ALT 14  --  13 14    < > = values in this interval not displayed.      Recent Labs     03/25/21  0559   PROCAL 0.19*     Lab Results   Component Value Date    CRP 0.3 03/25/2021    CRP 13.2 (H) 03/24/2021    CRP 12.6 (H) 08/01/2018     Lab Results   Component Value Date    SEDRATE 21 (H) 03/24/2021    SEDRATE 6 05/06/2019    SEDRATE 45 (H) 08/01/2018       Recent Labs     03/24/21  1645 03/24/21  2040 03/24/21  2225 03/25/21  0328 03/25/21  0559 03/26/21  0640   CRP  --  13.2*  --   --  0.3  --    PROCAL  --   --   --   --  0.19*  --    TROPONINI 0.02  --  0.02 0.03  --   --    DDIMER  --   --  629  --   --   --    INR 1.4  --   --   --   --   --    PROTIME 16.5*  --   --   --   --   --    AST 14  --   --   --  18 13   ALT 14  -- --   --  13 14   TRIG  --   --   --   --  52  --      Lab Results   Component Value Date    CHOL 93 03/25/2021    TRIG 52 03/25/2021    HDL 41 03/25/2021    LDLCALC 42 03/25/2021    LABVLDL 10 03/25/2021        MICROBIOLOGY:  Cultures :   Lab Results   Component Value Date    [facility]  03/24/2021     Gram stain performed from blood culture bottle media  Gram positive cocci in chains      BC 5 Days- no growth 05/06/2019    BC 5 Days- no growth 08/01/2018    ORG Staphylococcus aureus 05/06/2019    ORG Enterobacter cloacae 05/06/2019    ORG Coagulase Negative Staphylococci 08/01/2018    ORG Corynebacterium species 08/01/2018     Lab Results   Component Value Date    BLOODCULT2  03/24/2021     Gram stain performed from blood culture bottle media  Gram positive cocci in chains      BLOODCULT2 5 Days- no growth 05/06/2019    BLOODCULT2 5 Days- no growth 08/01/2018    ORG Staphylococcus aureus 05/06/2019    ORG Enterobacter cloacae 05/06/2019    ORG Coagulase Negative Staphylococci 08/01/2018    ORG Corynebacterium species 08/01/2018       WOUND/ABSCESS   Date Value Ref Range Status   05/06/2019 Heavy growth  Final   05/06/2019 Rare growth  Final   08/01/2018 Rare growth  Final   08/01/2018 Rare growth  Final          Urine Culture, Routine   Date Value Ref Range Status   07/25/2019 Growth not present  Final     MRSA Culture Only   Date Value Ref Range Status   07/22/2019 Methicillin resistant Staph aureus not isolated  Final        Patient is a 61 y.o. male who presented with   Chief Complaint   Patient presents with    Knee Pain     right , denies recent injury        FINAL IMPRESSION    ID was asked to see for GPC chains septicemia/Streptococcus agalactiae   fevers  1. Atrial flutter with rapid ventricular response ([address]    2. Right knee pain suspect PJI    3.  Suspected deep vein thrombosis (DVT)     right 1st to ulcer now with callus left 2nd toe callus   -f/u blood cx   -add ceftriaxone    Seen by Cardiology for ANALI    cefTRIAXone (ROCEPHIN) 2,000 mg in sterile water 20 mL IV syringe, Q24H  vancomycin (VANCOCIN) 1,750 mg in dextrose 5 % 500 mL IVPB, Q12H         Imaging and labs were reviewed per medical records and any ID pertinent labs were addressed with the patient. The patient/FAMILY  was educated about the diagnosis, prognosis, indications, risks and benefits of treatment. An opportunity to ask questions was given to the patient/FAMILY and questions were answered. Thank you for involving me in the care of Benedict Mosquera. Please do not hesitate to call for any questions or concerns.          Electronically signed by Ronel Shukla MD on 3/26/2021 at 9:40 AM

## 2021-03-26 NOTE — ANESTHESIA PRE PROCEDURE
Department of Anesthesiology  Preprocedure Note       Name:  Celi Rogers   Age:  61 y.o.  :  1960                                          MRN:  [id number]         Date:  3/26/2021      Surgeon: London Vital     Procedure: SJWZ ANALI CARDIOVERSION    Medications prior to admission:   Prior to Admission medications    Medication Sig Start Date End Date Taking? Authorizing Provider   cyclobenzaprine (FLEXERIL) 10 MG tablet Take 1 tablet by mouth 2 times daily as needed for Muscle spasms 3/19/21   DINH Ortega CNP   HYDROcodone-acetaminophen (NORCO)  MG per tablet Take 1 tablet by mouth daily for 30 days. Intended supply: 30 days 3/19/21 4/18/21  DINH Ortega CNP   celecoxib (CELEBREX) 200 MG capsule Take 200 mg by mouth 2 times daily    Historical Provider, MD   Elastic Bandages & Supports (JOBST KNEE HIGH COMPRESSION SM) MISC Dx: Varicose veins of the legs with pain and swelling. Bilateral thigh-high 30-40 mm Hg compression stockings. 3/15/21   Jyoti Avila PA-C   SSD 1 % cream apply externally to affected area once a day 12/10/20   Historical Provider, MD   celecoxib (CELEBREX) 200 MG capsule Take 1 capsule by mouth daily 11/11/20 3/15/21  DINH Ortega CNP   diphenhydrAMINE-APAP, sleep, (TYLENOL PM EXTRA STRENGTH)  MG tablet Take 1 tablet by mouth nightly as needed for Sleep    Historical Provider, MD   Glucosamine-Chondroit-Vit C-Mn (GLUCOSAMINE 1500 COMPLEX PO) Take 1 tablet by mouth daily    Historical Provider, MD   diltiazem (DILACOR XR) 240 MG extended release capsule Take 240 mg by mouth nightly     Historical Provider, MD   Turmeric 500 MG TABS Take 500 mg by mouth daily    Historical Provider, MD   omeprazole (PRILOSEC) 20 MG capsule Take 20 mg by mouth Daily  13   Historical Provider, MD   irbesartan (AVAPRO) 300 MG tablet Take 300 mg by mouth daily.  12   Historical Provider, MD       Current medications:    No current facility-administered medications for this visit. No current outpatient medications on file.      Facility-Administered Medications Ordered in Other Visits   Medication Dose Route Frequency Provider Last Rate Last Admin    cefTRIAXone (ROCEPHIN) 2,000 mg in sterile water 20 mL IV syringe  2,000 mg Intravenous Q24H Jaya Lopez MD   2,000 mg at 03/26/21 5486    dilTIAZem (CARDIZEM CD) extended release capsule 240 mg  240 mg Oral Nightly Lucia Simranen, DO   240 mg at 03/26/21 0236    [Held by provider] losartan (COZAAR) tablet 100 mg  100 mg Oral Daily Lucia Simranen, DO        fentaNYL (SUBLIMAZE) injection 25 mcg  25 mcg Intravenous Q2H PRN Lucia Casey, DO   25 mcg at 03/25/21 1309    metoprolol succinate (TOPROL XL) extended release tablet 25 mg  25 mg Oral BID Ebb Sergeant, PA-C   25 mg at 03/26/21 0920    apixaban (ELIQUIS) tablet 5 mg  5 mg Oral BID Ebb Sergeant, PA-C   5 mg at 03/26/21 7404    vancomycin (VANCOCIN) 1,750 mg in dextrose 5 % 500 mL IVPB  1,750 mg Intravenous Q12H Lucia Simranen, DO   Stopped at 03/26/21 0530    metoprolol (LOPRESSOR) injection 5 mg  5 mg Intravenous Q10 Min PRN Paul Murray MD   5 mg at 03/26/21 0834    dilTIAZem 125 mg in dextrose 5 % 125 mL infusion  5-15 mg/hr Intravenous Continuous Lucia Casey, DO 15 mL/hr at 03/25/21 1411 15 mg/hr at 03/25/21 1411    glucose (GLUTOSE) 40 % oral gel 15 g  15 g Oral PRN Lucia Casey, DO        dextrose 50 % IV solution  12.5 g Intravenous PRN Lucia Simranen, DO        glucagon (rDNA) injection 1 mg  1 mg Intramuscular PRN Lucia Simranen, DO        dextrose 5 % solution  100 mL/hr Intravenous PRN Lucia Casey, DO        sodium chloride flush 0.9 % injection 10 mL  10 mL Intravenous 2 times per day Lucia Casey, DO   10 mL at 03/26/21 2566    sodium chloride flush 0.9 % injection 10 mL  10 mL Intravenous PRN Lucia Casey, DO        polyethylene glycol (GLYCOLAX) packet 17 g  17 g Oral Daily PRN Jose L Millie, DO        acetaminophen (TYLENOL) tablet 650 mg  650 mg Oral Q6H PRN Jose L Millie, DO   650 mg at 03/25/21 2102    Or    acetaminophen (TYLENOL) suppository 650 mg  650 mg Rectal Q6H PRN Jose L Millie, DO        HYDROcodone-acetaminophen (NORCO) 5-325 MG per tablet 2 tablet  2 tablet Oral Q8H PRN Jose L Millie, DO   2 tablet at 03/26/21 0706    pantoprazole (PROTONIX) tablet 40 mg  40 mg Oral QAM AC Ismail U Ben, DO   40 mg at 03/26/21 1903       Allergies:    No Known Allergies    Problem List:    Patient Active Problem List   Diagnosis Code    Impingement syndrome of shoulder M75.40    Rotator cuff tear M75.100    Shoulder pain M25.519    Biceps tendon rupture, proximal S46.119A    De Quervain's disease (tenosynovitis) M65.4    Other tenosynovitis of hand and wrist M65.849, M65.839    Radial styloid tenosynovitis M65.4    Kienbock's avascular necrosis of lunate, adult M93.1    Primary osteoarthritis of right knee M17.11    Tear of medial meniscus of right knee, current S83.241A    Cellulitis of the right lower extremity L03.90    Non-pressure chronic ulcer of left lower leg with fat layer exposed ([address] L97.922    Venous stasis of lower extremity I87.8    Primary osteoarthritis of left knee M17.12    Status post total bilateral knee replacement Z96.653    Bilateral primary osteoarthritis of knee M17.0    Arthritis of right foot M19.071    Tenosynovitis of foot M65.9    Pain in right ankle and joints of right foot M25.571    Traumatic ulcer of right lower extremity with fat layer exposed ([address] L97.912    Open wound of right great toe S91.101A    Venous insufficiency (chronic) (peripheral) I87.2    Pain in both lower extremities M79.604, M79.605    Difficulty walking R26.2    New onset atrial flutter (HCC) I48.92       Past Medical History:        Diagnosis Date    Cellulitis     Edema     legs with cellulitis    GERD (gastroesophageal reflux disease)     Hypertension     Osteoarthritis     Tenosynovitis, de Quervain     Venous insufficiency        Past Surgical History:        Procedure Laterality Date    COLONOSCOPY      x2    FOOT SURGERY      JOINT REPLACEMENT      bilateral knee replacement    JOINT REPLACEMENT      right ankle    OTHER SURGICAL HISTORY Right 01-25-13    right wrist 1st dorsal compartment release    REPLACEMENT TOTAL KNEE BILATERAL Bilateral 7/31/2019    KNEE TOTAL ARTHROPLASTY BILATERAL performed by Yudith Mcintyre DO at [facility] ARTHROSCOPY      TONSILLECTOMY      TONSILLECTOMY AND ADENOIDECTOMY      VASCULAR SURGERY      both legs     VASECTOMY         Social History:    Social History     Tobacco Use    Smoking status: Never Smoker    Smokeless tobacco: Never Used   Substance Use Topics    Alcohol use: Yes     Alcohol/week: 2.0 standard drinks     Types: 2 Glasses of wine per week     Frequency: 2-3 times a week                                Counseling given: Not Answered      Vital Signs (Current): There were no vitals filed for this visit.                                            BP Readings from Last 3 Encounters:   03/26/21 120/82   03/15/21 122/78   08/02/19 118/65       NPO Status:  greater than 8 hours                                                                               BMI:   Wt Readings from Last 3 Encounters:   03/24/21 283 lb (128.4 kg)   03/15/21 285 lb (129.3 kg)   03/02/21 285 lb (129.3 kg)     There is no height or weight on file to calculate BMI.    CBC:   Lab Results   Component Value Date    WBC 10.8 03/26/2021    RBC 3.99 03/26/2021    HGB 11.0 03/26/2021    HCT 33.5 03/26/2021    MCV 84.0 03/26/2021    RDW 13.3 03/26/2021     03/26/2021       CMP:   Lab Results   Component Value Date     03/26/2021    K 3.9 03/26/2021    K 4.1 03/24/2021    CL 98 03/26/2021    CO2 21 03/26/2021    BUN 14 03/26/2021    CREATININE 0.9 03/26/2021    GFRAA >60 03/26/2021 LABGLOM >60 03/26/2021    GLUCOSE 119 03/26/2021    PROT 6.1 03/26/2021    CALCIUM 7.9 03/26/2021    BILITOT 0.8 03/26/2021    ALKPHOS 80 03/26/2021    AST 13 03/26/2021    ALT 14 03/26/2021       POC Tests: No results for input(s): POCGLU, POCNA, POCK, POCCL, POCBUN, POCHEMO, POCHCT in the last 72 hours. Coags:   Lab Results   Component Value Date    PROTIME 16.5 03/24/2021    INR 1.4 03/24/2021    APTT 38.4 07/31/2019       HCG (If Applicable): No results found for: PREGTESTUR, PREGSERUM, HCG, HCGQUANT     ABGs: No results found for: PHART, PO2ART, QRE6FBJ, ZYD8ALW, BEART, D3GBLOOK     Type & Screen (If Applicable):  No results found for: LABABO, [address]    Anesthesia Evaluation  Patient summary reviewed  Airway: Mallampati: II  TM distance: >3 FB   Neck ROM: full  Mouth opening: > = 3 FB Dental: normal exam         Pulmonary:Negative Pulmonary ROS breath sounds clear to auscultation                             Cardiovascular:    (+) hypertension:, dysrhythmias: atrial fibrillation and atrial flutter,       ECG reviewed  Rhythm: irregular  Rate: normal           Beta Blocker:  Dose within 24 Hrs      ROS comment: EKG: Normal Sinus Rhythm 74, with occasional premature ventricular contractions. Neuro/Psych:                ROS comment: Bilateral leg edema and venous insufficiency with stasis ulcers. GI/Hepatic/Renal:   (+) GERD:,           Endo/Other:    (+) : arthritis: OA., .                  ROS comment: Cellulitis. Abdominal:   (+) obese,         Vascular: negative vascular ROS. Anesthesia Plan      MAC     ASA 3       Induction: intravenous. BIS  MIPS: Prophylactic antiemetics administered. Anesthetic plan and risks discussed with patient. Plan discussed with CRNA.             Nandini Patton DO  Staff Anesthesiologist  1:46 PM

## 2021-03-26 NOTE — PROGRESS NOTES
Brief Post-procedure Note        Pre-operative Diagnosis: A Fib - r/o thrombus prior to DC cardioversion;  Bacteremia r/o Endocarditis    Post-operative Diagnosis: No ANALI indication of thrombus, No Endocarditis,    Procedure: ANALI with agitated saline injection; Cardioversion x1 with 100J of synchronized biphasic direct current     Anesthesia: LMAC    Surgeons/Assistants: Dr Meghan Lim    Estimated Blood Loss: None    Complications: None    Full report to follow    Electronically signed by Na Horne MD, [facility]

## 2021-03-26 NOTE — ANESTHESIA POSTPROCEDURE EVALUATION
Department of Anesthesiology  Postprocedure Note    Patient: Jonah Brennan  MRN: [id number]  YOB: 1960  Date of evaluation: 3/26/2021  Time:  3:48 PM     Procedure Summary     Date: 03/26/21 Room / Location: [address] [place] [address] PACU; [address] [place] [address] ECHO    Anesthesia Start: 1406 Anesthesia Stop: 1425    Procedure: SJWZ ANALI CARDIOVERSION Diagnosis:     Scheduled Providers:  Responsible Provider: Allyn Ruff DO    Anesthesia Type: MAC ASA Status: 3          Anesthesia Type: MAC    Bal Phase I:      Bal Phase II: Bal Score: 10    Last vitals: Reviewed and per EMR flowsheets.        Anesthesia Post Evaluation    Patient location during evaluation: PACU  Patient participation: complete - patient participated  Level of consciousness: awake and alert  Airway patency: patent  Nausea & Vomiting: no nausea and no vomiting  Complications: no  Cardiovascular status: hemodynamically stable  Respiratory status: acceptable  Hydration status: euvolemic

## 2021-03-26 NOTE — PROGRESS NOTES
1355-Pt attached to monitor. Aflutter. Oxygen applied at 4L NC. BP cuff applied.  Pads applied to chest. VSS  Timeout at 1409  1420 shock delivered 100J  Hand off from anesthesia at 1426

## 2021-03-26 NOTE — PROGRESS NOTES
Pt has been having wife dump urinal without measurement. Wife states that he has went 3 times \"approximately 300ml each time\". Nurse educated patient on importance of measuring urine in CHF patients. Nurse requested that patient call each time he uses urinal so that nurse may measure and dump it accordingly. Pt verbalized understanding. Will continue to monitor and further urine occurrences will be measured.     Electronically signed by Stacy Mendoza RN on 3/26/2021 at 5:47 PM

## 2021-03-27 ENCOUNTER — APPOINTMENT (OUTPATIENT)
Dept: NUCLEAR MEDICINE | Age: 61
DRG: 466 | End: 2021-03-27
Payer: COMMERCIAL

## 2021-03-27 ENCOUNTER — ANESTHESIA EVENT (OUTPATIENT)
Dept: OPERATING ROOM | Age: 61
DRG: 466 | End: 2021-03-27
Payer: COMMERCIAL

## 2021-03-27 LAB
ALBUMIN SERPL-MCNC: 3.1 G/DL (ref 3.5–5.2)
ALP BLD-CCNC: 93 U/L (ref 40–129)
ALT SERPL-CCNC: 22 U/L (ref 0–40)
ANION GAP SERPL CALCULATED.3IONS-SCNC: 14 MMOL/L (ref 7–16)
AST SERPL-CCNC: 18 U/L (ref 0–39)
BASOPHILS ABSOLUTE: 0.04 E9/L (ref 0–0.2)
BASOPHILS RELATIVE PERCENT: 0.4 % (ref 0–2)
BILIRUB SERPL-MCNC: 0.5 MG/DL (ref 0–1.2)
BUN BLDV-MCNC: 17 MG/DL (ref 8–23)
C-REACTIVE PROTEIN: 27.8 MG/DL (ref 0–0.4)
CALCIUM SERPL-MCNC: 8.3 MG/DL (ref 8.6–10.2)
CHLORIDE BLD-SCNC: 96 MMOL/L (ref 98–107)
CO2: 20 MMOL/L (ref 22–29)
CREAT SERPL-MCNC: 0.8 MG/DL (ref 0.7–1.2)
EOSINOPHILS ABSOLUTE: 0.02 E9/L (ref 0.05–0.5)
EOSINOPHILS RELATIVE PERCENT: 0.2 % (ref 0–6)
GFR AFRICAN AMERICAN: >60
GFR NON-AFRICAN AMERICAN: >60 ML/MIN/1.73
GLUCOSE BLD-MCNC: 109 MG/DL (ref 74–99)
HCT VFR BLD CALC: 33.5 % (ref 37–54)
HEMOGLOBIN: 11.3 G/DL (ref 12.5–16.5)
IMMATURE GRANULOCYTES #: 0.04 E9/L
IMMATURE GRANULOCYTES %: 0.4 % (ref 0–5)
LYMPHOCYTES ABSOLUTE: 1.22 E9/L (ref 1.5–4)
LYMPHOCYTES RELATIVE PERCENT: 13.1 % (ref 20–42)
MCH RBC QN AUTO: 27.7 PG (ref 26–35)
MCHC RBC AUTO-ENTMCNC: 33.7 % (ref 32–34.5)
MCV RBC AUTO: 82.1 FL (ref 80–99.9)
MONOCYTES ABSOLUTE: 0.91 E9/L (ref 0.1–0.95)
MONOCYTES RELATIVE PERCENT: 9.8 % (ref 2–12)
NEUTROPHILS ABSOLUTE: 7.05 E9/L (ref 1.8–7.3)
NEUTROPHILS RELATIVE PERCENT: 76.1 % (ref 43–80)
PDW BLD-RTO: 13.1 FL (ref 11.5–15)
PLATELET # BLD: 226 E9/L (ref 130–450)
PMV BLD AUTO: 11.3 FL (ref 7–12)
POTASSIUM SERPL-SCNC: 4 MMOL/L (ref 3.5–5)
RBC # BLD: 4.08 E12/L (ref 3.8–5.8)
SEDIMENTATION RATE, ERYTHROCYTE: 68 MM/HR (ref 0–15)
SODIUM BLD-SCNC: 130 MMOL/L (ref 132–146)
TOTAL PROTEIN: 6.7 G/DL (ref 6.4–8.3)
URIC ACID, SERUM: 3.7 MG/DL (ref 3.4–7)
VANCOMYCIN TROUGH: 6.9 MCG/ML (ref 5–16)
WBC # BLD: 9.3 E9/L (ref 4.5–11.5)

## 2021-03-27 PROCEDURE — 6370000000 HC RX 637 (ALT 250 FOR IP): Performed by: PHYSICIAN ASSISTANT

## 2021-03-27 PROCEDURE — 85651 RBC SED RATE NONAUTOMATED: CPT

## 2021-03-27 PROCEDURE — 6360000002 HC RX W HCPCS: Performed by: INTERNAL MEDICINE

## 2021-03-27 PROCEDURE — 3430000000 HC RX DIAGNOSTIC RADIOPHARMACEUTICAL: Performed by: RADIOLOGY

## 2021-03-27 PROCEDURE — 76937 US GUIDE VASCULAR ACCESS: CPT

## 2021-03-27 PROCEDURE — 2060000000 HC ICU INTERMEDIATE R&B

## 2021-03-27 PROCEDURE — 36415 COLL VENOUS BLD VENIPUNCTURE: CPT

## 2021-03-27 PROCEDURE — 2580000003 HC RX 258: Performed by: SPECIALIST

## 2021-03-27 PROCEDURE — 6360000002 HC RX W HCPCS: Performed by: SPECIALIST

## 2021-03-27 PROCEDURE — 2720000010 HC SURG SUPPLY STERILE

## 2021-03-27 PROCEDURE — 2580000003 HC RX 258: Performed by: INTERNAL MEDICINE

## 2021-03-27 PROCEDURE — C1751 CATH, INF, PER/CENT/MIDLINE: HCPCS

## 2021-03-27 PROCEDURE — 84550 ASSAY OF BLOOD/URIC ACID: CPT

## 2021-03-27 PROCEDURE — 99231 SBSQ HOSP IP/OBS SF/LOW 25: CPT | Performed by: ORTHOPAEDIC SURGERY

## 2021-03-27 PROCEDURE — A9503 TC99M MEDRONATE: HCPCS | Performed by: RADIOLOGY

## 2021-03-27 PROCEDURE — 86140 C-REACTIVE PROTEIN: CPT

## 2021-03-27 PROCEDURE — 80053 COMPREHEN METABOLIC PANEL: CPT

## 2021-03-27 PROCEDURE — 6360000002 HC RX W HCPCS: Performed by: PHYSICAL THERAPY ASSISTANT

## 2021-03-27 PROCEDURE — 6370000000 HC RX 637 (ALT 250 FOR IP): Performed by: INTERNAL MEDICINE

## 2021-03-27 PROCEDURE — 80202 ASSAY OF VANCOMYCIN: CPT

## 2021-03-27 PROCEDURE — 02HV33Z INSERTION OF INFUSION DEVICE INTO SUPERIOR VENA CAVA, PERCUTANEOUS APPROACH: ICD-10-PCS | Performed by: INTERNAL MEDICINE

## 2021-03-27 PROCEDURE — 78315 BONE IMAGING 3 PHASE: CPT

## 2021-03-27 PROCEDURE — 99254 IP/OBS CNSLTJ NEW/EST MOD 60: CPT | Performed by: SURGERY

## 2021-03-27 PROCEDURE — 85025 COMPLETE CBC W/AUTO DIFF WBC: CPT

## 2021-03-27 PROCEDURE — 36569 INSJ PICC 5 YR+ W/O IMAGING: CPT

## 2021-03-27 PROCEDURE — 2580000003 HC RX 258

## 2021-03-27 RX ORDER — TC 99M MEDRONATE 20 MG/10ML
25 INJECTION, POWDER, LYOPHILIZED, FOR SOLUTION INTRAVENOUS
Status: COMPLETED | OUTPATIENT
Start: 2021-03-27 | End: 2021-03-27

## 2021-03-27 RX ADMIN — ACETAMINOPHEN 650 MG: 325 TABLET, FILM COATED ORAL at 09:07

## 2021-03-27 RX ADMIN — VANCOMYCIN HYDROCHLORIDE 1500 MG: 10 INJECTION, POWDER, LYOPHILIZED, FOR SOLUTION INTRAVENOUS at 14:50

## 2021-03-27 RX ADMIN — VANCOMYCIN HYDROCHLORIDE 1750 MG: 10 INJECTION, POWDER, LYOPHILIZED, FOR SOLUTION INTRAVENOUS at 04:00

## 2021-03-27 RX ADMIN — ACETAMINOPHEN 650 MG: 325 TABLET, FILM COATED ORAL at 17:31

## 2021-03-27 RX ADMIN — Medication 10 ML: at 09:10

## 2021-03-27 RX ADMIN — TC 99M MEDRONATE 25 MILLICURIE: 20 INJECTION, POWDER, LYOPHILIZED, FOR SOLUTION INTRAVENOUS at 07:47

## 2021-03-27 RX ADMIN — WATER 2000 MG: 1 INJECTION INTRAMUSCULAR; INTRAVENOUS; SUBCUTANEOUS at 09:06

## 2021-03-27 RX ADMIN — METOPROLOL SUCCINATE 25 MG: 25 TABLET, EXTENDED RELEASE ORAL at 21:13

## 2021-03-27 RX ADMIN — DILTIAZEM HYDROCHLORIDE 240 MG: 240 CAPSULE, COATED, EXTENDED RELEASE ORAL at 21:13

## 2021-03-27 RX ADMIN — VANCOMYCIN HYDROCHLORIDE 1500 MG: 10 INJECTION, POWDER, LYOPHILIZED, FOR SOLUTION INTRAVENOUS at 23:37

## 2021-03-27 RX ADMIN — SODIUM CHLORIDE, PRESERVATIVE FREE 10 ML: 5 INJECTION INTRAVENOUS at 09:10

## 2021-03-27 RX ADMIN — HEPARIN 300 UNITS: 100 SYRINGE at 21:14

## 2021-03-27 RX ADMIN — METOPROLOL SUCCINATE 25 MG: 25 TABLET, EXTENDED RELEASE ORAL at 09:07

## 2021-03-27 RX ADMIN — CEFAZOLIN SODIUM 2000 MG: 2 SOLUTION INTRAVENOUS at 09:08

## 2021-03-27 RX ADMIN — Medication 10 ML: at 21:22

## 2021-03-27 ASSESSMENT — PAIN SCALES - GENERAL: PAINLEVEL_OUTOF10: 0

## 2021-03-27 ASSESSMENT — LIFESTYLE VARIABLES: SMOKING_STATUS: 0

## 2021-03-27 NOTE — PROGRESS NOTES
Internal Medicine Progress Note    [facility]    Sary Suresh, GLADYS.ACODYOLeoILeo Calabrese D.O., JAIDAOSHIRA Acuna, MSN, APRN, NP-C  Lazarus Leep. Katherine Fothergill, MSN, APRN-CNP     Primary Care Physician: Beto Brower MD   Admitting Physician:  Bianca Erazo DO  Admission date and time: 3/24/2021  4:20 PM    Room:  [address] [place] [address]  Admitting diagnosis: New onset atrial flutter [facility]) [I48.92]    Patient Name: Zacarias Abrams  MRN: [id number]    Date of Service: 3/27/2021     Subjective:  Jihan Marti is a 61 y.o. male who was seen and examined today,3/27/2021, at the bedside. Jihan Marti was evaluated while being wheeled to the bone scan. He seems to be resting more comfortably today but continues to complain of shortness of breath. Multiple subspecialists are providing consultation and recommendations have been reviewed. His pain is otherwise well controlled. No family members were present during my examination. Review of System:   Constitutional:   Admits to generalized malaise and fatigue with ongoing low-grade febrile illness. HEENT:   Denies ear pain, sore throat, sinus or eye problems. Lesion on right scalp  Cardiovascular:   Seems to be in normal sinus rhythm following cardioversion yesterday f  Respiratory:   Persistent shortness of breath without significant coughing or sputum production. Gastrointestinal:   Denies nausea, vomiting, diarrhea, or constipation. Denies any abdominal pain. Genitourinary:    Denies any urgency, frequency, hematuria. Voiding  without difficulty. Extremities:   Admits to pain with the affected knee. Neurology:    Denies any headache or focal neurological deficits, Denies generalized weakness or memory difficulty. Psch:   Denies being anxious or depressed. Musculoskeletal:    Denies  myalgias, joint complaints or back pain. Integumentary:   Denies any rashes, ulcers, or excoriations.   Denies bruising. Hematologic/Lymphatic:  Denies bruising or bleeding. Physical Exam:  I/O this shift:  In: 280 [P.O.:200; I.V.:30; IV Piggyback:50]  Out: 790 [Urine:790]    Intake/Output Summary (Last 24 hours) at 3/27/2021 1252  Last data filed at 3/27/2021 1233  Gross per 24 hour   Intake 1310 ml   Output 790 ml   Net 520 ml   I/O last 3 completed shifts: In: 1030 [P.O.:300; I.V.:230; IV Piggyback:500]  Out: -   Patient Vitals for the past 96 hrs (Last 3 readings):   Weight   03/24/21 1945 283 lb (128.4 kg)   03/24/21 1651 285 lb (129.3 kg)     Vital Signs:   Blood pressure 124/76, pulse 86, temperature 100.5 °F (38.1 °C), temperature source Oral, resp. rate 18, height 6' 4\" (1.93 m), weight 283 lb (128.4 kg), SpO2 96 %. General appearance:  Alert, responsive, oriented to person, place, and time. Well preserved, alert, no distress. Head:  Normocephalic. No masses, lesions or tenderness. 1 cm lesion on the right scalp  Eyes:  PERRLA. EOMI. Sclera clear. Buccal mucosa moist.  ENT:  Ears normal. Mucosa normal.  Neck:    Supple. Trachea midline. No thyromegaly. No JVD. No bruits. Heart:    Regular rate and rhythm. Mild systolic murmur is appreciated. Lungs:    Symmetrical. Clear to auscultation bilaterally. No wheezes. No rhonchi. No rales. Abdomen:   Soft. Non-tender. Non-distended. Bowel sounds positive. No organomegaly or masses. No pain on palpation. Extremities:    Complaining of discomfort in the right knee and posterior right calf  Neurologic:    Alert x 3. No focal deficit. Cranial nerves grossly intact. No focal weakness. Psych:   Behavior is normal. Mood appears normal. Speech is not rapid and/or pressured. Musculoskeletal:   Spine ROM normal. Muscular strength intact. Gait not assessed. Integumentary:  No rashes  Skin normal color and texture.   Genitalia/Breast:  Deferred    Medication:  Scheduled Meds:   cefTRIAXone (ROCEPHIN) IV  2,000 mg Intravenous Q24H    lidocaine PF  5 mL Intradermal Once    heparin flush  3 mL Intravenous 2 times per day    lidocaine  10 mL Intra-articular See Admin Instructions    dilTIAZem  240 mg Oral Nightly    [Held by provider] losartan  100 mg Oral Daily    metoprolol succinate  25 mg Oral BID    apixaban  5 mg Oral BID    vancomycin  1,750 mg Intravenous Q12H    sodium chloride flush  10 mL Intravenous 2 times per day    pantoprazole  40 mg Oral QAM AC     Continuous Infusions:   dextrose         Objective Data:  CBC with Differential:    Lab Results   Component Value Date    WBC 9.3 03/27/2021    RBC 4.08 03/27/2021    HGB 11.3 03/27/2021    HCT 33.5 03/27/2021     03/27/2021    MCV 82.1 03/27/2021    MCH 27.7 03/27/2021    MCHC 33.7 03/27/2021    RDW 13.1 03/27/2021    LYMPHOPCT 13.1 03/27/2021    MONOPCT 9.8 03/27/2021    BASOPCT 0.4 03/27/2021    MONOSABS 0.91 03/27/2021    LYMPHSABS 1.22 03/27/2021    EOSABS 0.02 03/27/2021    BASOSABS 0.04 03/27/2021     CMP:    Lab Results   Component Value Date     03/27/2021    K 4.0 03/27/2021    K 4.1 03/24/2021    CL 96 03/27/2021    CO2 20 03/27/2021    BUN 17 03/27/2021    CREATININE 0.8 03/27/2021    GFRAA >60 03/27/2021    LABGLOM >60 03/27/2021    GLUCOSE 109 03/27/2021    PROT 6.7 03/27/2021    LABALBU 3.1 03/27/2021    CALCIUM 8.3 03/27/2021    BILITOT 0.5 03/27/2021    ALKPHOS 93 03/27/2021    AST 18 03/27/2021    ALT 22 03/27/2021       Assessment:  1. New onset atrial fibrillation/flutter with rapid ventricular response status post cardioversion  2. Streptococcus bacteremia  3. Septic right total knee arthroplasty with plans for irrigation and debridement with polyethylene exchange  4. Bilateral lower extremity venous insufficiency  5. Essential hypertension  6. Gastroesophageal reflux disease  7. Osteoarthritis  8. Abnormality of the right scalp area possibly suggesting carcinoma      Plan:   Anita Decker underwent transesophageal echocardiogram with cardioversion yesterday.   He tolerated this without complication. Antibiotics are being administered as per the infectious disease team and plans are for orthopedic intervention with irrigation and debridement with polyethylene exchange tomorrow. His pain is otherwise well controlled. We will continue to monitor his shortness of breath. Multiple subspecialists continue to provide consultation. More than 50% of my  time was spent at the bedside counseling/coordinating care with the patient and/or family with face to face contact. This time was spent reviewing notes and laboratory data as well as instructing and counseling the patient. Time I spent with the family or surrogate(s) is included only if the patient was incapable of providing the necessary information or participating in medical decisions. I also discussed the differential diagnosis and all of the proposed management plans with the patient and individuals accompanying the patient. Lyn Cummins requires this high level of physician care and nursing on the [facility] unit due the complexity of decision management and chance of rapid decline or death. Continued cardiac monitoring and higher level of nursing are required. I am readily available for any further decision-making and intervention.      Cameron Busch DO, F.A.C.O.I.  3/27/2021  12:52 PM

## 2021-03-27 NOTE — ANESTHESIA PRE PROCEDURE
Department of Anesthesiology  Preprocedure Note       Name:  Terrance Menon   Age:  61 y.o.  :  1960                                          MRN:  [id number]         Date:  3/28/2021      Surgeon: Wendie Gilbert): Mathew Jama DO    Procedure: KNEE TOTAL ARTHROPLASTY REVISION (Right Knee)    Medications prior to admission:   Prior to Admission medications    Medication Sig Start Date End Date Taking? Authorizing Provider   cyclobenzaprine (FLEXERIL) 10 MG tablet Take 1 tablet by mouth 2 times daily as needed for Muscle spasms 3/19/21   DINH Murcia CNP   HYDROcodone-acetaminophen (NORCO)  MG per tablet Take 1 tablet by mouth daily for 30 days. Intended supply: 30 days 3/19/21 4/18/21  DINH Murcia CNP   celecoxib (CELEBREX) 200 MG capsule Take 200 mg by mouth 2 times daily    Historical Provider, MD   Elastic Bandages & Supports (JOBST KNEE HIGH COMPRESSION SM) MISC Dx: Varicose veins of the legs with pain and swelling. Bilateral thigh-high 30-40 mm Hg compression stockings. 3/15/21   Jyoti Avila PA-C   SSD 1 % cream apply externally to affected area once a day 12/10/20   Historical Provider, MD   celecoxib (CELEBREX) 200 MG capsule Take 1 capsule by mouth daily 11/11/20 3/15/21  DINH Murcia CNP   diphenhydrAMINE-APAP, sleep, (TYLENOL PM EXTRA STRENGTH)  MG tablet Take 1 tablet by mouth nightly as needed for Sleep    Historical Provider, MD   Glucosamine-Chondroit-Vit C-Mn (GLUCOSAMINE 1500 COMPLEX PO) Take 1 tablet by mouth daily    Historical Provider, MD   diltiazem (DILACOR XR) 240 MG extended release capsule Take 240 mg by mouth nightly     Historical Provider, MD   Turmeric 500 MG TABS Take 500 mg by mouth daily    Historical Provider, MD   omeprazole (PRILOSEC) 20 MG capsule Take 20 mg by mouth Daily  13   Historical Provider, MD   irbesartan (AVAPRO) 300 MG tablet Take 300 mg by mouth daily.  12   Historical Provider, MD Current medications:    Current Facility-Administered Medications   Medication Dose Route Frequency Provider Last Rate Last Admin    vancomycin (VANCOCIN) 1,500 mg in dextrose 5 % 300 mL IVPB  1,500 mg Intravenous Q8H Ismachadd Contreras,  mL/hr at 03/28/21 0605 1,500 mg at 03/28/21 5743    cefTRIAXone (ROCEPHIN) 2,000 mg in sterile water 20 mL IV syringe  2,000 mg Intravenous Q24H Aleisha Meeks MD   2,000 mg at 03/27/21 0906    lidocaine PF 1 % injection 5 mL  5 mL Intradermal Once Brian Rene, DO        sodium chloride flush 0.9 % injection 10 mL  10 mL Intravenous PRN Jh Rene, DO        heparin flush 100 UNIT/ML injection 300 Units  3 mL Intravenous 2 times per day Kevin Thomas DO   300 Units at 03/27/21 2114    heparin flush 100 UNIT/ML injection 300 Units  3 mL Intracatheter PRN Brian Rene, DO        lidocaine 1 % (PF) injection 10 mL  10 mL Intra-articular See Admin Instructions Patrick Valiente, DO        dilTIAZem (CARDIZEM CD) extended release capsule 240 mg  240 mg Oral Nightly Palomo Veronicai, DO   240 mg at 03/27/21 2113    [Held by provider] losartan (COZAAR) tablet 100 mg  100 mg Oral Daily Palomo Veronicai, DO        fentaNYL (SUBLIMAZE) injection 25 mcg  25 mcg Intravenous Q2H PRN Palomo Veronicai, DO   25 mcg at 03/25/21 1309    metoprolol succinate (TOPROL XL) extended release tablet 25 mg  25 mg Oral BID [facility], PA-C   25 mg at 03/27/21 2113    apixaban (ELIQUIS) tablet 5 mg  5 mg Oral BID [facility], PA-C   Stopped at 03/27/21 0900    metoprolol (LOPRESSOR) injection 5 mg  5 mg Intravenous Q10 Min PRN Joy Baldwin MD   5 mg at 03/26/21 0834    glucose (GLUTOSE) 40 % oral gel 15 g  15 g Oral PRN Palomo Veronicai, DO        dextrose 50 % IV solution  12.5 g Intravenous PRN Palomo Veronicai, DO        glucagon (rDNA) injection 1 mg  1 mg Intramuscular PRN Palomo Veronicai, DO        dextrose 5 % solution  100 mL/hr Intravenous PRN Gray Lapine U Ben, DO        sodium chloride flush 0.9 % injection 10 mL  10 mL Intravenous 2 times per day Zelpha Hidden, DO   10 mL at 03/27/21 2122    sodium chloride flush 0.9 % injection 10 mL  10 mL Intravenous PRN Zelpha Hidden, DO        polyethylene glycol (GLYCOLAX) packet 17 g  17 g Oral Daily PRN Zelpha Hidden, DO        acetaminophen (TYLENOL) tablet 650 mg  650 mg Oral Q6H PRN Zelpha Hidden, DO   650 mg at 03/27/21 1731    Or    acetaminophen (TYLENOL) suppository 650 mg  650 mg Rectal Q6H PRN Zelpha Hidden, DO        HYDROcodone-acetaminophen (NORCO) 5-325 MG per tablet 2 tablet  2 tablet Oral Q8H PRN Zelpha Hidden, DO   2 tablet at 03/26/21 2008    pantoprazole (PROTONIX) tablet 40 mg  40 mg Oral QAM AC Zelpha Hidden, DO   Stopped at 03/28/21 2768       Allergies:    No Known Allergies    Problem List:    Patient Active Problem List   Diagnosis Code    Impingement syndrome of shoulder M75.40    Rotator cuff tear M75.100    Shoulder pain M25.519    Biceps tendon rupture, proximal S46.119A    De Quervain's disease (tenosynovitis) M65.4    Other tenosynovitis of hand and wrist M65.849, M65.839    Radial styloid tenosynovitis M65.4    Kienbock's avascular necrosis of lunate, adult M93.1    Primary osteoarthritis of right knee M17.11    Tear of medial meniscus of right knee, current S83.241A    Cellulitis of the right lower extremity L03.90    Non-pressure chronic ulcer of left lower leg with fat layer exposed ([address] L97.922    Venous stasis of lower extremity I87.8    Primary osteoarthritis of left knee M17.12    Status post total bilateral knee replacement Z96.653    Bilateral primary osteoarthritis of knee M17.0    Arthritis of right foot M19.071    Tenosynovitis of foot M65.9    Pain in right ankle and joints of right foot M25.571    Traumatic ulcer of right lower extremity with fat layer exposed ([address] L97.912    Open wound of right great toe S91.101A    Venous insufficiency (chronic) (peripheral) I87.2    Pain in both lower extremities M79.604, M79.605    Difficulty walking R26.2    New onset atrial flutter (HCC) I48.92    Essential hypertension I10    Acute pain of right knee M25.561    Bacteremia R78.81       Past Medical History:        Diagnosis Date    Cellulitis     Edema     legs with cellulitis    GERD (gastroesophageal reflux disease)     Hypertension     Osteoarthritis     Tenosynovitis, de Quervain     Venous insufficiency        Past Surgical History:        Procedure Laterality Date    COLONOSCOPY      x2    FOOT SURGERY      JOINT REPLACEMENT      bilateral knee replacement    JOINT REPLACEMENT      right ankle    OTHER SURGICAL HISTORY Right 01-25-13    right wrist 1st dorsal compartment release    REPLACEMENT TOTAL KNEE BILATERAL Bilateral 7/31/2019    KNEE TOTAL ARTHROPLASTY BILATERAL performed by Octavio Flores DO at [facility] ARTHROSCOPY      TONSILLECTOMY      TONSILLECTOMY AND ADENOIDECTOMY      VASCULAR SURGERY      both legs     VASECTOMY         Social History:    Social History     Tobacco Use    Smoking status: Never Smoker    Smokeless tobacco: Never Used   Substance Use Topics    Alcohol use:  Yes     Alcohol/week: 2.0 standard drinks     Types: 2 Glasses of wine per week     Frequency: 2-3 times a week                                Counseling given: Not Answered      Vital Signs (Current):   Vitals:    03/27/21 2335 03/28/21 0140 03/28/21 0600 03/28/21 0730   BP:  121/73  133/89   Pulse:  80  75   Resp:  20  18   Temp: 98.8 °F (37.1 °C) 100.1 °F (37.8 °C)  98.8 °F (37.1 °C)   TempSrc:  Oral  Oral   SpO2:  95%  97%   Weight:   281 lb (127.5 kg)    Height:                                                  BP Readings from Last 3 Encounters:   03/28/21 133/89   03/26/21 101/74   03/26/21 89/66       NPO Status: Time of last liquid consumption: 2100                        Time of last solid consumption: 2100 Date of last liquid consumption: 03/27/21                        Date of last solid food consumption: 03/27/21    BMI:   Wt Readings from Last 3 Encounters:   03/28/21 281 lb (127.5 kg)   03/15/21 285 lb (129.3 kg)   03/02/21 285 lb (129.3 kg)     Body mass index is 34.2 kg/m². CBC:   Lab Results   Component Value Date    WBC 7.8 03/28/2021    RBC 3.78 03/28/2021    HGB 10.4 03/28/2021    HCT 32.1 03/28/2021    MCV 84.9 03/28/2021    RDW 13.2 03/28/2021     03/28/2021       CMP:   Lab Results   Component Value Date     03/28/2021    K 3.8 03/28/2021    K 4.1 03/24/2021     03/28/2021    CO2 25 03/28/2021    BUN 17 03/28/2021    CREATININE 0.7 03/28/2021    GFRAA >60 03/28/2021    LABGLOM >60 03/28/2021    GLUCOSE 116 03/28/2021    PROT 6.4 03/28/2021    CALCIUM 8.3 03/28/2021    BILITOT 0.4 03/28/2021    ALKPHOS 111 03/28/2021    AST 58 03/28/2021    ALT 78 03/28/2021       POC Tests: No results for input(s): POCGLU, POCNA, POCK, POCCL, POCBUN, POCHEMO, POCHCT in the last 72 hours.     Coags:   Lab Results   Component Value Date    PROTIME 16.5 03/24/2021    INR 1.4 03/24/2021    APTT 38.4 07/31/2019       HCG (If Applicable): No results found for: PREGTESTUR, PREGSERUM, HCG, HCGQUANT     ABGs: No results found for: PHART, PO2ART, YHR5RIG, VEE2OWA, BEART, X1EJJWOP     Type & Screen (If Applicable):  No results found for: LABABO, [address]    Anesthesia Evaluation  Patient summary reviewed no history of anesthetic complications:   Airway: Mallampati: II  TM distance: >3 FB   Neck ROM: full  Mouth opening: > = 3 FB Dental: normal exam         Pulmonary:Negative Pulmonary ROS breath sounds clear to auscultation      (-) not a current smoker                           Cardiovascular:    (+) hypertension:, dysrhythmias: atrial fibrillation and atrial flutter,       ECG reviewed  Rhythm: irregular  Rate: normal           Beta Blocker:  Dose within 24 Hrs      ROS comment: EKG: Normal Sinus Rhythm 74, with occasional premature ventricular contractions. Neuro/Psych:   Negative Neuro/Psych ROS              GI/Hepatic/Renal:   (+) GERD:,      (-) no morbid obesity       Endo/Other:    (+) : arthritis ( Status post total bilateral knee replacement): OA., .                  ROS comment: Cellulitis. Abdominal:   (+) obese,         Vascular:           ROS comment: Bilateral leg edema and venous insufficiency with stasis ulcers. .                                 Anesthesia Plan      general     ASA 3     (20g IV right antecubital, single lumen PICC left antecubital)  Induction: intravenous. MIPS: Postoperative opioids intended and Prophylactic antiemetics administered. Anesthetic plan and risks discussed with patient. Plan discussed with CRNA. DOS STAFF ADDENDUM:    Pt seen and examined, chart reviewed (including anesthesia, drug and allergy history). Anesthetic plan, risks, benefits, alternatives, and personnel involved discussed with patient. Patient verbalized an understanding and agrees to proceed. Plan discussed with care team members and agreed upon.     Lucas Guillen MD  Staff Anesthesiologist  7:51 AM

## 2021-03-27 NOTE — PROGRESS NOTES
Department of Orthopedic Surgery  Resident Progress Note    Patient seen and examined. Pain improved and well controlled. No new complaints. Denies chest pain, shortness of breath, dizziness/lightheadedness. VITALS:  /76   Pulse 86   Temp 100.5 °F (38.1 °C) (Oral)   Resp 18   Ht 6' 4\" (1.93 m)   Wt 283 lb (128.4 kg)   SpO2 96%   BMI 34.45 kg/m²     General: well-developed and well nourished, in no acute distress and alert    MUSCULOSKELETAL:   right lower extremity:  · Dressing C/D/I  · Knee effusion has returned  · Patient able to tolerate range of motion about the knee  · Compartments soft and compressible  · +PF/DF/EHL  · +2/4 DP & PT pulses, Brisk Cap refill, Toes warm and perfused  · Distal sensation grossly intact to Peroneals, Sural, Saphenous, and tibial nrs    CBC:   Lab Results   Component Value Date    WBC 9.3 03/27/2021    HGB 11.3 03/27/2021    HCT 33.5 03/27/2021     03/27/2021     PT/INR:    Lab Results   Component Value Date    PROTIME 16.5 03/24/2021    INR 1.4 03/24/2021       ASSESSMENT  · Septic R TKA    PLAN      · Continue physical therapy and protocol: WBAT -right LE  · Abx coverage per infectious disease  · Deep venous thrombosis prophylaxis -per admitting service, early mobilization  · Plan for orthopedic surgical intervention 3/20/2021, right knee irrigation and debridement with polyethylene exchange  · Pain Control: IV and PO  · Monitor H&H  · Treatment consent  · N.p.o. midnight  · Hold anticoagulants  · I was present with the resident during the history and exam. I discussed the case with the resident and agree with the findings and plan as documented in the resident's note.     Electronically signed by Oksana Kam DO on 3/27/2021 at 10:20 AM

## 2021-03-27 NOTE — OP NOTE
[address]                                OPERATIVE REPORT    PATIENT NAME: Santos Montalvo                   :        1960  MED REC NO:   [id number]                            ROOM:       0515  ACCOUNT NO:   [de-identified]                           ADMIT DATE: 2021  PROVIDER:     Diana Ross MD    DATE OF PROCEDURE:  2021    SURGEON:  Diana Ross MD    PROCEDURE PERFORMED:  DC cardioversion. PREOPERATIVE DIAGNOSIS:  Atrial flutter. POSTPROCEDURE DIAGNOSIS:  Atrial flutter. IMMEDIATE COMPLICATIONS:  None. SEDATION:  LMAC sedation. CLINICAL HISTORY:  The patient was scheduled for DC cardioversion due to  symptomatic atrial flutter. Risks, benefits, and alternatives were  discussed and informed consent was obtained. The patient was on oral  anticoagulation with Eliquis. Prior to procedure, the patient underwent  ANALI to rule out intracardiac thrombus. OPERATIVE REPORT:  The patient was brought to the PACU area. He was  connected to pulse oximetry, blood pressure, and heart rhythm monitor. After sedation, the patient underwent transesophageal echocardiogram,  which showed no intracardiac thrombus. At that time, the patient  received 100 joules of synchronized biphasic direct current and was  converted to sinus rhythm and maintain sinus rhythm. Postprocedure, the  patient was alert. No focal neurological deficits. Complications: None    CONCLUSION:  Successful conversion of atrial flutter into sinus rhythm  with single attempt of 100 joules of synchronized biphasic direct  current.         Chary Nova MD    D: 2021 14:59:09       T: 2021 23:01:56     [email or web address]  Job#: [id number]     Doc#: [id number]    CC:  Paulette Ahn DO

## 2021-03-27 NOTE — PROGRESS NOTES
[address] [facility]  Progress Note     Chief Complaint   Patient presents with    Knee Pain     right , denies recent injury   SUBJECTIVE    Seen at bedside  Nurse present   Going down to nuclear  picc placed  Tolerating medications  No side effects   Doing well     REVIEW OFSYSTEMS:    Negative except as above    PHYSICAL EXAM       Vitals:    Vitals:    03/26/21 1540 03/26/21 1947 03/26/21 2007 03/27/21 0817   BP: 104/75 120/65 120/65 124/76   Pulse: 83  83 86   Resp: 20   18   Temp: 98.8 °F (37.1 °C)   100.5 °F (38.1 °C)   TempSrc: Oral   Oral   SpO2: 96%   96%   Weight:       Height:         Physical Exam   Constitutional/General: Alert and oriented, NAD  Head: NC/AT  Eyes: PERRL, EOMI glasses  Mouth: Normal mucosa, no thrush   Neck: Supple, full ROM,    Pulmonary: Lungs clear to auscultation bilaterally. Not in respiratory distress  Cardiovascular:  Regular rate and rhythm, no murmurs, gallops, or rubs. Abdomen: Soft, + BS.   distension. Nontender. Extremities: Moves all extremities x 4.    Warm and well perfused r>left swelling warm  Pulses:  Distal pulses dec  Skin: Warm and dry without rash  Neurologic:    No focal deficits  Psych: Normal Affect  Left pic      DIAGNOSTIC RESULTS   RADIOLOGY:   Echo Complete    Result Date: 3/25/2021  Transthoracic Echocardiography Report (TTE)  Demographics   Patient Name      Yen Emerson Gender             Male                    D   Medical Record    [id number]       Room Number        5564  Number   Account #         [de-identified]      Procedure Date     03/25/2021   Corporate ID                     Ordering Physician Shubham Wells DO   Accession Number  [id number]     Referring                                   Physician   Date of Birth     1960     Sonographer        Terra Stokes                                                      SATYA   Age               61 year(s)     Interpreting       Shubham Wells DO Physician                                    Any Other  Procedure Type of Study   TTE procedure  Procedure Date Date: 03/25/2021 Start: 12:57 PM Study Location: Echo Lab Technical Quality: Limited visualization due to body habitus. Indications:Atrial flutter. Patient Status: Routine Contrast Medium: Definity. Height: 76 inches Weight: 283 pounds BSA: 2.57 m^2 BMI: 34.45 kg/m^2 Rhythm: Within normal limits HR: 96 bpm BP: 128/77 mmHg  Findings   Left Ventricle  Left ventricle is mildly enlarged . Mild left ventricular concentric hypertrophy noted. Ejection fraction is measured at 62%. No evidence of left ventricular mass or thrombus noted. No regional wall motion abnormalities seen. Right Ventricle  Normal right ventricular size. No evidence of a thrombus in the right ventricle. Left Atrium  The left atrium is mildly dilated. Interatrial septum appears intact. No evidence of thrombus within left atrium. Right Atrium  Normal right atrium size. Mitral Valve  Physiologic and/or trace mitral regurgitation is present. No evidence of mitral valve stenosis. Tricuspid Valve  Physiologic and/or trace tricuspid regurgitation. Aortic Valve  Aortic valve opens well. The aortic valve is trileaflet. No evidence of aortic valve regurgitation. No hemodynamically significant aortic stenosis is present. Pulmonic Valve  The pulmonic valve was not well visualized. Pericardial Effusion  No evidence of pericardial effusion. Aorta  The aorta is within normal limits. Miscellaneous  Irregular rhythm. Conclusions   Summary  Left ventricle is mildly enlarged . Mild left ventricular concentric hypertrophy noted. Ejection fraction is measured at 62%. No evidence of left ventricular mass or thrombus noted. No regional wall motion abnormalities seen. The left atrium is mildly dilated. Interatrial septum appears intact. No evidence of thrombus within left atrium.   Physiologic and/or trace mitral [email or web address] DocKey=[id number]    Xr Knee Right (1-2 Views)    Result Date: 3/24/2021  EXAMINATION: TWO XRAY VIEWS OF THE RIGHT KNEE; ONE XRAY VIEW OF THE CHEST 3/24/2021 5:35 pm COMPARISON: Right knee 08/18/2020. chest x-ray 07/22/2019. HISTORY: ORDERING SYSTEM PROVIDED HISTORY: right knee pain. Shortness of breath TECHNOLOGIST PROVIDED HISTORY: Reason for exam:->right knee pain FINDINGS: Right knee: A right knee arthroplasty device is again noted. It remains intact. No radiographic evidence of loosening or change in alignment. The bones are intact. No acute fracture or dislocation is noted. Anterior patella degenerative enthesopathy is moderate. Non-specific slight soft tissue prominence superior to the patella on the lateral view may reflect small suprapatellar recess joint effusion. No definite radiographic evidence of acute skeletal or hardware pathology. Suggestion of small joint effusion Chest: Limited examination due to prominent soft tissue attenuation. The cardiac silhouette size is slightly enlarged without evidence of vascular congestion or alveolar pulmonary edema. There is no evidence of pulmonary consolidation or infiltrate. No pleural effusion noted. No radiographically visible pneumothorax. No acute displaced fracture. IMPRESSION: Slight cardiomegaly No radiographic evidence of definite acute pulmonary disease in the visualized chest     Ct Knee Right Wo Contrast    Result Date: 3/25/2021  EXAMINATION: CT OF THE RIGHT FEMUR WITH CONTRAST; CT OF THE RIGHT KNEE WITHOUT CONTRAST; CT OF THE RIGHT TIBIA AND FIBULA WITHOUT CONTRAST 3/25/2021 7:47 pm TECHNIQUE: CT of the right femur was performed with the administration of intravenous contrast.  Multiplanar reformatted images are provided for review.  Dose modulation, iterative reconstruction, and/or weight based adjustment of the mA/kV was utilized to reduce the radiation dose to as low as reasonably achievable.; CT of the right knee was performed without the administration of intravenous contrast.  Multiplanar reformatted images are provided for review. Dose modulation, iterative reconstruction, and/or weight based adjustment of the mA/kV was utilized to reduce the radiation dose to as low as reasonably achievable.; CT of the right tibia and fibula was performed without the administration of intravenous contrast.  Multiplanar reformatted images are provided for review. Dose modulation, iterative reconstruction, and/or weight based adjustment of the mA/kV was utilized to reduce the radiation dose to as low as reasonably achievable. COMPARISON: None. HISTORY ORDERING SYSTEM PROVIDED HISTORY: pain, swelling lower aspect of thigh TECHNOLOGIST PROVIDED HISTORY: Reason for exam:->pain, swelling lower aspect of thigh Recent knee replacement, evaluate for septic arthritis. FINDINGS: Bones: No definite acute fracture or dislocation of the femur, tibia, and fibula. No periprosthetic lucency identified to suggest loosening. No aggressive periostitis although please note that evaluation is somewhat limited due to metallic artifact from the patient's prosthesis. Soft Tissue:  Musculature is essentially preserved within the anterior and posterior compartments of the thigh. There is diffuse subcutaneous soft tissue edema surrounding the knee and lower leg, primarily within the dorsum of the calf. There is no well-circumscribed drainable fluid collection identified, however please note that small fluid collections would not be readily visualized without the benefit of intravenous contrast.  There is advanced fatty infiltration/atrophy of the medial head of the gastrocnemius muscle. Joint: Mild degenerative narrowing of the femoroacetabular joint. No joint effusion identified. Status post total knee arthroplasty.   Evaluation of the arthroplasty itself is obscured by streak artifact from the metal component, however there is no convincing evidence for hardware loosening or perihardware fracture. There is a moderate amount of patellofemoral joint fluid, the sterility of which cannot be confirmed by imaging. The tibiotalar joint space is grossly preserved. Within limitations of hardware related artifact, there is no convincing evidence for loosening or perihardware fracture. Moderate patellofemoral joint fluid, the sterility of which cannot be confirmed by imaging. No definite cortical disruption or periostitis identified. Extensive subcutaneous soft tissue edema primarily within the dorsal aspect of the lower leg. No well-defined drainable fluid collection identified although evaluation for this finding is somewhat limited without intravenous contrast. Diffuse fatty infiltration/atrophy of the medial head of the gastrocnemius muscle. Xr Chest Portable    Result Date: 3/24/2021  EXAMINATION: TWO XRAY VIEWS OF THE RIGHT KNEE; ONE XRAY VIEW OF THE CHEST 3/24/2021 5:35 pm COMPARISON: Right knee 08/18/2020. chest x-ray 07/22/2019. HISTORY: ORDERING SYSTEM PROVIDED HISTORY: right knee pain. Shortness of breath TECHNOLOGIST PROVIDED HISTORY: Reason for exam:->right knee pain FINDINGS: Right knee: A right knee arthroplasty device is again noted. It remains intact. No radiographic evidence of loosening or change in alignment. The bones are intact. No acute fracture or dislocation is noted. Anterior patella degenerative enthesopathy is moderate. Non-specific slight soft tissue prominence superior to the patella on the lateral view may reflect small suprapatellar recess joint effusion. No definite radiographic evidence of acute skeletal or hardware pathology. Suggestion of small joint effusion Chest: Limited examination due to prominent soft tissue attenuation.  The cardiac silhouette size is slightly enlarged without evidence of vascular congestion or alveolar pulmonary edema. There is no evidence of pulmonary consolidation or infiltrate. No pleural effusion noted. No radiographically visible pneumothorax. No acute displaced fracture. IMPRESSION: Slight cardiomegaly No radiographic evidence of definite acute pulmonary disease in the visualized chest     Ct Femur Right Wo Contrast    Result Date: 3/25/2021  EXAMINATION: CT OF THE RIGHT FEMUR WITH CONTRAST; CT OF THE RIGHT KNEE WITHOUT CONTRAST; CT OF THE RIGHT TIBIA AND FIBULA WITHOUT CONTRAST 3/25/2021 7:47 pm TECHNIQUE: CT of the right femur was performed with the administration of intravenous contrast.  Multiplanar reformatted images are provided for review. Dose modulation, iterative reconstruction, and/or weight based adjustment of the mA/kV was utilized to reduce the radiation dose to as low as reasonably achievable.; CT of the right knee was performed without the administration of intravenous contrast.  Multiplanar reformatted images are provided for review. Dose modulation, iterative reconstruction, and/or weight based adjustment of the mA/kV was utilized to reduce the radiation dose to as low as reasonably achievable.; CT of the right tibia and fibula was performed without the administration of intravenous contrast.  Multiplanar reformatted images are provided for review. Dose modulation, iterative reconstruction, and/or weight based adjustment of the mA/kV was utilized to reduce the radiation dose to as low as reasonably achievable. COMPARISON: None. HISTORY ORDERING SYSTEM PROVIDED HISTORY: pain, swelling lower aspect of thigh TECHNOLOGIST PROVIDED HISTORY: Reason for exam:->pain, swelling lower aspect of thigh Recent knee replacement, evaluate for septic arthritis. FINDINGS: Bones: No definite acute fracture or dislocation of the femur, tibia, and fibula. No periprosthetic lucency identified to suggest loosening.   No aggressive periostitis although please note that evaluation is somewhat limited due to metallic artifact from the patient's prosthesis. Soft Tissue:  Musculature is essentially preserved within the anterior and posterior compartments of the thigh. There is diffuse subcutaneous soft tissue edema surrounding the knee and lower leg, primarily within the dorsum of the calf. There is no well-circumscribed drainable fluid collection identified, however please note that small fluid collections would not be readily visualized without the benefit of intravenous contrast.  There is advanced fatty infiltration/atrophy of the medial head of the gastrocnemius muscle. Joint: Mild degenerative narrowing of the femoroacetabular joint. No joint effusion identified. Status post total knee arthroplasty. Evaluation of the arthroplasty itself is obscured by streak artifact from the metal component, however there is no convincing evidence for hardware loosening or perihardware fracture. There is a moderate amount of patellofemoral joint fluid, the sterility of which cannot be confirmed by imaging. The tibiotalar joint space is grossly preserved. Within limitations of hardware related artifact, there is no convincing evidence for loosening or perihardware fracture. Moderate patellofemoral joint fluid, the sterility of which cannot be confirmed by imaging. No definite cortical disruption or periostitis identified. Extensive subcutaneous soft tissue edema primarily within the dorsal aspect of the lower leg. No well-defined drainable fluid collection identified although evaluation for this finding is somewhat limited without intravenous contrast. Diffuse fatty infiltration/atrophy of the medial head of the gastrocnemius muscle.      Ct Tibia Fibula Right Wo Contrast    Result Date: 3/25/2021  EXAMINATION: CT OF THE RIGHT FEMUR WITH CONTRAST; CT OF THE RIGHT KNEE WITHOUT CONTRAST; CT OF THE RIGHT TIBIA AND FIBULA WITHOUT CONTRAST 3/25/2021 7:47 pm TECHNIQUE: CT of the right femur was performed with the administration of intravenous contrast.  Multiplanar reformatted images are provided for review. Dose modulation, iterative reconstruction, and/or weight based adjustment of the mA/kV was utilized to reduce the radiation dose to as low as reasonably achievable.; CT of the right knee was performed without the administration of intravenous contrast.  Multiplanar reformatted images are provided for review. Dose modulation, iterative reconstruction, and/or weight based adjustment of the mA/kV was utilized to reduce the radiation dose to as low as reasonably achievable.; CT of the right tibia and fibula was performed without the administration of intravenous contrast.  Multiplanar reformatted images are provided for review. Dose modulation, iterative reconstruction, and/or weight based adjustment of the mA/kV was utilized to reduce the radiation dose to as low as reasonably achievable. COMPARISON: None. HISTORY ORDERING SYSTEM PROVIDED HISTORY: pain, swelling lower aspect of thigh TECHNOLOGIST PROVIDED HISTORY: Reason for exam:->pain, swelling lower aspect of thigh Recent knee replacement, evaluate for septic arthritis. FINDINGS: Bones: No definite acute fracture or dislocation of the femur, tibia, and fibula. No periprosthetic lucency identified to suggest loosening. No aggressive periostitis although please note that evaluation is somewhat limited due to metallic artifact from the patient's prosthesis. Soft Tissue:  Musculature is essentially preserved within the anterior and posterior compartments of the thigh. There is diffuse subcutaneous soft tissue edema surrounding the knee and lower leg, primarily within the dorsum of the calf.   There is no well-circumscribed drainable fluid collection identified, however please note that small fluid collections would not be readily visualized without the benefit of intravenous contrast.  There is advanced fatty infiltration/atrophy of the medial head of the gastrocnemius muscle. Joint: Mild degenerative narrowing of the femoroacetabular joint. No joint effusion identified. Status post total knee arthroplasty. Evaluation of the arthroplasty itself is obscured by streak artifact from the metal component, however there is no convincing evidence for hardware loosening or perihardware fracture. There is a moderate amount of patellofemoral joint fluid, the sterility of which cannot be confirmed by imaging. The tibiotalar joint space is grossly preserved. Within limitations of hardware related artifact, there is no convincing evidence for loosening or perihardware fracture. Moderate patellofemoral joint fluid, the sterility of which cannot be confirmed by imaging. No definite cortical disruption or periostitis identified. Extensive subcutaneous soft tissue edema primarily within the dorsal aspect of the lower leg. No well-defined drainable fluid collection identified although evaluation for this finding is somewhat limited without intravenous contrast. Diffuse fatty infiltration/atrophy of the medial head of the gastrocnemius muscle. Us Dup Lower Extremities Bilateral Venous    Result Date: 3/25/2021  EXAMINATION: DUPLEX VENOUS ULTRASOUND OF THE BILATERAL LOWER EXTREMITIES, 3/25/2021 1:37 pm TECHNIQUE: Duplex ultrasound using B-mode/gray scaled imaging and Doppler spectral analysis and color flow was obtained of the bilateral lower extremities. COMPARISON: None. HISTORY: ORDERING SYSTEM PROVIDED HISTORY: Suspected deep vein thrombosis (DVT) TECHNOLOGIST PROVIDED HISTORY: Reason for exam:->rule out dvt What reading provider will be dictating this exam?->CRC FINDINGS: The visualized veins of the bilateral lower extremities are patent and free of echogenic thrombus. The veins demonstrate good compressibility with normal color flow study and spectral analysis. There is a right inguinal node maximally measuring 4.0 cm. No evidence of DVT in either lower extremity. LABS  Recent Labs     03/25/21  0559 03/26/21  0640 03/27/21  0409   WBC 11.5 10.8 9.3   HGB 11.3* 11.0* 11.3*   HCT 35.1* 33.5* 33.5*   MCV 84.4 84.0 82.1    200 226     Recent Labs     03/25/21  0559 03/26/21  0640 03/27/21  0409    131* 130*   K 4.0 3.9 4.0    98 96*   CO2 20* 21* 20*   BUN 15 14 17   CREATININE 0.9 0.9 0.8   GFRAA >60 >60 >60   LABGLOM >60 >60 >60   GLUCOSE 116* 119* 109*   PROT 6.6 6.1* 6.7   LABALBU 3.4* 3.1* 3.1*   CALCIUM 8.0* 7.9* 8.3*   BILITOT 1.1 0.8 0.5   ALKPHOS 85 80 93   AST 18 13 18   ALT 13 14 22     Recent Labs     03/25/21  0559   PROCAL 0.19*     Lab Results   Component Value Date    CRP 27.8 (H) 03/27/2021    CRP 0.3 03/25/2021    CRP 13.2 (H) 03/24/2021     Lab Results   Component Value Date    SEDRATE 68 (H) 03/27/2021    SEDRATE 21 (H) 03/24/2021    SEDRATE 6 05/06/2019       Recent Labs     03/24/21  1645 03/24/21  2040 03/24/21  2225 03/25/21  0328 03/25/21  0559 03/26/21  0640 03/27/21  0409   CRP  --  13.2*  --   --  0.3  --  27.8*   PROCAL  --   --   --   --  0.19*  --   --    TROPONINI 0.02  --  0.02 0.03  --   --   --    DDIMER  --   --  629  --   --   --   --    INR 1.4  --   --   --   --   --   --    PROTIME 16.5*  --   --   --   --   --   --    AST 14  --   --   --  18 13 18   ALT 14  --   --   --  13 14 22   TRIG  --   --   --   --  52  --   --      Lab Results   Component Value Date    CHOL 93 03/25/2021    TRIG 52 03/25/2021    HDL 41 03/25/2021    LDLCALC 42 03/25/2021    LABVLDL 10 03/25/2021        MICROBIOLOGY:  Cultures :   Lab Results   Component Value Date    [facility]  03/24/2021     Gram stain performed from blood culture bottle media  Gram positive cocci in chains      BC Identification and sensitivity to follow 03/24/2021    BC 5 Days- no growth 05/06/2019    ORG Streptococcus species 03/24/2021    ORG Streptococcus species 03/24/2021    ORG Staphylococcus aureus 05/06/2019    ORG Enterobacter cloacae 05/06/2019     Lab Results Component Value Date    BLOODCULT2  03/24/2021     Gram stain performed from blood culture bottle media  Gram positive cocci in chains      BLOODCULT2 Identification and sensitivity to follow 03/24/2021    BLOODCULT2 5 Days- no growth 05/06/2019    ORG Streptococcus species 03/24/2021    ORG Streptococcus species 03/24/2021    ORG Staphylococcus aureus 05/06/2019    ORG Enterobacter cloacae 05/06/2019       WOUND/ABSCESS   Date Value Ref Range Status   05/06/2019 Heavy growth  Final   05/06/2019 Rare growth  Final   08/01/2018 Rare growth  Final   08/01/2018 Rare growth  Final          Urine Culture, Routine   Date Value Ref Range Status   07/25/2019 Growth not present  Final     MRSA Culture Only   Date Value Ref Range Status   07/22/2019 Methicillin resistant Staph aureus not isolated  Final      ASSESSMENT     GPC chains septicemia/Streptococcus agalactiae   fevers  1. Atrial flutter with rapid ventricular response ([address]    2. Right knee pain suspect PJI -- Nucl fluid + 45, 730    3. Suspected deep vein thrombosis (DVT)     right 1st to ulcer now with callus left 2nd toe callus     PLAN  Continue with Ceftriaxone and Vancomycin   For surgery Monday   ESR elevated 68  PICC placed 3/27   Follow cultures  Monitor labs   ANALI   CRP and bone scan today     Electronically signed by DINH Corbett NP on 3/27/2021 at 12:20 PM     Patient examined along with the nurse practitioner  Agree with above  Patient with gram-positive cocci bacteremia most likely Streptococcus agalactiae  Continue patient on ceftriaxone and on vancomycin  Patient with suspicion of right knee prosthetic joint infection for which she is supposed to go for I&D and debridement tomorrow    I have discussed the case, including pertinent history and physical  exam findings . I have seen and examined the patient and the key elements of the encounter have been performed by me. I agree with the assessment, plan and orders as documented. Treatment plan as per my recommendation     Sherry Gil MD, FACP  3/27/2021  4:26 PM

## 2021-03-27 NOTE — CONSULTS
1 Winter Feliz MD    Patient's Name/Date of Birth: Kevon Gardner / 1960    Date: March 27, 2021     Consulting Surgeon: Kip De Jesus MD    PCP: Ck Rosales MD     Chief Complaint: Scalp lesion    HPI:   Kevon Gardner is a 61 y.o. male who presents for evaluation of septic arthropathy and new onset Afib Aflutter and RLE cellulitis. He is s/p DC cardiversion and is undergoing further evaluation of his acute issue. I was consulted for evaluation of scalp lesion. Patient reports it has been there for two years and started as a scratch as he was mowing. Over the years he reports multiple episodes of healing and scabbing ago. He denies any similar lesions anywhere else. He reports no growth in size of the lesion. He has no other history of skin cancers.      Patient Active Problem List   Diagnosis    Impingement syndrome of shoulder    Rotator cuff tear    Shoulder pain    Biceps tendon rupture, proximal    De Quervain's disease (tenosynovitis)    Other tenosynovitis of hand and wrist    Radial styloid tenosynovitis    Kienbock's avascular necrosis of lunate, adult    Primary osteoarthritis of right knee    Tear of medial meniscus of right knee, current    Cellulitis of the right lower extremity    Non-pressure chronic ulcer of left lower leg with fat layer exposed ([address]    Venous stasis of lower extremity    Primary osteoarthritis of left knee    Status post total bilateral knee replacement    Bilateral primary osteoarthritis of knee    Arthritis of right foot    Tenosynovitis of foot    Pain in right ankle and joints of right foot    Traumatic ulcer of right lower extremity with fat layer exposed ([address]    Open wound of right great toe    Venous insufficiency (chronic) (peripheral)    Pain in both lower extremities    Difficulty walking    New onset atrial flutter ([address]    Essential hypertension    Acute pain of right knee  Bacteremia       No Known Allergies    Past Medical History:   Diagnosis Date    Cellulitis     Edema     legs with cellulitis    GERD (gastroesophageal reflux disease)     Hypertension     Osteoarthritis     Tenosynovitis, de Quervain     Venous insufficiency        Past Surgical History:   Procedure Laterality Date    COLONOSCOPY      x2    FOOT SURGERY      JOINT REPLACEMENT      bilateral knee replacement    JOINT REPLACEMENT      right ankle    OTHER SURGICAL HISTORY Right 01-25-13    right wrist 1st dorsal compartment release    REPLACEMENT TOTAL KNEE BILATERAL Bilateral 7/31/2019    KNEE TOTAL ARTHROPLASTY BILATERAL performed by Ernestine Sparrow DO at [facility] ARTHROSCOPY      TONSILLECTOMY      TONSILLECTOMY AND ADENOIDECTOMY      VASCULAR SURGERY      both legs     VASECTOMY         Social History     Socioeconomic History    Marital status:      Spouse name: Not on file    Number of children: Not on file    Years of education: Not on file    Highest education level: Not on file   Occupational History    Not on file   Social Needs    Financial resource strain: Not on file    Food insecurity     Worry: Not on file     Inability: Not on file    Transportation needs     Medical: Not on file     Non-medical: Not on file   Tobacco Use    Smoking status: Never Smoker    Smokeless tobacco: Never Used   Substance and Sexual Activity    Alcohol use:  Yes     Alcohol/week: 2.0 standard drinks     Types: 2 Glasses of wine per week     Frequency: 2-3 times a week    Drug use: No    Sexual activity: Yes     Partners: Female   Lifestyle    Physical activity     Days per week: Not on file     Minutes per session: Not on file    Stress: Not on file   Relationships    Social connections     Talks on phone: Not on file     Gets together: Not on file     Attends Worship service: Not on file     Active member of club or organization: Not on file     Attends meetings of clubs or organizations: Not on file     Relationship status: Not on file    Intimate partner violence     Fear of current or ex partner: Not on file     Emotionally abused: Not on file     Physically abused: Not on file     Forced sexual activity: Not on file   Other Topics Concern    Not on file   Social History Narrative    Not on file       The patient has a family history that is negative for severe cardiovascular or respiratory issues, negative for reaction to anesthesia. Recent Labs     03/25/21  0559 03/26/21  0640 03/27/21  0409   WBC 11.5 10.8 9.3   HGB 11.3* 11.0* 11.3*   HCT 35.1* 33.5* 33.5*   MCV 84.4 84.0 82.1    200 226       Recent Labs     03/25/21  0559 03/26/21  0640 03/27/21  0409    131* 130*   K 4.0 3.9 4.0   CO2 20* 21* 20*   PHOS 2.3*  --   --    BUN 15 14 17   CREATININE 0.9 0.9 0.8       Recent Labs     03/24/21  1645 03/25/21  0559 03/26/21  0640 03/27/21  0409   PROT 6.5 6.6 6.1* 6.7   INR 1.4  --   --   --    LIPASE 15  --   --   --          Intake/Output Summary (Last 24 hours) at 3/27/2021 1450  Last data filed at 3/27/2021 1408  Gross per 24 hour   Intake 1550 ml   Output 1190 ml   Net 360 ml       I have reviewed relevant labs from this admission and interpretation is included in my assessment and plan      Review of Systems  A complete 10 system review was performed and are otherwise negative unless mentioned in the above HPI. Specific negatives are listed below but may not include all those reviewed.     General ROS: negative obtundation, AMS  ENT ROS: negative rhinorrhea, epistaxis  Allergy and Immunology ROS: negative itchy/watery eyes or nasal congestion  Hematological and Lymphatic ROS: negative spontaneous bleeding or bruising  Endocrine ROS: negative  lethargy, mood swings, palpitations or polydipsia/polyuria  Respiratory ROS: negative sputum changes, stridor, tachypnea or wheezing  Cardiovascular ROS: negative for - loss of consciousness, murmur or orthopnea  Gastrointestinal ROS: negative for - hematochezia or hematemesis  Genito-Urinary ROS: negative for -  genital discharge or hematuria  Musculoskeletal ROS: negative for - focal weakness, gangrene  Psych/Neuro ROS: negative for - visual or auditory hallucinations, suicidal ideation      Physical exam:   /76   Pulse 86   Temp 100.5 °F (38.1 °C) (Oral)   Resp 18   Ht 6' 4\" (1.93 m)   Wt 283 lb (128.4 kg)   SpO2 96%   BMI 34.45 kg/m²   General appearance:  NAD, appears stated age  Head: R frontotemporal 1.5cm raised, scabbed lesions with irregular boarders  Neck: no significant lymphadenopathy  Lungs: Equal chest rise bilateral, no retractions, no wheezing  Heart: Reg rate  Abdomen: soft, NT, ND  Skin; warm and dry, no cyanosis  Gu: no cva tenderness  Extremities: atraumatic, no focal motor deficits, no open wounds  Psych: No tremor, visual hallucinations    Pathology: n/a    Radiology: n/a    Assessment:  Evelyne Springer is a 61 y.o. male with scalp lesion, irregular, concerning for squamous cell ca    Patient Active Problem List   Diagnosis    Impingement syndrome of shoulder    Rotator cuff tear    Shoulder pain    Biceps tendon rupture, proximal    De Quervain's disease (tenosynovitis)    Other tenosynovitis of hand and wrist    Radial styloid tenosynovitis    Kienbock's avascular necrosis of lunate, adult    Primary osteoarthritis of right knee    Tear of medial meniscus of right knee, current    Cellulitis of the right lower extremity    Non-pressure chronic ulcer of left lower leg with fat layer exposed ([address]    Venous stasis of lower extremity    Primary osteoarthritis of left knee    Status post total bilateral knee replacement    Bilateral primary osteoarthritis of knee    Arthritis of right foot    Tenosynovitis of foot    Pain in right ankle and joints of right foot    Traumatic ulcer of right lower extremity with fat layer exposed ([address]    Open wound of right great toe    Venous insufficiency (chronic) (peripheral)    Pain in both lower extremities    Difficulty walking    New onset atrial flutter (HCC)    Essential hypertension    Acute pain of right knee    Bacteremia       Plan:  Needs core biopsy, will do at beside prior to discharge  If carcinoma, may need plastic surgery eval for removal with adequate margins and reconstruction due to location  Will follow  Time spent reviewing past medical, surgical, social and family history, vitals, nursing assessment and images. Time spent face to face with patient and family counciling and discussing care exceeded 50% of the time of the consult. Additional time spent reviewing images and labs, discussing case with nursing, support staff and other physicians; as well as coordinating care.         Physician Signature: Electronically signed by Dr. Lorenzo Tierney

## 2021-03-27 NOTE — PROGRESS NOTES
Pharmacy Consultation Note  (Antibiotic Dosing and Monitoring)    Initial consult date: 3/24/21  Consulting physician: Dr. Radha Torre  Drug(s): Vancomycin  Indication: Bone/joint infection    Ht Readings from Last 1 Encounters:   21 6' 4\" (1.93 m)     Wt Readings from Last 1 Encounters:   21 283 lb (128.4 kg)         Age/  Gender IBW DW  Allergy Information   61 y.o.     male 86.8 kg 103 kg  Patient has no known allergies. Date  WBC BUN/CR UOP Drug/Dose Time   Given Level(s)   (Time) Comments   3/24  (#1) 10.0 25/1.4 -- Vancomycin 1,750 mg IV Q18H 2322     3/25  (#2) 11.5 15/0.9 -- Vancomycin 1,750 mg IV Q12H 1651     3/26  (#3) 10.8 14/0.9 -- Vancomycin 1,750 mg IV Q12H 0330  1549     3/27  (#4) 9.3 17/0.8 -- Vancomycin 1,750 mg IV Q12H  <Vancomycin 1,500 mg IV Q8H> 0400  <1400> 6.9 mcg/mL Hold dose if trough is >20 mcg/mL     (#5)            (#6)            (#7)            Estimated Creatinine Clearance: 144 mL/min (based on SCr of 0.8 mg/dL). UOP over the past 24 hours:       Intake/Output Summary (Last 24 hours) at 3/27/2021 1300  Last data filed at 3/27/2021 1233  Gross per 24 hour   Intake 1310 ml   Output 790 ml   Net 520 ml       Temp max: Temp (24hrs), Av.3 °F (37.4 °C), Min:98.8 °F (37.1 °C), Max:100.5 °F (38.1 °C)      Antibiotic Regimen:  Antibiotic Dose Date Initiated   Ceftriaxone 2 g Q24H 3/26     Cultures:  available culture and sensitivity results were reviewed in EPIC  Cultures sent and are pending.   Culture Date Result    Blood #1 3/24 Strep species   Blood #2 3/24 Strep species   Blood ID, molecular 3/24 Strep agalactiae by PCR     Assessment:  · Consulted by Dr. Radha Torre to dose/monitor vancomycin  · Goal trough level:  15-20 mcg/mL  · Pt is a 61 yom admitted for new onset atrial flutter, initiated on vancomycin for a bone/joint infection and skin & soft tissue infection - now with bacteremia  · Serum creatinine today: 0.8; CrCl >100 mL/min; baseline Scr ~ 1.0 · 3/27: Trough today @ 0409 = 6.9 mcg/ml    Plan:  · Adjust dose to Vancomycin 1,500 mg IV Q8H  · Repeat trough tomorrow @ 1330, hold dose if trough is >20 mcg/mL  · Follow renal function  · Pharmacist will follow and monitor/adjust dosing as necessary      Thank you for the consult,    Kathleen Solo, KirstenD, BCPS 3/27/2021 1:00 PM   [phone]

## 2021-03-28 ENCOUNTER — APPOINTMENT (OUTPATIENT)
Dept: GENERAL RADIOLOGY | Age: 61
DRG: 466 | End: 2021-03-28
Payer: COMMERCIAL

## 2021-03-28 ENCOUNTER — ANESTHESIA (OUTPATIENT)
Dept: OPERATING ROOM | Age: 61
DRG: 466 | End: 2021-03-28
Payer: COMMERCIAL

## 2021-03-28 VITALS
SYSTOLIC BLOOD PRESSURE: 119 MMHG | TEMPERATURE: 100.4 F | RESPIRATION RATE: 16 BRPM | OXYGEN SATURATION: 99 % | DIASTOLIC BLOOD PRESSURE: 85 MMHG

## 2021-03-28 LAB
ALBUMIN SERPL-MCNC: 3 G/DL (ref 3.5–5.2)
ALP BLD-CCNC: 111 U/L (ref 40–129)
ALT SERPL-CCNC: 78 U/L (ref 0–40)
ANION GAP SERPL CALCULATED.3IONS-SCNC: 11 MMOL/L (ref 7–16)
AST SERPL-CCNC: 58 U/L (ref 0–39)
BASOPHILS ABSOLUTE: 0.04 E9/L (ref 0–0.2)
BASOPHILS RELATIVE PERCENT: 0.5 % (ref 0–2)
BILIRUB SERPL-MCNC: 0.4 MG/DL (ref 0–1.2)
BUN BLDV-MCNC: 17 MG/DL (ref 8–23)
CALCIUM SERPL-MCNC: 8.3 MG/DL (ref 8.6–10.2)
CHLORIDE BLD-SCNC: 100 MMOL/L (ref 98–107)
CO2: 25 MMOL/L (ref 22–29)
CREAT SERPL-MCNC: 0.7 MG/DL (ref 0.7–1.2)
CULTURE, BLOOD 2: ABNORMAL
EOSINOPHILS ABSOLUTE: 0.04 E9/L (ref 0.05–0.5)
EOSINOPHILS RELATIVE PERCENT: 0.5 % (ref 0–6)
GFR AFRICAN AMERICAN: >60
GFR NON-AFRICAN AMERICAN: >60 ML/MIN/1.73
GLUCOSE BLD-MCNC: 116 MG/DL (ref 74–99)
GRAM STAIN ORDERABLE: NORMAL
HCT VFR BLD CALC: 32.1 % (ref 37–54)
HEMOGLOBIN: 10.4 G/DL (ref 12.5–16.5)
IMMATURE GRANULOCYTES #: 0.06 E9/L
IMMATURE GRANULOCYTES %: 0.8 % (ref 0–5)
LYMPHOCYTES ABSOLUTE: 1.08 E9/L (ref 1.5–4)
LYMPHOCYTES RELATIVE PERCENT: 13.9 % (ref 20–42)
MAGNESIUM: 2.2 MG/DL (ref 1.6–2.6)
MCH RBC QN AUTO: 27.5 PG (ref 26–35)
MCHC RBC AUTO-ENTMCNC: 32.4 % (ref 32–34.5)
MCV RBC AUTO: 84.9 FL (ref 80–99.9)
MONOCYTES ABSOLUTE: 0.78 E9/L (ref 0.1–0.95)
MONOCYTES RELATIVE PERCENT: 10 % (ref 2–12)
NEUTROPHILS ABSOLUTE: 5.79 E9/L (ref 1.8–7.3)
NEUTROPHILS RELATIVE PERCENT: 74.3 % (ref 43–80)
ORGANISM: ABNORMAL
ORGANISM: ABNORMAL
PDW BLD-RTO: 13.2 FL (ref 11.5–15)
PHOSPHORUS: 4 MG/DL (ref 2.5–4.5)
PLATELET # BLD: 233 E9/L (ref 130–450)
PMV BLD AUTO: 11.5 FL (ref 7–12)
POTASSIUM SERPL-SCNC: 3.8 MMOL/L (ref 3.5–5)
RBC # BLD: 3.78 E12/L (ref 3.8–5.8)
REASON FOR REJECTION: NORMAL
REASON FOR REJECTION: NORMAL
REJECTED TEST: NORMAL
REJECTED TEST: NORMAL
SODIUM BLD-SCNC: 136 MMOL/L (ref 132–146)
TOTAL PROTEIN: 6.4 G/DL (ref 6.4–8.3)
VANCOMYCIN TROUGH: 15.1 MCG/ML (ref 5–16)
WBC # BLD: 7.8 E9/L (ref 4.5–11.5)

## 2021-03-28 PROCEDURE — 27486 REVISE/REPLACE KNEE JOINT: CPT | Performed by: ORTHOPAEDIC SURGERY

## 2021-03-28 PROCEDURE — 7100000001 HC PACU RECOVERY - ADDTL 15 MIN: Performed by: ORTHOPAEDIC SURGERY

## 2021-03-28 PROCEDURE — 3700000000 HC ANESTHESIA ATTENDED CARE: Performed by: ORTHOPAEDIC SURGERY

## 2021-03-28 PROCEDURE — 6360000002 HC RX W HCPCS: Performed by: ORTHOPAEDIC SURGERY

## 2021-03-28 PROCEDURE — 2500000003 HC RX 250 WO HCPCS: Performed by: ORTHOPAEDIC SURGERY

## 2021-03-28 PROCEDURE — 87205 SMEAR GRAM STAIN: CPT

## 2021-03-28 PROCEDURE — 87075 CULTR BACTERIA EXCEPT BLOOD: CPT

## 2021-03-28 PROCEDURE — 6360000002 HC RX W HCPCS: Performed by: INTERNAL MEDICINE

## 2021-03-28 PROCEDURE — 2709999900 HC NON-CHARGEABLE SUPPLY: Performed by: ORTHOPAEDIC SURGERY

## 2021-03-28 PROCEDURE — 71045 X-RAY EXAM CHEST 1 VIEW: CPT

## 2021-03-28 PROCEDURE — 6370000000 HC RX 637 (ALT 250 FOR IP): Performed by: NURSE PRACTITIONER

## 2021-03-28 PROCEDURE — 6370000000 HC RX 637 (ALT 250 FOR IP): Performed by: PHYSICIAN ASSISTANT

## 2021-03-28 PROCEDURE — 6360000002 HC RX W HCPCS

## 2021-03-28 PROCEDURE — 27301 DRAIN THIGH/KNEE LESION: CPT | Performed by: ORTHOPAEDIC SURGERY

## 2021-03-28 PROCEDURE — 2500000003 HC RX 250 WO HCPCS: Performed by: NURSE ANESTHETIST, CERTIFIED REGISTERED

## 2021-03-28 PROCEDURE — 87070 CULTURE OTHR SPECIMN AEROBIC: CPT

## 2021-03-28 PROCEDURE — 87102 FUNGUS ISOLATION CULTURE: CPT

## 2021-03-28 PROCEDURE — 2580000003 HC RX 258: Performed by: ORTHOPAEDIC SURGERY

## 2021-03-28 PROCEDURE — 2060000000 HC ICU INTERMEDIATE R&B

## 2021-03-28 PROCEDURE — 84100 ASSAY OF PHOSPHORUS: CPT

## 2021-03-28 PROCEDURE — 85025 COMPLETE CBC W/AUTO DIFF WBC: CPT

## 2021-03-28 PROCEDURE — 0SRC0JZ REPLACEMENT OF RIGHT KNEE JOINT WITH SYNTHETIC SUBSTITUTE, OPEN APPROACH: ICD-10-PCS | Performed by: ORTHOPAEDIC SURGERY

## 2021-03-28 PROCEDURE — 6370000000 HC RX 637 (ALT 250 FOR IP): Performed by: INTERNAL MEDICINE

## 2021-03-28 PROCEDURE — 6360000002 HC RX W HCPCS: Performed by: NURSE ANESTHETIST, CERTIFIED REGISTERED

## 2021-03-28 PROCEDURE — 2580000003 HC RX 258: Performed by: NURSE ANESTHETIST, CERTIFIED REGISTERED

## 2021-03-28 PROCEDURE — 36592 COLLECT BLOOD FROM PICC: CPT

## 2021-03-28 PROCEDURE — 80053 COMPREHEN METABOLIC PANEL: CPT

## 2021-03-28 PROCEDURE — 6360000002 HC RX W HCPCS: Performed by: SPECIALIST

## 2021-03-28 PROCEDURE — 36415 COLL VENOUS BLD VENIPUNCTURE: CPT

## 2021-03-28 PROCEDURE — 2580000003 HC RX 258: Performed by: INTERNAL MEDICINE

## 2021-03-28 PROCEDURE — 3700000001 HC ADD 15 MINUTES (ANESTHESIA): Performed by: ORTHOPAEDIC SURGERY

## 2021-03-28 PROCEDURE — 3600000015 HC SURGERY LEVEL 5 ADDTL 15MIN: Performed by: ORTHOPAEDIC SURGERY

## 2021-03-28 PROCEDURE — 3600000005 HC SURGERY LEVEL 5 BASE: Performed by: ORTHOPAEDIC SURGERY

## 2021-03-28 PROCEDURE — 0SPC0JZ REMOVAL OF SYNTHETIC SUBSTITUTE FROM RIGHT KNEE JOINT, OPEN APPROACH: ICD-10-PCS | Performed by: ORTHOPAEDIC SURGERY

## 2021-03-28 PROCEDURE — C1776 JOINT DEVICE (IMPLANTABLE): HCPCS | Performed by: ORTHOPAEDIC SURGERY

## 2021-03-28 PROCEDURE — 6360000002 HC RX W HCPCS: Performed by: ANESTHESIOLOGY

## 2021-03-28 PROCEDURE — 2580000003 HC RX 258: Performed by: SPECIALIST

## 2021-03-28 PROCEDURE — 80202 ASSAY OF VANCOMYCIN: CPT

## 2021-03-28 PROCEDURE — 83735 ASSAY OF MAGNESIUM: CPT

## 2021-03-28 PROCEDURE — 7100000000 HC PACU RECOVERY - FIRST 15 MIN: Performed by: ORTHOPAEDIC SURGERY

## 2021-03-28 DEVICE — JOURNEY II BCS XLPE ARTICULAR                                    INSERT SZ 7-8 RIGHT 10MM
Type: IMPLANTABLE DEVICE | Site: KNEE | Status: FUNCTIONAL
Brand: JOURNEY

## 2021-03-28 RX ORDER — MORPHINE SULFATE 2 MG/ML
2 INJECTION, SOLUTION INTRAMUSCULAR; INTRAVENOUS EVERY 5 MIN PRN
Status: DISCONTINUED | OUTPATIENT
Start: 2021-03-28 | End: 2021-03-28 | Stop reason: HOSPADM

## 2021-03-28 RX ORDER — MORPHINE SULFATE 10 MG/ML
INJECTION, SOLUTION INTRAMUSCULAR; INTRAVENOUS PRN
Status: DISCONTINUED | OUTPATIENT
Start: 2021-03-28 | End: 2021-03-28 | Stop reason: SDUPTHER

## 2021-03-28 RX ORDER — PROMETHAZINE HYDROCHLORIDE 25 MG/ML
6.25 INJECTION, SOLUTION INTRAMUSCULAR; INTRAVENOUS
Status: DISCONTINUED | OUTPATIENT
Start: 2021-03-28 | End: 2021-03-28 | Stop reason: HOSPADM

## 2021-03-28 RX ORDER — ROCURONIUM BROMIDE 10 MG/ML
INJECTION, SOLUTION INTRAVENOUS PRN
Status: DISCONTINUED | OUTPATIENT
Start: 2021-03-28 | End: 2021-03-28 | Stop reason: SDUPTHER

## 2021-03-28 RX ORDER — OXYCODONE HYDROCHLORIDE 5 MG/1
10 TABLET ORAL EVERY 4 HOURS PRN
Status: CANCELLED | OUTPATIENT
Start: 2021-03-28

## 2021-03-28 RX ORDER — MORPHINE SULFATE 4 MG/ML
4 INJECTION, SOLUTION INTRAMUSCULAR; INTRAVENOUS EVERY 4 HOURS PRN
Status: CANCELLED | OUTPATIENT
Start: 2021-03-28

## 2021-03-28 RX ORDER — PROPOFOL 10 MG/ML
INJECTION, EMULSION INTRAVENOUS PRN
Status: DISCONTINUED | OUTPATIENT
Start: 2021-03-28 | End: 2021-03-28 | Stop reason: SDUPTHER

## 2021-03-28 RX ORDER — SODIUM CHLORIDE, SODIUM LACTATE, POTASSIUM CHLORIDE, CALCIUM CHLORIDE 600; 310; 30; 20 MG/100ML; MG/100ML; MG/100ML; MG/100ML
INJECTION, SOLUTION INTRAVENOUS CONTINUOUS PRN
Status: DISCONTINUED | OUTPATIENT
Start: 2021-03-28 | End: 2021-03-28 | Stop reason: SDUPTHER

## 2021-03-28 RX ORDER — MORPHINE SULFATE 2 MG/ML
2 INJECTION, SOLUTION INTRAMUSCULAR; INTRAVENOUS EVERY 4 HOURS PRN
Status: CANCELLED | OUTPATIENT
Start: 2021-03-28

## 2021-03-28 RX ORDER — OXYCODONE HYDROCHLORIDE 5 MG/1
5 TABLET ORAL EVERY 4 HOURS PRN
Status: CANCELLED | OUTPATIENT
Start: 2021-03-28

## 2021-03-28 RX ORDER — FENTANYL CITRATE 50 UG/ML
INJECTION, SOLUTION INTRAMUSCULAR; INTRAVENOUS PRN
Status: DISCONTINUED | OUTPATIENT
Start: 2021-03-28 | End: 2021-03-28 | Stop reason: SDUPTHER

## 2021-03-28 RX ORDER — VANCOMYCIN HYDROCHLORIDE 1 G/20ML
INJECTION, POWDER, LYOPHILIZED, FOR SOLUTION INTRAVENOUS PRN
Status: DISCONTINUED | OUTPATIENT
Start: 2021-03-28 | End: 2021-03-28 | Stop reason: ALTCHOICE

## 2021-03-28 RX ORDER — DOCUSATE SODIUM 100 MG/1
100 CAPSULE, LIQUID FILLED ORAL 2 TIMES DAILY
Status: DISCONTINUED | OUTPATIENT
Start: 2021-03-28 | End: 2021-03-30 | Stop reason: HOSPADM

## 2021-03-28 RX ORDER — LIDOCAINE HYDROCHLORIDE AND EPINEPHRINE 10; 10 MG/ML; UG/ML
20 INJECTION, SOLUTION INFILTRATION; PERINEURAL ONCE
Status: DISCONTINUED | OUTPATIENT
Start: 2021-03-28 | End: 2021-03-30 | Stop reason: HOSPADM

## 2021-03-28 RX ORDER — CEFAZOLIN SODIUM 2 G/50ML
SOLUTION INTRAVENOUS
Status: DISPENSED
Start: 2021-03-28 | End: 2021-03-28

## 2021-03-28 RX ORDER — GLYCOPYRROLATE 1 MG/5 ML
SYRINGE (ML) INTRAVENOUS PRN
Status: DISCONTINUED | OUTPATIENT
Start: 2021-03-28 | End: 2021-03-28 | Stop reason: SDUPTHER

## 2021-03-28 RX ORDER — ONDANSETRON 2 MG/ML
INJECTION INTRAMUSCULAR; INTRAVENOUS PRN
Status: DISCONTINUED | OUTPATIENT
Start: 2021-03-28 | End: 2021-03-28 | Stop reason: SDUPTHER

## 2021-03-28 RX ORDER — LIDOCAINE HYDROCHLORIDE 20 MG/ML
INJECTION, SOLUTION INTRAVENOUS PRN
Status: DISCONTINUED | OUTPATIENT
Start: 2021-03-28 | End: 2021-03-28 | Stop reason: SDUPTHER

## 2021-03-28 RX ORDER — OXYCODONE HYDROCHLORIDE 10 MG/1
10 TABLET ORAL EVERY 6 HOURS PRN
Qty: 28 TABLET | Refills: 0 | Status: SHIPPED | OUTPATIENT
Start: 2021-03-28 | End: 2021-04-04

## 2021-03-28 RX ORDER — NEOSTIGMINE METHYLSULFATE 1 MG/ML
INJECTION, SOLUTION INTRAVENOUS PRN
Status: DISCONTINUED | OUTPATIENT
Start: 2021-03-28 | End: 2021-03-28 | Stop reason: SDUPTHER

## 2021-03-28 RX ORDER — MEPERIDINE HYDROCHLORIDE 25 MG/ML
INJECTION INTRAMUSCULAR; INTRAVENOUS; SUBCUTANEOUS
Status: COMPLETED
Start: 2021-03-28 | End: 2021-03-28

## 2021-03-28 RX ORDER — ONDANSETRON 2 MG/ML
4 INJECTION INTRAMUSCULAR; INTRAVENOUS
Status: DISCONTINUED | OUTPATIENT
Start: 2021-03-28 | End: 2021-03-28 | Stop reason: HOSPADM

## 2021-03-28 RX ORDER — MEPERIDINE HYDROCHLORIDE 25 MG/ML
12.5 INJECTION INTRAMUSCULAR; INTRAVENOUS; SUBCUTANEOUS EVERY 5 MIN PRN
Status: DISCONTINUED | OUTPATIENT
Start: 2021-03-28 | End: 2021-03-28 | Stop reason: HOSPADM

## 2021-03-28 RX ORDER — LABETALOL HYDROCHLORIDE 5 MG/ML
INJECTION, SOLUTION INTRAVENOUS PRN
Status: DISCONTINUED | OUTPATIENT
Start: 2021-03-28 | End: 2021-03-28 | Stop reason: SDUPTHER

## 2021-03-28 RX ADMIN — HYDROCODONE BITARTRATE AND ACETAMINOPHEN 2 TABLET: 5; 325 TABLET ORAL at 19:01

## 2021-03-28 RX ADMIN — FENTANYL CITRATE 100 MCG: 50 INJECTION, SOLUTION INTRAMUSCULAR; INTRAVENOUS at 08:39

## 2021-03-28 RX ADMIN — WATER 2 G: 1 INJECTION INTRAMUSCULAR; INTRAVENOUS; SUBCUTANEOUS at 08:20

## 2021-03-28 RX ADMIN — DILTIAZEM HYDROCHLORIDE 240 MG: 240 CAPSULE, COATED, EXTENDED RELEASE ORAL at 20:17

## 2021-03-28 RX ADMIN — MEPERIDINE HYDROCHLORIDE 12.5 MG: 25 INJECTION INTRAMUSCULAR; INTRAVENOUS; SUBCUTANEOUS at 10:53

## 2021-03-28 RX ADMIN — FENTANYL CITRATE 100 MCG: 50 INJECTION, SOLUTION INTRAMUSCULAR; INTRAVENOUS at 08:08

## 2021-03-28 RX ADMIN — APIXABAN 5 MG: 5 TABLET, FILM COATED ORAL at 20:17

## 2021-03-28 RX ADMIN — ROCURONIUM BROMIDE 40 MG: 10 SOLUTION INTRAVENOUS at 08:08

## 2021-03-28 RX ADMIN — MORPHINE SULFATE 5 MG: 10 INJECTION INTRAMUSCULAR; INTRAVENOUS; SUBCUTANEOUS at 09:15

## 2021-03-28 RX ADMIN — VANCOMYCIN HYDROCHLORIDE 1500 MG: 10 INJECTION, POWDER, LYOPHILIZED, FOR SOLUTION INTRAVENOUS at 22:53

## 2021-03-28 RX ADMIN — SODIUM CHLORIDE, POTASSIUM CHLORIDE, SODIUM LACTATE AND CALCIUM CHLORIDE: 600; 310; 30; 20 INJECTION, SOLUTION INTRAVENOUS at 08:05

## 2021-03-28 RX ADMIN — DOCUSATE SODIUM 100 MG: 100 CAPSULE, LIQUID FILLED ORAL at 20:17

## 2021-03-28 RX ADMIN — MORPHINE SULFATE 5 MG: 10 INJECTION INTRAMUSCULAR; INTRAVENOUS; SUBCUTANEOUS at 09:17

## 2021-03-28 RX ADMIN — Medication 10 ML: at 14:53

## 2021-03-28 RX ADMIN — Medication 3 MG: at 09:55

## 2021-03-28 RX ADMIN — LABETALOL HYDROCHLORIDE 5 MG: 5 INJECTION INTRAVENOUS at 08:43

## 2021-03-28 RX ADMIN — VANCOMYCIN HYDROCHLORIDE 1500 MG: 10 INJECTION, POWDER, LYOPHILIZED, FOR SOLUTION INTRAVENOUS at 06:05

## 2021-03-28 RX ADMIN — Medication 0.6 MG: at 09:55

## 2021-03-28 RX ADMIN — METOPROLOL SUCCINATE 25 MG: 25 TABLET, EXTENDED RELEASE ORAL at 20:17

## 2021-03-28 RX ADMIN — METOPROLOL SUCCINATE 25 MG: 25 TABLET, EXTENDED RELEASE ORAL at 12:36

## 2021-03-28 RX ADMIN — MEPERIDINE HYDROCHLORIDE 12.5 MG: 25 INJECTION INTRAMUSCULAR; INTRAVENOUS; SUBCUTANEOUS at 11:10

## 2021-03-28 RX ADMIN — FENTANYL CITRATE 50 MCG: 50 INJECTION, SOLUTION INTRAMUSCULAR; INTRAVENOUS at 08:24

## 2021-03-28 RX ADMIN — ONDANSETRON 4 MG: 2 INJECTION INTRAMUSCULAR; INTRAVENOUS at 09:52

## 2021-03-28 RX ADMIN — LIDOCAINE HYDROCHLORIDE 50 MG: 20 INJECTION, SOLUTION INTRAVENOUS at 08:08

## 2021-03-28 RX ADMIN — PROPOFOL 200 MG: 10 INJECTION, EMULSION INTRAVENOUS at 08:08

## 2021-03-28 RX ADMIN — NALOXEGOL OXALATE 25 MG: 12.5 TABLET, FILM COATED ORAL at 14:35

## 2021-03-28 RX ADMIN — VANCOMYCIN HYDROCHLORIDE 1500 MG: 10 INJECTION, POWDER, LYOPHILIZED, FOR SOLUTION INTRAVENOUS at 16:15

## 2021-03-28 RX ADMIN — HEPARIN 300 UNITS: 100 SYRINGE at 20:18

## 2021-03-28 RX ADMIN — DOCUSATE SODIUM 100 MG: 100 CAPSULE, LIQUID FILLED ORAL at 14:35

## 2021-03-28 ASSESSMENT — PULMONARY FUNCTION TESTS
PIF_VALUE: 23
PIF_VALUE: 17
PIF_VALUE: 17
PIF_VALUE: 21
PIF_VALUE: 17
PIF_VALUE: 1
PIF_VALUE: 23
PIF_VALUE: 18
PIF_VALUE: 19
PIF_VALUE: 24
PIF_VALUE: 17
PIF_VALUE: 17
PIF_VALUE: 2
PIF_VALUE: 18
PIF_VALUE: 17
PIF_VALUE: 18
PIF_VALUE: 22
PIF_VALUE: 24
PIF_VALUE: 21
PIF_VALUE: 17
PIF_VALUE: 22
PIF_VALUE: 19
PIF_VALUE: 18
PIF_VALUE: 19
PIF_VALUE: 18
PIF_VALUE: 18
PIF_VALUE: 1
PIF_VALUE: 24
PIF_VALUE: 24
PIF_VALUE: 17
PIF_VALUE: 1
PIF_VALUE: 24
PIF_VALUE: 20
PIF_VALUE: 18
PIF_VALUE: 18
PIF_VALUE: 17
PIF_VALUE: 18
PIF_VALUE: 17
PIF_VALUE: 24
PIF_VALUE: 18
PIF_VALUE: 19
PIF_VALUE: 17
PIF_VALUE: 22
PIF_VALUE: 17
PIF_VALUE: 1
PIF_VALUE: 18
PIF_VALUE: 23
PIF_VALUE: 17
PIF_VALUE: 17
PIF_VALUE: 24
PIF_VALUE: 1
PIF_VALUE: 24
PIF_VALUE: 17
PIF_VALUE: 24
PIF_VALUE: 22
PIF_VALUE: 18
PIF_VALUE: 17
PIF_VALUE: 1
PIF_VALUE: 18
PIF_VALUE: 22
PIF_VALUE: 17
PIF_VALUE: 1
PIF_VALUE: 1
PIF_VALUE: 22
PIF_VALUE: 22

## 2021-03-28 ASSESSMENT — PAIN SCALES - GENERAL
PAINLEVEL_OUTOF10: 0
PAINLEVEL_OUTOF10: 9
PAINLEVEL_OUTOF10: 0
PAINLEVEL_OUTOF10: 0
PAINLEVEL_OUTOF10: 6

## 2021-03-28 ASSESSMENT — PAIN - FUNCTIONAL ASSESSMENT: PAIN_FUNCTIONAL_ASSESSMENT: PREVENTS OR INTERFERES SOME ACTIVE ACTIVITIES AND ADLS

## 2021-03-28 ASSESSMENT — PAIN DESCRIPTION - DESCRIPTORS: DESCRIPTORS: ACHING

## 2021-03-28 ASSESSMENT — PAIN DESCRIPTION - ORIENTATION: ORIENTATION: RIGHT

## 2021-03-28 ASSESSMENT — PAIN DESCRIPTION - PAIN TYPE
TYPE: SURGICAL PAIN
TYPE: SURGICAL PAIN

## 2021-03-28 ASSESSMENT — PAIN DESCRIPTION - PROGRESSION: CLINICAL_PROGRESSION: GRADUALLY IMPROVING

## 2021-03-28 ASSESSMENT — PAIN DESCRIPTION - LOCATION
LOCATION: KNEE
LOCATION: KNEE

## 2021-03-28 NOTE — PROGRESS NOTES
Internal Medicine Progress Note    [facility]    Apollo Cabrera., F.A.C.OLeoI. Kell Fang D.O., JAIDAOJOE Bobby D.O. Alon Ponce, MSN, APRN, NP-C  Jeremy Pat. Ari Martinez, MSN, APRN-CNP     Primary Care Physician: Louis Vargas MD   Admitting Physician:  Katherine Hussein DO  Admission date and time: 3/24/2021  4:20 PM    Room:  [address] [place] [address]  Admitting diagnosis: New onset atrial flutter [facility]) [I48.92]    Patient Name: Alvarez Sarah  MRN: [id number]    Date of Service: 3/28/2021     Subjective:  Raquel Quijano is a 61 y.o. male who was seen and examined today,3/28/2021, at the bedside. Raquel Quijano underwent orthopedic intervention today with total knee arthroplasty revision and irrigation and excisional debridement. He tolerated the procedure well and admits to mild postoperative pain. He was also evaluated by the general surgery team yesterday in regard to the scalp lesion with plans for core biopsy prior to discharge. Otherwise he is less short of breath today. He understands the importance of utilizing the incentive spirometer and early activity. Review of System:   Constitutional:   Admits to generalized malaise and fatigue with ongoing low-grade febrile illness. HEENT:   Denies ear pain, sore throat, sinus or eye problems. Lesion on right scalp  Cardiovascular:   Seems to be in normal sinus rhythm following cardioversion yesterday f  Respiratory:   Persistent shortness of breath without significant coughing or sputum production. Gastrointestinal:   Denies nausea, vomiting, diarrhea, or constipation. Denies any abdominal pain. Genitourinary:    Denies any urgency, frequency, hematuria. Voiding  without difficulty. Extremities:   Mild postoperative knee pain as to be expected. Neurology:    Denies any headache or focal neurological deficits, Denies generalized weakness or memory difficulty.    Psch:   Denies being anxious or color and texture. Genitalia/Breast:  Deferred    Medication:  Scheduled Meds:   ceFAZolin        lidocaine-EPINEPHrine  20 mL Intradermal Once    ortho mix injection   Injection On Call    vancomycin  1,500 mg Intravenous Q8H    cefTRIAXone (ROCEPHIN) IV  2,000 mg Intravenous Q24H    lidocaine PF  5 mL Intradermal Once    heparin flush  3 mL Intravenous 2 times per day    lidocaine  10 mL Intra-articular See Admin Instructions    dilTIAZem  240 mg Oral Nightly    [Held by provider] losartan  100 mg Oral Daily    metoprolol succinate  25 mg Oral BID    apixaban  5 mg Oral BID    sodium chloride flush  10 mL Intravenous 2 times per day    pantoprazole  40 mg Oral QAM AC     Continuous Infusions:   dextrose         Objective Data:  CBC with Differential:    Lab Results   Component Value Date    WBC 7.8 03/28/2021    RBC 3.78 03/28/2021    HGB 10.4 03/28/2021    HCT 32.1 03/28/2021     03/28/2021    MCV 84.9 03/28/2021    MCH 27.5 03/28/2021    MCHC 32.4 03/28/2021    RDW 13.2 03/28/2021    LYMPHOPCT 13.9 03/28/2021    MONOPCT 10.0 03/28/2021    BASOPCT 0.5 03/28/2021    MONOSABS 0.78 03/28/2021    LYMPHSABS 1.08 03/28/2021    EOSABS 0.04 03/28/2021    BASOSABS 0.04 03/28/2021     CMP:    Lab Results   Component Value Date     03/28/2021    K 3.8 03/28/2021    K 4.1 03/24/2021     03/28/2021    CO2 25 03/28/2021    BUN 17 03/28/2021    CREATININE 0.7 03/28/2021    GFRAA >60 03/28/2021    LABGLOM >60 03/28/2021    GLUCOSE 116 03/28/2021    PROT 6.4 03/28/2021    LABALBU 3.0 03/28/2021    CALCIUM 8.3 03/28/2021    BILITOT 0.4 03/28/2021    ALKPHOS 111 03/28/2021    AST 58 03/28/2021    ALT 78 03/28/2021       Assessment:  1. New onset atrial fibrillation/flutter with rapid ventricular response status post cardioversion  2. Streptococcus bacteremia  3.  Septic right total knee arthroplasty status post total knee arthroplasty revision with irrigation and excisional debridement 3/28/2021  4. Bilateral lower extremity venous insufficiency  5. Essential hypertension  6. Gastroesophageal reflux disease  7. Osteoarthritis  8. Abnormality of the right scalp area possibly suggesting carcinoma      Plan:   Arcenio Patterson tolerated surgical intervention without complication. Antibiotics are being administered at the discretion of the infectious disease team.  Patient's pain appears fairly well controlled. In regards to the scalp lesion, plans are for core biopsy with the general surgery team prior to discharge. We will continue to monitor chronic comorbidities. More than 50% of my  time was spent at the bedside counseling/coordinating care with the patient and/or family with face to face contact. This time was spent reviewing notes and laboratory data as well as instructing and counseling the patient. Time I spent with the family or surrogate(s) is included only if the patient was incapable of providing the necessary information or participating in medical decisions. I also discussed the differential diagnosis and all of the proposed management plans with the patient and individuals accompanying the patient. Arcenio Patterson requires this high level of physician care and nursing on the [facility] unit due the complexity of decision management and chance of rapid decline or death. Continued cardiac monitoring and higher level of nursing are required. I am readily available for any further decision-making and intervention.      Joselyn Reyes DO, [email or web address].  3/28/2021  12:31 PM

## 2021-03-28 NOTE — OP NOTE
Operative Note      Patient: Dipak Cuadra  YOB: 1960  MRN: [id number]    Date of Procedure: 3/28/2021    Pre-Op Diagnosis: RIGHT KNEE EFFUSION POSSIBLE SEPTIC JOINT    Post-Op Diagnosis: Same       Procedure(s):  KNEE TOTAL ARTHROPLASTY REVISION, with irrigation and excisional debridement    Surgeon(s): Ernestine Sparrow DO    Assistant:   Resident: Randall Carl DO; Yuni Rosas DO; Lacho Sandoval DO    Anesthesia: General    Estimated Blood Loss (mL): Minimal    Complications: None    Specimens:   ID Type Source Tests Collected by Time Destination   1 : RIGHT KNEE SWAB FOR CULTURE Body Fluid Fluid CULTURE, ANAEROBIC, CULTURE, FUNGUS, GRAM STAIN, CULTURE, SURGICAL, CULTURE WITH SMEAR, ACID FAST [employer],  3/28/2021 0907    2 : TISSUE RIGHT KNEE FOR CULTURE Tissue Tissue CULTURE, ANAEROBIC, CULTURE, FUNGUS, GRAM STAIN, CULTURE, SURGICAL Ernestine Sparrow DO 3/28/2021 0910        Implants:  Implant Name Type Inv.  Item Serial No.  Lot No. LRB No. Used Action   INSERT TIB SZ 7-8 [id number] XLPE R KNEE BICRUCIATE ARTC  INSERT TIB SZ 7-8 [id number] XLPE R KNEE BICRUCIATE [facility] AND [facility]W [facility] [id number] Right 1 Implanted         Drains: * No LDAs found *    Findings: as above    Detailed Description of Procedure:   Per dictation    Electronically signed by Ernestine Sparrow DO on 3/28/2021 at 9:41 AM

## 2021-03-28 NOTE — PROGRESS NOTES
- now with bacteremia  · Serum creatinine today: 0.7; CrCl >100 mL/min; baseline Scr ~ 1.0   · 3/27: Trough today @ 0409 = 6.9 mcg/ml  · 3/28: Trough @ 1445 = 15.1 mcg/mL    Plan:  · Continue Vancomycin 1,500 mg IV Q8H  · Follow renal function  · Pharmacist will follow and monitor/adjust dosing as necessary      Thank you for the consult,    Rosibel Thomas, PharmD, BCPS 3/28/2021 3:25 PM   [phone]

## 2021-03-28 NOTE — PROGRESS NOTES
Encouraged Debbi Her to attempt to ambulate or at least stand at bedside and he was adamant about not doing anything today. Has not had pain up until this point where we assisted him to get dressed. Shortly thereafter he changed his mind from getting up to refusing. Encouraged him once again and he stated \"not today. Maybe tomorrow\".

## 2021-03-28 NOTE — ANESTHESIA POSTPROCEDURE EVALUATION
Department of Anesthesiology  Postprocedure Note    Patient: Herlinda Shipley  MRN: [id number]  YOB: 1960  Date of evaluation: 3/28/2021  Time:  11:40 AM     Procedure Summary     Date: 03/28/21 Room / Location: [address] [place] [address] / [address] [place] [address]    Anesthesia Start: 0805 Anesthesia Stop: 2676    Procedure: Tonyberg (Right Knee) Diagnosis: (RIGHT KNEE EFFUSION POSSIBLE SEPTIC JOINT)    Surgeons: Casilda Schwab, DO Responsible Provider: Lisa Barrios MD    Anesthesia Type: general ASA Status: 3          Anesthesia Type: general    Bal Phase I: Bal Score: 9    Bal Phase II:      Last vitals: Reviewed and per EMR flowsheets.        Anesthesia Post Evaluation    Patient location during evaluation: PACU  Patient participation: complete - patient participated  Level of consciousness: awake  Airway patency: patent  Nausea & Vomiting: no nausea and no vomiting  Complications: no  Cardiovascular status: hemodynamically stable  Respiratory status: acceptable  Hydration status: euvolemic

## 2021-03-28 NOTE — PROGRESS NOTES
[facility]  Progress Note     Chief Complaint   Patient presents with    Knee Pain     right , denies recent injury     SUBJECTIVE    Seen at bedside  S/p OR   Wife at bedside   picc in place   Tolerating medications  No side effects   No complaints of pain   Right knee bandaged - no strikethrough     REVIEW OFSYSTEMS:    Negative except as above    PHYSICAL EXAM       Vitals:    Vitals:    03/28/21 1127 03/28/21 1128 03/28/21 1129 03/28/21 1130   BP:       Pulse: 80 80 78 78   Resp: 13 17 16 18   Temp:       TempSrc:       SpO2: (!) 89% 93% 91% 93%   Weight:       Height:         Physical Exam   Constitutional/General: Alert and oriented, NAD  Head: NC/AT  Eyes: PERRL, EOMI glasses  Mouth: Normal mucosa, no thrush   Neck: Supple, full ROM,    Pulmonary: Lungs clear to auscultation bilaterally. Not in respiratory distress  Cardiovascular:  Regular rate and rhythm, no murmurs, gallops, or rubs. Abdomen: Soft, + BS.   distension. Nontender. Extremities: Moves all extremities x 4.   Right knee bandaged -ice on   Pulses:  Distal pulses dec  Skin: Warm and dry without rash  Neurologic:    No focal deficits  Psych: Normal Affect  Left pic      DIAGNOSTIC RESULTS   RADIOLOGY:   Echo Complete    Result Date: 3/25/2021  Transthoracic Echocardiography Report (TTE)  Demographics   Patient Name      Warren Martinez Gender             Male                    D   Medical Record    [id number]       Room Number        3042  Number   Account #         [de-identified]      Procedure Date     03/25/2021   Corporate ID                     Ordering Physician Ema Reis DO   Accession Number  [id number]     Referring                                   Physician   Date of Birth     1960     Sonographer        William Stokes                                                      [facility]   Age               61 year(s)     Interpreting       Ema Reis DO                                   Physician Any Other  Procedure Type of Study   TTE procedure  Procedure Date Date: 03/25/2021 Start: 12:57 PM Study Location: Echo Lab Technical Quality: Limited visualization due to body habitus. Indications:Atrial flutter. Patient Status: Routine Contrast Medium: Definity. Height: 76 inches Weight: 283 pounds BSA: 2.57 m^2 BMI: 34.45 kg/m^2 Rhythm: Within normal limits HR: 96 bpm BP: 128/77 mmHg  Findings   Left Ventricle  Left ventricle is mildly enlarged . Mild left ventricular concentric hypertrophy noted. Ejection fraction is measured at 62%. No evidence of left ventricular mass or thrombus noted. No regional wall motion abnormalities seen. Right Ventricle  Normal right ventricular size. No evidence of a thrombus in the right ventricle. Left Atrium  The left atrium is mildly dilated. Interatrial septum appears intact. No evidence of thrombus within left atrium. Right Atrium  Normal right atrium size. Mitral Valve  Physiologic and/or trace mitral regurgitation is present. No evidence of mitral valve stenosis. Tricuspid Valve  Physiologic and/or trace tricuspid regurgitation. Aortic Valve  Aortic valve opens well. The aortic valve is trileaflet. No evidence of aortic valve regurgitation. No hemodynamically significant aortic stenosis is present. Pulmonic Valve  The pulmonic valve was not well visualized. Pericardial Effusion  No evidence of pericardial effusion. Aorta  The aorta is within normal limits. Miscellaneous  Irregular rhythm. Conclusions   Summary  Left ventricle is mildly enlarged . Mild left ventricular concentric hypertrophy noted. Ejection fraction is measured at 62%. No evidence of left ventricular mass or thrombus noted. No regional wall motion abnormalities seen. The left atrium is mildly dilated. Interatrial septum appears intact. No evidence of thrombus within left atrium. Physiologic and/or trace mitral regurgitation is present. [email or web address] DocKey=[id number]    Xr Knee Right (1-2 Views)    Result Date: 3/24/2021  EXAMINATION: TWO XRAY VIEWS OF THE RIGHT KNEE; ONE XRAY VIEW OF THE CHEST 3/24/2021 5:35 pm COMPARISON: Right knee 08/18/2020. chest x-ray 07/22/2019. HISTORY: ORDERING SYSTEM PROVIDED HISTORY: right knee pain. Shortness of breath TECHNOLOGIST PROVIDED HISTORY: Reason for exam:->right knee pain FINDINGS: Right knee: A right knee arthroplasty device is again noted. It remains intact. No radiographic evidence of loosening or change in alignment. The bones are intact. No acute fracture or dislocation is noted. Anterior patella degenerative enthesopathy is moderate. Non-specific slight soft tissue prominence superior to the patella on the lateral view may reflect small suprapatellar recess joint effusion. No definite radiographic evidence of acute skeletal or hardware pathology. Suggestion of small joint effusion Chest: Limited examination due to prominent soft tissue attenuation. The cardiac silhouette size is slightly enlarged without evidence of vascular congestion or alveolar pulmonary edema. There is no evidence of pulmonary consolidation or infiltrate. No pleural effusion noted. No radiographically visible pneumothorax. No acute displaced fracture. IMPRESSION: Slight cardiomegaly No radiographic evidence of definite acute pulmonary disease in the visualized chest     Ct Knee Right Wo Contrast    Result Date: 3/25/2021  EXAMINATION: CT OF THE RIGHT FEMUR WITH CONTRAST; CT OF THE RIGHT KNEE WITHOUT CONTRAST; CT OF THE RIGHT TIBIA AND FIBULA WITHOUT CONTRAST 3/25/2021 7:47 pm TECHNIQUE: CT of the right femur was performed with the administration of intravenous contrast.  Multiplanar reformatted images are provided for review.  Dose modulation, iterative reconstruction, and/or weight based adjustment of the mA/kV was utilized to reduce the radiation dose to as low as reasonably achievable.; CT of the right knee was performed without the administration of intravenous contrast.  Multiplanar reformatted images are provided for review. Dose modulation, iterative reconstruction, and/or weight based adjustment of the mA/kV was utilized to reduce the radiation dose to as low as reasonably achievable.; CT of the right tibia and fibula was performed without the administration of intravenous contrast.  Multiplanar reformatted images are provided for review. Dose modulation, iterative reconstruction, and/or weight based adjustment of the mA/kV was utilized to reduce the radiation dose to as low as reasonably achievable. COMPARISON: None. HISTORY ORDERING SYSTEM PROVIDED HISTORY: pain, swelling lower aspect of thigh TECHNOLOGIST PROVIDED HISTORY: Reason for exam:->pain, swelling lower aspect of thigh Recent knee replacement, evaluate for septic arthritis. FINDINGS: Bones: No definite acute fracture or dislocation of the femur, tibia, and fibula. No periprosthetic lucency identified to suggest loosening. No aggressive periostitis although please note that evaluation is somewhat limited due to metallic artifact from the patient's prosthesis. Soft Tissue:  Musculature is essentially preserved within the anterior and posterior compartments of the thigh. There is diffuse subcutaneous soft tissue edema surrounding the knee and lower leg, primarily within the dorsum of the calf. There is no well-circumscribed drainable fluid collection identified, however please note that small fluid collections would not be readily visualized without the benefit of intravenous contrast.  There is advanced fatty infiltration/atrophy of the medial head of the gastrocnemius muscle. Joint: Mild degenerative narrowing of the femoroacetabular joint. No joint effusion identified. Status post total knee arthroplasty.   Evaluation of the arthroplasty itself is obscured by streak artifact from the metal component, however there is no convincing evidence for hardware loosening or perihardware fracture. There is a moderate amount of patellofemoral joint fluid, the sterility of which cannot be confirmed by imaging. The tibiotalar joint space is grossly preserved. Within limitations of hardware related artifact, there is no convincing evidence for loosening or perihardware fracture. Moderate patellofemoral joint fluid, the sterility of which cannot be confirmed by imaging. No definite cortical disruption or periostitis identified. Extensive subcutaneous soft tissue edema primarily within the dorsal aspect of the lower leg. No well-defined drainable fluid collection identified although evaluation for this finding is somewhat limited without intravenous contrast. Diffuse fatty infiltration/atrophy of the medial head of the gastrocnemius muscle. Xr Chest Portable    Result Date: 3/24/2021  EXAMINATION: TWO XRAY VIEWS OF THE RIGHT KNEE; ONE XRAY VIEW OF THE CHEST 3/24/2021 5:35 pm COMPARISON: Right knee 08/18/2020. chest x-ray 07/22/2019. HISTORY: ORDERING SYSTEM PROVIDED HISTORY: right knee pain. Shortness of breath TECHNOLOGIST PROVIDED HISTORY: Reason for exam:->right knee pain FINDINGS: Right knee: A right knee arthroplasty device is again noted. It remains intact. No radiographic evidence of loosening or change in alignment. The bones are intact. No acute fracture or dislocation is noted. Anterior patella degenerative enthesopathy is moderate. Non-specific slight soft tissue prominence superior to the patella on the lateral view may reflect small suprapatellar recess joint effusion. No definite radiographic evidence of acute skeletal or hardware pathology. Suggestion of small joint effusion Chest: Limited examination due to prominent soft tissue attenuation.  The cardiac silhouette size is slightly enlarged without evidence of vascular congestion or alveolar pulmonary edema. There is no evidence of pulmonary consolidation or infiltrate. No pleural effusion noted. No radiographically visible pneumothorax. No acute displaced fracture. IMPRESSION: Slight cardiomegaly No radiographic evidence of definite acute pulmonary disease in the visualized chest     Ct Femur Right Wo Contrast    Result Date: 3/25/2021  EXAMINATION: CT OF THE RIGHT FEMUR WITH CONTRAST; CT OF THE RIGHT KNEE WITHOUT CONTRAST; CT OF THE RIGHT TIBIA AND FIBULA WITHOUT CONTRAST 3/25/2021 7:47 pm TECHNIQUE: CT of the right femur was performed with the administration of intravenous contrast.  Multiplanar reformatted images are provided for review. Dose modulation, iterative reconstruction, and/or weight based adjustment of the mA/kV was utilized to reduce the radiation dose to as low as reasonably achievable.; CT of the right knee was performed without the administration of intravenous contrast.  Multiplanar reformatted images are provided for review. Dose modulation, iterative reconstruction, and/or weight based adjustment of the mA/kV was utilized to reduce the radiation dose to as low as reasonably achievable.; CT of the right tibia and fibula was performed without the administration of intravenous contrast.  Multiplanar reformatted images are provided for review. Dose modulation, iterative reconstruction, and/or weight based adjustment of the mA/kV was utilized to reduce the radiation dose to as low as reasonably achievable. COMPARISON: None. HISTORY ORDERING SYSTEM PROVIDED HISTORY: pain, swelling lower aspect of thigh TECHNOLOGIST PROVIDED HISTORY: Reason for exam:->pain, swelling lower aspect of thigh Recent knee replacement, evaluate for septic arthritis. FINDINGS: Bones: No definite acute fracture or dislocation of the femur, tibia, and fibula. No periprosthetic lucency identified to suggest loosening.   No aggressive periostitis although please note that evaluation is somewhat limited due to metallic artifact from the patient's prosthesis. Soft Tissue:  Musculature is essentially preserved within the anterior and posterior compartments of the thigh. There is diffuse subcutaneous soft tissue edema surrounding the knee and lower leg, primarily within the dorsum of the calf. There is no well-circumscribed drainable fluid collection identified, however please note that small fluid collections would not be readily visualized without the benefit of intravenous contrast.  There is advanced fatty infiltration/atrophy of the medial head of the gastrocnemius muscle. Joint: Mild degenerative narrowing of the femoroacetabular joint. No joint effusion identified. Status post total knee arthroplasty. Evaluation of the arthroplasty itself is obscured by streak artifact from the metal component, however there is no convincing evidence for hardware loosening or perihardware fracture. There is a moderate amount of patellofemoral joint fluid, the sterility of which cannot be confirmed by imaging. The tibiotalar joint space is grossly preserved. Within limitations of hardware related artifact, there is no convincing evidence for loosening or perihardware fracture. Moderate patellofemoral joint fluid, the sterility of which cannot be confirmed by imaging. No definite cortical disruption or periostitis identified. Extensive subcutaneous soft tissue edema primarily within the dorsal aspect of the lower leg. No well-defined drainable fluid collection identified although evaluation for this finding is somewhat limited without intravenous contrast. Diffuse fatty infiltration/atrophy of the medial head of the gastrocnemius muscle.      Ct Tibia Fibula Right Wo Contrast    Result Date: 3/25/2021  EXAMINATION: CT OF THE RIGHT FEMUR WITH CONTRAST; CT OF THE RIGHT KNEE WITHOUT CONTRAST; CT OF THE RIGHT TIBIA AND FIBULA WITHOUT CONTRAST 3/25/2021 7:47 pm TECHNIQUE: CT of the right femur was performed with the administration of intravenous contrast.  Multiplanar reformatted images are provided for review. Dose modulation, iterative reconstruction, and/or weight based adjustment of the mA/kV was utilized to reduce the radiation dose to as low as reasonably achievable.; CT of the right knee was performed without the administration of intravenous contrast.  Multiplanar reformatted images are provided for review. Dose modulation, iterative reconstruction, and/or weight based adjustment of the mA/kV was utilized to reduce the radiation dose to as low as reasonably achievable.; CT of the right tibia and fibula was performed without the administration of intravenous contrast.  Multiplanar reformatted images are provided for review. Dose modulation, iterative reconstruction, and/or weight based adjustment of the mA/kV was utilized to reduce the radiation dose to as low as reasonably achievable. COMPARISON: None. HISTORY ORDERING SYSTEM PROVIDED HISTORY: pain, swelling lower aspect of thigh TECHNOLOGIST PROVIDED HISTORY: Reason for exam:->pain, swelling lower aspect of thigh Recent knee replacement, evaluate for septic arthritis. FINDINGS: Bones: No definite acute fracture or dislocation of the femur, tibia, and fibula. No periprosthetic lucency identified to suggest loosening. No aggressive periostitis although please note that evaluation is somewhat limited due to metallic artifact from the patient's prosthesis. Soft Tissue:  Musculature is essentially preserved within the anterior and posterior compartments of the thigh. There is diffuse subcutaneous soft tissue edema surrounding the knee and lower leg, primarily within the dorsum of the calf.   There is no well-circumscribed drainable fluid collection identified, however please note that small fluid collections would not be readily visualized without the benefit of intravenous contrast.  There is advanced fatty infiltration/atrophy of the medial head of the gastrocnemius LABS  Recent Labs     03/26/21  0640 03/27/21  0409 03/28/21  0550   WBC 10.8 9.3 7.8   HGB 11.0* 11.3* 10.4*   HCT 33.5* 33.5* 32.1*   MCV 84.0 82.1 84.9    226 233     Recent Labs     03/26/21  0640 03/27/21  0409 03/28/21  0550   * 130* 136   K 3.9 4.0 3.8   CL 98 96* 100   CO2 21* 20* 25   BUN 14 17 17   CREATININE 0.9 0.8 0.7   GFRAA >60 >60 >60   LABGLOM >60 >60 >60   GLUCOSE 119* 109* 116*   PROT 6.1* 6.7 6.4   LABALBU 3.1* 3.1* 3.0*   CALCIUM 7.9* 8.3* 8.3*   BILITOT 0.8 0.5 0.4   ALKPHOS 80 93 111   AST 13 18 58*   ALT 14 22 78*     No results for input(s): PROCAL in the last 72 hours.   Lab Results   Component Value Date    CRP 27.8 (H) 03/27/2021    CRP 0.3 03/25/2021    CRP 13.2 (H) 03/24/2021     Lab Results   Component Value Date    SEDRATE 68 (H) 03/27/2021    SEDRATE 21 (H) 03/24/2021    SEDRATE 6 05/06/2019       Recent Labs     03/26/21  0640 03/27/21  0409 03/28/21  0550   CRP  --  27.8*  --    AST 13 18 58*   ALT 14 22 78*     Lab Results   Component Value Date    CHOL 93 03/25/2021    TRIG 52 03/25/2021    HDL 41 03/25/2021    LDLCALC 42 03/25/2021    LABVLDL 10 03/25/2021        MICROBIOLOGY:  Cultures :   Lab Results   Component Value Date    BC 5 Days- no growth 05/06/2019    BC 5 Days- no growth 08/01/2018    ORG Strep agalactiae (Beta Strep Group B) 03/24/2021    ORG Strep agalactiae (Beta Strep Group B) 03/24/2021    ORG Staphylococcus aureus 05/06/2019    ORG Enterobacter cloacae 05/06/2019     Lab Results   Component Value Date    BLOODCULT2  03/24/2021     Refer to previous blood culture (Right wrist)  collected 3/24/21 @ 2040 for susceptibility results      BLOODCULT2 5 Days- no growth 05/06/2019    BLOODCULT2 5 Days- no growth 08/01/2018    ORG Strep agalactiae (Beta Strep Group B) 03/24/2021    ORG Strep agalactiae (Beta Strep Group B) 03/24/2021    ORG Staphylococcus aureus 05/06/2019    ORG Enterobacter cloacae 05/06/2019       WOUND/ABSCESS   Date Value Ref Range Status   05/06/2019 Heavy growth  Final   05/06/2019 Rare growth  Final   08/01/2018 Rare growth  Final   08/01/2018 Rare growth  Final          Urine Culture, Routine   Date Value Ref Range Status   07/25/2019 Growth not present  Final     MRSA Culture Only   Date Value Ref Range Status   07/22/2019 Methicillin resistant Staph aureus not isolated  Final      ASSESSMENT     Group B strep bacteremia   fevers  1. Atrial flutter with rapid ventricular response ([address]    2. Right knee pain suspect PJI -- Nucl fluid + 45, 730    3. Suspected deep vein thrombosis (DVT)     right 1st to ulcer now with callus left 2nd toe callus     S/p right knee revision with irrigation and excisional debridement 3/28   ESR 68     PLAN  Continue with Ceftriaxone and Vancomycin   PICC placed 3/27   Follow cultures  Monitor labs   ANALI     DINH Bhagat - NP  3/28/2021  11:57 AM    Patient examined along with the nurse practitioner  Agree with above  Patient had a right knee surgery done today it was irrigation and excisional debridement  Clinically doing okay he is tolerating his antibiotic well  Follow Vanco trough  Awaiting ANALI  Patient with group B strep bacteremia    I have discussed the case, including pertinent history and physical  exam findings . I have seen and examined the patient and the key elements of the encounter have been performed by me. I agree with the assessment, plan and orders as documented.       Treatment plan as per my recommendation     Jimmy Steiner MD, FACP  3/28/2021  3:56 PM

## 2021-03-29 LAB
ALBUMIN SERPL-MCNC: 2.9 G/DL (ref 3.5–5.2)
ALP BLD-CCNC: 131 U/L (ref 40–129)
ALT SERPL-CCNC: 94 U/L (ref 0–40)
ANION GAP SERPL CALCULATED.3IONS-SCNC: 11 MMOL/L (ref 7–16)
AST SERPL-CCNC: 54 U/L (ref 0–39)
BASOPHILS ABSOLUTE: 0.02 E9/L (ref 0–0.2)
BASOPHILS RELATIVE PERCENT: 0.3 % (ref 0–2)
BILIRUB SERPL-MCNC: 0.5 MG/DL (ref 0–1.2)
BUN BLDV-MCNC: 19 MG/DL (ref 8–23)
CALCIUM SERPL-MCNC: 8 MG/DL (ref 8.6–10.2)
CHLORIDE BLD-SCNC: 98 MMOL/L (ref 98–107)
CO2: 24 MMOL/L (ref 22–29)
CREAT SERPL-MCNC: 0.8 MG/DL (ref 0.7–1.2)
EOSINOPHILS ABSOLUTE: 0.07 E9/L (ref 0.05–0.5)
EOSINOPHILS RELATIVE PERCENT: 0.9 % (ref 0–6)
GFR AFRICAN AMERICAN: >60
GFR NON-AFRICAN AMERICAN: >60 ML/MIN/1.73
GLUCOSE BLD-MCNC: 127 MG/DL (ref 74–99)
GRAM STAIN ORDERABLE: NORMAL
GRAM STAIN ORDERABLE: NORMAL
HCT VFR BLD CALC: 30.6 % (ref 37–54)
HEMOGLOBIN: 9.8 G/DL (ref 12.5–16.5)
IMMATURE GRANULOCYTES #: 0.03 E9/L
IMMATURE GRANULOCYTES %: 0.4 % (ref 0–5)
LYMPHOCYTES ABSOLUTE: 0.95 E9/L (ref 1.5–4)
LYMPHOCYTES RELATIVE PERCENT: 12.7 % (ref 20–42)
MCH RBC QN AUTO: 27.4 PG (ref 26–35)
MCHC RBC AUTO-ENTMCNC: 32 % (ref 32–34.5)
MCV RBC AUTO: 85.5 FL (ref 80–99.9)
METER GLUCOSE: 119 MG/DL (ref 74–99)
MONOCYTES ABSOLUTE: 0.86 E9/L (ref 0.1–0.95)
MONOCYTES RELATIVE PERCENT: 11.5 % (ref 2–12)
NEUTROPHILS ABSOLUTE: 5.55 E9/L (ref 1.8–7.3)
NEUTROPHILS RELATIVE PERCENT: 74.2 % (ref 43–80)
PDW BLD-RTO: 13.2 FL (ref 11.5–15)
PLATELET # BLD: 226 E9/L (ref 130–450)
PMV BLD AUTO: 11.4 FL (ref 7–12)
POTASSIUM SERPL-SCNC: 4.1 MMOL/L (ref 3.5–5)
RBC # BLD: 3.58 E12/L (ref 3.8–5.8)
SODIUM BLD-SCNC: 133 MMOL/L (ref 132–146)
TOTAL PROTEIN: 6.3 G/DL (ref 6.4–8.3)
WBC # BLD: 7.5 E9/L (ref 4.5–11.5)

## 2021-03-29 PROCEDURE — 6360000002 HC RX W HCPCS: Performed by: SPECIALIST

## 2021-03-29 PROCEDURE — 2060000000 HC ICU INTERMEDIATE R&B

## 2021-03-29 PROCEDURE — 2580000003 HC RX 258: Performed by: INTERNAL MEDICINE

## 2021-03-29 PROCEDURE — 2580000003 HC RX 258

## 2021-03-29 PROCEDURE — 85025 COMPLETE CBC W/AUTO DIFF WBC: CPT

## 2021-03-29 PROCEDURE — 80053 COMPREHEN METABOLIC PANEL: CPT

## 2021-03-29 PROCEDURE — 2580000003 HC RX 258: Performed by: SPECIALIST

## 2021-03-29 PROCEDURE — 97530 THERAPEUTIC ACTIVITIES: CPT

## 2021-03-29 PROCEDURE — 82962 GLUCOSE BLOOD TEST: CPT

## 2021-03-29 PROCEDURE — 36415 COLL VENOUS BLD VENIPUNCTURE: CPT

## 2021-03-29 PROCEDURE — 97165 OT EVAL LOW COMPLEX 30 MIN: CPT

## 2021-03-29 PROCEDURE — 6360000002 HC RX W HCPCS: Performed by: INTERNAL MEDICINE

## 2021-03-29 PROCEDURE — 6370000000 HC RX 637 (ALT 250 FOR IP): Performed by: NURSE PRACTITIONER

## 2021-03-29 PROCEDURE — 6370000000 HC RX 637 (ALT 250 FOR IP): Performed by: INTERNAL MEDICINE

## 2021-03-29 PROCEDURE — 6370000000 HC RX 637 (ALT 250 FOR IP): Performed by: PHYSICIAN ASSISTANT

## 2021-03-29 RX ORDER — GINSENG 100 MG
1 CAPSULE ORAL DAILY
Status: DISCONTINUED | OUTPATIENT
Start: 2021-03-29 | End: 2021-03-30 | Stop reason: HOSPADM

## 2021-03-29 RX ORDER — ACETAMINOPHEN 650 MG
TABLET, EXTENDED RELEASE ORAL PRN
Status: DISCONTINUED | OUTPATIENT
Start: 2021-03-29 | End: 2021-03-30 | Stop reason: HOSPADM

## 2021-03-29 RX ORDER — LIDOCAINE HYDROCHLORIDE AND EPINEPHRINE 10; 10 MG/ML; UG/ML
20 INJECTION, SOLUTION INFILTRATION; PERINEURAL ONCE
Status: DISCONTINUED | OUTPATIENT
Start: 2021-03-29 | End: 2021-03-30 | Stop reason: HOSPADM

## 2021-03-29 RX ADMIN — DOCUSATE SODIUM 100 MG: 100 CAPSULE, LIQUID FILLED ORAL at 20:57

## 2021-03-29 RX ADMIN — METOPROLOL SUCCINATE 25 MG: 25 TABLET, EXTENDED RELEASE ORAL at 20:57

## 2021-03-29 RX ADMIN — WATER 2000 MG: 1 INJECTION INTRAMUSCULAR; INTRAVENOUS; SUBCUTANEOUS at 08:53

## 2021-03-29 RX ADMIN — WATER: 1 INJECTION INTRAMUSCULAR; INTRAVENOUS; SUBCUTANEOUS at 09:10

## 2021-03-29 RX ADMIN — HYDROCODONE BITARTRATE AND ACETAMINOPHEN 2 TABLET: 5; 325 TABLET ORAL at 20:57

## 2021-03-29 RX ADMIN — POLYETHYLENE GLYCOL 3350 17 G: 17 POWDER, FOR SOLUTION ORAL at 09:01

## 2021-03-29 RX ADMIN — DOCUSATE SODIUM 100 MG: 100 CAPSULE, LIQUID FILLED ORAL at 08:53

## 2021-03-29 RX ADMIN — Medication 10 ML: at 09:10

## 2021-03-29 RX ADMIN — METOPROLOL SUCCINATE 25 MG: 25 TABLET, EXTENDED RELEASE ORAL at 08:53

## 2021-03-29 RX ADMIN — VANCOMYCIN HYDROCHLORIDE 1500 MG: 10 INJECTION, POWDER, LYOPHILIZED, FOR SOLUTION INTRAVENOUS at 06:44

## 2021-03-29 RX ADMIN — DILTIAZEM HYDROCHLORIDE 240 MG: 240 CAPSULE, COATED, EXTENDED RELEASE ORAL at 20:57

## 2021-03-29 RX ADMIN — HYDROCODONE BITARTRATE AND ACETAMINOPHEN 2 TABLET: 5; 325 TABLET ORAL at 12:58

## 2021-03-29 RX ADMIN — VANCOMYCIN HYDROCHLORIDE 1500 MG: 10 INJECTION, POWDER, LYOPHILIZED, FOR SOLUTION INTRAVENOUS at 15:11

## 2021-03-29 RX ADMIN — VANCOMYCIN HYDROCHLORIDE 1500 MG: 10 INJECTION, POWDER, LYOPHILIZED, FOR SOLUTION INTRAVENOUS at 22:43

## 2021-03-29 RX ADMIN — HYDROCODONE BITARTRATE AND ACETAMINOPHEN 2 TABLET: 5; 325 TABLET ORAL at 04:50

## 2021-03-29 RX ADMIN — NALOXEGOL OXALATE 25 MG: 12.5 TABLET, FILM COATED ORAL at 08:52

## 2021-03-29 RX ADMIN — PANTOPRAZOLE SODIUM 40 MG: 40 TABLET, DELAYED RELEASE ORAL at 05:32

## 2021-03-29 ASSESSMENT — PAIN DESCRIPTION - FREQUENCY
FREQUENCY: CONTINUOUS
FREQUENCY: CONTINUOUS

## 2021-03-29 ASSESSMENT — PAIN SCALES - GENERAL
PAINLEVEL_OUTOF10: 0
PAINLEVEL_OUTOF10: 10

## 2021-03-29 ASSESSMENT — PAIN DESCRIPTION - PAIN TYPE: TYPE: SURGICAL PAIN

## 2021-03-29 ASSESSMENT — PAIN DESCRIPTION - ORIENTATION: ORIENTATION: RIGHT

## 2021-03-29 ASSESSMENT — PAIN DESCRIPTION - LOCATION: LOCATION: KNEE

## 2021-03-29 NOTE — PROGRESS NOTES
Internal Medicine Progress Note    [facility]    Hortensia Alex. Nissa Gallardo., F.A.CLeoOLeoI. Trell Rascon D.O., SHIRA Hilliard, MSN, APRN, NP-C  Elieser Cruz. Stefano Zamora, MSN, APRN-CNP     Primary Care Physician: Huy Erazo MD   Admitting Physician:  Dominick Calvo DO  Admission date and time: 3/24/2021  4:20 PM    Room:  [address] [place] [address]  Admitting diagnosis: New onset atrial flutter [facility]) [I48.92]    Patient Name: Yasmeen Carey  MRN: [id number]    Date of Service: 3/29/2021     Subjective:  Dennys Cruz is a 61 y.o. male who was seen and examined today,3/29/2021, at the bedside. Dennys Cruz seems to be resting comfortably during my examination today. He has mild postoperative knee pain and understands the importance of working with the therapy teams today. He is awaiting delivery of a walker. He is tolerating antibiotic therapy. We discussed skin biopsy prior to discharge. Review of System:   Constitutional:   Admits to improving malaise and fatigue. HEENT:   Denies ear pain, sore throat, sinus or eye problems. Lesion on right scalp  Cardiovascular:   No chest pain or chest discomfort. Respiratory:   Shortness of breath has entirely resolved at this point. No coughing or sputum production. Gastrointestinal:   Denies nausea, vomiting, diarrhea, or constipation. Denies any abdominal pain. Genitourinary:    Denies any urgency, frequency, hematuria. Voiding  without difficulty. Extremities:   Mild postoperative knee pain as to be expected. Neurology:    Denies any headache or focal neurological deficits, Denies generalized weakness or memory difficulty. Psch:   Denies being anxious or depressed. Musculoskeletal:    Denies  myalgias, joint complaints or back pain. Integumentary:   Denies any rashes, ulcers, or excoriations. Denies bruising. Hematologic/Lymphatic:  Denies bruising or bleeding. Physical Exam:  I/O this shift:   In: -   Out: 350 [Urine:350]    Intake/Output Summary (Last 24 hours) at 3/29/2021 0952  Last data filed at 3/29/2021 0851  Gross per 24 hour   Intake 1940 ml   Output 765 ml   Net 1175 ml   I/O last 3 completed shifts: In: 1940 [P.O.:640; I.V.:1300]  Out: 415 [Urine:400; Blood:15]  Patient Vitals for the past 96 hrs (Last 3 readings):   Weight   03/29/21 0348 283 lb (128.4 kg)   03/28/21 0600 281 lb (127.5 kg)     Vital Signs:   Blood pressure 119/73, pulse 68, temperature 98.8 °F (37.1 °C), temperature source Oral, resp. rate 18, height 6' 4\" (1.93 m), weight 283 lb (128.4 kg), SpO2 96 %. General appearance:  Alert, responsive, oriented to person, place, and time. Well preserved, alert, no distress. Head:  Normocephalic. No masses, lesions or tenderness. 1 cm lesion on the right scalp  Eyes:  PERRLA. EOMI. Sclera clear. Buccal mucosa moist.  ENT:  Ears normal. Mucosa normal.  Neck:    Supple. Trachea midline. No thyromegaly. No JVD. No bruits. Heart:    Regular rate and rhythm. Mild systolic murmur is appreciated. Lungs:    Symmetrical. Clear to auscultation bilaterally. No wheezes. No rhonchi. No rales. Abdomen:   Soft. Non-tender. Non-distended. Bowel sounds positive. No organomegaly or masses. No pain on palpation. Extremities:    Postoperative dressings are in place to the surgical knee. Neurologic:    Alert x 3. No focal deficit. Cranial nerves grossly intact. No focal weakness. Psych:   Behavior is normal. Mood appears normal. Speech is not rapid and/or pressured. Musculoskeletal:   Spine ROM normal. Muscular strength intact. Gait not assessed. Integumentary:  No rashes  Skin normal color and texture.   Genitalia/Breast:  Deferred    Medication:  Scheduled Meds:   lidocaine-EPINEPHrine  20 mL Intradermal Once    naloxegol  25 mg Oral QAM    docusate sodium  100 mg Oral BID    vancomycin  1,500 mg Intravenous Q8H    cefTRIAXone (ROCEPHIN) IV  2,000 mg Intravenous Q24H    lidocaine PF  5 mL Intradermal Once    heparin flush  3 mL Intravenous 2 times per day    lidocaine  10 mL Intra-articular See Admin Instructions    dilTIAZem  240 mg Oral Nightly    [Held by provider] losartan  100 mg Oral Daily    metoprolol succinate  25 mg Oral BID    [Held by provider] apixaban  5 mg Oral BID    sodium chloride flush  10 mL Intravenous 2 times per day    pantoprazole  40 mg Oral QAM AC     Continuous Infusions:   dextrose         Objective Data:  CBC with Differential:    Lab Results   Component Value Date    WBC 7.5 03/29/2021    RBC 3.58 03/29/2021    HGB 9.8 03/29/2021    HCT 30.6 03/29/2021     03/29/2021    MCV 85.5 03/29/2021    MCH 27.4 03/29/2021    MCHC 32.0 03/29/2021    RDW 13.2 03/29/2021    LYMPHOPCT 12.7 03/29/2021    MONOPCT 11.5 03/29/2021    BASOPCT 0.3 03/29/2021    MONOSABS 0.86 03/29/2021    LYMPHSABS 0.95 03/29/2021    EOSABS 0.07 03/29/2021    BASOSABS 0.02 03/29/2021     CMP:    Lab Results   Component Value Date     03/29/2021    K 4.1 03/29/2021    K 4.1 03/24/2021    CL 98 03/29/2021    CO2 24 03/29/2021    BUN 19 03/29/2021    CREATININE 0.8 03/29/2021    GFRAA >60 03/29/2021    LABGLOM >60 03/29/2021    GLUCOSE 127 03/29/2021    PROT 6.3 03/29/2021    LABALBU 2.9 03/29/2021    CALCIUM 8.0 03/29/2021    BILITOT 0.5 03/29/2021    ALKPHOS 131 03/29/2021    AST 54 03/29/2021    ALT 94 03/29/2021       Assessment:  1. New onset atrial fibrillation/flutter with rapid ventricular response status post cardioversion  2. Streptococcus bacteremia  3. Septic right total knee arthroplasty status post total knee arthroplasty revision with irrigation and excisional debridement 3/28/2021  4. Bilateral lower extremity venous insufficiency  5. Essential hypertension  6. Gastroesophageal reflux disease  7. Osteoarthritis  8. Abnormality of the right scalp area possibly suggesting carcinoma    Plan:   Cory Rodgers tolerated orthopedic intervention yesterday without complication.   We discussed the absolute need to become more ambulatory today and he will work with the therapy teams. Gaetana Scales will be delivered. He is tolerating antibiotic therapy and PICC line has been placed. Plans will be for discharge home with home health care tomorrow. This is being arranged with the assistance of the social work team.  Otherwise, I will talk with the surgical team regarding core biopsy of the scalp lesion prior to resumption of anticoagulation therapy. More than 50% of my  time was spent at the bedside counseling/coordinating care with the patient and/or family with face to face contact. This time was spent reviewing notes and laboratory data as well as instructing and counseling the patient. Time I spent with the family or surrogate(s) is included only if the patient was incapable of providing the necessary information or participating in medical decisions. I also discussed the differential diagnosis and all of the proposed management plans with the patient and individuals accompanying the patient. Kary Teixeira requires this high level of physician care and nursing on the [facility] unit due the complexity of decision management and chance of rapid decline or death. Continued cardiac monitoring and higher level of nursing are required. I am readily available for any further decision-making and intervention.      Eder Glasgow DO, F.A.C.O.I.  3/29/2021  9:52 AM

## 2021-03-29 NOTE — OP NOTE
[address]                                OPERATIVE REPORT    PATIENT NAME: Joe Grewal                   :        1960  MED REC NO:   [id number]                            ROOM:       0515  ACCOUNT NO:   [de-identified]                           ADMIT DATE: 2021  PROVIDER:     Akua Booth DO    DATE OF PROCEDURE:  2021    PREOPERATIVE DIAGNOSIS:  Septic right total knee arthroplasty. POSTOPERATIVE DIAGNOSIS:  Septic right total knee arthroplasty. PROCEDURE PERFORMED:  Revision right knee with irrigation and excisional  debridement. SURGEON:  Akua Booth DO    ASSISTANT:  Per chart. BLOOD LOSS:  Minimal.    COMPLICATIONS:  None. ANESTHESIA:  General.    OPERATIVE PROCEDURE:  The patient was taken to operative suite, was  given a general anesthesia. The right leg was identified with  preoperative time-out. A tourniquet was placed on the right thigh. The  right leg was then prepped and draped in sterile fashion. Outlined  incision along the previously anterior incision over the knee. I then  reprepped the leg, elevated the leg for approximately 1 minute and  inflated the tourniquet to 300 mmHg. I made a midline incision with a  10 blade through skin and subcutaneous tissue. I dissected down to the  level of the extensor mechanism. I then created our equal medial and  lateral flaps, exposed the underlying extensor tendon and carried out a  medial parapatellar arthrotomy. The kneecap was everted. Upon entering  the joint, we noted a milky yellowish fluid that was quite extensive. We were able to get good fluid cultures, then went into the knee and  obtained some soft tissue cultures. The polyethylene was then  identified _____ tibial insert. The insert was a Journey size 7, 10-mm  insert.   We then removed this using a small curved osteotome and carried  out an excisional debridement. All devitalized tissue was removed  sharply and using Bovie to control hemostasis. I then washed with  approximately 6 liters of normal saline solution through the knee. I  did check both components for aseptic loosening, none of which was seen. A trial insert was placed 10 mm again, felt to be adequately stable. I  removed this and performed _____ of the knee using saline solution and  Betadine we soaked the knee for approximately 5 minutes. We then  re-draped and changed the gown and gloves and inserted the final  polyethylene insert and packed in place by the gun. I did check the  patellofemoral tracking, which appeared to be adequately reduced and  stable and washed the knee with approximately 3 liters of fluid. Again  placed a 1 gm of vancomycin deep in the knee joint. We then closed the  medial parapatellar arthrotomy with a #1 PDS. I closed the subcu layer  with 2-0 Monocryl followed by skin staples. The patient was placed in a  sterile dressing, recovered in the recovery room without difficulty. The patient tolerated the procedure well.         Arnold Whitman DO    D: 03/28/2021 9:53:15       T: 03/29/2021 3:41:19     VALENTÍN/[email or web address]_GREGORY  Job#: [id number]     Doc#: [id number]    CC:

## 2021-03-29 NOTE — HOME CARE
Floresita Hinders  Ordered therapy at time of quote: CEFTRIAXONE 2GM IV Q24H  Coverage: 80%  Total pt responsibility: $33.65 PER DAY      SPOKE WITH PATIENT AND UPDATED WITH PRICING HE STATES HES GOING TO [place], WILL CONTINUE TO FOLLOW UNTIL DECISION IS MADE AS TO WHAT D/C PLAN IS     Getachew Sinha [facility]

## 2021-03-29 NOTE — PROGRESS NOTES
Department of Orthopedic Surgery  Resident Progress Note    POD 1. Patient seen and examined. Sitting at edge of bed, pain controlled. No new complaints. He reports he did not work with physical therapy yesterday secondary to anticipated right lower extremity pain. Patient was agreeable to work with physical therapy today. Denies chest pain, shortness of breath, dizziness/lightheadedness. VITALS:  /64   Pulse 74   Temp 98.9 °F (37.2 °C) (Oral)   Resp 18   Ht 6' 4\" (1.93 m)   Wt 283 lb (128.4 kg)   SpO2 96%   BMI 34.45 kg/m²     General: Sitting at edge of bed alert, well-developed and well-nourished, in no acute distress    MUSCULOSKELETAL:   right lower extremity:  · Dressing C/D/I, no strike through appreciated  · Compartments soft and compressible  · +PF/DF/EHL  · +2/4 DP & PT pulses, Brisk Cap refill, Toes warm and perfused  · Distal sensation grossly intact to Peroneals, Sural, Saphenous, and tibial nrs    CBC:   Lab Results   Component Value Date    WBC 7.5 03/29/2021    HGB 9.8 03/29/2021    HCT 30.6 03/29/2021     03/29/2021     PT/INR:    Lab Results   Component Value Date    PROTIME 16.5 03/24/2021    INR 1.4 03/24/2021       ASSESSMENT  · S/P right knee irrigation and excisional debridement with polyethylene exchange-3/28/2021    PLAN      · Continue physical therapy and protocol: WBAT -right LE  · Abx coverage per primary service and infectious disease  · Deep venous thrombosis prophylaxis -per admitting service, early mobilization  · PT/OT  · Pain Control: IV and PO  · Monitor H&H-stable this morning  · Discharge planning: Okay to discharge from orthopedic standpoint.   Will follow patient peripherally for the remainder of his inpatient stay    Electronically signed by Tony Owens DO on 3/29/2021 at 6:45 AM

## 2021-03-29 NOTE — PROGRESS NOTES
Occupational Therapy  OCCUPATIONAL THERAPY INITIAL EVALUATION      Date:3/29/2021  Patient Name: Zack Gay  MRN: [id number]  : 1960  Room: [address] [place] [address]    Referring Provider: Araceli Carl DO    Evaluating OT: Vearl Led OTR/L [id number]    AM-PAC Daily Activity Raw Score:     Tenative Discharge Recommendation: Inpatient Rehab vs Home with assist - pending progress     Recommended Adaptive Equipment:  TBD     Diagnosis: new onset A fib    Surgery: right knee irrigation and excisional debridement with polyethylene exchange-3/28/2021  Pertinent Medical History: B TKA 2019,OA,   Precautions:  Falls, WBAT R LE     Home Living: Pt lives with wife, 2 story home with 2 steps/2 rail to + approx 8 foot landing  And over 2 inch threshold to enter.   Bed/bath on 2nd.  1/2 bath on 1st   Bathroom setup: tub/shower unit    Equipment owned: [facility], walker, cane, shower chairr    Prior Level of Function: Independent  with ADLs , Independent  with IADLs; ambulated with no device   Driving: yes   Occupation:  shop     Pain Level: intense T knee pain when standing and attempts to ambulate to bathroom ; BSC provided   Cognition: alert, oriented x3 and conversing    Memory:  Good    Sequencing:  Good    Problem solving:  Fair    Judgement/safety:  Fair      Functional Assessment:   Initial Eval Status  Date: 3/29 /21 Treatment Status  Date: STGs = LTGs  Time frame: 7-10 days   Feeding Independent     Grooming Set-up ,seated  Decrease standing balance/tolerance   Mod I    UB Dressing SBA/set-up    Mod I    LB Dressing Max A   Mod I    Bathing Mod A   Mod I   Toileting Assist with thorough hygiene   Independent   Bed Mobility  Upon entry sitting EOB    Mod A with sit-supine   (assist with LEs)     Functional Transfers Mod A  Sit-stand from bed   MiN A  From elevated bed  Mod I    Functional Mobility Mod A ,w/walker   Steps next to bed  Patient with increase pain RLE , unable to tolerate further distance   Mod I with techniques, posture, safety and energy conservation  techniques throughout tx session. Patient demonstrating fair+ understanding,   Patient tolerated session with fair  tolerance and NO signs of SOB on room air         Assessment of current deficits   Functional mobility [x]  ADLs [x] Strength [x]  Cognition []  Functional transfers  [x] IADLs [x] Safety Awareness [x]  Endurance [x]  Fine Motor Coordination [] Balance [x] Vision/perception [] Sensation []   Gross Motor Coordination [] ROM [] Delirium []                  Motor Control []       Plan of Care: 1-3 days/week for 1-2 weeks PRN   [x]ADL retraining/adapted techniques and AE recommendations to increase functional independence within precautions                    [x]Energy conservation techniques to improve tolerance for selfcare routine   [x]Functional transfer/mobility training/DME recommendations for increased independence, safety and fall prevention         [x]Patient/family education to increase safety and functional independence             [x]Environmental modifications for safe mobility and completion of ADLs                             []Cognitive retraining ex's to improve problem solving skills & safe participation in ADLs/IADLs     []Sensory re-education techniques to improve extremity awareness, maintain skin integrity and improve hand function                             []Visual/Perceptual retraining  to improve body awareness and safety during transfers and ADLs  []Splinting/positioning needs to maintain joint/skin integrity and prevent contractures  [x]Therapeutic activity to improve functional performance during ADLs. [x]Therapeutic exercise to improve tolerance and functional strength for ADLs   [x]Balance retraining/tolerance tasks for facilitation of postural control with dynamic challenges during ADLs .   []Neuromuscular re-education: facilitation of righting/equilibrium reactions, midline orientation, scapular stability/mobility, Normalization muscle     tone and facilitation active functional movement/Attention                         []Delirium prevention/treatment    [x]Positioning to improve functional independence  []Other:       Rehab Potential:  Good for established goals     Patient / Family Goal: return home      Patient and/or family were instructed on functional diagnosis, prognosis/goals and OT plan of care. Demonstrated good  understanding. Eval Complexity: Low      Time In:1023  Time Out: 1100        Min Units   OT Eval Low 97165  x 1   OT Eval Medium 03902      OT Eval High 61302       OT Re-Eval [id number]       Therapeutic Ex 53107       Therapeutic Activities 65975  15 1   ADL/Self Care 28897       Orthotic Management 28675       Neuro Re-Ed 81115       Non-Billable Time          Evaluation Time includes thorough review of current medical information, gathering information on past medical history/social history and prior level of function, completion of standardized testing/informal observation of tasks, assessment of data and education on plan of care and goals.             Claudia Ceron OTR/L [id number]      `9

## 2021-03-29 NOTE — PROGRESS NOTES
were noted. HEENT:   Round and reactive pupils. AT/NC glasses  Chest:   No use of accessory muscles to breathe. Symmetrical expansion. Cardiovascular:  S1 and S2 are rhythmic and regular. No murmurs appreciated. Abdomen:   Positive bowel sounds to auscultation. Benign to palpation. Extremities:   No clubbing, no cyanosis,   Edema rle picco dressing   CNS    AAxO   Lines: 3/27 left ue picc    Radiology:  Laboratory and Tests Review:  Lab Results   Component Value Date    WBC 7.5 03/29/2021    WBC 7.8 03/28/2021    WBC 9.3 03/27/2021    HGB 9.8 (L) 03/29/2021    HCT 30.6 (L) 03/29/2021    MCV 85.5 03/29/2021     03/29/2021     No results found for: [facility]  Lab Results   Component Value Date    ALT 94 (H) 03/29/2021    AST 54 (H) 03/29/2021    ALKPHOS 131 (H) 03/29/2021    BILITOT 0.5 03/29/2021     Lab Results   Component Value Date     03/29/2021    K 4.1 03/29/2021    K 4.1 03/24/2021    CL 98 03/29/2021    CO2 24 03/29/2021    BUN 19 03/29/2021    CREATININE 0.8 03/29/2021    CREATININE 0.7 03/28/2021    CREATININE 0.8 03/27/2021    GFRAA >60 03/29/2021    LABGLOM >60 03/29/2021    GLUCOSE 127 03/29/2021    PROT 6.3 03/29/2021    LABALBU 2.9 03/29/2021    CALCIUM 8.0 03/29/2021    BILITOT 0.5 03/29/2021    ALKPHOS 131 03/29/2021    AST 54 03/29/2021    ALT 94 03/29/2021     Lab Results   Component Value Date    CRP 27.8 (H) 03/27/2021    CRP 0.3 03/25/2021    CRP 13.2 (H) 03/24/2021     Lab Results   Component Value Date    SEDRATE 68 (H) 03/27/2021    SEDRATE 21 (H) 03/24/2021    SEDRATE 6 05/06/2019     Recent Labs     03/27/21  0409 03/28/21  0550 03/29/21  0539   CRP 27.8*  --   --    AST 18 58* 54*   ALT 22 78* 94*     Lab Results   Component Value Date    CHOL 93 03/25/2021    TRIG 52 03/25/2021    HDL 41 03/25/2021    LDLCALC 42 03/25/2021    LABVLDL 10 03/25/2021     Microbiology:   No results for input(s): COVID19 in the last 72 hours.   Lab Results   Component Value Date    BC 5 Days- no growth 05/06/2019    BC 5 Days- no growth 08/01/2018    BLOODCULT2  03/24/2021     Refer to previous blood culture (Right wrist)  collected 3/24/21 @ 2040 for susceptibility results      BLOODCULT2 5 Days- no growth 05/06/2019    BLOODCULT2 5 Days- no growth 08/01/2018    ORG Strep agalactiae (Beta Strep Group B) 03/24/2021    ORG Strep agalactiae (Beta Strep Group B) 03/24/2021    ORG Staphylococcus aureus 05/06/2019    ORG Enterobacter cloacae 05/06/2019     WOUND/ABSCESS   Date Value Ref Range Status   05/06/2019 Heavy growth  Final   05/06/2019 Rare growth  Final   08/01/2018 Rare growth  Final   08/01/2018 Rare growth  Final     MRSA Culture Only   Date Value Ref Range Status   07/22/2019 Methicillin resistant Staph aureus not isolated  Final       ASSESSMENT/PLAN  Temp better  Strep agalactiae (Beta Strep Group B) septicemia  Septic right total knee arthroplasty. cx pending   -3/28 s/p Revision right knee with irrigation and excisional debridement. Scalp lesion suggesting carcinoma for biopsy  transaminitis    vancomycin (VANCOCIN) 1,500 mg in dextrose 5 % 300 mL IVPB, Q8H will stop   cefTRIAXone (ROCEPHIN) 2,000 mg in sterile water 20 mL IV syringe, Q24H plan 4-6 weeks thr po suppressive with duricef 500mg po q12 for  life     ANALI     Summary   No intra cardiac thrombus. No ANALI indication of Endocarditis. Normal left ventricular chamber size. Normal left ventricular systolic function. Left atrium is enlarged. Interatrial septum intact. Agitated saline injection showed no right to left shunt. Normal right ventricle size. There is trace mitral regurgitation. No mitral valve prolapse. The aortic valve appears mildly sclerotic. Normal aortic root size. No evidence of pericardial effusion. Compared to prior TTE from 3/25/2021. Can d/c  F/u 2 weeks    · Monitor labs    Imaging and labs were reviewed per medical records.    The patient was educated about the diagnosis, prognosis, indications, risks and benefits of treatment. An opportunity to ask questions was given to the patient/FAMILY. Thank you for involving me in the care of Georgi Hernández will continue to follow. Please do not hesitate to call for any questions or concerns.     Electronically signed by Ken Cárdenas MD on 3/29/2021 at 9:04 AM

## 2021-03-29 NOTE — PROGRESS NOTES
Pharmacy Consultation Note  (Antibiotic Dosing and Monitoring)    Initial consult date: 3/24/21  Consulting physician: Dr. Riddhi Watson  Drug(s): Vancomycin  Indication: Bone/joint infection    Ht Readings from Last 1 Encounters:   21 6' 4\" (1.93 m)     Wt Readings from Last 1 Encounters:   21 283 lb (128.4 kg)         Age/  Gender IBW DW  Allergy Information   61 y.o.     male 86.8 kg 103 kg  Patient has no known allergies. Date  WBC BUN/CR UOP Drug/Dose Time   Given Level(s)   (Time) Comments   3/24  (#1) 10.0 25/1.4 -- Vancomycin 1,750 mg IV Q18H 2322     3/25  (#2) 11.5 15/0.9 -- Vancomycin 1,750 mg IV Q12H 1651     3/26  (#3) 10.8 14/0.9 -- Vancomycin 1,750 mg IV Q12H 0330  1549     3/27  (#4) 9.3 17/0.8 -- Vancomycin 1,750 mg IV Q12H  Vancomycin 1,500 mg IV Q8H 0400  1450  2337 6.9 mcg/mL Hold dose if trough is >20 mcg/mL   3/28  (#5) 7.8 17/0.7 -- Vancomycin 1,500 mg IV Q8H 0605  1615  2253 15.1 mcg/mL @ 1445    3/29  (#6) 7.5 19/0.8 0.4 mL/kg/hr Vancomycin 1,500 mg IV Q8H 0644  <1500>  <2300>       (#7)            Estimated Creatinine Clearance: 144 mL/min (based on SCr of 0.8 mg/dL). UOP over the past 24 hours:       Intake/Output Summary (Last 24 hours) at 3/29/2021 1154  Last data filed at 3/29/2021 0956  Gross per 24 hour   Intake 1000 ml   Output 1125 ml   Net -125 ml       Temp max: Temp (24hrs), Av.6 °F (37 °C), Min:97.8 °F (36.6 °C), Max:98.9 °F (37.2 °C)      Antibiotic Regimen:  Antibiotic Dose Date Initiated   Ceftriaxone 2 g Q24H 3/26     Cultures:  available culture and sensitivity results were reviewed in EPIC  Culture Date Result    Blood #1 3/24 Beta strep group B   Blood #2 3/24 Beta strep group B   Body fluid gram stain (joint) 3/26 No organisms seen   Surgical cx; Joint 3/28 NGTD   Surgical cx; Tissue 3/28 NGTD   Fungal cx;  Tissue 3/28 In process     Assessment:  · Consulted by Dr. Riddhi Watson to dose/monitor vancomycin  · Goal trough level:  15-20 mcg/mL  · Pt is a 61 yom admitted for new onset atrial flutter, initiated on vancomycin for a bone/joint infection and skin & soft tissue infection - now with bacteremia  · Serum creatinine today: 0.8; CrCl >120 mL/min; baseline Scr ~ 1.0   · 3/27: Trough today @ 0409 = 6.9 mcg/ml  · 3/28: Trough @ 1445 = 15.1 mcg/mL    Plan:  · Continue Vancomycin 1,500 mg IV Q8H  · Planned stop prior to discharge per ID  · Follow renal function  · Pharmacist will follow and monitor/adjust dosing as necessary      Thank you for the consult,    Liz Willams, PharmD 3/29/2021 11:59 AM   [phone]

## 2021-03-30 VITALS
HEART RATE: 73 BPM | DIASTOLIC BLOOD PRESSURE: 73 MMHG | HEIGHT: 76 IN | OXYGEN SATURATION: 96 % | WEIGHT: 281 LBS | BODY MASS INDEX: 34.22 KG/M2 | TEMPERATURE: 99.6 F | RESPIRATION RATE: 16 BRPM | SYSTOLIC BLOOD PRESSURE: 135 MMHG

## 2021-03-30 LAB
ADENOVIRUS BY PCR: NOT DETECTED
ALBUMIN SERPL-MCNC: 2.9 G/DL (ref 3.5–5.2)
ALP BLD-CCNC: 128 U/L (ref 40–129)
ALT SERPL-CCNC: 81 U/L (ref 0–40)
ANION GAP SERPL CALCULATED.3IONS-SCNC: 7 MMOL/L (ref 7–16)
AST SERPL-CCNC: 33 U/L (ref 0–39)
BASOPHILS ABSOLUTE: 0.04 E9/L (ref 0–0.2)
BASOPHILS RELATIVE PERCENT: 0.5 % (ref 0–2)
BILIRUB SERPL-MCNC: 0.4 MG/DL (ref 0–1.2)
BORDETELLA PARAPERTUSSIS BY PCR: NOT DETECTED
BORDETELLA PERTUSSIS BY PCR: NOT DETECTED
BUN BLDV-MCNC: 13 MG/DL (ref 8–23)
CALCIUM SERPL-MCNC: 8.3 MG/DL (ref 8.6–10.2)
CHLAMYDOPHILIA PNEUMONIAE BY PCR: NOT DETECTED
CHLORIDE BLD-SCNC: 101 MMOL/L (ref 98–107)
CO2: 28 MMOL/L (ref 22–29)
CORONAVIRUS 229E BY PCR: NOT DETECTED
CORONAVIRUS HKU1 BY PCR: NOT DETECTED
CORONAVIRUS NL63 BY PCR: NOT DETECTED
CORONAVIRUS OC43 BY PCR: NOT DETECTED
CREAT SERPL-MCNC: 0.8 MG/DL (ref 0.7–1.2)
CULTURE SURGICAL: NORMAL
EOSINOPHILS ABSOLUTE: 0.12 E9/L (ref 0.05–0.5)
EOSINOPHILS RELATIVE PERCENT: 1.6 % (ref 0–6)
GFR AFRICAN AMERICAN: >60
GFR NON-AFRICAN AMERICAN: >60 ML/MIN/1.73
GLUCOSE BLD-MCNC: 105 MG/DL (ref 74–99)
HCT VFR BLD CALC: 32.6 % (ref 37–54)
HEMOGLOBIN: 10.5 G/DL (ref 12.5–16.5)
HUMAN METAPNEUMOVIRUS BY PCR: NOT DETECTED
HUMAN RHINOVIRUS/ENTEROVIRUS BY PCR: NOT DETECTED
IMMATURE GRANULOCYTES #: 0.04 E9/L
IMMATURE GRANULOCYTES %: 0.5 % (ref 0–5)
INFLUENZA A BY PCR: NOT DETECTED
INFLUENZA B BY PCR: NOT DETECTED
LYMPHOCYTES ABSOLUTE: 1.37 E9/L (ref 1.5–4)
LYMPHOCYTES RELATIVE PERCENT: 18.7 % (ref 20–42)
MCH RBC QN AUTO: 27.2 PG (ref 26–35)
MCHC RBC AUTO-ENTMCNC: 32.2 % (ref 32–34.5)
MCV RBC AUTO: 84.5 FL (ref 80–99.9)
MONOCYTES ABSOLUTE: 0.77 E9/L (ref 0.1–0.95)
MONOCYTES RELATIVE PERCENT: 10.5 % (ref 2–12)
MYCOPLASMA PNEUMONIAE BY PCR: NOT DETECTED
NEUTROPHILS ABSOLUTE: 4.97 E9/L (ref 1.8–7.3)
NEUTROPHILS RELATIVE PERCENT: 68.2 % (ref 43–80)
PARAINFLUENZA VIRUS 1 BY PCR: NOT DETECTED
PARAINFLUENZA VIRUS 2 BY PCR: NOT DETECTED
PARAINFLUENZA VIRUS 3 BY PCR: NOT DETECTED
PARAINFLUENZA VIRUS 4 BY PCR: NOT DETECTED
PDW BLD-RTO: 13.2 FL (ref 11.5–15)
PLATELET # BLD: 276 E9/L (ref 130–450)
PMV BLD AUTO: 11 FL (ref 7–12)
POTASSIUM SERPL-SCNC: 4.4 MMOL/L (ref 3.5–5)
RBC # BLD: 3.86 E12/L (ref 3.8–5.8)
RESPIRATORY SYNCYTIAL VIRUS BY PCR: NOT DETECTED
SARS-COV-2, PCR: NOT DETECTED
SODIUM BLD-SCNC: 136 MMOL/L (ref 132–146)
TOTAL PROTEIN: 6.2 G/DL (ref 6.4–8.3)
WBC # BLD: 7.3 E9/L (ref 4.5–11.5)

## 2021-03-30 PROCEDURE — 36415 COLL VENOUS BLD VENIPUNCTURE: CPT

## 2021-03-30 PROCEDURE — 0HB0XZX EXCISION OF SCALP SKIN, EXTERNAL APPROACH, DIAGNOSTIC: ICD-10-PCS | Performed by: STUDENT IN AN ORGANIZED HEALTH CARE EDUCATION/TRAINING PROGRAM

## 2021-03-30 PROCEDURE — 0202U NFCT DS 22 TRGT SARS-COV-2: CPT

## 2021-03-30 PROCEDURE — 2580000003 HC RX 258: Performed by: SPECIALIST

## 2021-03-30 PROCEDURE — 97110 THERAPEUTIC EXERCISES: CPT | Performed by: PHYSICAL THERAPIST

## 2021-03-30 PROCEDURE — 97161 PT EVAL LOW COMPLEX 20 MIN: CPT | Performed by: PHYSICAL THERAPIST

## 2021-03-30 PROCEDURE — 88305 TISSUE EXAM BY PATHOLOGIST: CPT

## 2021-03-30 PROCEDURE — 6360000002 HC RX W HCPCS: Performed by: INTERNAL MEDICINE

## 2021-03-30 PROCEDURE — 2580000003 HC RX 258: Performed by: INTERNAL MEDICINE

## 2021-03-30 PROCEDURE — 6370000000 HC RX 637 (ALT 250 FOR IP): Performed by: NURSE PRACTITIONER

## 2021-03-30 PROCEDURE — 97530 THERAPEUTIC ACTIVITIES: CPT | Performed by: PHYSICAL THERAPIST

## 2021-03-30 PROCEDURE — 6370000000 HC RX 637 (ALT 250 FOR IP): Performed by: PHYSICIAN ASSISTANT

## 2021-03-30 PROCEDURE — 80053 COMPREHEN METABOLIC PANEL: CPT

## 2021-03-30 PROCEDURE — 85025 COMPLETE CBC W/AUTO DIFF WBC: CPT

## 2021-03-30 PROCEDURE — 6370000000 HC RX 637 (ALT 250 FOR IP): Performed by: INTERNAL MEDICINE

## 2021-03-30 PROCEDURE — 6360000002 HC RX W HCPCS: Performed by: SPECIALIST

## 2021-03-30 RX ORDER — METOPROLOL SUCCINATE 25 MG/1
25 TABLET, EXTENDED RELEASE ORAL 2 TIMES DAILY
Qty: 30 TABLET | Refills: 3 | Status: SHIPPED | OUTPATIENT
Start: 2021-03-30 | End: 2021-04-23

## 2021-03-30 RX ORDER — GINSENG 100 MG
CAPSULE ORAL
Refills: 3 | DISCHARGE
Start: 2021-03-30 | End: 2021-04-09

## 2021-03-30 RX ADMIN — Medication 10 ML: at 10:45

## 2021-03-30 RX ADMIN — WATER 2000 MG: 1 INJECTION INTRAMUSCULAR; INTRAVENOUS; SUBCUTANEOUS at 10:37

## 2021-03-30 RX ADMIN — DOCUSATE SODIUM 100 MG: 100 CAPSULE, LIQUID FILLED ORAL at 10:43

## 2021-03-30 RX ADMIN — HYDROCODONE BITARTRATE AND ACETAMINOPHEN 2 TABLET: 5; 325 TABLET ORAL at 18:14

## 2021-03-30 RX ADMIN — NALOXEGOL OXALATE 25 MG: 12.5 TABLET, FILM COATED ORAL at 12:05

## 2021-03-30 RX ADMIN — VANCOMYCIN HYDROCHLORIDE 1500 MG: 10 INJECTION, POWDER, LYOPHILIZED, FOR SOLUTION INTRAVENOUS at 06:45

## 2021-03-30 RX ADMIN — PANTOPRAZOLE SODIUM 40 MG: 40 TABLET, DELAYED RELEASE ORAL at 06:45

## 2021-03-30 RX ADMIN — METOPROLOL SUCCINATE 25 MG: 25 TABLET, EXTENDED RELEASE ORAL at 10:40

## 2021-03-30 ASSESSMENT — PAIN SCALES - GENERAL
PAINLEVEL_OUTOF10: 0
PAINLEVEL_OUTOF10: 7

## 2021-03-30 NOTE — DISCHARGE SUMMARY
Internal Medicine Progress Note     [facility]     Verito Graves. Nia Doll., F.A.C.O.I. June Tejeda D.O., F.A.C.O.I. Manpreet Cross D.O. Kostas Connolly, MSN, APRN, NP-C  Dioni Nicole. Gary Milton, MSN, APRN-CNP       Internal Medicine  Discharge Summary    NAME: Humza Grady  :  1960  MRN:  [id number]  Kya Reynolds MD  ADMITTED: 3/24/2021      DISCHARGED: 3/30/21    ADMITTING PHYSICIAN: Verito Rene DO    CONSULTANT(S):   IP CONSULT TO ORTHOPEDIC SURGERY  IP CONSULT TO CARDIOLOGY  PHARMACY TO DOSE VANCOMYCIN  IP CONSULT TO INFECTIOUS DISEASES  IP CONSULT TO GENERAL SURGERY  IP CONSULT TO SOCIAL WORK     ADMITTING DIAGNOSIS:   New onset atrial flutter ([facility] [address] [I48.92]     DISCHARGE DIAGNOSES:   1. New onset atrial fibrillation/flutter with rapid ventricular response status post cardioversion  2. Streptococcus bacteremia  3. Septic right total knee arthroplasty status post total knee arthroplasty revision with irrigation and excisional debridement 3/28/2021  4. Bilateral lower extremity venous insufficiency  5. Essential hypertension  6. Gastroesophageal reflux disease  7. Osteoarthritis  8. Abnormality of the right scalp area possibly suggesting carcinoma    BRIEF HISTORY OF PRESENT ILLNESS:   Patient is 66-year-old male who presented to the ED due to acute on chronic right knee pain. Patient states that over the last few days he has a been experiencing intermittent fever and chills. He also admitted to shortness of breath. Yesterday he was working and while moving heavy packages of meat he injured his back. Earlier today he noticed increased right knee pain. As such he presented to the ED for management. On arrival he was found to be tachycardic. He was given adenosine and he was identified as havng  atrial flutter. Patient denies any chest pain. Denies any lightheadedness or dizziness.     LABS[de-identified]  Lab Results   Component Value Date WBC 7.3 03/30/2021    HGB 10.5 (L) 03/30/2021    HCT 32.6 (L) 03/30/2021     03/30/2021     03/30/2021    K 4.4 03/30/2021     03/30/2021    CREATININE 0.8 03/30/2021    BUN 13 03/30/2021    CO2 28 03/30/2021    GLUCOSE 105 (H) 03/30/2021    ALT 81 (H) 03/30/2021    AST 33 03/30/2021    INR 1.4 03/24/2021     Lab Results   Component Value Date    INR 1.4 03/24/2021    INR 1.1 07/31/2019    INR 1.0 07/22/2019    PROTIME 16.5 (H) 03/24/2021    PROTIME 11.9 07/31/2019    PROTIME 11.6 07/22/2019      Lab Results   Component Value Date    TSH 1.370 03/25/2021     Lab Results   Component Value Date    TRIG 52 03/25/2021     Lab Results   Component Value Date    HDL 41 03/25/2021     Lab Results   Component Value Date    LDLCALC 42 03/25/2021     Lab Results   Component Value Date    LABA1C 4.9 03/25/2021       IMAGING:  Echo Transesophageal    Result Date: 3/26/2021  Transesophageal Echocardiography Report (ANALI)   Demographics   Patient Name       Katerin Mayorga  Gender              Male                     D   Medical Record     [id number]        Room Number  Number   Account #          [de-identified]       Procedure Date      03/26/2021   Corporate ID                       Ordering Physician  Norman Millan MD   Accession Number   [id number]      Referring Physician Norman Millan MD   Date of Birth      1960      Sonographer         Angel [de-identified] RDCS   Age                61 year(s)      Interpreting        Norman Seo MD                                      Any Other  Procedure Type of Study   ANALI procedure:Transesophageal Echo (ANALI), Color Flow Velocity Mapping.   Procedure Date Date: 03/26/2021 Start: 01:59 PM Study Location: Portable Technical Quality: Adequate visualization Indications:Atrial flutter and R/O Endocarditis. Patient Status: Routine Height: 76 inches Weight: 285 pounds BSA: 2.58 m^2 BMI: 34.69 kg/m^2 ANALI Performed By: the attending and the sonographer  Type of Anesthesia: Moderate sedation   Findings   Left Ventricle  Normal left ventricular chamber size. Normal left ventricular systolic function. Right Ventricle  Normal right ventricle size and function. Left Atrium  Left atrium is enlarged. Interatrial septum intact. Agitated saline injection showed no right to left shunt. No evidence of thrombus within left atrium or AVELINO. Right Atrium  Normal right atrium. Mitral Valve  Normal mitral valve structure. There is trace mitral regurgitation. No mitral valve prolapse. No vegetations. Tricuspid Valve  Normal tricuspid valve structure. No vegetations. Aortic Valve  The aortic valve appears mildly sclerotic. No vegetations. Pulmonic Valve  The pulmonic valve was not well visualized. Pericardial Effusion  No evidence of pericardial effusion. Pericardium appears normal.   Pleural Effusion  No evidence of pleural effusion. Aorta  Normal aortic root size. Conclusions   Summary  No intra cardiac thrombus. No ANALI indication of Endocarditis. Normal left ventricular chamber size. Normal left ventricular systolic function. Left atrium is enlarged. Interatrial septum intact. Agitated saline injection showed no right to left shunt. Normal right ventricle size. There is trace mitral regurgitation. No mitral valve prolapse. The aortic valve appears mildly sclerotic. Normal aortic root size. No evidence of pericardial effusion. Compared to prior TTE from 3/25/2021.    Signature   ----------------------------------------------------------------  Electronically signed by Philip Lopez MD(Interpreting  physician) on 03/26/2021 06:49 PM  ---------------------------------------------------------------- Physiologic and/or trace tricuspid regurgitation. Aortic Valve  Aortic valve opens well. The aortic valve is trileaflet. No evidence of aortic valve regurgitation. No hemodynamically significant aortic stenosis is present. Pulmonic Valve  The pulmonic valve was not well visualized. Pericardial Effusion  No evidence of pericardial effusion. Aorta  The aorta is within normal limits. Miscellaneous  Irregular rhythm. Conclusions   Summary  Left ventricle is mildly enlarged . Mild left ventricular concentric hypertrophy noted. Ejection fraction is measured at 62%. No evidence of left ventricular mass or thrombus noted. No regional wall motion abnormalities seen. The left atrium is mildly dilated. Interatrial septum appears intact. No evidence of thrombus within left atrium. Physiologic and/or trace mitral regurgitation is present. No evidence of mitral valve stenosis. Physiologic and/or trace tricuspid regurgitation. Irregular rhythm.    Signature   ----------------------------------------------------------------  Electronically signed by Iman Healy DO(Interpreting  physician) on 03/25/2021 05:14 PM  ----------------------------------------------------------------  M-Mode/2D Measurements & Calculations   LV Diastolic    LV Systolic Dimension: 3.9   AV Cusp Separation: 2.1 cmLA  Dimension: 5.9  cm                           Dimension: 4.2 cmAO Root  cm              LV Volume Diastolic: 091.8   Dimension: 3.5 cm  LV FS:33.9 %    ml  LV PW           LV Volume Systolic: 83.4 ml  Diastolic: 1.2  LV EDV/LV EDV Index: 172.7  cm              ml/67 ml/m^2LV ESV/LV ESV    RV Diastolic Dimension: 2.7  Septum          Index: 65.9 ml/26ml/ m^2     cm  Diastolic: 1.3  EF Calculated: 61.8 %  cm              LV Mass Index: 126 l/min*m^2  CO: 5.45 l/min                               LA volume/Index: 79.7 ml  CI: 2.12  l/m*m^2         LVOT: 2.1 cm  LV Mass: 322.85  g  Doppler Measurements & Calculations   MV Peak E-Wave: 0.91 AV Peak Velocity: 1.27 LVOT Peak Velocity: 0.93 m/s  m/s                  m/s                    LVOT Mean Velocity: 0.74 m/s  MV Peak A-Wave: 0.23 AV Peak Gradient: 6.46 LVOT Peak Gradient: 3.5  m/s                  mmHg                   mmHgLVOT Mean Gradient: 2.4  MV E/A Ratio: 3.98   AV Mean Velocity: 0.94 mmHg  MV Peak Gradient:    m/s  4.9 mmHg             AV Mean Gradient: 4  MV Mean Gradient: 2  mmHg  mmHg                 AV VTI: 18.5 cm  MV Mean Velocity:    AV Area  0.65 m/s             (Continuity):3.07 cm^2 PV Peak Velocity: 1 m/s  MV Deceleration                             PV Peak Gradient: 4.03 mmHg  Time: 178.5 msec     LVOT VTI: 16.4 cm      PV Mean Velocity: 0.8 m/s  MV P1/2t: 43.4 msec  IVRT: 90 msec          PV Mean Gradient: 2.8 mmHg  MVA by PHT:5.07 cm^2  MV Area  (continuity): 2.6  cm^2  [email or web address] [id number]    Xr Knee Right (1-2 Views)    Result Date: 3/24/2021  EXAMINATION: TWO XRAY VIEWS OF THE RIGHT KNEE; ONE XRAY VIEW OF THE CHEST 3/24/2021 5:35 pm COMPARISON: Right knee 08/18/2020. chest x-ray 07/22/2019. HISTORY: ORDERING SYSTEM PROVIDED HISTORY: right knee pain. Shortness of breath TECHNOLOGIST PROVIDED HISTORY: Reason for exam:->right knee pain FINDINGS: Right knee: A right knee arthroplasty device is again noted. It remains intact. No radiographic evidence of loosening or change in alignment. The bones are intact. No acute fracture or dislocation is noted. Anterior patella degenerative enthesopathy is moderate. Non-specific slight soft tissue prominence superior to the patella on the lateral view may reflect small suprapatellar recess joint effusion. No definite radiographic evidence of acute skeletal or hardware pathology. Suggestion of small joint effusion Chest: Limited examination due to prominent soft tissue attenuation.  The cardiac aggressive periostitis although please note that evaluation is somewhat limited due to metallic artifact from the patient's prosthesis. Soft Tissue:  Musculature is essentially preserved within the anterior and posterior compartments of the thigh. There is diffuse subcutaneous soft tissue edema surrounding the knee and lower leg, primarily within the dorsum of the calf. There is no well-circumscribed drainable fluid collection identified, however please note that small fluid collections would not be readily visualized without the benefit of intravenous contrast.  There is advanced fatty infiltration/atrophy of the medial head of the gastrocnemius muscle. Joint: Mild degenerative narrowing of the femoroacetabular joint. No joint effusion identified. Status post total knee arthroplasty. Evaluation of the arthroplasty itself is obscured by streak artifact from the metal component, however there is no convincing evidence for hardware loosening or perihardware fracture. There is a moderate amount of patellofemoral joint fluid, the sterility of which cannot be confirmed by imaging. The tibiotalar joint space is grossly preserved. Within limitations of hardware related artifact, there is no convincing evidence for loosening or perihardware fracture. Moderate patellofemoral joint fluid, the sterility of which cannot be confirmed by imaging. No definite cortical disruption or periostitis identified. Extensive subcutaneous soft tissue edema primarily within the dorsal aspect of the lower leg. No well-defined drainable fluid collection identified although evaluation for this finding is somewhat limited without intravenous contrast. Diffuse fatty infiltration/atrophy of the medial head of the gastrocnemius muscle.      Nm Bone Scan 3 Phase    Result Date: 3/27/2021  EXAMINATION: THREE PHASE BONE SCAN BILATERAL KNEES AND WHOLE-BODY BONE SCAN 3/27/2021 7:26 am TECHNIQUE: The patient was injected intravenously with 32.8 mCi of 99 mTc MDP. Initial blood flow and pool images of both knees were acquired. After 3 hours, delayed bone images were obtained of both knees followed by anterior and posterior whole body gamma camera views. COMPARISON: CT right knee 03/25/2021 and right knee x-ray exam 03/24/2021 HISTORY: ORDERING SYSTEM PROVIDED HISTORY: attn knees bilaterally TECHNOLOGIST PROVIDED HISTORY: Reason for exam:->attn knees bilaterally What reading provider will be dictating this exam?->CRC FINDINGS: The patellar region of the right knee shows asymmetric increased flow, immediate soft tissue, and delayed bone activity. Bilateral photopenic regions of both knees related to known prostheses. The femoral and tibial periprosthetic regions of the right knee are unremarkable. Negative left knee. Whole-body images obtained. No suspicious foci of increased or decreased activity to suggest metastatic bone disease. Degenerative/arthritic foci of increased activity involving the left AC joint, both wrists, bilateral ankles, worse on the right, and 1st MTP joints of both feet. Bilateral renal activity. 1.  Bilateral knee arthroplasty with asymmetric three-phase increased uptake at the right patellar region. Diagnostic considerations would include patellar component loosening, infection, or possibly inflammatory arthritis. 2.  The tibial and femoral prosthetic components of the right knee are unremarkable. 3.  Whole body scan shows polyarticular degenerative/arthritic foci, greatest at bilateral wrists and the right ankle. Xr Chest Portable    Result Date: 3/28/2021  EXAMINATION: ONE XRAY VIEW OF THE CHEST 3/28/2021 6:05 am COMPARISON: 03/24/2021 HISTORY: ORDERING SYSTEM PROVIDED HISTORY: SOB TECHNOLOGIST PROVIDED HISTORY: Reason for exam:->SOB FINDINGS: A left PICC the central venous catheter is present, with its tip overlying the  superior vena cava. The heart and mediastinum are normal for AP technique.  Lung volumes are decreased. The pulmonary vasculature is mildly engorged. There are no focal airspace opacities. Pulmonary venous hypertension without edema. Partial expiration     Xr Chest Portable    Result Date: 3/24/2021  EXAMINATION: TWO XRAY VIEWS OF THE RIGHT KNEE; ONE XRAY VIEW OF THE CHEST 3/24/2021 5:35 pm COMPARISON: Right knee 08/18/2020. chest x-ray 07/22/2019. HISTORY: ORDERING SYSTEM PROVIDED HISTORY: right knee pain. Shortness of breath TECHNOLOGIST PROVIDED HISTORY: Reason for exam:->right knee pain FINDINGS: Right knee: A right knee arthroplasty device is again noted. It remains intact. No radiographic evidence of loosening or change in alignment. The bones are intact. No acute fracture or dislocation is noted. Anterior patella degenerative enthesopathy is moderate. Non-specific slight soft tissue prominence superior to the patella on the lateral view may reflect small suprapatellar recess joint effusion. No definite radiographic evidence of acute skeletal or hardware pathology. Suggestion of small joint effusion Chest: Limited examination due to prominent soft tissue attenuation. The cardiac silhouette size is slightly enlarged without evidence of vascular congestion or alveolar pulmonary edema. There is no evidence of pulmonary consolidation or infiltrate. No pleural effusion noted. No radiographically visible pneumothorax. No acute displaced fracture. IMPRESSION: Slight cardiomegaly No radiographic evidence of definite acute pulmonary disease in the visualized chest     Ct Femur Right Wo Contrast    Result Date: 3/25/2021  EXAMINATION: CT OF THE RIGHT FEMUR WITH CONTRAST; CT OF THE RIGHT KNEE WITHOUT CONTRAST; CT OF THE RIGHT TIBIA AND FIBULA WITHOUT CONTRAST 3/25/2021 7:47 pm TECHNIQUE: CT of the right femur was performed with the administration of intravenous contrast.  Multiplanar reformatted images are provided for review.  Dose modulation, iterative reconstruction, and/or weight based adjustment of itself is obscured by streak artifact from the metal component, however there is no convincing evidence for hardware loosening or perihardware fracture. There is a moderate amount of patellofemoral joint fluid, the sterility of which cannot be confirmed by imaging. The tibiotalar joint space is grossly preserved. Within limitations of hardware related artifact, there is no convincing evidence for loosening or perihardware fracture. Moderate patellofemoral joint fluid, the sterility of which cannot be confirmed by imaging. No definite cortical disruption or periostitis identified. Extensive subcutaneous soft tissue edema primarily within the dorsal aspect of the lower leg. No well-defined drainable fluid collection identified although evaluation for this finding is somewhat limited without intravenous contrast. Diffuse fatty infiltration/atrophy of the medial head of the gastrocnemius muscle. Ct Tibia Fibula Right Wo Contrast    Result Date: 3/25/2021  EXAMINATION: CT OF THE RIGHT FEMUR WITH CONTRAST; CT OF THE RIGHT KNEE WITHOUT CONTRAST; CT OF THE RIGHT TIBIA AND FIBULA WITHOUT CONTRAST 3/25/2021 7:47 pm TECHNIQUE: CT of the right femur was performed with the administration of intravenous contrast.  Multiplanar reformatted images are provided for review. Dose modulation, iterative reconstruction, and/or weight based adjustment of the mA/kV was utilized to reduce the radiation dose to as low as reasonably achievable.; CT of the right knee was performed without the administration of intravenous contrast.  Multiplanar reformatted images are provided for review. Dose modulation, iterative reconstruction, and/or weight based adjustment of the mA/kV was utilized to reduce the radiation dose to as low as reasonably achievable.; CT of the right tibia and fibula was performed without the administration of intravenous contrast.  Multiplanar reformatted images are provided for review.  Dose modulation, iterative reconstruction, and/or weight based adjustment of the mA/kV was utilized to reduce the radiation dose to as low as reasonably achievable. COMPARISON: None. HISTORY ORDERING SYSTEM PROVIDED HISTORY: pain, swelling lower aspect of thigh TECHNOLOGIST PROVIDED HISTORY: Reason for exam:->pain, swelling lower aspect of thigh Recent knee replacement, evaluate for septic arthritis. FINDINGS: Bones: No definite acute fracture or dislocation of the femur, tibia, and fibula. No periprosthetic lucency identified to suggest loosening. No aggressive periostitis although please note that evaluation is somewhat limited due to metallic artifact from the patient's prosthesis. Soft Tissue:  Musculature is essentially preserved within the anterior and posterior compartments of the thigh. There is diffuse subcutaneous soft tissue edema surrounding the knee and lower leg, primarily within the dorsum of the calf. There is no well-circumscribed drainable fluid collection identified, however please note that small fluid collections would not be readily visualized without the benefit of intravenous contrast.  There is advanced fatty infiltration/atrophy of the medial head of the gastrocnemius muscle. Joint: Mild degenerative narrowing of the femoroacetabular joint. No joint effusion identified. Status post total knee arthroplasty. Evaluation of the arthroplasty itself is obscured by streak artifact from the metal component, however there is no convincing evidence for hardware loosening or perihardware fracture. There is a moderate amount of patellofemoral joint fluid, the sterility of which cannot be confirmed by imaging. The tibiotalar joint space is grossly preserved. Within limitations of hardware related artifact, there is no convincing evidence for loosening or perihardware fracture. Moderate patellofemoral joint fluid, the sterility of which cannot be confirmed by imaging.   No definite cortical disruption or periostitis identified. Extensive subcutaneous soft tissue edema primarily within the dorsal aspect of the lower leg. No well-defined drainable fluid collection identified although evaluation for this finding is somewhat limited without intravenous contrast. Diffuse fatty infiltration/atrophy of the medial head of the gastrocnemius muscle. Us Dup Lower Extremities Bilateral Venous    Result Date: 3/25/2021  EXAMINATION: DUPLEX VENOUS ULTRASOUND OF THE BILATERAL LOWER EXTREMITIES, 3/25/2021 1:37 pm TECHNIQUE: Duplex ultrasound using B-mode/gray scaled imaging and Doppler spectral analysis and color flow was obtained of the bilateral lower extremities. COMPARISON: None. HISTORY: ORDERING SYSTEM PROVIDED HISTORY: Suspected deep vein thrombosis (DVT) TECHNOLOGIST PROVIDED HISTORY: Reason for exam:->rule out dvt What reading provider will be dictating this exam?->CRC FINDINGS: The visualized veins of the bilateral lower extremities are patent and free of echogenic thrombus. The veins demonstrate good compressibility with normal color flow study and spectral analysis. There is a right inguinal node maximally measuring 4.0 cm. No evidence of DVT in either lower extremity. HOSPITAL COURSE:   Dennys Cruz did well throughout the hospitalization. He was evaluated by multiple subspecialist.  He was found to be in new onset atrial fibrillation and underwent cardioversion. Anticoagulation therapy was added and heart medications were adjusted. He underwent surgical intervention for the infected hardware. He also underwent scalp biopsy. Lab work and vital signs improved. He continues to require extensive work with the therapy teams will be discharged to a rehabilitation facility today. BRIEF PHYSICAL EXAMINATION AND LABORATORIES ON DAY OF DISCHARGE:  VITALS:  /78   Pulse 65   Temp 99.7 °F (37.6 °C) (Oral)   Resp 18   Ht 6' 4\" (1.93 m)   Wt 281 lb (127.5 kg)   SpO2 98%   BMI 34.20 kg/m²     HEENT:  RODOLFO. EOMI.  Sclera clear. Buccal mucosa moist.  Scalp. Lesion. Neck:  Supple. Trachea midline. No thyromegaly. No JVD. No bruits. Heart:  Rhythm regular, rate controlled. No murmurs. Lungs:  Symmetrical. Clear to auscultation bilaterally. No wheezes. No rhonchi. No rales. Abdomen: Soft. Non-tender. Non-distended. Bowel sounds positive. No organomegaly or masses. No pain on palpation    Extremities:  Peripheral pulses present. Postoperative dressings to the knee. Neurologic:  Alert x 3. No focal deficit. Cranial nerves grossly intact. DISPOSITION:  The patient's condition is good. At this time the patient is without objective evidence of an acute process requiring continuing hospitalization or inpatient management. They are stable for discharge with outpatient follow-up. I have spoken with the patient and discussed the results of the current hospitalization, in addition to providing specific details for the plan of care and counseling regarding the diagnosis and prognosis. The plan has been discussed in detail and they are aware of the specific conditions for emergent return, as well as the importance of follow-up. Their questions are answered at this time and they are agreeable with the plan for discharge to rehabilitation. DISCHARGE MEDICATIONS:    Dominic Socorro   Home Medication Instructions JEFFERY:[id number]    Printed on:03/30/21 [id number]   Medication Information                      apixaban (ELIQUIS) 5 MG TABS tablet  Take 1 tablet by mouth 2 times daily             bacitracin 500 UNIT/GM ointment  Apply topically 2 times daily.              cefTRIAXone (ROCEPHIN) infusion  Infuse 2,000 mg intravenously every 24 hours Compound per protocol             celecoxib (CELEBREX) 200 MG capsule  Take 1 capsule by mouth daily             cyclobenzaprine (FLEXERIL) 10 MG tablet  Take 1 tablet by mouth 2 times daily as needed for Muscle spasms             diltiazem (DILACOR XR) 240 MG extended release capsule  Take 240 mg by mouth nightly              diphenhydrAMINE-APAP, sleep, (TYLENOL PM EXTRA STRENGTH)  MG tablet  Take 1 tablet by mouth nightly as needed for Sleep             Elastic Bandages & Supports (JOBST KNEE HIGH COMPRESSION SM) MISC  Dx: Varicose veins of the legs with pain and swelling. Bilateral thigh-high 30-40 mm Hg compression stockings. Glucosamine-Chondroit-Vit C-Mn (GLUCOSAMINE 1500 COMPLEX PO)  Take 1 tablet by mouth daily             irbesartan (AVAPRO) 300 MG tablet  Take 300 mg by mouth daily. metoprolol succinate (TOPROL XL) 25 MG extended release tablet  Take 1 tablet by mouth 2 times daily             naloxegol (MOVANTIK) 25 MG TABS tablet  Take 1 tablet by mouth every morning             omeprazole (PRILOSEC) 20 MG capsule  Take 20 mg by mouth Daily              oxyCODONE HCl (OXY-IR) 10 MG immediate release tablet  Take 1 tablet by mouth every 6 hours as needed for Pain for up to 7 days. Intended supply: 30 days             SSD 1 % cream  apply externally to affected area once a day             Turmeric 500 MG TABS  Take 500 mg by mouth daily                 FOLLOW UP/INSTRUCTIONS:  · This patient is instructed to follow-up with his primary care physician. · Patient is instructed to follow-up with the consults listed above as directed by them. · he is instructed to resume home medications and take new medications as indicated in the list above. · If the patient has a recurrence of symptoms, he is instructed to go to the ED. Preparing for this patient's discharge, including paperwork, orders, instructions, and meeting with patient did require > 40 minutes.     Stephanie Calvert DO   3/30/2021  8:28 AM

## 2021-03-30 NOTE — FLOWSHEET NOTE
Inpatient Wound Care    Admit Date: 3/24/2021  4:20 PM    Reason for consult:  Right great toe    Significant history:    Past Medical History:   Diagnosis Date    Cellulitis     Edema     legs with cellulitis    GERD (gastroesophageal reflux disease)     Hypertension     Osteoarthritis     Tenosynovitis, de Quervain     Venous insufficiency        Wound history:      Findings:       03/30/21 1136   Skin Integrity Site 2   Skin Integrity Location 2   (raised dark area)   Location 2 right top of head   Skin Integrity Site 3   Skin Integrity Location 3   (callous area first 2nd toe not open)   appears as a callous  Pt states he does see podiatry   inst him to cont follow up    Impression:      Interventions in place:  Open to air    Plan:  Cont follow up with podiatry      Ruth Rogers 3/30/2021 11:39 AM

## 2021-03-30 NOTE — DISCHARGE INSTR - COC
Continuity of Care Form    Patient Name: Benedict Mosquera   :  1960  MRN:  [id number]    Admit date:  3/24/2021  Discharge date:  3/30/21    Code Status Order: Full Code   Advance Directives:   [facility] Documentation       Date/Time Healthcare Directive Type of Healthcare Directive Copy in [facility] [address] Agent's Name Healthcare Agent's Phone Number    21  Yes, patient has an advance directive for healthcare treatment  Living will  No, copy requested from family  Inezruy Tyrese 87  --            Admitting Physician:  Dominique Healy DO  PCP: Kike Florentino MD    Discharging Nurse: Fredy Lizarraga [facility] Unit/Room#: 4291/7326-07  Discharging Unit Phone Number: [phone]    Emergency Contact:   Extended Emergency Contact Information  Primary Emergency Contact: [facility]  Address[address] Phone: [phone]  Mobile Phone: [phone]  Relation: Spouse  Secondary Emergency Contact: [place] [address] Phone: [phone]  Mobile Phone: [phone]  Relation: Child    Past Surgical History:  Past Surgical History:   Procedure Laterality Date    COLONOSCOPY      x2    FOOT SURGERY      JOINT REPLACEMENT      bilateral knee replacement    JOINT REPLACEMENT      right ankle    OTHER SURGICAL HISTORY Right 13    right wrist 1st dorsal compartment release    REPLACEMENT TOTAL KNEE BILATERAL Bilateral 2019    KNEE TOTAL ARTHROPLASTY BILATERAL performed by Jonathan Iyer DO at [facility] ARTHROSCOPY      TONSILLECTOMY      TONSILLECTOMY AND ADENOIDECTOMY      VASCULAR SURGERY      both legs     VASECTOMY         Immunization History: There is no immunization history on file for this patient.     Active Problems:  Patient Active Problem List   Diagnosis Code    Impingement syndrome of shoulder M75.40    Rotator cuff tear M75.100    Shoulder pain M25.519    Biceps tendon rupture, proximal S46.119A    De Quervain's disease (tenosynovitis) M65.4    Other tenosynovitis of hand and wrist M65.849, M65.839    Radial styloid tenosynovitis M65.4    Kienbock's avascular necrosis of lunate, adult M93.1    Primary osteoarthritis of right knee M17.11    Tear of medial meniscus of right knee, current S83.241A    Cellulitis of the right lower extremity L03.90    Non-pressure chronic ulcer of left lower leg with fat layer exposed ([address] Q94.889    Venous stasis of lower extremity I87.8    Primary osteoarthritis of left knee M17.12    Status post total bilateral knee replacement Z96.653    Bilateral primary osteoarthritis of knee M17.0    Arthritis of right foot M19.071    Tenosynovitis of foot M65.9    Pain in right ankle and joints of right foot M25.571    Traumatic ulcer of right lower extremity with fat layer exposed ([address] L97.912    Open wound of right great toe S91.101A    Venous insufficiency (chronic) (peripheral) I87.2    Pain in both lower extremities M79.604, M79.605    Difficulty walking R26.2    New onset atrial flutter (HCC) I48.92    Essential hypertension I10    Acute pain of right knee M25.561    Bacteremia R78.81       Isolation/Infection:   Isolation            No Isolation          Patient Infection Status       None to display            Nurse Assessment:  Last Vital Signs: /78   Pulse 65   Temp 99.7 °F (37.6 °C) (Oral)   Resp 18   Ht 6' 4\" (1.93 m)   Wt 281 lb (127.5 kg)   SpO2 98%   BMI 34.20 kg/m²     Last documented pain score (0-10 scale): Pain Level: 6  Last Weight:   Wt Readings from Last 1 Encounters:   03/30/21 281 lb (127.5 kg)     Mental Status:  oriented and alert    IV Access:  PICC Left Arm 3/27/2021  Nursing Mobility/ADLs:  Walking   Assisted  Transfer  Assisted  Bathing  Assisted  Dressing  Assisted  Toileting  Assisted  Feeding  Independent  Med Admin  Independent  Med Delivery   none    Wound Care Documentation and Therapy:  Wound 06/13/19 Leg Lateral;Left;Lower #1 ACQUIRED 6-1-19 (Active)   Number of days: 655        Elimination:  Continence:   · Bowel: Yes  · Bladder: Yes  Urinary Catheter: None   Colostomy/Ileostomy/Ileal Conduit: No       Date of Last BM: 3/30/21    Intake/Output Summary (Last 24 hours) at 3/30/2021 0828  Last data filed at 3/30/2021 0425  Gross per 24 hour   Intake 1320 ml   Output 5650 ml   Net -4330 ml     I/O last 3 completed shifts: In: 36 [P.O.:1320]  Out: 5650 [Urine:5650]    Safety Concerns:     None    Impairments/Disabilities:      Vision    Nutrition Therapy:  Current Nutrition Therapy:   - Oral Diet:  General    Routes of Feeding: Oral  Liquids: Thin Liquids  Daily Fluid Restriction: no  Last Modified Barium Swallow with Video (Video Swallowing Test): not done    Treatments at the Time of Hospital Discharge:   Respiratory Treatments: NA  Oxygen Therapy:  is not on home oxygen therapy. Ventilator:    - No ventilator support    Rehab Therapies: Physical Therapy and Occupational Therapy  Weight Bearing Status/Restrictions: No weight bearing restirctions  Other Medical Equipment (for information only, NOT a DME order):  walker  Other Treatments: NA    Patient's personal belongings (please select all that are sent with patient):  Marielle    RN SIGNATURE:  Electronically signed by Madison Meyers RN on 3/30/21 at 3:27 PM EDT    CASE MANAGEMENT/SOCIAL WORK SECTION    Inpatient Status Date: ***    Readmission Risk Assessment Score:  Readmission Risk              Risk of Unplanned Readmission:        12           Discharging to Facility/ Agency   · Name: [facility]  · Address:  · Phone: [phone]  · Fax: [phone]    Dialysis Facility (if applicable)   · Name:  · Address:  · Dialysis Schedule:  · Phone:  · Fax:    / signature: Electronically signed by Kiki Elam on 3/30/21 at 3:23 PM EDT    PHYSICIAN SECTION    Prognosis: Good    Condition at Discharge: Stable    Rehab Potential (if transferring to Rehab): Good    Recommended Labs or Other Treatments After Discharge: ***    Physician Certification: I certify the above information and transfer of Nikhil Walter  is necessary for the continuing treatment of the diagnosis listed and that he requires Acute Rehab for greater 30 days.      Update Admission H&P: {CHP DME Changes in HandP:[id number]:::0}    PHYSICIAN SIGNATURE:  Electronically signed by Susan Winn DO on 3/30/21 at 8:28 AM EDT

## 2021-03-30 NOTE — PROGRESS NOTES
OT BEDSIDE TREATMENT NOTE      Date:3/30/2021  Patient Name: Swati Fajardo  MRN: [id number]  : 1960  Room: [address] [place] [address]      Referring Provider: Tigre La DO     Evaluating OT: Adithya Foley OTR/L [id number]            Attempted occupational therapy this date, however pt declined due to being discharged and having meal tray in front eating. Continue current POC.          Carlos Rico ADKINS/L [id number]

## 2021-03-30 NOTE — PROCEDURES
PUNCH BIOPSY PROCEDURE NOTE    After informed written consent was obtained, using Betadine for cleansing and 1% Lidocaine with epinephrine for anesthetic, with sterile technique two 4 mm punch biopsy was used to obtain a biopsy specimen of the scalp lesion. Hemostasis was obtained by pressure and wound was sutured with 3-0 nylon. Antibiotic dressing is applied, and wound care instructions provided. Be alert for any signs of cutaneous infection. The specimen is labeled and sent to pathology for evaluation. The procedure was well tolerated without complications.     Electronically signed by Donavan Calvert DO on 3/30/2021 at 3:22 PM

## 2021-03-30 NOTE — DISCHARGE INSTR - PHARMACY
naloxegol  Pronunciation:  nal OX ee gol  Brand:  Movantik  What is the most important information I should know about naloxegol? You should not use this medicine if you have a blockage in your stomach or intestines. Tell your doctor if you have symptoms of opioid withdrawal, such as anxiety, feeling irritable, sweating, chills, yawning, stomach pain, and diarrhea. Stop using naloxegol and call your doctor at once if you have severe stomach pain and/or diarrhea (which can lead to serious medical problems). Tell your doctor about all your current medicines and any you start or stop using. Many drugs can interact, and some drugs should not be used together. What is naloxegol? Naloxegol reduces constipation caused by opioid (narcotic) pain medicine used to treat severe chronic pain. Naloxegol works by treating constipation without reducing the pain-relieving effects of the narcotic. Naloxegol is used in people who have been taking narcotic pain medicine for at least 4 weeks, to treat chronic pain that is not caused by cancer. Naloxegol may also be used for purposes not listed in this medication guide. What should I discuss with my healthcare provider before taking naloxegol? You should not use naloxegol if you are allergic to it, or if you have:  · a blockage in your stomach or intestines. Some medicines can cause unwanted or dangerous effects when used with naloxegol. Your doctor may need to change your treatment plan if you also use:  · certain antibiotics;  · antifungal medicine;  · heart medication; or  · antiviral medicine to treat hepatitis C or HIV. Tell your doctor if you have ever had:  · stomach or intestinal problems (including diverticulitis, or ischemic colitis);  · stomach cancer, colorectal cancer;  · a perforation (a hole or tear) in your esophagus, stomach, or intestines;  · recent surgery on the stomach or intestines; or  · liver or kidney disease.   If you use naloxegol while you are at once if you have:  · symptoms of narcotic medicine withdrawal --anxiety, feeling irritable, sweating, chills, yawning, stomach pain, diarrhea. Stop taking naloxegol and call your doctor at once if you have:  · severe stomach pain that will not go away; or  · severe diarrhea. Common side effects may include:  · nausea, vomiting, stomach pain;  · diarrhea, gas; or  · headache. This is not a complete list of side effects and others may occur. Call your doctor for medical advice about side effects. You may report side effects to FDA at 2-785-FDA-8072. What other drugs will affect naloxegol? Sometimes it is not safe to use certain medications at the same time. Some drugs can affect your blood levels of other drugs you take, which may increase side effects or make the medications less effective. Tell your doctor about all your other medicines, especially bevacizumab. This list is not complete. Other drugs may affect naloxegol, including prescription and over-the-counter medicines, vitamins, and herbal products. Not all possible drug interactions are listed here. Where can I get more information? Your pharmacist can provide more information about naloxegol. Remember, keep this and all other medicines out of the reach of children, never share your medicines with others, and use this medication only for the indication prescribed. Every effort has been made to ensure that the information provided by [address] is accurate, up-to-date, and complete, but no guarantee is made to that effect. Drug information contained herein may be time sensitive. [facility] information has been compiled for use by healthcare practitioners and consumers in the [place] and therefore [facility] does not warrant that uses outside of the [place] are appropriate, unless specifically indicated otherwise. [facility] drug information does not endorse drugs, diagnose patients or recommend therapy.  [facility]'s drug information is an informational resource designed to assist licensed healthcare practitioners in caring for their patients and/or to serve consumers viewing this service as a supplement to, and not a substitute for, the expertise, skill, knowledge and judgment of healthcare practitioners. The absence of a warning for a given drug or drug combination in no way should be construed to indicate that the drug or drug combination is safe, effective or appropriate for any given patient. [facility] does not assume any responsibility for any aspect of healthcare administered with the aid of information [facility] provides. The information contained herein is not intended to cover all possible uses, directions, precautions, warnings, drug interactions, allergic reactions, or adverse effects. If you have questions about the drugs you are taking, check with your doctor, nurse or pharmacist.  Copyright 8566-1903 [address]: 7.01. Revision date: 7/13/2020. Care instructions adapted under license by [facility] ([facility]). If you have questions about a medical condition or this instruction, always ask your healthcare professional. [place] [address] any warranty or liability for your use of this information. metoprolol (oral/injection)  Pronunciation:  me TOE pro lol  Brand:  Kapspargo Sprinkle, Lopressor, Metoprolol Succinate ER, Metoprolol Tartrate, Toprol-XL  What is the most important information I should know about metoprolol? You should not use this medicine if you have a serious heart problem (heart block, sick sinus syndrome, slow heart rate), severe circulation problems, severe heart failure, or a history of slow heart beats that caused fainting. What is metoprolol? Metoprolol is a beta-blocker that affects the heart and circulation (blood flow through arteries and veins). Metoprolol is used to treat angina (chest pain) and hypertension (high blood pressure).  It is also used to lower your risk of death or needing to be hospitalized for heart failure. Metoprolol injection is used during the early phase of a heart attack to lower the risk of death. Metoprolol may also be used for other purposes not listed in this medication guide. What should I discuss with my healthcare provider before taking metoprolol? You should not use this medicine if you are allergic to metoprolol, or other beta-blockers (atenolol, carvedilol, labetalol, nadolol, nebivolol, propranolol, sotalol, and others), or if you have:  · a serious heart problem such as heart block, sick sinus syndrome, or slow heart rate;  · severe circulation problems;  · severe heart failure (that required you to be in the hospital); or  · a history of slow heart beats that have caused you to faint. Tell your doctor if you have ever had:  · asthma, chronic obstructive pulmonary disease (COPD), sleep apnea, or other breathing disorder;  · diabetes (taking metoprolol may make it harder for you to tell when you have low blood sugar);  · liver disease;  · congestive heart failure;  · problems with circulation (such as Raynaud's syndrome);  · a thyroid disorder; or  · pheochromocytoma (tumor of the adrenal gland). Do not give this medicine to a child without medical advice. Tell your doctor if you are pregnant or plan to become pregnant. It is not known whether metoprolol will harm an unborn baby. However, having high blood pressure during pregnancy may cause complications such as diabetes or eclampsia (dangerously high blood pressure that can lead to medical problems in both mother and baby). The benefit of treating hypertension may outweigh any risks to the baby. Ask a doctor before using this medicine if you are breast-feeding. Metoprolol can pass into breast milk and may cause dry skin, dry mouth, diarrhea, constipation, or slow heartbeats in your baby. How should I take metoprolol?   Follow all directions on your prescription label and read all you have signs of an allergic reaction: hives; difficulty breathing; swelling of your face, lips, tongue, or throat. Call your doctor at once if you have:  · very slow heartbeats;  · a light-headed feeling, like you might pass out;  · shortness of breath (even with mild exertion), swelling, rapid weight gain; or  · cold feeling in your hands and feet. Common side effects may include:  · dizziness, tired feeling;  · depression, confusion, memory problems;  · nightmares, trouble sleeping;  · diarrhea; or  · mild itching or rash. This is not a complete list of side effects and others may occur. Call your doctor for medical advice about side effects. You may report side effects to FDA at 7-516-FDA-2228. What other drugs will affect metoprolol? Tell your doctor about all your current medicines. Many drugs can affect metoprolol, especially:  · any other heart or blood pressure medications;  · epinephrine (Epi-Pen);  · an antidepressant;  · an ergot medicine --dihydroergotamine, ergonovine, ergotamine, methylergonovine; or  · an MAO inhibitor --isocarboxazid, linezolid, phenelzine, rasagiline, selegiline, tranylcypromine. This list is not complete and many other drugs may affect metoprolol. This includes prescription and over-the-counter medicines, vitamins, and herbal products. Not all possible drug interactions are listed here. Where can I get more information? Your pharmacist can provide more information about metoprolol. Remember, keep this and all other medicines out of the reach of children, never share your medicines with others, and use this medication only for the indication prescribed. Every effort has been made to ensure that the information provided by [address] [place], [place] [address]can Dr is accurate, up-to-date, and complete, but no guarantee is made to that effect. Drug information contained herein may be time sensitive.  [facility] information has been compiled for use by healthcare practitioners and consumers in the [facility] and therefore [facility] does not warrant that uses outside of the [facility] [place] are appropriate, unless specifically indicated otherwise. [facility]'s drug information does not endorse drugs, diagnose patients or recommend therapy. [facility]'s drug information is an informational resource designed to assist licensed healthcare practitioners in caring for their patients and/or to serve consumers viewing this service as a supplement to, and not a substitute for, the expertise, skill, knowledge and judgment of healthcare practitioners. The absence of a warning for a given drug or drug combination in no way should be construed to indicate that the drug or drug combination is safe, effective or appropriate for any given patient. [facility] does not assume any responsibility for any aspect of healthcare administered with the aid of information [facility] provides. The information contained herein is not intended to cover all possible uses, directions, precautions, warnings, drug interactions, allergic reactions, or adverse effects. If you have questions about the drugs you are taking, check with your doctor, nurse or pharmacist.  Copyright 8876-1876 [address] [place] [address] Dr COMER. Version: 17.03. Revision date: 5/29/2019. Care instructions adapted under license by [facility] ([facility]). If you have questions about a medical condition or this instruction, always ask your healthcare professional. [place] [address] any warranty or liability for your use of this information. apixaban  Pronunciation:  a PIX a ban  Brand:  Eliquis  What is the most important information I should know about apixaban? Apixaban increases your risk of severe or fatal bleeding, especially if you take certain medicines at the same time (including some over-the-counter medicines). It is very important to tell your doctor about all medicines you have recently used.   Call your doctor at once if you have signs of bleeding such as: taken apixaban within the past 24 hours. You may need to stop taking apixaban for a short time. Do not stop taking apixaban unless your doctor tells you to. Stopping suddenly can increase your risk of blood clot or stroke. If you stop taking apixaban for any reason, your doctor may prescribe another medication to prevent blood clots until you start taking apixaban again. Store at room temperature away from moisture and heat. What happens if I miss a dose? Take the missed dose on the same day you remember it. Take your next dose at the regular time and stay on your twice-daily schedule. Do not take two doses at one time. Get your prescription refilled before you run out of medicine completely. What happens if I overdose? Seek emergency medical attention or call the Poison Help line at 1-119.843.6447. What should I avoid while taking apixaban? Avoid activities that may increase your risk of bleeding or injury. Use extra care to prevent bleeding while shaving or brushing your teeth. What are the possible side effects of apixaban? Get emergency medical help if you have signs of an allergic reaction: hives; chest pain, wheezing, difficult breathing; feeling light-headed; swelling of your face, lips, tongue, or throat. Also seek emergency medical attention if you have symptoms of a spinal blood clot: back pain, numbness or muscle weakness in your lower body, or loss of bladder or bowel control. Call your doctor at once if you have:  · easy bruising, unusual bleeding (nose, mouth, vagina, or rectum), bleeding from wounds or needle injections, any bleeding that will not stop;  · heavy menstrual periods;  · headache, dizziness, weakness, feeling like you might pass out;  · urine that looks red, pink, or brown; or  · black or bloody stools, coughing up blood or vomit that looks like coffee grounds. This is not a complete list of side effects and others may occur.  Call your doctor for medical advice about side effects. You may report side effects to FDA at 5-004-FDA-3520. What other drugs will affect apixaban? Sometimes it is not safe to use certain medications at the same time. Some drugs can affect your blood levels of other drugs you take, which may increase side effects or make the medications less effective. Many other drugs (including some over-the-counter medicines) can increase your risk of bleeding or blood clots, or your risk of developing blood clots around the brain or spinal cord during a spinal tap or epidural. It is very important to tell your doctor about all medicines you have recently used, especially:  · any other medicines to treat or prevent blood clots;  · a blood thinner such as heparin or warfarin (Coumadin, Jantoven);  · an antidepressant; or  · an NSAID (nonsteroidal anti-inflammatory drug) used long term. This list is not complete and many other drugs may affect apixaban. This includes prescription and over-the-counter medicines, vitamins, and herbal products. Not all possible drug interactions are listed here. Where can I get more information? Your pharmacist can provide more information about apixaban. Remember, keep this and all other medicines out of the reach of children, never share your medicines with others, and use this medication only for the indication prescribed. Every effort has been made to ensure that the information provided by [address] is accurate, up-to-date, and complete, but no guarantee is made to that effect. Drug information contained herein may be time sensitive. [facility] information has been compiled for use by healthcare practitioners and consumers in the [place] and therefore [facility] does not warrant that uses outside of the [place] are appropriate, unless specifically indicated otherwise. [facility] drug information does not endorse drugs, diagnose patients or recommend therapy.  [facility] drug information is an informational resource designed to assist licensed healthcare practitioners in caring for their patients and/or to serve consumers viewing this service as a supplement to, and not a substitute for, the expertise, skill, knowledge and judgment of healthcare practitioners. The absence of a warning for a given drug or drug combination in no way should be construed to indicate that the drug or drug combination is safe, effective or appropriate for any given patient. [facility] does not assume any responsibility for any aspect of healthcare administered with the aid of information [facility] provides. The information contained herein is not intended to cover all possible uses, directions, precautions, warnings, drug interactions, allergic reactions, or adverse effects. If you have questions about the drugs you are taking, check with your doctor, nurse or pharmacist.  Copyright 1825-2248 [address]: 4.01. Revision date: 6/21/2019. Care instructions adapted under license by [facility] ([facility]). If you have questions about a medical condition or this instruction, always ask your healthcare professional. [place] [address] any warranty or liability for your use of this information.

## 2021-03-30 NOTE — PROGRESS NOTES
Physical Therapy Initial Evaluation    Room #:  0515/0515-02  Patient Name: Hailee Oneal  YOB: 1960  MRN: [id number]    Referring Provider:   Latanya Baugh DO     Date of Service: 3/30/2021    Evaluating Physical Therapist: Gordo Bethea, PT #[id number]       Diagnosis:   New onset atrial flutter ([address] [I48.92]     Revision right knee with irrigation and excisional debridement.     Patient Active Problem List   Diagnosis    Impingement syndrome of shoulder    Rotator cuff tear    Shoulder pain    Biceps tendon rupture, proximal    De Quervain's disease (tenosynovitis)    Other tenosynovitis of hand and wrist    Radial styloid tenosynovitis    Kienbock's avascular necrosis of lunate, adult    Primary osteoarthritis of right knee    Tear of medial meniscus of right knee, current    Cellulitis of the right lower extremity    Non-pressure chronic ulcer of left lower leg with fat layer exposed ([address]    Venous stasis of lower extremity    Primary osteoarthritis of left knee    Status post total bilateral knee replacement    Bilateral primary osteoarthritis of knee    Arthritis of right foot    Tenosynovitis of foot    Pain in right ankle and joints of right foot    Traumatic ulcer of right lower extremity with fat layer exposed ([address]    Open wound of right great toe    Venous insufficiency (chronic) (peripheral)    Pain in both lower extremities    Difficulty walking    New onset atrial flutter ([address]    Essential hypertension    Acute pain of right knee    Bacteremia        Tentative placement recommendation: Inpatient Rehab    Equipment recommendation: Patient has needed equipment       Prior Level of Function: Patient ambulated independently    Rehab Potential: good    for baseline    Past medical history:   Past Medical History:   Diagnosis Date    Cellulitis     Edema     legs with cellulitis    GERD (gastroesophageal reflux disease)     Hypertension     Osteoarthritis evaluation. OBJECTIVE;   Initial Evaluation  Date: 3/30/2021 Treatment Date:     Short Term/ Long Term   Goals   Was pt agreeable to Eval/treatment? Yes    To be met in 2 days   Pain level   6/10  Right knee     Bed Mobility    Rolling: Not assessed     Supine to sit: Moderate assist of 1    Sit to supine: Not assessed     Scooting: Moderate assist of 1    Rolling: Minimal assist of 1    Supine to sit: Minimal assist of 1    Sit to supine: Minimal assist of 1    Scooting: Minimal assist of 1     Transfers Sit to stand:  Moderate assist of 1 bed elevated Cues for hand placement and safety   Sit to stand: Minimal assist of 1     Ambulation    40 feet using  wheeled walker with Moderate assist of 1   progressing to minimal assist. Cues for   knee extension during stance phase of gait    50 feet using  wheeled walker with Supervision     Stair negotiation: ascended and descended   Not assessed            ROM Within functional limits right knee 10-70*    Increase range of motion 10% of affected joints    Strength BUE:  refer to OT eval  RLE:  2+/5  LLE:  4/5   Increase strength in affected mm groups by 1/3 grade   Balance Sitting EOB:  good -  Dynamic Standing:  fair wheeled walker   Sitting EOB:  good    Dynamic Standing: fair +wheeled walker      Patient is Alert & Oriented x person, place, time and situation and follows directions    Sensation:  Patient  denies numbness and tingling     Edema:  yes right lower extremity    Endurance: fair       Vitals: room air    Blood Pressure at rest   Blood Pressure during session     Heart Rate at rest   Heart Rate during session 90   SPO2 at rest  %  SPO2 during session 95%     Patient education  Patient educated on role of Physical Therapy, risks of immobility, safety and plan of care,  importance of mobility while in hospital , ankle pumps, quad set and glut set for edema control, blood clot prevention, weight bearing status  and right knee extension in bed and during Utilize Supervised activities to increase level of endurance to allow for safe functional mobility including transfers and gait     Patient and or family understand(s) diagnosis, prognosis, and plan of care. Frequency of treatments: Patient will be seen  twice daily. Time in  0840  Time out  0913    Total Treatment Time  23 minutes    Evaluation time includes thorough review of current medical information, gathering information on past medical history/social history and prior level of function, completion of standardized testing/informal observation of tasks, assessment of data, and development of Plan of care and goals.      CPT codes:  Low Complexity PT evaluation (40176)  Therapeutic activities (62586)   8 minutes  1 unit(s)  Therapeutic exercises (79674)   10 minutes  1 unit(s)  Gait Training (95918) 5 minutes 0 unit(s)    Jay Jernigan, PT

## 2021-03-30 NOTE — PLAN OF CARE
Problem: Pain:  Description: Pain management should include both nonpharmacologic and pharmacologic interventions.   Goal: Pain level will decrease  Description: Pain level will decrease  3/29/2021 2217 by Figueroa More RN  Outcome: Ongoing  3/29/2021 1423 by Figueroa More RN  Outcome: Ongoing  Goal: Control of acute pain  Description: Control of acute pain  3/29/2021 2217 by Figueroa More RN  Outcome: Ongoing  3/29/2021 1423 by Figueroa More RN  Outcome: Ongoing  Goal: Control of chronic pain  Description: Control of chronic pain  3/29/2021 2217 by Figueroa More RN  Outcome: Ongoing  3/29/2021 1423 by Figueroa More RN  Outcome: Ongoing     Problem: Falls - Risk of:  Goal: Will remain free from falls  Description: Will remain free from falls  3/29/2021 2217 by Figueroa More RN  Outcome: Ongoing  3/29/2021 1423 by Figueroa More RN  Outcome: Ongoing  Goal: Absence of physical injury  Description: Absence of physical injury  3/29/2021 2217 by Figueroa More RN  Outcome: Ongoing  3/29/2021 1423 by Figueroa More RN  Outcome: Ongoing

## 2021-03-30 NOTE — PROGRESS NOTES
[facility]  [facility]  Progress Note    NAME: Yasmeen Carey  MR:  [id number]  :   1960  DATE OF SERVICE:21    This is a face to face encounter with Yasmeen Carey 61 y.o. male on 21  ID following for   Chief Complaint   Patient presents with    Knee Pain     right , denies recent injury     SUBJECTIVE:  PT COMFORTABLE IN BED  No f/c/n/v/d  No issues with atbx  S/p Revision right knee with irrigation and excisionaldebridement. Has asim dressing  Patient is tolerating medications. No reported adverse drug reactions. 3/26 DC cardioversion. [facility] pic  Review of systems:  As stated above in the chief complaint, otherwise negative.     Medications:  Scheduled Meds:   bacitracin  1 Package Topical Daily    lidocaine-EPINEPHrine  20 mL Intradermal Once    lidocaine-EPINEPHrine  20 mL Intradermal Once    naloxegol  25 mg Oral QAM    docusate sodium  100 mg Oral BID    vancomycin  1,500 mg Intravenous Q8H    cefTRIAXone (ROCEPHIN) IV  2,000 mg Intravenous Q24H    lidocaine PF  5 mL Intradermal Once    heparin flush  3 mL Intravenous 2 times per day    lidocaine  10 mL Intra-articular See Admin Instructions    dilTIAZem  240 mg Oral Nightly    [Held by provider] losartan  100 mg Oral Daily    metoprolol succinate  25 mg Oral BID    [Held by provider] apixaban  5 mg Oral BID    sodium chloride flush  10 mL Intravenous 2 times per day    pantoprazole  40 mg Oral QAM AC     Continuous Infusions:   dextrose       PRN Meds:povidone-iodine, sodium chloride flush, heparin flush, fentanNYL, metoprolol, glucose, dextrose, glucagon (rDNA), dextrose, sodium chloride flush, polyethylene glycol, acetaminophen **OR** acetaminophen, HYDROcodone 5 mg - acetaminophen    OBJECTIVE:  /73   Pulse 73   Temp 99.6 °F (37.6 °C) (Oral)   Resp 16   Ht 6' 4\" (1.93 m)   Wt 281 lb (127.5 kg)   SpO2 96%   BMI 34.20 kg/m²   Temp  Av.5 °F (37.5 °C)  Min: 99.3 °F (37.4 °C)  Max: 99.7 °F (37.6 °C)  Constitutional:  The patient is awake, alert, and oriented. Skin:    Warm and dry. HEENT:     AT/NC glasses  Chest:   No use of accessory muscles to breathe. Symmetrical expansion. CTAB ANT  Cardiovascular:  S1 and S2 are rhythmic and regular. No murmurs appreciated. Abdomen:   Positive bowel sounds to auscultation. Benign to palpation. SOFT  Extremities:     Edema rle picco dressing   CNS    AAxO   Lines: 3/27 left ue picc    Radiology:  Laboratory and Tests Review:  Lab Results   Component Value Date    WBC 7.3 03/30/2021    WBC 7.5 03/29/2021    WBC 7.8 03/28/2021    HGB 10.5 (L) 03/30/2021    HCT 32.6 (L) 03/30/2021    MCV 84.5 03/30/2021     03/30/2021     No results found for: CRP  Lab Results   Component Value Date    ALT 81 (H) 03/30/2021    AST 33 03/30/2021    ALKPHOS 128 03/30/2021    BILITOT 0.4 03/30/2021     Lab Results   Component Value Date     03/30/2021    K 4.4 03/30/2021    K 4.1 03/24/2021     03/30/2021    CO2 28 03/30/2021    BUN 13 03/30/2021    CREATININE 0.8 03/30/2021    CREATININE 0.8 03/29/2021    CREATININE 0.7 03/28/2021    GFRAA >60 03/30/2021    LABGLOM >60 03/30/2021    GLUCOSE 105 03/30/2021    PROT 6.2 03/30/2021    LABALBU 2.9 03/30/2021    CALCIUM 8.3 03/30/2021    BILITOT 0.4 03/30/2021    ALKPHOS 128 03/30/2021    AST 33 03/30/2021    ALT 81 03/30/2021     Lab Results   Component Value Date    CRP 27.8 (H) 03/27/2021    CRP 0.3 03/25/2021    CRP 13.2 (H) 03/24/2021     Lab Results   Component Value Date    SEDRATE 68 (H) 03/27/2021    SEDRATE 21 (H) 03/24/2021    SEDRATE 6 05/06/2019     Recent Labs     03/28/21  0550 03/29/21  0539 03/30/21  0425   AST 58* 54* 33   ALT 78* 94* 81*     Lab Results   Component Value Date    CHOL 93 03/25/2021    TRIG 52 03/25/2021    HDL 41 03/25/2021    LDLCALC 42 03/25/2021    LABVLDL 10 03/25/2021     Microbiology:   No results for input(s): COVID19 in the last 72 hours.   Lab medical records. The patient was educated about the diagnosis, prognosis, indications, risks and benefits of treatment. An opportunity to ask questions was given to the patient/FAMILY. Thank you for involving me in the care of Marney Scheuermann will continue to follow. Please do not hesitate to call for any questions or concerns.     Electronically signed by Rianna Michael MD on 3/30/2021 at 3:06 PM

## 2021-03-30 NOTE — PROGRESS NOTES
Department of Orthopedic Surgery  Resident Progress Note    POD 2. Patient seen and examined. No new complaints. Con't to deny any symptoms to L knee. Denies chest pain, shortness of breath, dizziness/lightheadedness. VITALS:  /73   Pulse 73   Temp 99.6 °F (37.6 °C) (Oral)   Resp 16   Ht 6' 4\" (1.93 m)   Wt 281 lb (127.5 kg)   SpO2 96%   BMI 34.20 kg/m²     General:  well-developed and well-nourished, in no acute distress    MUSCULOSKELETAL:   right lower extremity:  · Dressing C/D/I, no strike through appreciated  · Compartments soft and compressible  · +PF/DF/EHL  · +2/4 DP & PT pulses, Brisk Cap refill, Toes warm and perfused  · Distal sensation grossly intact to Peroneals, Sural, Saphenous, and tibial nrs    CBC:   Lab Results   Component Value Date    WBC 7.3 03/30/2021    HGB 10.5 03/30/2021    HCT 32.6 03/30/2021     03/30/2021     PT/INR:    Lab Results   Component Value Date    PROTIME 16.5 03/24/2021    INR 1.4 03/24/2021       ASSESSMENT  · S/P right knee irrigation and excisional debridement with polyethylene exchange-3/28/2021    PLAN      · Continue physical therapy and protocol: WBAT -right LE  · Abx coverage per primary service and infectious disease  · Deep venous thrombosis prophylaxis -per admitting service, early mobilization  · PT/OT  · Pain Control: IV and PO  · Monitor H&H-stable this morning  · Discharge planning: Okay to discharge from orthopedic standpoint.       Electronically signed by Bryan Delgado DO on 3/30/2021 at 4:54 PM

## 2021-04-01 LAB
BODY FLUID CULTURE, STERILE: NORMAL
GRAM STAIN RESULT: NORMAL

## 2021-04-02 LAB
ANAEROBIC CULTURE: NORMAL
ANAEROBIC CULTURE: NORMAL
CULTURE SURGICAL: NORMAL

## 2021-04-09 DIAGNOSIS — M25.561 ACUTE PAIN OF RIGHT KNEE: Primary | ICD-10-CM

## 2021-04-12 ENCOUNTER — OFFICE VISIT (OUTPATIENT)
Dept: ORTHOPEDIC SURGERY | Age: 61
End: 2021-04-12

## 2021-04-12 VITALS — BODY MASS INDEX: 34.22 KG/M2 | WEIGHT: 281 LBS | HEIGHT: 76 IN | TEMPERATURE: 98 F

## 2021-04-12 DIAGNOSIS — Z96.659 INFECTION OF TOTAL KNEE REPLACEMENT, INITIAL ENCOUNTER (HCC): Primary | ICD-10-CM

## 2021-04-12 DIAGNOSIS — T84.59XA INFECTION OF TOTAL KNEE REPLACEMENT, INITIAL ENCOUNTER (HCC): Primary | ICD-10-CM

## 2021-04-12 PROCEDURE — 99024 POSTOP FOLLOW-UP VISIT: CPT | Performed by: ORTHOPAEDIC SURGERY

## 2021-04-12 RX ORDER — DOXYCYCLINE HYCLATE 100 MG/1
CAPSULE ORAL
COMMUNITY
Start: 2021-02-09 | End: 2021-04-23

## 2021-04-12 RX ORDER — OXYCODONE HYDROCHLORIDE 5 MG/1
TABLET ORAL
COMMUNITY
Start: 2021-04-07 | End: 2021-07-07

## 2021-04-12 RX ORDER — DIAPER,BRIEF,INFANT-TODD,DISP
EACH MISCELLANEOUS
COMMUNITY
Start: 2021-04-07 | End: 2021-07-07

## 2021-04-12 NOTE — PROGRESS NOTES
Subjective:     Post-Operative week: 2 Status Post right Total Knee Arthroplasty revision, surgery date 3/24/2021. Systemic or Specific Complaints:no systemic symptoms. He is using picc line for abx therapy    Objective:     General: alert, appears stated age and cooperative   Wound: Wound clean and dry no evidence of infection. , No Erythema, No Edema and No Drainage   Motion: Flexion: 0 to 100 Degrees   DVT Exam: No evidence of DVT seen on physical exam.  Negative Ester's sign. No cords or calf tenderness. No significant calf/ankle edema. crp 2.27  ESR 68    Imaging:  Xrays:  No signs of fracture, there is good alignment, and no signs of aseptic loosening. Radiographic findings reviewed with patient    Assessment:     Encounter Diagnosis   Name Primary?  In[facility] total [facility] Yes      Doing well postoperatively. Plan:     Continues current post-op course, staples were removed at today's appointment  Patient is to continue taking enteric coated aspirin 325 mg, 1 tablet twice daily. Patient is to continue wearing ERICA hose, on during the day and off at night. Continue home PT   No bathing/swimming/hot tub for two weeks or until incision is completely closed. We will see the patient back in 3 weeks for repeat xray and evaluation.   Please call office with any questions or concerns at [phone]

## 2021-04-19 DIAGNOSIS — T84.59XA INFECTION OF TOTAL KNEE REPLACEMENT, INITIAL ENCOUNTER (HCC): Primary | ICD-10-CM

## 2021-04-19 DIAGNOSIS — Z96.659 INFECTION OF TOTAL KNEE REPLACEMENT, INITIAL ENCOUNTER (HCC): Primary | ICD-10-CM

## 2021-04-23 ENCOUNTER — OFFICE VISIT (OUTPATIENT)
Dept: CARDIOLOGY CLINIC | Age: 61
End: 2021-04-23
Payer: COMMERCIAL

## 2021-04-23 VITALS
WEIGHT: 275 LBS | HEART RATE: 58 BPM | RESPIRATION RATE: 16 BRPM | BODY MASS INDEX: 33.49 KG/M2 | OXYGEN SATURATION: 98 % | SYSTOLIC BLOOD PRESSURE: 122 MMHG | HEIGHT: 76 IN | DIASTOLIC BLOOD PRESSURE: 70 MMHG

## 2021-04-23 DIAGNOSIS — I10 ESSENTIAL HYPERTENSION: Primary | ICD-10-CM

## 2021-04-23 DIAGNOSIS — I48.92 ATRIAL FLUTTER, UNSPECIFIED TYPE (HCC): ICD-10-CM

## 2021-04-23 PROBLEM — M17.0 BILATERAL PRIMARY OSTEOARTHRITIS OF KNEE: Status: RESOLVED | Noted: 2019-08-01 | Resolved: 2021-04-23

## 2021-04-23 PROBLEM — M65.979 TENOSYNOVITIS OF FOOT: Status: RESOLVED | Noted: 2020-10-29 | Resolved: 2021-04-23

## 2021-04-23 PROBLEM — M17.11 PRIMARY OSTEOARTHRITIS OF RIGHT KNEE: Status: RESOLVED | Noted: 2017-02-14 | Resolved: 2021-04-23

## 2021-04-23 PROBLEM — M65.9 TENOSYNOVITIS OF FOOT: Status: RESOLVED | Noted: 2020-10-29 | Resolved: 2021-04-23

## 2021-04-23 PROBLEM — R26.2 DIFFICULTY WALKING: Status: RESOLVED | Noted: 2021-03-02 | Resolved: 2021-04-23

## 2021-04-23 PROBLEM — M17.12 PRIMARY OSTEOARTHRITIS OF LEFT KNEE: Status: RESOLVED | Noted: 2019-07-31 | Resolved: 2021-04-23

## 2021-04-23 PROBLEM — M25.571 PAIN IN RIGHT ANKLE AND JOINTS OF RIGHT FOOT: Status: RESOLVED | Noted: 2020-10-29 | Resolved: 2021-04-23

## 2021-04-23 PROBLEM — L03.90 CELLULITIS: Status: RESOLVED | Noted: 2018-08-01 | Resolved: 2021-04-23

## 2021-04-23 PROBLEM — M79.605 PAIN IN BOTH LOWER EXTREMITIES: Status: RESOLVED | Noted: 2021-02-16 | Resolved: 2021-04-23

## 2021-04-23 PROBLEM — M19.071 ARTHRITIS OF RIGHT FOOT: Status: RESOLVED | Noted: 2020-10-29 | Resolved: 2021-04-23

## 2021-04-23 PROBLEM — L97.912 TRAUMATIC ULCER OF RIGHT LOWER EXTREMITY WITH FAT LAYER EXPOSED (HCC): Status: RESOLVED | Noted: 2021-02-03 | Resolved: 2021-04-23

## 2021-04-23 PROBLEM — S91.101A OPEN WOUND OF RIGHT GREAT TOE: Status: RESOLVED | Noted: 2021-02-03 | Resolved: 2021-04-23

## 2021-04-23 PROBLEM — M79.604 PAIN IN BOTH LOWER EXTREMITIES: Status: RESOLVED | Noted: 2021-02-16 | Resolved: 2021-04-23

## 2021-04-23 PROBLEM — I87.8 VENOUS STASIS OF LOWER EXTREMITY: Status: RESOLVED | Noted: 2019-06-13 | Resolved: 2021-04-23

## 2021-04-23 PROBLEM — S83.241A TEAR OF MEDIAL MENISCUS OF RIGHT KNEE, CURRENT: Status: RESOLVED | Noted: 2017-02-14 | Resolved: 2021-04-23

## 2021-04-23 PROCEDURE — G8427 DOCREV CUR MEDS BY ELIG CLIN: HCPCS | Performed by: INTERNAL MEDICINE

## 2021-04-23 PROCEDURE — 3017F COLORECTAL CA SCREEN DOC REV: CPT | Performed by: INTERNAL MEDICINE

## 2021-04-23 PROCEDURE — 1036F TOBACCO NON-USER: CPT | Performed by: INTERNAL MEDICINE

## 2021-04-23 PROCEDURE — G8417 CALC BMI ABV UP PARAM F/U: HCPCS | Performed by: INTERNAL MEDICINE

## 2021-04-23 PROCEDURE — 1111F DSCHRG MED/CURRENT MED MERGE: CPT | Performed by: INTERNAL MEDICINE

## 2021-04-23 PROCEDURE — 93000 ELECTROCARDIOGRAM COMPLETE: CPT | Performed by: INTERNAL MEDICINE

## 2021-04-23 PROCEDURE — 99214 OFFICE O/P EST MOD 30 MIN: CPT | Performed by: INTERNAL MEDICINE

## 2021-04-23 NOTE — PROGRESS NOTES
Chief Complaint   Patient presents with    Atrial Flutter    Hypertension       Patient Active Problem List    Diagnosis Date Noted    Essential hypertension     Atrial flutter ([facility] 03/24/2021     Overview Note:     A. Initial presentation  3/24/2021 with ANALI guided DCCV 3/26/21  B  CHADSVASC = 2      Venous insufficiency (chronic) (peripheral) 02/16/2021    Status post total bilateral knee replacement 07/31/2019    Non-pressure chronic ulcer of left lower leg with fat layer exposed ([facility] 06/13/2019    Kienbock's avascular necrosis of lunate, adult 07/29/2013     Overview Note:     Replacing deprecated diagnoses      De Quervain's disease (tenosynovitis) 09/13/2011    Biceps tendon rupture, proximal 08/22/2011    Impingement syndrome of shoulder 08/16/2011    Rotator cuff tear 08/16/2011       Current Outpatient Medications   Medication Sig Dispense Refill    bacitracin zinc 500 UNIT/GM ointment APPLY TOPICALLY EVERY 12 HOURS FOR 30 DAYS      oxyCODONE (ROXICODONE) 5 MG immediate release tablet TAKE ONE TABLET BY MOUTH EVERY 6 HOURS AS NEEDED FOR MODERATE TO SEVERE PAIN      apixaban (ELIQUIS) 5 MG TABS tablet Take 1 tablet by mouth 2 times daily 60 tablet 0    naloxegol (MOVANTIK) 25 MG TABS tablet Take 1 tablet by mouth every morning 30 tablet 0    cefTRIAXone (ROCEPHIN) infusion Infuse 2,000 mg intravenously every 24 hours Compound per protocol 84 g 0    cyclobenzaprine (FLEXERIL) 10 MG tablet Take 1 tablet by mouth 2 times daily as needed for Muscle spasms 60 tablet 0    Elastic Bandages & Supports (JOBST KNEE HIGH COMPRESSION ) MISC Dx: Varicose veins of the legs with pain and swelling. Bilateral thigh-high 30-40 mm Hg compression stockings.  2 each 2    SSD 1 % cream apply externally to affected area once a day      celecoxib (CELEBREX) 200 MG capsule Take 1 capsule by mouth daily 30 capsule 3    diphenhydrAMINE-APAP, sleep, (TYLENOL PM EXTRA STRENGTH)  MG tablet Take 1 tablet by mouth nightly as needed for Sleep      Glucosamine-Chondroit-Vit C-Mn (GLUCOSAMINE 1500 COMPLEX PO) Take 1 tablet by mouth daily      Turmeric 500 MG TABS Take 500 mg by mouth daily      omeprazole (PRILOSEC) 20 MG capsule Take 20 mg by mouth Daily       irbesartan (AVAPRO) 300 MG tablet Take 300 mg by mouth daily. No current facility-administered medications for this visit. No Known Allergies    Vitals:    04/23/21 1001   BP: 122/70   Site: Right Upper Arm   Position: Sitting   Cuff Size: Large Adult   Pulse: 58   Resp: 16   SpO2: 98%   Weight: 275 lb (124.7 kg)   Height: 6' 4\" (1.93 m)       Social History     Socioeconomic History    Marital status:      Spouse name: Not on file    Number of children: Not on file    Years of education: Not on file    Highest education level: Not on file   Occupational History    Not on file   Social Needs    Financial resource strain: Not on file    Food insecurity     Worry: Not on file     Inability: Not on file    Transportation needs     Medical: Not on file     Non-medical: Not on file   Tobacco Use    Smoking status: Never Smoker    Smokeless tobacco: Never Used   Substance and Sexual Activity    Alcohol use:  Yes     Alcohol/week: 2.0 standard drinks     Types: 2 Glasses of wine per week     Frequency: 2-3 times a week     Comment: No caffeine    Drug use: No    Sexual activity: Yes     Partners: Female   Lifestyle    Physical activity     Days per week: Not on file     Minutes per session: Not on file    Stress: Not on file   Relationships    Social connections     Talks on phone: Not on file     Gets together: Not on file     Attends Yarsanism service: Not on file     Active member of club or organization: Not on file     Attends meetings of clubs or organizations: Not on file     Relationship status: Not on file    Intimate partner violence     Fear of current or ex partner: Not on file     Emotionally abused: Not on file     Physically abused: Not on file     Forced sexual activity: Not on file   Other Topics Concern    Not on file   Social History Narrative    Not on file       Family History   Problem Relation Age of Onset    Asthma Mother     Cancer Mother         lung    Emphysema Mother     Heart Disease Mother     Heart Disease Father     Alzheimer's Disease Father     Colon Cancer Sister     Prostate Cancer Brother     Diabetes Maternal Grandmother          SUBJECTIVE: Juanita Ji presents to the office today for re-evaluation of cardiac diagnoses and hfu.  I reviewed records. Had recent ortho surgery with infectious complications, leading to hospitalization. He developed AF with RVR (typical flutter) and underwent successful ANALI guided DCCV. No structural disease demonstrated. Sent home on NOAC (Chadvasc = 2). .  He complains of no cardiac complaints, bleeds or neuro events and denies   chest pain, palpitations and syncope. Review of Systems:   Heart: as above   Lungs: as above   Eyes: denies changes in vision or discharge. Ears: denies changes in hearing or pain. Nose: denies epistaxis or masses   Throat: denies sore throat or trouble swallowing. Neuro: denies numbness, tingling, tremors. Skin: denies rashes or itching. : denies hematuria, dysuria   GI: denies vomiting, diarrhea   Psych: denies mood changed, anxiety, depression. all others negative. Physical Exam   /70 (Site: Right Upper Arm, Position: Sitting, Cuff Size: Large Adult)   Pulse 58   Resp 16   Ht 6' 4\" (1.93 m)   Wt 275 lb (124.7 kg)   SpO2 98%   BMI 33.47 kg/m²   Constitutional: Oriented to person, place, and time. Well-developed and well-nourished. No distress. Head: Normocephalic and atraumatic. Eyes: EOM are normal. Pupils are equal, round, and reactive to light. Neck: Normal range of motion. Neck supple. No hepatojugular reflux and no JVD present. Carotid bruit is not present.  No tracheal deviation present. No thyromegaly present. Cardiovascular: Normal rate, regular rhythm, normal heart sounds and intact distal pulses. Exam reveals no gallop and no friction rub. No murmur heard. Pulmonary/Chest: Effort normal and breath sounds normal. No respiratory distress. No wheezes. No rales. No tenderness. Abdominal: Soft. Bowel sounds are normal. No distension and no mass. No tenderness. No rebound and no guarding. Musculoskeletal: No edema and no tenderness. Neurological: Alert and oriented to person, place, and time. Skin: Skin is warm and dry. No rash noted. Not diaphoretic. No erythema. Psychiatric: Normal mood and affect. Behavior is normal.     EKG:  normal sinus rhythm, rate 58, axis +43, normal.    ASSESSMENT AND PLAN:  Patient Active Problem List   Diagnosis    Impingement syndrome of shoulder    Rotator cuff tear    Biceps tendon rupture, proximal    De Quervain's disease (tenosynovitis)    Kienbock's avascular necrosis of lunate, adult    Non-pressure chronic ulcer of left lower leg with fat layer exposed ([address]    Status post total bilateral knee replacement    Venous insufficiency (chronic) (peripheral)    Atrial flutter (HCC)    Essential hypertension       Patient with episode of rapid AFl after orthopedic surgery  CV exam, echo and ekg otherwise normal  Continue NOAC for now, hope is to withdraw in several months given low CHADS-VASC score, \"provoked\" arrhthymia, and predicated on  Patient obtaining a Kardia[employer] device. The patient was educated as to the symptoms that would require a prompt return to our office. Return visit in 3 months.     Pilar Ayala M.D  [facility]

## 2021-05-03 LAB
FUNGUS (MYCOLOGY) CULTURE: NORMAL
FUNGUS STAIN: NORMAL

## 2021-05-04 ENCOUNTER — PROCEDURE VISIT (OUTPATIENT)
Dept: PODIATRY | Age: 61
End: 2021-05-04
Payer: COMMERCIAL

## 2021-05-04 VITALS — HEIGHT: 76 IN | BODY MASS INDEX: 33.49 KG/M2 | WEIGHT: 275 LBS

## 2021-05-04 DIAGNOSIS — M20.42 HAMMER TOES OF BOTH FEET: ICD-10-CM

## 2021-05-04 DIAGNOSIS — I87.2 VENOUS INSUFFICIENCY (CHRONIC) (PERIPHERAL): ICD-10-CM

## 2021-05-04 DIAGNOSIS — M19.071 ARTHRITIS OF RIGHT FOOT: Primary | ICD-10-CM

## 2021-05-04 DIAGNOSIS — R26.2 DIFFICULTY WALKING: ICD-10-CM

## 2021-05-04 DIAGNOSIS — L84 CORNS AND CALLUS: ICD-10-CM

## 2021-05-04 DIAGNOSIS — M20.41 HAMMER TOES OF BOTH FEET: ICD-10-CM

## 2021-05-04 PROCEDURE — 3017F COLORECTAL CA SCREEN DOC REV: CPT | Performed by: PODIATRIST

## 2021-05-04 PROCEDURE — 1036F TOBACCO NON-USER: CPT | Performed by: PODIATRIST

## 2021-05-04 PROCEDURE — G8427 DOCREV CUR MEDS BY ELIG CLIN: HCPCS | Performed by: PODIATRIST

## 2021-05-04 PROCEDURE — 99213 OFFICE O/P EST LOW 20 MIN: CPT | Performed by: PODIATRIST

## 2021-05-04 PROCEDURE — G8417 CALC BMI ABV UP PARAM F/U: HCPCS | Performed by: PODIATRIST

## 2021-05-04 NOTE — PROGRESS NOTES
Patient is in today for routine callous and nail care. Patient says his orthotics that were made for him are hurting his feet really bad and he can not worn them.  pcp is Ginny Bertrand MD  Last ov 4/20/21

## 2021-05-05 NOTE — PROGRESS NOTES
21     Alexandria Jeronimo    : 1960   Sex: male    Age: 61 y.o. Patient's PCP/Provider is:  Yumiko John MD    Subjective:  Patient is seen today for follow-up regarding foot evaluation and management. Patient just recently got his custom foot orthoses but they are somewhat thicker in the forefoot area which is causing some discomfort into his right foot due to his previous multiple surgeries and deformities present. Patient wanted to discuss other potential treatment options available at this time. Patient had noticed that the distal abrasion/ulceration to the right great toe has subsequently healed several weeks ago. Patient is in no acute distress. He denies any additional issues at this time. Chief Complaint   Patient presents with    Callouses     bilateral     Nail Problem     nail care       ROS:  Const: Positives and pertinent negatives as per HPI. Musculo: Denies symptoms other than stated above. Neuro: Denies symptoms other than stated above. Skin: Denies symptoms other than stated above. Current Medications:    Current Outpatient Medications:     bacitracin zinc 500 UNIT/GM ointment, APPLY TOPICALLY EVERY 12 HOURS FOR 30 DAYS, Disp: , Rfl:     oxyCODONE (ROXICODONE) 5 MG immediate release tablet, TAKE ONE TABLET BY MOUTH EVERY 6 HOURS AS NEEDED FOR MODERATE TO SEVERE PAIN, Disp: , Rfl:     cefTRIAXone (ROCEPHIN) infusion, Infuse 2,000 mg intravenously every 24 hours Compound per protocol, Disp: 84 g, Rfl: 0    cyclobenzaprine (FLEXERIL) 10 MG tablet, Take 1 tablet by mouth 2 times daily as needed for Muscle spasms, Disp: 60 tablet, Rfl: 0    Elastic Bandages & Supports (JOBST KNEE HIGH COMPRESSION SM) MISC, Dx: Varicose veins of the legs with pain and swelling. Bilateral thigh-high 30-40 mm Hg compression stockings. , Disp: 2 each, Rfl: 2    SSD 1 % cream, apply externally to affected area once a day, Disp: , Rfl:     diphenhydrAMINE-APAP, sleep, (TYLENOL PM EXTRA STRENGTH)  MG tablet, Take 1 tablet by mouth nightly as needed for Sleep, Disp: , Rfl:     Glucosamine-Chondroit-Vit C-Mn (GLUCOSAMINE 1500 COMPLEX PO), Take 1 tablet by mouth daily, Disp: , Rfl:     Turmeric 500 MG TABS, Take 500 mg by mouth daily, Disp: , Rfl:     omeprazole (PRILOSEC) 20 MG capsule, Take 20 mg by mouth Daily , Disp: , Rfl:     irbesartan (AVAPRO) 300 MG tablet, Take 300 mg by mouth daily. , Disp: , Rfl:     celecoxib (CELEBREX) 200 MG capsule, Take 1 capsule by mouth daily, Disp: 30 capsule, Rfl: 3    Allergies:  No Known Allergies    Vitals:    05/04/21 0943   Weight: 275 lb (124.7 kg)   Height: 6' 4\" (1.93 m)       Exam:  Neurovascular status unchanged. Site of area healed distal aspect right great toe. Mild callused areas noted to the plantar aspect bilateral feet, upon partial thickness nonexcisional debridement no underlying ulcerations noted. Digital deformities noted from previous surgeries performed. No plantar ulcerations or heel fissuring noted bilateral foot. Edematous changes noted to both lower extremities secondary to venous insufficiency issues. Diagnostic Studies:     Xr Knee Right (1-2 Views)    Result Date: 4/12/2021  EXAMINATION: TWO XRAY VIEWS OF THE RIGHT KNEE 4/12/2021 2:38 pm COMPARISON: March 24, 2021 HISTORY: ORDERING SYSTEM PROVIDED HISTORY: Acute pain of right knee TECHNOLOGIST PROVIDED HISTORY: Reason for exam:->pain FINDINGS: Stable, satisfactory alignment of right knee arthroplasty. No periprosthetic fracture or evidence of loosening. Patella is in normal position. Moderate-sized suprapatellar synovial effusion. Cutaneous staples overlie anterior knee. 1. Satisfactory alignment status post right knee arthroplasty. 2. No periprosthetic fracture or loosening. 3. Moderate synovial effusion. Procedures:    None    Plan Per Assessment  Mathew Valle was seen today for callouses and nail problem.     Diagnoses and all orders for this visit:    Arthritis of right foot  -     Amb External Referral For Orthotics    Hammer toes of both feet    Corns and callus    Venous insufficiency (chronic) (peripheral)    Difficulty walking  -     Amb External Referral For Orthotics      1. Evaluation and management  2. We did discuss getting his current foot orthoses reevaluated to diminish the thickness in the forefoot region to prevent any excessive irritation to the digital regions right foot. 3. Patient was advised on continued foot care techniques and skin care techniques to prevent reoccurrence of issues. 4. Patient will be followed up at a later date for continued evaluation and management. Seen By:    Viraj Oleary DPM    Electronically signed by Viraj Oleary DPM on 5/5/2021 at 7:24 AM    This note was created using voice recognition software. The note was reviewed however may contain grammatical errors.

## 2021-05-07 DIAGNOSIS — T84.59XA INFECTION OF TOTAL KNEE REPLACEMENT, INITIAL ENCOUNTER (HCC): Primary | ICD-10-CM

## 2021-05-07 DIAGNOSIS — Z96.659 INFECTION OF TOTAL KNEE REPLACEMENT, INITIAL ENCOUNTER (HCC): Primary | ICD-10-CM

## 2021-05-10 ENCOUNTER — OFFICE VISIT (OUTPATIENT)
Dept: ORTHOPEDIC SURGERY | Age: 61
End: 2021-05-10

## 2021-05-10 VITALS — WEIGHT: 275 LBS | BODY MASS INDEX: 33.49 KG/M2 | HEIGHT: 76 IN

## 2021-05-10 DIAGNOSIS — T84.59XA INFECTION OF TOTAL KNEE REPLACEMENT, INITIAL ENCOUNTER (HCC): Primary | ICD-10-CM

## 2021-05-10 DIAGNOSIS — Z96.659 INFECTION OF TOTAL KNEE REPLACEMENT, INITIAL ENCOUNTER (HCC): Primary | ICD-10-CM

## 2021-05-10 PROCEDURE — 99024 POSTOP FOLLOW-UP VISIT: CPT | Performed by: ORTHOPAEDIC SURGERY

## 2021-05-10 NOTE — PROGRESS NOTES
Subjective:     Post-Operative week: 6 Status Post right Total Knee Arthroplasty revision, surgery date 3/24/2021. Systemic or Specific Complaints:no systemic symptoms. He is using picc line for abx therapy    Objective:     General: alert, appears stated age and cooperative   Wound: Wound clean and dry no evidence of infection. , No Erythema, No Edema and No Drainage   Motion: Flexion: 0 to 120 Degrees   DVT Exam: No evidence of DVT seen on physical exam.  Negative Ester's sign. No cords or calf tenderness. No significant calf/ankle edema. Imaging:  Xrays:  No signs of fracture, there is good alignment, and no signs of aseptic loosening. Radiographic findings reviewed with patient    Assessment:     Encounter Diagnosis   Name Primary?  Infection of total knee replacement, initial encounter ([facility] Yes      Doing well postoperatively. Plan:     Continues current post-op course  Patient is to dc taking enteric coated aspirin 325 mg, 1 tablet twice daily. Patient is to dc wearing ERICA hose, on during the day and off at night. No PT needed due to progression with ROM  HEP  Release back to work  We will see the patient back in 2 months for repeat xray and evaluation.   Please call office with any questions or concerns at [phone]

## 2021-05-14 ENCOUNTER — TELEPHONE (OUTPATIENT)
Dept: ORTHOPEDIC SURGERY | Age: 61
End: 2021-05-14

## 2021-05-14 DIAGNOSIS — Z96.653 STATUS POST TOTAL BILATERAL KNEE REPLACEMENT: ICD-10-CM

## 2021-05-14 DIAGNOSIS — M93.1 KIENBOECK DISEASE OF ADULTS: ICD-10-CM

## 2021-05-14 DIAGNOSIS — M65.4 RADIAL STYLOID TENOSYNOVITIS OF RIGHT HAND: ICD-10-CM

## 2021-05-14 RX ORDER — HYDROCODONE BITARTRATE AND ACETAMINOPHEN 10; 325 MG/1; MG/1
1 TABLET ORAL DAILY
Qty: 30 TABLET | Refills: 0 | Status: SHIPPED
Start: 2021-05-14 | End: 2021-06-14 | Stop reason: SDUPTHER

## 2021-05-14 RX ORDER — CYCLOBENZAPRINE HCL 10 MG
10 TABLET ORAL 2 TIMES DAILY PRN
Qty: 60 TABLET | Refills: 0 | Status: SHIPPED
Start: 2021-05-14 | End: 2021-06-14 | Stop reason: SDUPTHER

## 2021-05-14 NOTE — TELEPHONE ENCOUNTER
.  Last appointment 5/10/2021  Next appointment   Future Appointments   Date Time Provider Leanne Meloi   7/7/2021  9:00 AM Fartun Gottlieb MD [facility]   7/8/2021  9:30 AM Carson Ashton, DPMODESTO [facility]   7/9/2021  1:00 PM Ghislaine Drake MD [facility]   7/13/2021  8:00 AM DO Candace Zhu [facility]      Last refill 03/19/2021  DOS: Monthly RX      Patient called in requesting refill of:    cyclobenzaprine (FLEXERIL) 10 MG tablet     HYDROcodone-acetaminophen (NORCO)  MG per tablet       [id number] - [place]ney [place] - [address] [facility] [phone] - F [address] [place] [address] [address] [place] [address]

## 2021-06-14 ENCOUNTER — TELEPHONE (OUTPATIENT)
Dept: ORTHOPEDIC SURGERY | Age: 61
End: 2021-06-14

## 2021-06-14 DIAGNOSIS — Z96.653 STATUS POST TOTAL BILATERAL KNEE REPLACEMENT: ICD-10-CM

## 2021-06-14 DIAGNOSIS — M93.1 KIENBOECK DISEASE OF ADULTS: ICD-10-CM

## 2021-06-14 DIAGNOSIS — M65.4 RADIAL STYLOID TENOSYNOVITIS OF RIGHT HAND: ICD-10-CM

## 2021-06-14 RX ORDER — CYCLOBENZAPRINE HCL 10 MG
10 TABLET ORAL 2 TIMES DAILY PRN
Qty: 60 TABLET | Refills: 0 | Status: SHIPPED
Start: 2021-06-14 | End: 2021-07-13 | Stop reason: SDUPTHER

## 2021-06-14 RX ORDER — HYDROCODONE BITARTRATE AND ACETAMINOPHEN 10; 325 MG/1; MG/1
1 TABLET ORAL DAILY
Qty: 30 TABLET | Refills: 0 | Status: SHIPPED
Start: 2021-06-14 | End: 2021-07-13 | Stop reason: SDUPTHER

## 2021-06-14 NOTE — TELEPHONE ENCOUNTER
Last appointment 5/10/2021  Next appointment          Future Appointments   Date Time Provider Leanne Jayda   7/7/2021  9:00 AM Lorelei Allen MD [facility]   7/8/2021  9:30 AM Ousmane Holland, DPMODESTO [facility]   7/9/2021  1:00 PM Jose Carrera MD [facility]   7/13/2021  8:00 AM DO Kirill Dougherty [facility]      Last refill 05/14/2021  DOS: Monthly RX        Patient called in requesting refill of:     cyclobenzaprine (FLEXERIL) 10 MG tablet      HYDROcodone-acetaminophen (NORCO)  MG per tablet         [id number] - [facility] - [address] [place] [address], [address] [place] [address]

## 2021-07-06 ENCOUNTER — HOSPITAL ENCOUNTER (OUTPATIENT)
Age: 61
Discharge: HOME | End: 2021-07-06
Payer: COMMERCIAL

## 2021-07-06 DIAGNOSIS — T84.50XS PROSTHETIC JOINT INFECTION, SEQUELA: ICD-10-CM

## 2021-07-06 LAB
ALBUMIN SERPL-MCNC: 4 G/DL (ref 3.5–5.2)
ALP BLD-CCNC: 141 U/L (ref 40–129)
ALT SERPL-CCNC: 20 U/L (ref 0–40)
ANION GAP SERPL CALCULATED.3IONS-SCNC: 11 MMOL/L (ref 7–16)
AST SERPL-CCNC: 19 U/L (ref 0–39)
BASOPHILS ABSOLUTE: 0.05 E9/L (ref 0–0.2)
BASOPHILS RELATIVE PERCENT: 0.9 % (ref 0–2)
BILIRUB SERPL-MCNC: 0.3 MG/DL (ref 0–1.2)
BUN BLDV-MCNC: 17 MG/DL (ref 6–23)
CALCIUM SERPL-MCNC: 9.2 MG/DL (ref 8.6–10.2)
CHLORIDE BLD-SCNC: 104 MMOL/L (ref 98–107)
CO2: 22 MMOL/L (ref 22–29)
CREAT SERPL-MCNC: 1 MG/DL (ref 0.7–1.2)
EOSINOPHILS ABSOLUTE: 0.09 E9/L (ref 0.05–0.5)
EOSINOPHILS RELATIVE PERCENT: 1.6 % (ref 0–6)
GFR AFRICAN AMERICAN: >60
GFR NON-AFRICAN AMERICAN: >60 ML/MIN/1.73
GLUCOSE BLD-MCNC: 86 MG/DL (ref 74–99)
HCT VFR BLD CALC: 40.5 % (ref 37–54)
HEMOGLOBIN: 13.3 G/DL (ref 12.5–16.5)
IMMATURE GRANULOCYTES #: 0.03 E9/L
IMMATURE GRANULOCYTES %: 0.5 % (ref 0–5)
LYMPHOCYTES ABSOLUTE: 1.98 E9/L (ref 1.5–4)
LYMPHOCYTES RELATIVE PERCENT: 34.4 % (ref 20–42)
MCH RBC QN AUTO: 26.2 PG (ref 26–35)
MCHC RBC AUTO-ENTMCNC: 32.8 % (ref 32–34.5)
MCV RBC AUTO: 79.9 FL (ref 80–99.9)
MONOCYTES ABSOLUTE: 0.58 E9/L (ref 0.1–0.95)
MONOCYTES RELATIVE PERCENT: 10.1 % (ref 2–12)
NEUTROPHILS ABSOLUTE: 3.03 E9/L (ref 1.8–7.3)
NEUTROPHILS RELATIVE PERCENT: 52.5 % (ref 43–80)
PDW BLD-RTO: 15.2 FL (ref 11.5–15)
PLATELET # BLD: 236 E9/L (ref 130–450)
PMV BLD AUTO: 10.4 FL (ref 7–12)
POTASSIUM SERPL-SCNC: 4.4 MMOL/L (ref 3.5–5)
RBC # BLD: 5.07 E12/L (ref 3.8–5.8)
SEDIMENTATION RATE, ERYTHROCYTE: 5 MM/HR (ref 0–15)
SODIUM BLD-SCNC: 137 MMOL/L (ref 132–146)
TOTAL PROTEIN: 7 G/DL (ref 6.4–8.3)
WBC # BLD: 5.8 E9/L (ref 4.5–11.5)

## 2021-07-06 PROCEDURE — 36415 COLL VENOUS BLD VENIPUNCTURE: CPT

## 2021-07-06 PROCEDURE — 80053 COMPREHEN METABOLIC PANEL: CPT

## 2021-07-06 PROCEDURE — 85651 RBC SED RATE NONAUTOMATED: CPT

## 2021-07-06 PROCEDURE — 85025 COMPLETE CBC W/AUTO DIFF WBC: CPT

## 2021-07-12 DIAGNOSIS — Z96.653 STATUS POST TOTAL BILATERAL KNEE REPLACEMENT: Primary | ICD-10-CM

## 2021-07-13 ENCOUNTER — OFFICE VISIT (OUTPATIENT)
Dept: PODIATRY | Age: 61
End: 2021-07-13
Payer: COMMERCIAL

## 2021-07-13 ENCOUNTER — OFFICE VISIT (OUTPATIENT)
Dept: ORTHOPEDIC SURGERY | Age: 61
End: 2021-07-13
Payer: COMMERCIAL

## 2021-07-13 VITALS — BODY MASS INDEX: 34.7 KG/M2 | WEIGHT: 285 LBS | HEIGHT: 76 IN

## 2021-07-13 VITALS — HEIGHT: 76 IN | WEIGHT: 285 LBS | BODY MASS INDEX: 34.7 KG/M2 | TEMPERATURE: 98 F

## 2021-07-13 DIAGNOSIS — Z96.653 STATUS POST TOTAL BILATERAL KNEE REPLACEMENT: ICD-10-CM

## 2021-07-13 DIAGNOSIS — M20.42 HAMMER TOES OF BOTH FEET: ICD-10-CM

## 2021-07-13 DIAGNOSIS — R26.2 DIFFICULTY WALKING: ICD-10-CM

## 2021-07-13 DIAGNOSIS — M25.571 CHRONIC PAIN OF RIGHT ANKLE: ICD-10-CM

## 2021-07-13 DIAGNOSIS — M65.9 SYNOVITIS OF RIGHT ANKLE: Primary | ICD-10-CM

## 2021-07-13 DIAGNOSIS — M93.1 KIENBOECK DISEASE OF ADULTS: ICD-10-CM

## 2021-07-13 DIAGNOSIS — M20.41 HAMMER TOES OF BOTH FEET: ICD-10-CM

## 2021-07-13 DIAGNOSIS — G89.29 CHRONIC PAIN OF RIGHT ANKLE: ICD-10-CM

## 2021-07-13 DIAGNOSIS — M65.4 RADIAL STYLOID TENOSYNOVITIS OF RIGHT HAND: ICD-10-CM

## 2021-07-13 DIAGNOSIS — M19.071 ARTHRITIS OF RIGHT FOOT: ICD-10-CM

## 2021-07-13 PROBLEM — M65.971 SYNOVITIS OF RIGHT ANKLE: Status: ACTIVE | Noted: 2021-07-13

## 2021-07-13 PROCEDURE — 3017F COLORECTAL CA SCREEN DOC REV: CPT | Performed by: PODIATRIST

## 2021-07-13 PROCEDURE — G8427 DOCREV CUR MEDS BY ELIG CLIN: HCPCS | Performed by: ORTHOPAEDIC SURGERY

## 2021-07-13 PROCEDURE — G8417 CALC BMI ABV UP PARAM F/U: HCPCS | Performed by: ORTHOPAEDIC SURGERY

## 2021-07-13 PROCEDURE — G8417 CALC BMI ABV UP PARAM F/U: HCPCS | Performed by: PODIATRIST

## 2021-07-13 PROCEDURE — 1036F TOBACCO NON-USER: CPT | Performed by: PODIATRIST

## 2021-07-13 PROCEDURE — 99213 OFFICE O/P EST LOW 20 MIN: CPT | Performed by: ORTHOPAEDIC SURGERY

## 2021-07-13 PROCEDURE — 3017F COLORECTAL CA SCREEN DOC REV: CPT | Performed by: ORTHOPAEDIC SURGERY

## 2021-07-13 PROCEDURE — 99213 OFFICE O/P EST LOW 20 MIN: CPT | Performed by: PODIATRIST

## 2021-07-13 PROCEDURE — 1036F TOBACCO NON-USER: CPT | Performed by: ORTHOPAEDIC SURGERY

## 2021-07-13 PROCEDURE — G8427 DOCREV CUR MEDS BY ELIG CLIN: HCPCS | Performed by: PODIATRIST

## 2021-07-13 RX ORDER — CELECOXIB 200 MG/1
200 CAPSULE ORAL 2 TIMES DAILY
Qty: 30 CAPSULE | Refills: 3 | Status: SHIPPED | OUTPATIENT
Start: 2021-07-13 | End: 2021-11-10

## 2021-07-13 RX ORDER — HYDROCODONE BITARTRATE AND ACETAMINOPHEN 10; 325 MG/1; MG/1
1 TABLET ORAL DAILY
Qty: 30 TABLET | Refills: 0 | Status: SHIPPED
Start: 2021-07-13 | End: 2021-08-19 | Stop reason: SDUPTHER

## 2021-07-13 RX ORDER — CYCLOBENZAPRINE HCL 10 MG
10 TABLET ORAL 2 TIMES DAILY PRN
Qty: 60 TABLET | Refills: 0 | Status: SHIPPED
Start: 2021-07-13 | End: 2021-08-19 | Stop reason: SDUPTHER

## 2021-07-13 NOTE — PROGRESS NOTES
Chief Complaint   Patient presents with    Knee Pain     Right knee tka revision DOS 3/24/21. Knee still very sore on the sides and has pain going up and down steps. Does not feel much different then previous visit. Raheem Farrar returns today for follow-up of his right knee revision TKA due to septic joint 03/24/2021. he reports this is the same than when I saw him last.  The patient's pain level is a 4/10. The previous treatment was successful. Past Medical History:   Diagnosis Date    Cellulitis     Edema     legs with cellulitis    GERD (gastroesophageal reflux disease)     Hypertension     Osteoarthritis     Tenosynovitis, de Quervain     Venous insufficiency      Past Surgical History:   Procedure Laterality Date    COLONOSCOPY      x2    FOOT SURGERY      JOINT REPLACEMENT      bilateral knee replacement    JOINT REPLACEMENT      right ankle    OTHER SURGICAL HISTORY Right 01-25-13    right wrist 1st dorsal compartment release    REPLACEMENT TOTAL KNEE BILATERAL Bilateral 7/31/2019    KNEE TOTAL ARTHROPLASTY BILATERAL performed by Justina Dakins, DO at [address] [place] [address] Right 3/28/2021    KNEE TOTAL ARTHROPLASTY REVISION performed by Justina Dakins, DO at [facility] ARTHROSCOPY      TONSILLECTOMY      TONSILLECTOMY AND ADENOIDECTOMY      VASCULAR SURGERY      both legs     VASECTOMY         Current Outpatient Medications:     cyclobenzaprine (FLEXERIL) 10 MG tablet, Take 1 tablet by mouth 2 times daily as needed for Muscle spasms, Disp: 60 tablet, Rfl: 0    HYDROcodone-acetaminophen (NORCO)  MG per tablet, Take 1 tablet by mouth daily for 30 days.  Intended supply: 30 days, Disp: 30 tablet, Rfl: 0    celecoxib (CELEBREX) 200 MG capsule, Take 1 capsule by mouth 2 times daily, Disp: 30 capsule, Rfl: 3    ELIQUIS 5 MG TABS tablet, TAKE ONE TABLET (5MG) BY MOUTH EVERY 12 HOURS, Disp: , Rfl:     cefdinir (OMNICEF) 300 MG capsule, TAKE ONE CAPSULE BY MOUTH TWO TIMES A DAY, Disp: , Rfl:     DILT- MG extended release capsule, , Disp: , Rfl:     magnesium 30 MG tablet, Take 30 mg by mouth 2 times daily, Disp: , Rfl:     potassium chloride (MICRO-K) 10 MEQ extended release capsule, Take 10 mEq by mouth 2 times daily, Disp: , Rfl:     Elastic Bandages & Supports (JOBST KNEE HIGH COMPRESSION SM) MISC, Dx: Varicose veins of the legs with pain and swelling. Bilateral thigh-high 30-40 mm Hg compression stockings. , Disp: 2 each, Rfl: 2    SSD 1 % cream, apply externally to affected area once a day, Disp: , Rfl:     celecoxib (CELEBREX) 200 MG capsule, Take 1 capsule by mouth daily, Disp: 30 capsule, Rfl: 3    diphenhydrAMINE-APAP, sleep, (TYLENOL PM EXTRA STRENGTH)  MG tablet, Take 1 tablet by mouth nightly as needed for Sleep, Disp: , Rfl:     Glucosamine-Chondroit-Vit C-Mn (GLUCOSAMINE 1500 COMPLEX PO), Take 1 tablet by mouth daily, Disp: , Rfl:     Turmeric 500 MG TABS, Take 500 mg by mouth daily, Disp: , Rfl:     omeprazole (PRILOSEC) 20 MG capsule, Take 20 mg by mouth Daily , Disp: , Rfl:     irbesartan (AVAPRO) 300 MG tablet, Take 300 mg by mouth daily. , Disp: , Rfl:   No Known Allergies  Social History     Socioeconomic History    Marital status:      Spouse name: Not on file    Number of children: Not on file    Years of education: Not on file    Highest education level: Not on file   Occupational History    Not on file   Tobacco Use    Smoking status: Never Smoker    Smokeless tobacco: Never Used   Vaping Use    Vaping Use: Never used   Substance and Sexual Activity    Alcohol use:  Yes     Alcohol/week: 2.0 standard drinks     Types: 2 Glasses of wine per week     Comment: No caffeine    Drug use: No    Sexual activity: Yes     Partners: Female   Other Topics Concern    Not on file   Social History Narrative    Not on file     Social Determinants of Health     Financial Resource Strain:     Difficulty of Paying Living Expenses:    Food Insecurity:     Worried About [facility] in the Last Year:     [address] [facility] in the Last Year:    Transportation Needs:     Lack of Transportation (Medical):  Lack of Transportation (Non-Medical):    Physical Activity:     Days of Exercise per Week:     Minutes of Exercise per Session:    Stress:     Feeling of Stress :    Social Connections:     Frequency of Communication with Friends and Family:     Frequency of Social Gatherings with Friends and Family:     Attends Muslim Services:     Active Member of Clubs or Organizations:     Attends Club or Organization Meetings:     Marital Status:    Intimate Partner Violence:     Fear of Current or Ex-Partner:     Emotionally Abused:     Physically Abused:     Sexually Abused:      Family History   Problem Relation Age of Onset    Asthma Mother     Cancer Mother         lung    Emphysema Mother     Heart Disease Mother     Heart Disease Father     Alzheimer's Disease Father     Colon Cancer Sister     Prostate Cancer Brother     Diabetes Maternal Grandmother        Review of Systems:     Skin: (-) rash,(-) psoriasis,(-) eczema, (-)skin cancer. Musculoskeletal: (-) fractures,  (-) dislocations,(-) collagen vascular disease, (-) fibromyalgia, (-) multiple sclerosis, (-) muscular dystrophy, (-) RSD,(-) joint pain (-)swelling, (-) joint pain,swelling. Neurologic: (-) epilepsy, (-)seizures,(-) brain tumor,(-) TIA, (-)stroke, (-)headaches, (-)Parkinson disease,(-) memory loss, (-) LOC. Cardiovascular: (-) Chest pain, (-) swelling in legs/feet, (-) SOB, (-) cramping in legs/feet with walking. Constitutional:  The patient is alert and oriented x 3, appears to be stated age and in no distress. Temp 98 °F (36.7 °C)   Ht 6' 4\" (1.93 m)   Wt 285 lb (129.3 kg)   BMI 34.69 kg/m²     Skin:  Upon inspection: the skin appears warm, dry and intact.   There is not a previous scar over the affected area.There is not any cellulitis, lymphedema or cutaneous lesions noted in the lower extremities. Upon palpation there is no induration noted. Neurologic:  Gait: normal;  Motor exam of the lower extremities show ; quadriceps, hamstrings, foot dorsi and plantar flexors intact R.  5/5 and L. 5/5. Deep tendon reflexes are 2/4 at the knees and 2/4 at the ankles with strong extensor hallicus longus motor strength bilaterally. Sensory to both feet is intact to all sensory roots. Cardiovascular: The vascular exam is normal and is well perfused to distal extremities. Distal pulses DP/PT: R. 2+; L. 2+. There is cap refill noted less than two seconds in all digits. There is not edema of the bilateral lower extremities. There is not varicosities noted in the distal extremities. Lymph:  Upon palpation,  there is no lymphadenopathy noted in bilateral lower extremities. Musculoskeletal:  Gait: antalgic; examination of the nails and digits reveal no cyanosis or clubbing    Lumbar exam:  On visual inspection, there is no deformity of the spine. full range of motion, no tenderness, palpable spasm or pain on motion. Special tests: Straight Leg Raise negative, Rico testnegative. Hip exam:  Upon inspection, there is no deformity noted. Upon palpation there is not tenderness. ROM: is   full and semetrical.   Strength: Hip Flexors 5/5; Hip Abductors 5/5; Hip Adduction 5/5. Knee exam:  Right knee exam shows;  range of motion of R. Knee is 0 to 115, and L. Knee is 0 to 120. He does have  pain on motion, effusion is none, there is tenderness over the  medial, lateral region, there are not any masses, there is not ligamentous instability, there is not  deformity noted. Knee exam: right positive for moderate crepitations, some mild tenderness laxity is not noted with stress.       R. Knee:  Lachman's negative, Anterior Drawer negative, Posterior Drawer negative  Elijah's negative, Thallasy  negative,   PF

## 2021-07-13 NOTE — PROGRESS NOTES
21     Juliana East    : 1960   Sex: male    Age: 61 y.o. Patient's PCP/Provider is:  Ara Wills MD    Subjective:  Patient is seen today for follow-up regarding continued evaluation regarding chronic ankle pain, swelling, and disability. Patient has been wearing his orthopedic shoes and insoles as instructed with modest improvement in symptoms. Patient was concerned about increased discomfort into the lateral malleolus/sinus tarsi region right lower extremity with his every day work activities which has progressively gotten worse. Patient did have previous x-rays taken several months ago which did reveal some pes valgus and arthritic issues into the right lower extremity. No other additional abnormalities noted at this time. Chief Complaint   Patient presents with    Nail Problem     nail care    Callouses     bilateral     Foot Pain     right foot, ankle        ROS:  Const: Positives and pertinent negatives as per HPI. Musculo: Denies symptoms other than stated above. Neuro: Denies symptoms other than stated above. Skin: Denies symptoms other than stated above. Current Medications:    Current Outpatient Medications:     cyclobenzaprine (FLEXERIL) 10 MG tablet, Take 1 tablet by mouth 2 times daily as needed for Muscle spasms, Disp: 60 tablet, Rfl: 0    HYDROcodone-acetaminophen (NORCO)  MG per tablet, Take 1 tablet by mouth daily for 30 days.  Intended supply: 30 days, Disp: 30 tablet, Rfl: 0    celecoxib (CELEBREX) 200 MG capsule, Take 1 capsule by mouth 2 times daily, Disp: 30 capsule, Rfl: 3    ELIQUIS 5 MG TABS tablet, TAKE ONE TABLET (5MG) BY MOUTH EVERY 12 HOURS, Disp: , Rfl:     cefdinir (OMNICEF) 300 MG capsule, TAKE ONE CAPSULE BY MOUTH TWO TIMES A DAY, Disp: , Rfl:     DILT- MG extended release capsule, , Disp: , Rfl:     magnesium 30 MG tablet, Take 30 mg by mouth 2 times daily, Disp: , Rfl:     potassium chloride (MICRO-K) 10 MEQ extended release capsule, Take 10 mEq by mouth 2 times daily, Disp: , Rfl:     Elastic Bandages & Supports (JOBST KNEE HIGH COMPRESSION SM) MISC, Dx: Varicose veins of the legs with pain and swelling. Bilateral thigh-high 30-40 mm Hg compression stockings. , Disp: 2 each, Rfl: 2    SSD 1 % cream, apply externally to affected area once a day, Disp: , Rfl:     diphenhydrAMINE-APAP, sleep, (TYLENOL PM EXTRA STRENGTH)  MG tablet, Take 1 tablet by mouth nightly as needed for Sleep, Disp: , Rfl:     Glucosamine-Chondroit-Vit C-Mn (GLUCOSAMINE 1500 COMPLEX PO), Take 1 tablet by mouth daily, Disp: , Rfl:     Turmeric 500 MG TABS, Take 500 mg by mouth daily, Disp: , Rfl:     omeprazole (PRILOSEC) 20 MG capsule, Take 20 mg by mouth Daily , Disp: , Rfl:     irbesartan (AVAPRO) 300 MG tablet, Take 300 mg by mouth daily. , Disp: , Rfl:     celecoxib (CELEBREX) 200 MG capsule, Take 1 capsule by mouth daily, Disp: 30 capsule, Rfl: 3    Allergies:  No Known Allergies    Vitals:    07/13/21 0954   Weight: 285 lb (129.3 kg)   Height: 6' 4\" (1.93 m)       Exam:  Neurovascular status unchanged. Tenderness noted to palpation into the lateral malleoli region and sinus tarsi area right lower extremity with attempted range of motion and muscle testing performed. Severe valgus issues noted right lower extremity. Edematous issues noted into the right lower extremity. No plantar calluses or ulcerative areas noted to both lower extremities. No maceration of the webspaces noted bilateral foot. Diagnostic Studies:     XR KNEE RIGHT (1-2 VIEWS)    Result Date: 7/13/2021  EXAMINATION: TWO XRAY VIEWS OF THE RIGHT KNEE 7/13/2021 6:54 am COMPARISON: 10 May 2021 HISTORY: ORDERING SYSTEM PROVIDED HISTORY: Status post total bilateral knee replacement FINDINGS: Very small joint effusion. Anatomically aligned right knee arthroplasty with no abnormal osseous findings.      Anatomically aligned right knee arthroplasty with small joint effusion. XR KNEE RIGHT (1-2 VIEWS)    Result Date: 7/13/2021  EXAMINATION: TWO XRAY VIEWS OF THE RIGHT KNEE 7/13/2021 6:54 am COMPARISON: 10 May 2021 HISTORY: ORDERING SYSTEM PROVIDED HISTORY: Status post total bilateral knee replacement FINDINGS: Very small joint effusion. Anatomically aligned right knee arthroplasty with no abnormal osseous findings. Anatomically aligned right knee arthroplasty with small joint effusion. Procedures:    None    Plan Per Assessment  Gracia Simmonds was seen today for nail problem, callouses and foot pain. Diagnoses and all orders for this visit:    Synovitis of right ankle  -     MRI ANKLE RIGHT W WO CONTRAST; Future    Chronic pain of right ankle  -     MRI ANKLE RIGHT W WO CONTRAST; Future    Arthritis of right foot    Hammer toes of both feet    Difficulty walking  -     MRI ANKLE RIGHT W WO CONTRAST; Future      1. Evaluation and management  2. We did discuss additional treatment options with patient in detail today. We did recommend obtaining an MRI of the right ankle region to assess integrity of the lateral collateral ligamentous regions and sinus tarsi ligamentous areas. 3. Patient was to continue with his current orthopedic shoes and custom insoles for added support and stability into the hindfoot and ankle regions bilaterally. 4. Patient will be followed up at a later date for continued evaluation and management. We will discuss MRI results with patient in detail at that time and further treatment options available. Seen By:    Tan Begum DPM    Electronically signed by Tan Begum DPM on 7/13/2021 at 2:08 PM    This note was created using voice recognition software. The note was reviewed however may contain grammatical errors.

## 2021-07-13 NOTE — PROGRESS NOTES
Patient is in today for routine nail and callous care. Patient also would like to discuss the right foot/ankle pain. Patient had previous MRI in 2019 and has not had any change in improvement since then. Patient would like another MRI.  pcp is Shi Avina MD  Last ov 6/14/21

## 2021-08-18 ENCOUNTER — TELEPHONE (OUTPATIENT)
Dept: ORTHOPEDIC SURGERY | Age: 61
End: 2021-08-18

## 2021-08-18 DIAGNOSIS — M65.4 RADIAL STYLOID TENOSYNOVITIS OF RIGHT HAND: ICD-10-CM

## 2021-08-18 DIAGNOSIS — M93.1 KIENBOECK DISEASE OF ADULTS: ICD-10-CM

## 2021-08-18 DIAGNOSIS — Z96.653 STATUS POST TOTAL BILATERAL KNEE REPLACEMENT: ICD-10-CM

## 2021-08-18 NOTE — TELEPHONE ENCOUNTER
Last appointment Visit date 07/13/2021  Next appointment   Future Appointments   Date Time Provider Leanne Montelongo   [facility]14[facility]  [facility]   10/6/2021  8:45 AM Saba Viera MD [facility]   10/14/2021  8:10 AM [facility]      Last refill 07/13/2021  DOS: Monthly rx       Patient called in requesting refill of   cyclobenzaprine (FLEXERIL) 10 MG tablet      HYDROcodone-acetaminophen (NORCO)  MG per tablet         [id number] - Gar[place] Bengali - [address] [address] [address] [place] [address]

## 2021-08-19 RX ORDER — CYCLOBENZAPRINE HCL 10 MG
10 TABLET ORAL 2 TIMES DAILY PRN
Qty: 60 TABLET | Refills: 0 | Status: SHIPPED
Start: 2021-08-19 | End: 2021-09-21 | Stop reason: SDUPTHER

## 2021-08-19 RX ORDER — HYDROCODONE BITARTRATE AND ACETAMINOPHEN 10; 325 MG/1; MG/1
1 TABLET ORAL DAILY
Qty: 30 TABLET | Refills: 0 | Status: SHIPPED
Start: 2021-08-19 | End: 2021-09-21 | Stop reason: SDUPTHER

## 2021-08-31 ENCOUNTER — OFFICE VISIT (OUTPATIENT)
Dept: PODIATRY | Age: 61
End: 2021-08-31
Payer: COMMERCIAL

## 2021-08-31 VITALS — WEIGHT: 285 LBS | HEIGHT: 76 IN | BODY MASS INDEX: 34.7 KG/M2

## 2021-08-31 DIAGNOSIS — G89.29 CHRONIC PAIN OF RIGHT ANKLE: ICD-10-CM

## 2021-08-31 DIAGNOSIS — M25.571 CHRONIC PAIN OF RIGHT ANKLE: ICD-10-CM

## 2021-08-31 DIAGNOSIS — M19.071 ARTHRITIS OF RIGHT FOOT: ICD-10-CM

## 2021-08-31 DIAGNOSIS — M65.9 SYNOVITIS OF RIGHT ANKLE: Primary | ICD-10-CM

## 2021-08-31 DIAGNOSIS — R26.2 DIFFICULTY WALKING: ICD-10-CM

## 2021-08-31 PROCEDURE — 99213 OFFICE O/P EST LOW 20 MIN: CPT | Performed by: PODIATRIST

## 2021-08-31 PROCEDURE — 1036F TOBACCO NON-USER: CPT | Performed by: PODIATRIST

## 2021-08-31 PROCEDURE — G8427 DOCREV CUR MEDS BY ELIG CLIN: HCPCS | Performed by: PODIATRIST

## 2021-08-31 PROCEDURE — 3017F COLORECTAL CA SCREEN DOC REV: CPT | Performed by: PODIATRIST

## 2021-08-31 PROCEDURE — G8417 CALC BMI ABV UP PARAM F/U: HCPCS | Performed by: PODIATRIST

## 2021-09-01 NOTE — PROGRESS NOTES
21     Ranjeet Milder    : 1960   Sex: male    Age: 64 y.o. Patient's PCP/Provider is:  Leslie Lamb MD    Subjective:  Patient is seen today for follow-up regarding continued evaluation right lower extremity pain and instability issues. Patient is still having issues with current shoes and insoles. No other additional issues noted. Chief Complaint   Patient presents with    Follow-up     MRI results        ROS:  Const: Positives and pertinent negatives as per HPI. Musculo: Denies symptoms other than stated above. Neuro: Denies symptoms other than stated above. Skin: Denies symptoms other than stated above. Current Medications:    Current Outpatient Medications:     cyclobenzaprine (FLEXERIL) 10 MG tablet, Take 1 tablet by mouth 2 times daily as needed for Muscle spasms, Disp: 60 tablet, Rfl: 0    HYDROcodone-acetaminophen (NORCO)  MG per tablet, Take 1 tablet by mouth daily for 30 days. Intended supply: 30 days, Disp: 30 tablet, Rfl: 0    celecoxib (CELEBREX) 200 MG capsule, Take 1 capsule by mouth 2 times daily, Disp: 30 capsule, Rfl: 3    ELIQUIS 5 MG TABS tablet, TAKE ONE TABLET (5MG) BY MOUTH EVERY 12 HOURS, Disp: , Rfl:     cefdinir (OMNICEF) 300 MG capsule, TAKE ONE CAPSULE BY MOUTH TWO TIMES A DAY, Disp: , Rfl:     DILT- MG extended release capsule, , Disp: , Rfl:     magnesium 30 MG tablet, Take 30 mg by mouth 2 times daily, Disp: , Rfl:     potassium chloride (MICRO-K) 10 MEQ extended release capsule, Take 10 mEq by mouth 2 times daily, Disp: , Rfl:     Elastic Bandages & Supports (JOBST KNEE HIGH COMPRESSION SM) MISC, Dx: Varicose veins of the legs with pain and swelling. Bilateral thigh-high 30-40 mm Hg compression stockings. , Disp: 2 each, Rfl: 2    SSD 1 % cream, apply externally to affected area once a day, Disp: , Rfl:     diphenhydrAMINE-APAP, sleep, (TYLENOL PM EXTRA STRENGTH)  MG tablet, Take 1 tablet by mouth nightly as needed for Sleep, Disp: , Rfl:     Glucosamine-Chondroit-Vit C-Mn (GLUCOSAMINE 1500 COMPLEX PO), Take 1 tablet by mouth daily, Disp: , Rfl:     Turmeric 500 MG TABS, Take 500 mg by mouth daily, Disp: , Rfl:     omeprazole (PRILOSEC) 20 MG capsule, Take 20 mg by mouth Daily , Disp: , Rfl:     irbesartan (AVAPRO) 300 MG tablet, Take 300 mg by mouth daily. , Disp: , Rfl:     celecoxib (CELEBREX) 200 MG capsule, Take 1 capsule by mouth daily, Disp: 30 capsule, Rfl: 3    Allergies:  No Known Allergies    Vitals:    08/31/21 1412   Weight: 285 lb (129.3 kg)   Height: 6' 4\" (1.93 m)       Exam:  NVS unchanged. Edema and arthtritic changes noted right lower extremity. Limited ROM ankle, STJ, and MTPJ's noted. Antalgic gait noted upon evaluation today. Diagnostic Studies:     MRI ANKLE RIGHT W WO CONTRAST    Result Date: 8/21/2021  EXAMINATION: MRI OF THE RIGHT ANKLE WITHOUT AND WITH CONTRAST, 8/18/2021 11:34 am TECHNIQUE: Multiplanar multisequence MRI of the right ankle was performed without and with the administration of intravenous contrast. COMPARISON: 10/29/2020 HISTORY: ORDERING SYSTEM PROVIDED HISTORY: Chronic pain of right ankle TECHNOLOGIST PROVIDED HISTORY: Reason for exam:->Chronic pain right ankle FINDINGS: SYNDESMOTIC LIGAMENTS: There is mild thickening of the anterior inferior tibiofibular syndesmotic ligament. There is mild thickening of the posteroinferior tibiofibular syndesmotic ligament. LATERAL COLLATERAL LIGAMENT COMPLEX: A normal anterior talofibular ligament is not identified. The anterior talofibular ligament is thickened. The calcaneofibular ligament is thickened. The posterior talofibular ligament is intact. DELTOID LIGAMENT COMPLEX: The deep fibers of deltoid ligament are intact with mild increased T2 signal. SINUS TARSI AND SPRING LIGAMENT: The sinus tarsi fat is partially replaced. The superomedial portion of the spring ligament is attenuated.  MEDIAL TENDONS: The tibialis posterior tendon is intact. There is a small amount of fluid in the tibialis posterior tendon sheath. The flexor digitorum longus tendon is intact. The flexor hallucis longus tendon becomes attenuated about the level of the posterior aspect of the distal tibia and extends to the level of an os trigonum. This may be postsurgical or relate to chronic tearing. There is fatty atrophy of the flexor hallucis longus muscle. LATERAL TENDONS: Peroneal tendons are located. There is mild thickening of the distal peroneus longus tendon. ANTERIOR TENDONS: The anterior tendons are intact. ACHILLES TENDON: There is mild thickening of the proximal Achilles tendon. There is minimal increased T2 signal  within the distal Achilles tendon. There is trace fluid in the retrocalcaneal bursa. Enthesophyte formation is noted. PLANTAR FASCIA: There are moderate-sized plantar calcaneal heel spurs. There is significant thickening of the central cord of plantar fascia without focal or full-thickness disruption. There is fatty atrophy of the adjacent musculature. TARSAL TUNNEL: There are no obstructing lesions in the tarsal tunnel. BONE MARROW: There is decreased T1 marrow signal involving the anterior calcaneus, cuboid and talus. This is most pronounced surrounding the sinus tarsi and at the calcaneocuboid and posterior subtalar articulations. A discrete fracture is not identified. There are mature ossifications about the dorsum of the talonavicular articulation. Dorsal ossifications are noted at the naviculocuneiform articulation. What may be an os trigonum is noted along the superior margin of the posterior talus. Dorsal spurring is noted at the calcaneocuboid articulation. JOINT SPACES: Cartilage loss is noted at the calcaneocuboid articulation. There is cartilage thinning throughout the hindfoot and midfoot articulations. There is edema within the lateral aspect of the medial cuneiform which may be degenerative in nature.  Tibiotalar for continued evaluation and management. Seen By:    Francisco Baer DPM    Electronically signed by Francisco Baer DPM on 8/31/2021 at 10:05 PM    This note was created using voice recognition software. The note was reviewed however may contain grammatical errors.

## 2021-09-21 ENCOUNTER — TELEPHONE (OUTPATIENT)
Dept: ORTHOPEDIC SURGERY | Age: 61
End: 2021-09-21

## 2021-09-21 DIAGNOSIS — M65.4 RADIAL STYLOID TENOSYNOVITIS OF RIGHT HAND: ICD-10-CM

## 2021-09-21 DIAGNOSIS — Z96.653 STATUS POST TOTAL BILATERAL KNEE REPLACEMENT: ICD-10-CM

## 2021-09-21 DIAGNOSIS — M93.1 KIENBOECK DISEASE OF ADULTS: ICD-10-CM

## 2021-09-21 RX ORDER — CYCLOBENZAPRINE HCL 10 MG
10 TABLET ORAL 2 TIMES DAILY PRN
Qty: 60 TABLET | Refills: 0 | Status: SHIPPED
Start: 2021-09-21 | End: 2021-10-21 | Stop reason: SDUPTHER

## 2021-09-21 RX ORDER — HYDROCODONE BITARTRATE AND ACETAMINOPHEN 10; 325 MG/1; MG/1
1 TABLET ORAL DAILY
Qty: 30 TABLET | Refills: 0 | Status: SHIPPED
Start: 2021-09-21 | End: 2021-10-21 | Stop reason: SDUPTHER

## 2021-09-21 NOTE — TELEPHONE ENCOUNTER
.  Last appointment 7/13/2021  Next appointment   Future Appointments   Date Time Provider Leanne Montelongo   9/30/2021  8:15 AM Karla Berg., [facility]   10/6/2021  8:45 AM Claudette Crazier, MD [facility]   10[facility]14/2021  8[facility]10 [facility]      Last refill:  08/19/2021  DOS: Monthly RX      Patient called in requesting refill of:    cyclobenzaprine (FLEXERIL) 10 MG tablet       HYDROcodone-acetaminophen (NORCO)  MG per tablet       [id number] [phone] - F [address] [place] [address], [address] [place] [address]

## 2021-10-12 DIAGNOSIS — Z96.653 STATUS POST TOTAL BILATERAL KNEE REPLACEMENT: Primary | ICD-10-CM

## 2021-10-21 ENCOUNTER — TELEPHONE (OUTPATIENT)
Dept: ORTHOPEDIC SURGERY | Age: 61
End: 2021-10-21

## 2021-10-21 DIAGNOSIS — M93.1 KIENBOECK DISEASE OF ADULTS: ICD-10-CM

## 2021-10-21 DIAGNOSIS — Z96.653 STATUS POST TOTAL BILATERAL KNEE REPLACEMENT: ICD-10-CM

## 2021-10-21 DIAGNOSIS — M65.4 RADIAL STYLOID TENOSYNOVITIS OF RIGHT HAND: ICD-10-CM

## 2021-10-21 RX ORDER — CYCLOBENZAPRINE HCL 10 MG
10 TABLET ORAL 2 TIMES DAILY PRN
Qty: 60 TABLET | Refills: 0 | Status: SHIPPED
Start: 2021-10-21 | End: 2021-11-23 | Stop reason: SDUPTHER

## 2021-10-21 RX ORDER — HYDROCODONE BITARTRATE AND ACETAMINOPHEN 10; 325 MG/1; MG/1
1 TABLET ORAL DAILY
Qty: 30 TABLET | Refills: 0 | Status: SHIPPED
Start: 2021-10-21 | End: 2021-11-23 | Stop reason: SDUPTHER

## 2021-10-21 NOTE — TELEPHONE ENCOUNTER
Last appointment Visit 07/13/2021  Next appointment   Future Appointments   Date Time Provider Leanne Jayda   11/3/2021  9:00 AM Blanca Sutherland MD [facility]      Last refill 09/21/2021  DOS: Monthly RX       Patient called in requesting refill of   cyclobenzaprine (FLEXERIL) 10 MG tablet         HYDROcodone-acetaminophen (NORCO)  MG per tablet         [id number] - Lucyann Amass - [address] [address] [address] [place] [id number]

## 2021-11-02 ENCOUNTER — HOSPITAL ENCOUNTER (OUTPATIENT)
Age: 61
Discharge: HOME | End: 2021-11-02
Payer: COMMERCIAL

## 2021-11-02 DIAGNOSIS — T84.50XS PROSTHETIC JOINT INFECTION, SEQUELA: ICD-10-CM

## 2021-11-02 LAB
BASOPHILS ABSOLUTE: 0.05 E9/L (ref 0–0.2)
BASOPHILS RELATIVE PERCENT: 0.7 % (ref 0–2)
EOSINOPHILS ABSOLUTE: 0.09 E9/L (ref 0.05–0.5)
EOSINOPHILS RELATIVE PERCENT: 1.2 % (ref 0–6)
HCT VFR BLD CALC: 43.6 % (ref 37–54)
HEMOGLOBIN: 14.5 G/DL (ref 12.5–16.5)
IMMATURE GRANULOCYTES #: 0.04 E9/L
IMMATURE GRANULOCYTES %: 0.5 % (ref 0–5)
LYMPHOCYTES ABSOLUTE: 1.79 E9/L (ref 1.5–4)
LYMPHOCYTES RELATIVE PERCENT: 23.3 % (ref 20–42)
MCH RBC QN AUTO: 27.3 PG (ref 26–35)
MCHC RBC AUTO-ENTMCNC: 33.3 % (ref 32–34.5)
MCV RBC AUTO: 82.1 FL (ref 80–99.9)
MONOCYTES ABSOLUTE: 0.66 E9/L (ref 0.1–0.95)
MONOCYTES RELATIVE PERCENT: 8.6 % (ref 2–12)
NEUTROPHILS ABSOLUTE: 5.05 E9/L (ref 1.8–7.3)
NEUTROPHILS RELATIVE PERCENT: 65.7 % (ref 43–80)
PDW BLD-RTO: 14.5 FL (ref 11.5–15)
PLATELET # BLD: 238 E9/L (ref 130–450)
PMV BLD AUTO: 10.4 FL (ref 7–12)
RBC # BLD: 5.31 E12/L (ref 3.8–5.8)
WBC # BLD: 7.7 E9/L (ref 4.5–11.5)

## 2021-11-02 PROCEDURE — 86140 C-REACTIVE PROTEIN: CPT

## 2021-11-02 PROCEDURE — 85025 COMPLETE CBC W/AUTO DIFF WBC: CPT

## 2021-11-02 PROCEDURE — 36415 COLL VENOUS BLD VENIPUNCTURE: CPT

## 2021-11-02 PROCEDURE — 85651 RBC SED RATE NONAUTOMATED: CPT

## 2021-11-02 PROCEDURE — 80053 COMPREHEN METABOLIC PANEL: CPT

## 2021-11-03 LAB
ALBUMIN SERPL-MCNC: 4.6 G/DL (ref 3.5–5.2)
ALP BLD-CCNC: 135 U/L (ref 40–129)
ALT SERPL-CCNC: 33 U/L (ref 0–40)
ANION GAP SERPL CALCULATED.3IONS-SCNC: 8 MMOL/L (ref 7–16)
AST SERPL-CCNC: 20 U/L (ref 0–39)
BILIRUB SERPL-MCNC: 0.4 MG/DL (ref 0–1.2)
BUN BLDV-MCNC: 18 MG/DL (ref 6–23)
C-REACTIVE PROTEIN: 0.3 MG/DL (ref 0–0.4)
CALCIUM SERPL-MCNC: 9.4 MG/DL (ref 8.6–10.2)
CHLORIDE BLD-SCNC: 104 MMOL/L (ref 98–107)
CO2: 27 MMOL/L (ref 22–29)
CREAT SERPL-MCNC: 1.1 MG/DL (ref 0.7–1.2)
GFR AFRICAN AMERICAN: >60
GFR NON-AFRICAN AMERICAN: >60 ML/MIN/1.73
GLUCOSE BLD-MCNC: 94 MG/DL (ref 74–99)
POTASSIUM SERPL-SCNC: 4.7 MMOL/L (ref 3.5–5)
SEDIMENTATION RATE, ERYTHROCYTE: 0 MM/HR (ref 0–15)
SODIUM BLD-SCNC: 139 MMOL/L (ref 132–146)
TOTAL PROTEIN: 7.4 G/DL (ref 6.4–8.3)

## 2021-11-23 ENCOUNTER — TELEPHONE (OUTPATIENT)
Dept: ORTHOPEDIC SURGERY | Age: 61
End: 2021-11-23

## 2021-11-23 DIAGNOSIS — Z96.653 STATUS POST TOTAL BILATERAL KNEE REPLACEMENT: ICD-10-CM

## 2021-11-23 DIAGNOSIS — M93.1 KIENBOECK DISEASE OF ADULTS: ICD-10-CM

## 2021-11-23 DIAGNOSIS — M65.4 RADIAL STYLOID TENOSYNOVITIS OF RIGHT HAND: ICD-10-CM

## 2021-11-23 RX ORDER — CYCLOBENZAPRINE HCL 10 MG
10 TABLET ORAL 2 TIMES DAILY PRN
Qty: 60 TABLET | Refills: 0 | Status: SHIPPED
Start: 2021-11-23 | End: 2022-01-06 | Stop reason: SDUPTHER

## 2021-11-23 RX ORDER — HYDROCODONE BITARTRATE AND ACETAMINOPHEN 10; 325 MG/1; MG/1
1 TABLET ORAL DAILY
Qty: 30 TABLET | Refills: 0 | Status: SHIPPED
Start: 2021-11-23 | End: 2022-01-06 | Stop reason: SDUPTHER

## 2021-11-23 NOTE — TELEPHONE ENCOUNTER
.  Last appointment 7/13/2021  Next appointment   Future Appointments   Date Time Provider Leanne Montelongo   12/9/2021  8:10 AM DO Lázaro Barlow [facility]      Last refill: 10/21/2021  DOS: Monthly RX      Patient called in requesting refill of:    HYDROcodone-acetaminophen ([facility])  MG per tablet     cyclobenzaprine (FLEXERIL) 10 MG tablet           [id number] - [place], [place] - [address] [facility] [address], [place]

## 2021-12-07 DIAGNOSIS — Z96.653 STATUS POST TOTAL BILATERAL KNEE REPLACEMENT: Primary | ICD-10-CM

## 2021-12-09 ENCOUNTER — OFFICE VISIT (OUTPATIENT)
Dept: ORTHOPEDIC SURGERY | Age: 61
End: 2021-12-09
Payer: COMMERCIAL

## 2021-12-09 VITALS — HEIGHT: 76 IN | TEMPERATURE: 98 F | WEIGHT: 285 LBS | BODY MASS INDEX: 34.7 KG/M2

## 2021-12-09 DIAGNOSIS — Z96.659 INFECTION OF TOTAL KNEE REPLACEMENT, INITIAL ENCOUNTER (HCC): Primary | ICD-10-CM

## 2021-12-09 DIAGNOSIS — T84.59XA INFECTION OF TOTAL KNEE REPLACEMENT, INITIAL ENCOUNTER (HCC): Primary | ICD-10-CM

## 2021-12-09 PROCEDURE — 3017F COLORECTAL CA SCREEN DOC REV: CPT | Performed by: ORTHOPAEDIC SURGERY

## 2021-12-09 PROCEDURE — G8484 FLU IMMUNIZE NO ADMIN: HCPCS | Performed by: ORTHOPAEDIC SURGERY

## 2021-12-09 PROCEDURE — G8427 DOCREV CUR MEDS BY ELIG CLIN: HCPCS | Performed by: ORTHOPAEDIC SURGERY

## 2021-12-09 PROCEDURE — 1036F TOBACCO NON-USER: CPT | Performed by: ORTHOPAEDIC SURGERY

## 2021-12-09 PROCEDURE — G8417 CALC BMI ABV UP PARAM F/U: HCPCS | Performed by: ORTHOPAEDIC SURGERY

## 2021-12-09 PROCEDURE — 99212 OFFICE O/P EST SF 10 MIN: CPT | Performed by: ORTHOPAEDIC SURGERY

## 2021-12-09 NOTE — PROGRESS NOTES
Chief Complaint   Patient presents with    Knee Pain     Right TKA Revision DOS 03/24/2021       Adeel Flores returns today for follow-up of his right knee revision TKA due to septic joint 03/24/2021. He states he is feeling pretty good with occasional pain. Past Medical History:   Diagnosis Date    Cellulitis     Edema     legs with cellulitis    GERD (gastroesophageal reflux disease)     Hypertension     Osteoarthritis     Tenosynovitis, de Quervain     Venous insufficiency      Past Surgical History:   Procedure Laterality Date    COLONOSCOPY      x2    FOOT SURGERY      JOINT REPLACEMENT      bilateral knee replacement    JOINT REPLACEMENT      right ankle    OTHER SURGICAL HISTORY Right 01-25-13    right wrist 1st dorsal compartment release    REPLACEMENT TOTAL KNEE BILATERAL Bilateral 7/31/2019    KNEE TOTAL ARTHROPLASTY BILATERAL performed by Malgorzata Humphreys DO at [address] [place] [address] Right 3/28/2021    KNEE TOTAL ARTHROPLASTY REVISION performed by Malgorzata Humphreys DO at [facility] ARTHROSCOPY      TONSILLECTOMY      TONSILLECTOMY AND ADENOIDECTOMY      VASCULAR SURGERY      both legs     VASECTOMY         Current Outpatient Medications:     cyclobenzaprine (FLEXERIL) 10 MG tablet, Take 1 tablet by mouth 2 times daily as needed for Muscle spasms, Disp: 60 tablet, Rfl: 0    HYDROcodone-acetaminophen (NORCO)  MG per tablet, Take 1 tablet by mouth daily for 30 days.  Intended supply: 30 days, Disp: 30 tablet, Rfl: 0    ELIQUIS 5 MG TABS tablet, TAKE ONE TABLET (5MG) BY MOUTH EVERY 12 HOURS, Disp: , Rfl:     DILT- MG extended release capsule, , Disp: , Rfl:     magnesium 30 MG tablet, Take 30 mg by mouth 2 times daily, Disp: , Rfl:     potassium chloride (MICRO-K) 10 MEQ extended release capsule, Take 10 mEq by mouth 2 times daily, Disp: , Rfl:     Elastic Bandages & Supports (JOBST KNEE HIGH COMPRESSION ) MISC, Dx: Varicose veins of the legs with pain and swelling. Bilateral thigh-high 30-40 mm Hg compression stockings. , Disp: 2 each, Rfl: 2    SSD 1 % cream, apply externally to affected area once a day, Disp: , Rfl:     diphenhydrAMINE-APAP, sleep, (TYLENOL PM EXTRA STRENGTH)  MG tablet, Take 1 tablet by mouth nightly as needed for Sleep, Disp: , Rfl:     Glucosamine-Chondroit-Vit C-Mn (GLUCOSAMINE 1500 COMPLEX PO), Take 1 tablet by mouth daily, Disp: , Rfl:     Turmeric 500 MG TABS, Take 500 mg by mouth daily, Disp: , Rfl:     omeprazole (PRILOSEC) 20 MG capsule, Take 20 mg by mouth Daily , Disp: , Rfl:     irbesartan (AVAPRO) 300 MG tablet, Take 300 mg by mouth daily. , Disp: , Rfl:     celecoxib (CELEBREX) 200 MG capsule, Take 1 capsule by mouth 2 times daily, Disp: 30 capsule, Rfl: 3    celecoxib (CELEBREX) 200 MG capsule, Take 1 capsule by mouth daily, Disp: 30 capsule, Rfl: 3  No Known Allergies  Social History     Socioeconomic History    Marital status:      Spouse name: Not on file    Number of children: Not on file    Years of education: Not on file    Highest education level: Not on file   Occupational History    Not on file   Tobacco Use    Smoking status: Never Smoker    Smokeless tobacco: Never Used   Vaping Use    Vaping Use: Never used   Substance and Sexual Activity    Alcohol use: Yes     Alcohol/week: 2.0 standard drinks     Types: 2 Glasses of wine per week     Comment: No caffeine    Drug use: No    Sexual activity: Yes     Partners: Female   Other Topics Concern    Not on file   Social History Narrative    Not on file     Social Determinants of Health     Financial Resource Strain:     Difficulty of Paying Living Expenses: Not on file   Food Insecurity:     Worried About Running Out of Food in the Last Year: Not on file    Yecenia of Food in the Last Year: Not on file   Transportation Needs:     Lack of Transportation (Medical):  Not on file    Lack of Transportation (Non-Medical): Not on file   Physical Activity:     Days of Exercise per Week: Not on file    Minutes of Exercise per Session: Not on file   Stress:     Feeling of Stress : Not on file   Social Connections:     Frequency of Communication with Friends and Family: Not on file    Frequency of Social Gatherings with Friends and Family: Not on file    Attends Baptism Services: Not on file    Active Member of Clubs or Organizations: Not on file    Attends Club or Organization Meetings: Not on file    Marital Status: Not on file   Intimate Partner Violence:     Fear of Current or Ex-Partner: Not on file    Emotionally Abused: Not on file    Physically Abused: Not on file    Sexually Abused: Not on file   Housing Stability:     Unable to Pay for Housing in the Last Year: Not on file    Number of [place] in the Last Year: Not on file    Unstable Housing in the Last Year: Not on file     Family History   Problem Relation Age of Onset    Asthma Mother     Cancer Mother         lung    Emphysema Mother     Heart Disease Mother     Heart Disease Father     Alzheimer's Disease Father     Colon Cancer Sister     Prostate Cancer Brother     Diabetes Maternal Grandmother        Review of Systems:     Skin: (-) rash,(-) psoriasis,(-) eczema, (-)skin cancer. Musculoskeletal: (-) fractures,  (-) dislocations,(-) collagen vascular disease, (-) fibromyalgia, (-) multiple sclerosis, (-) muscular dystrophy, (-) RSD,(-) joint pain (-)swelling, (-) joint pain,swelling. Neurologic: (-) epilepsy, (-)seizures,(-) brain tumor,(-) TIA, (-)stroke, (-)headaches, (-)Parkinson disease,(-) memory loss, (-) LOC. Cardiovascular: (-) Chest pain, (-) swelling in legs/feet, (-) SOB, (-) cramping in legs/feet with walking. Constitutional:  The patient is alert and oriented x 3, appears to be stated age and in no distress. Temp 98 °F (36.7 °C)   Ht 6' 4\" (1.93 m)   Wt 285 lb (129.3 kg)   BMI 34.69 kg/m²     Skin:  Upon inspection: the skin appears warm, dry and intact. There is not a previous scar over the affected area. There is not any cellulitis, lymphedema or cutaneous lesions noted in the lower extremities. Upon palpation there is no induration noted. Neurologic:  Gait: normal;  Motor exam of the lower extremities show ; quadriceps, hamstrings, foot dorsi and plantar flexors intact R.  5/5 and L. 5/5. Deep tendon reflexes are 2/4 at the knees and 2/4 at the ankles with strong extensor hallicus longus motor strength bilaterally. Sensory to both feet is intact to all sensory roots. Cardiovascular: The vascular exam is normal and is well perfused to distal extremities. Distal pulses DP/PT: R. 2+; L. 2+. There is cap refill noted less than two seconds in all digits. There is not edema of the bilateral lower extremities. There is not varicosities noted in the distal extremities. Lymph:  Upon palpation,  there is no lymphadenopathy noted in bilateral lower extremities. Musculoskeletal:  Gait: antalgic; examination of the nails and digits reveal no cyanosis or clubbing    Lumbar exam:  On visual inspection, there is no deformity of the spine. full range of motion, no tenderness, palpable spasm or pain on motion. Special tests: Straight Leg Raise negative, Rico testnegative. Hip exam:  Upon inspection, there is no deformity noted. Upon palpation there is not tenderness. ROM: is   full and semetrical.   Strength: Hip Flexors 5/5; Hip Abductors 5/5; Hip Adduction 5/5. Knee exam:  Right knee exam shows;  range of motion of R. Knee is 0 to 115, and L. Knee is 0 to 120. He does have  pain on motion, effusion is none, there is tenderness over the  medial, lateral region, there are not any masses, there is not ligamentous instability, there is not  deformity noted. Knee exam: right positive for moderate crepitations, some mild tenderness laxity is not noted with stress.       R. Knee:  Lachman's negative, Anterior Drawer negative, Posterior Drawer negative  Elijah's negative, Thallasy  negative,   PF grind test negative, Apprehension test negative, Patellar J sign  negative  L. Knee:  Lachman's negative, Anterior Drawer negative, Posterior Drawer positive  Elijah's negative, Thallasy  negative,   PF grind test negative, Apprehension test negative,  Patellar J sign  negative    Xrays:   Anatomically aligned right knee arthroplasty with small joint effusion. MRI:   Not performed today. Radiographic findings reviewed with patient    Impression:  Encounter Diagnosis   Name Primary?  Infection of total knee replacement, initial encounter ([facility] [address] Yes       Plan:   Natural history and expected course discussed. Questions answered. Educational materials distributed. He is following with Dr. Harlan Gutierrez for ID. He continues with antibiotics as directed. I will see him back in 2 years. He will call if he has issues.

## 2022-01-06 ENCOUNTER — OFFICE VISIT (OUTPATIENT)
Dept: ORTHOPEDIC SURGERY | Age: 62
End: 2022-01-06
Payer: COMMERCIAL

## 2022-01-06 VITALS — WEIGHT: 285 LBS | HEIGHT: 76 IN | TEMPERATURE: 98 F | BODY MASS INDEX: 34.7 KG/M2

## 2022-01-06 DIAGNOSIS — Z96.653 STATUS POST TOTAL BILATERAL KNEE REPLACEMENT: ICD-10-CM

## 2022-01-06 DIAGNOSIS — M65.4 RADIAL STYLOID TENOSYNOVITIS OF RIGHT HAND: ICD-10-CM

## 2022-01-06 DIAGNOSIS — M93.1 KIENBOECK DISEASE OF ADULTS: Primary | ICD-10-CM

## 2022-01-06 PROCEDURE — G8417 CALC BMI ABV UP PARAM F/U: HCPCS | Performed by: ORTHOPAEDIC SURGERY

## 2022-01-06 PROCEDURE — G8484 FLU IMMUNIZE NO ADMIN: HCPCS | Performed by: ORTHOPAEDIC SURGERY

## 2022-01-06 PROCEDURE — 3017F COLORECTAL CA SCREEN DOC REV: CPT | Performed by: ORTHOPAEDIC SURGERY

## 2022-01-06 PROCEDURE — G8427 DOCREV CUR MEDS BY ELIG CLIN: HCPCS | Performed by: ORTHOPAEDIC SURGERY

## 2022-01-06 PROCEDURE — 1036F TOBACCO NON-USER: CPT | Performed by: ORTHOPAEDIC SURGERY

## 2022-01-06 PROCEDURE — 99213 OFFICE O/P EST LOW 20 MIN: CPT | Performed by: ORTHOPAEDIC SURGERY

## 2022-01-06 RX ORDER — HYDROCODONE BITARTRATE AND ACETAMINOPHEN 10; 325 MG/1; MG/1
1 TABLET ORAL DAILY
Qty: 30 TABLET | Refills: 0 | Status: SHIPPED
Start: 2022-01-06 | End: 2022-02-17 | Stop reason: SDUPTHER

## 2022-01-06 RX ORDER — CYCLOBENZAPRINE HCL 10 MG
10 TABLET ORAL 2 TIMES DAILY PRN
Qty: 60 TABLET | Refills: 0 | Status: SHIPPED
Start: 2022-01-06 | End: 2022-02-17 | Stop reason: SDUPTHER

## 2022-01-06 NOTE — PROGRESS NOTES
Chief Complaint   Patient presents with    Wrist Pain     Right Wrist, [facility] DOI 06/15/2011, F/U         Sharri Caldwell is a 64y.o. year old male who presents for follow up of right wrist.  He states he has good days and bad. He states some days the pain goes up to his elbow. He is doing well with current medication regimen of celebrex, flexeril and norco.     Past Medical History:   Diagnosis Date    Cellulitis     Edema     legs with cellulitis    GERD (gastroesophageal reflux disease)     Hypertension     Osteoarthritis     Tenosynovitis, de Quervain     Venous insufficiency      Past Surgical History:   Procedure Laterality Date    COLONOSCOPY      x2    FOOT SURGERY      JOINT REPLACEMENT      bilateral knee replacement    JOINT REPLACEMENT      right ankle    OTHER SURGICAL HISTORY Right 01-25-13    right wrist 1st dorsal compartment release    REPLACEMENT TOTAL KNEE BILATERAL Bilateral 7/31/2019    KNEE TOTAL ARTHROPLASTY BILATERAL performed by Lorenzo Johnson DO at [address] [place] [address] Right 3/28/2021    KNEE TOTAL ARTHROPLASTY REVISION performed by Lorenzo Johnson DO at [facility] ARTHROSCOPY      TONSILLECTOMY      TONSILLECTOMY AND ADENOIDECTOMY      VASCULAR SURGERY      both legs     VASECTOMY         Current Outpatient Medications:     cyclobenzaprine (FLEXERIL) 10 MG tablet, Take 1 tablet by mouth 2 times daily as needed for Muscle spasms, Disp: 60 tablet, Rfl: 0    ELIQUIS 5 MG TABS tablet, TAKE ONE TABLET (5MG) BY MOUTH EVERY 12 HOURS, Disp: , Rfl:     DILT- MG extended release capsule, , Disp: , Rfl:     magnesium 30 MG tablet, Take 30 mg by mouth 2 times daily, Disp: , Rfl:     potassium chloride (MICRO-K) 10 MEQ extended release capsule, Take 10 mEq by mouth 2 times daily, Disp: , Rfl:     Elastic Bandages & Supports (JOBST KNEE HIGH COMPRESSION ) MISC, Dx: Varicose veins of the legs with pain and swelling.      Bilateral thigh-high 30-40 mm Hg compression stockings. , Disp: 2 each, Rfl: 2    SSD 1 % cream, apply externally to affected area once a day, Disp: , Rfl:     diphenhydrAMINE-APAP, sleep, (TYLENOL PM EXTRA STRENGTH)  MG tablet, Take 1 tablet by mouth nightly as needed for Sleep, Disp: , Rfl:     Glucosamine-Chondroit-Vit C-Mn (GLUCOSAMINE 1500 COMPLEX PO), Take 1 tablet by mouth daily, Disp: , Rfl:     Turmeric 500 MG TABS, Take 500 mg by mouth daily, Disp: , Rfl:     omeprazole (PRILOSEC) 20 MG capsule, Take 20 mg by mouth Daily , Disp: , Rfl:     irbesartan (AVAPRO) 300 MG tablet, Take 300 mg by mouth daily. , Disp: , Rfl:     celecoxib (CELEBREX) 200 MG capsule, Take 1 capsule by mouth 2 times daily, Disp: 30 capsule, Rfl: 3    celecoxib (CELEBREX) 200 MG capsule, Take 1 capsule by mouth daily, Disp: 30 capsule, Rfl: 3  No Known Allergies  Social History     Socioeconomic History    Marital status:      Spouse name: Not on file    Number of children: Not on file    Years of education: Not on file    Highest education level: Not on file   Occupational History    Not on file   Tobacco Use    Smoking status: Never Smoker    Smokeless tobacco: Never Used   Vaping Use    Vaping Use: Never used   Substance and Sexual Activity    Alcohol use: Yes     Alcohol/week: 2.0 standard drinks     Types: 2 Glasses of wine per week     Comment: No caffeine    Drug use: No    Sexual activity: Yes     Partners: Female   Other Topics Concern    Not on file   Social History Narrative    Not on file     Social Determinants of Health     Financial Resource Strain:     Difficulty of Paying Living Expenses: Not on file   Food Insecurity:     Worried About Running Out of Food in the Last Year: Not on file    Yecenia of Food in the Last Year: Not on file   Transportation Needs:     Lack of Transportation (Medical): Not on file    Lack of Transportation (Non-Medical):  Not on file   Physical Activity:     Days of Exercise per Week: Not on file    Minutes of Exercise per Session: Not on file   Stress:     Feeling of Stress : Not on file   Social Connections:     Frequency of Communication with Friends and Family: Not on file    Frequency of Social Gatherings with Friends and Family: Not on file    Attends Anabaptist Services: Not on file    Active Member of [address] [place] [address] [employer] or Organizations: Not on file    Attends Club or Organization Meetings: Not on file    Marital Status: Not on file   Intimate Partner Violence:     Fear of Current or Ex-Partner: Not on file    Emotionally Abused: Not on file    Physically Abused: Not on file    Sexually Abused: Not on file   Housing Stability:     Unable to Pay for Housing in the Last Year: Not on file    Number of [place] in the Last Year: Not on file    Unstable Housing in the Last Year: Not on file     Family History   Problem Relation Age of Onset    Asthma Mother     Cancer Mother         lung    Emphysema Mother     Heart Disease Mother     Heart Disease Father     Alzheimer's Disease Father     Colon Cancer Sister     Prostate Cancer Brother     Diabetes Maternal Grandmother        REVIEW OF SYSTEMS:    General/Constitution:  (-)weight loss, (-)fever, (-)chills, (-)weakness. Skin: (-) rash,(-) psoriasis,(-) eczema, (-)skin cancer. Musculoskeletal: (-) fractures,  (-) dislocations,(-) collagen vascular disease, (-) fibromyalgia, (-) multiple sclerosis, (-) muscular dystrophy, (-) RSD,(-) joint pain (-)swelling, (-) joint pain,swelling. Neurologic: (-) epilepsy, (-)seizures,(-) brain tumor,(-) TIA, (-)stroke, (-)headaches, (-)Parkinson disease,(-) memory loss, (-) LOC. Cardiovascular: (-) Chest pain, (-) swelling in legs/feet, (-) SOB, (-) cramping in legs/feet with walking. Respiratory: (-) SOB, (-) Coughing, (-) night sweats. GI: (-) nausea, (-) vomiting, (-) diarrhea, (-) blood in stool, (-) gastric ulcer.   Psychiatric: (-) Depression, (-) Anxiety, (-) bipolar disease, (-) Alzheimer's Disease  Allergic/Immunologic: (-) allergies latex, (-) allergies metal, (-) skin sensitivity. Hematlogic: (-) anemia, (-) blood transfusion, (-) DVT/PE, (-) Clotting disorders    SUBJECTIVE:      Constitutional:    The patient is alert and oriented x 3, appears to be stated age and in no distress. Ht. 6 ft 4 in., Wt. 285 lbs. Skin:    Upon inspection: the skin appears warm, dry and intact. There is not a previous scar over the affected area. There is not any cellulitis, lymphedema or cutaneous lesions noted in the lower extremities. Upon palpation there is no induration noted. Neurologic:    Gait: antalgic; Motor exam of the upper extremities show: The reflexes in biceps/triceps/brachioradialis are equal and symmetric. Sensory exam C5-T1 are normal bilaterally. Cardiovascular: The vascular exam is normal and is well perfused to distal extremities. There are 2+ radial pulses bilaterally, and motor and sensation is intact to median, ulnar, and radial, musclocutaneus, and axillary nerve distribution and grossly symmetric bilaterally. There is cap refill noted less than two seconds in all digits. There is not edema of the bilateral upper extremities. There is  varicosities noted in the distal extremities. Lymph:    Upon palpation,  there is no lymphadenopathy noted in bilateral upper extremities. Musculoskeletal:  Gait: antalgic; examination of the nails and digits reveal no cyanosis or clubbing. Cervical Exam:    On physical exam, Eden Sampson is well-developed, well-nourished, oriented to person, place and time. his gait is normal.  On evaluation of his cervical spine, he has full range of motion of the cervical spine without pain. There is no cervical tenderness to palpation. Shoulder Exam:    On evaluation of his bilaterally upper extremities, his bilateral shoulder has no deformity. There is not evidence of scapular dyskinesis. There is not muscle atrophy in shoulder girdle. The range of motion for the Right Shoulder is 160/45/T8 and for the Left shoulder is 160/45/T8. Right shoulder Motor strength is 5/5 in the supraspinatus, 5/5 internal rotation and 5/5 in external rotation, and Left shoulder motor strength 5/5 in supraspinatus, 5/5 in internal rotation, 5/5 in external rotation. Elbow exam:    Evaluation of the elbow, reveals no signs of swelling or deformity. ROM is 0-150. There is not instability with varus/valgus stresses. Motor strength is 5/5 with flexion/extension. Wrist exam:       Inspection of the bilateral upper extremities, there is evidence of deformity of the wrist.  ROM Wrist ROM R wrist DF 30, VF 10, L wrist DF 40, VF 30, R pronation 70/ supination 70, L pronation 90/supination 90. Motor strength is 3/5 with Dorsiflexion/Volarflexion/Supination/Pronation. Motor and sensation is intact and symmetric throughout the bilateral upper extremities in the median, ulnar and radial , musclcutaneous, and axillary nerve distributions. He has severe pain over TFCC and has a positive piano key sign. He also has pain with ulnar and radial deviation. Hand exam:    The skin overlying the hand is not intact. There is not evidence of scar, lesion, laceration, or abrasion. The motion in the small joints of the hand are intact with no stiffness or deformity. The ROM in the MCP flexion WNL/ extension WNL , PIP flexion WNL/ extension WNL, DIP flexion WNL/ extension WNL. There is not rotational deformity. There is no masses or adenopathy in bilateral upper extremities. Radial pulses are 2+ and symmetric bilaterally. Capillary refill is intact and < 2 seconds. Motor strength is 5/5 with flexion and extension of the small finger joints. Phallens sign(-), Tinnells sign (-), Median nerve compression test (-),  Finklesteins (-), CMC Grind test (-), Piano Key Test(+).        Xrays:   Not performed today. Radiographic findings reviewed with patient    Impression:   Encounter Diagnoses   Name Primary?  Kienboeck disease of adults Yes    Radial styloid tenosynovitis of right hand        Plan:   I discussed the treatment options with the patient. I will refill his medications today and put in a C9 for a cortisone injection for his radial styloid tenosynovitis. Once the injection is approved I will see him back.

## 2022-01-20 ENCOUNTER — OFFICE VISIT (OUTPATIENT)
Dept: ORTHOPEDIC SURGERY | Age: 62
End: 2022-01-20
Payer: COMMERCIAL

## 2022-01-20 VITALS — TEMPERATURE: 98 F | BODY MASS INDEX: 34.7 KG/M2 | HEIGHT: 76 IN | WEIGHT: 285 LBS

## 2022-01-20 DIAGNOSIS — M65.4 RADIAL STYLOID TENOSYNOVITIS OF RIGHT HAND: Primary | ICD-10-CM

## 2022-01-20 PROCEDURE — 20550 NJX 1 TENDON SHEATH/LIGAMENT: CPT | Performed by: NURSE PRACTITIONER

## 2022-01-20 NOTE — PROGRESS NOTES
Chief Complaint   Patient presents with    Wrist Pain     right wrist radial styloid tenosynovitis injection     Verbal and written consent for the injection was given by the patient. The patient was placed in a supine position and the right wrist was prepped with alcohol. He was injected with .5 mL of 0.25% Marcaine and .25 mL of Kenalog into the wrist.  The patient tolerated the injection well. Diagnosis Orders   1.  Radial styloid tenosynovitis of right hand  TX INJECT TENDON SHEATH/LIGAMENT

## 2022-02-17 ENCOUNTER — OFFICE VISIT (OUTPATIENT)
Dept: ORTHOPEDIC SURGERY | Age: 62
End: 2022-02-17
Payer: COMMERCIAL

## 2022-02-17 VITALS — BODY MASS INDEX: 34.7 KG/M2 | TEMPERATURE: 98 F | HEIGHT: 76 IN | WEIGHT: 285 LBS

## 2022-02-17 DIAGNOSIS — M65.4 RADIAL STYLOID TENOSYNOVITIS OF RIGHT HAND: ICD-10-CM

## 2022-02-17 DIAGNOSIS — M93.1 KIENBOECK DISEASE OF ADULTS: ICD-10-CM

## 2022-02-17 PROCEDURE — 99213 OFFICE O/P EST LOW 20 MIN: CPT | Performed by: ORTHOPAEDIC SURGERY

## 2022-02-17 RX ORDER — CYCLOBENZAPRINE HCL 10 MG
10 TABLET ORAL 2 TIMES DAILY PRN
Qty: 60 TABLET | Refills: 0 | Status: SHIPPED
Start: 2022-02-17 | End: 2022-03-28 | Stop reason: SDUPTHER

## 2022-02-17 RX ORDER — HYDROCODONE BITARTRATE AND ACETAMINOPHEN 10; 325 MG/1; MG/1
1 TABLET ORAL DAILY
Qty: 30 TABLET | Refills: 0 | Status: SHIPPED
Start: 2022-02-17 | End: 2022-03-28 | Stop reason: SDUPTHER

## 2022-02-17 NOTE — PROGRESS NOTES
Chief Complaint   Patient presents with    Wrist Pain     WC Right wrist radial styloid injection follow up. Pain in wrist remains the same. No improvement at all. Janice Johnson is a 64y.o. year old male who presents for follow up of right wrist CSI done on 1/20/22. Patient states no relief. Past Medical History:   Diagnosis Date    Cellulitis     Edema     legs with cellulitis    GERD (gastroesophageal reflux disease)     Hypertension     Osteoarthritis     Tenosynovitis, de Quervain     Venous insufficiency      Past Surgical History:   Procedure Laterality Date    COLONOSCOPY      x2    FOOT SURGERY      JOINT REPLACEMENT      bilateral knee replacement    JOINT REPLACEMENT      right ankle    OTHER SURGICAL HISTORY Right 01-25-13    right wrist 1st dorsal compartment release    REPLACEMENT TOTAL KNEE BILATERAL Bilateral 7/31/2019    KNEE TOTAL ARTHROPLASTY BILATERAL performed by José Miguel Puentes DO at [address] Right 3/28/2021    KNEE TOTAL ARTHROPLASTY REVISION performed by José Miguel Puentes DO at [facility] ARTHROSCOPY      TONSILLECTOMY      TONSILLECTOMY AND ADENOIDECTOMY      VASCULAR SURGERY      both legs     VASECTOMY         Current Outpatient Medications:     cyclobenzaprine (FLEXERIL) 10 MG tablet, Take 1 tablet by mouth 2 times daily as needed for Muscle spasms, Disp: 60 tablet, Rfl: 0    HYDROcodone-acetaminophen (NORCO)  MG per tablet, Take 1 tablet by mouth daily for 30 days.  Intended supply: 30 days, Disp: 30 tablet, Rfl: 0    celecoxib (CELEBREX) 200 MG capsule, Take 1 capsule by mouth 2 times daily, Disp: 30 capsule, Rfl: 3    ELIQUIS 5 MG TABS tablet, TAKE ONE TABLET (5MG) BY MOUTH EVERY 12 HOURS, Disp: , Rfl:     DILT- MG extended release capsule, , Disp: , Rfl:     magnesium 30 MG tablet, Take 30 mg by mouth 2 times daily, Disp: , Rfl:     potassium chloride (MICRO-K) 10 MEQ extended release capsule, Take 10 mEq by mouth 2 times daily, Disp: , Rfl:     Elastic Bandages & Supports (JOBST KNEE HIGH COMPRESSION SM) MISC, Dx: Varicose veins of the legs with pain and swelling. Bilateral thigh-high 30-40 mm Hg compression stockings. , Disp: 2 each, Rfl: 2    SSD 1 % cream, apply externally to affected area once a day, Disp: , Rfl:     celecoxib (CELEBREX) 200 MG capsule, Take 1 capsule by mouth daily, Disp: 30 capsule, Rfl: 3    diphenhydrAMINE-APAP, sleep, (TYLENOL PM EXTRA STRENGTH)  MG tablet, Take 1 tablet by mouth nightly as needed for Sleep, Disp: , Rfl:     Glucosamine-Chondroit-Vit C-Mn (GLUCOSAMINE 1500 COMPLEX PO), Take 1 tablet by mouth daily, Disp: , Rfl:     Turmeric 500 MG TABS, Take 500 mg by mouth daily, Disp: , Rfl:     omeprazole (PRILOSEC) 20 MG capsule, Take 20 mg by mouth Daily , Disp: , Rfl:     irbesartan (AVAPRO) 300 MG tablet, Take 300 mg by mouth daily. , Disp: , Rfl:   No Known Allergies  Social History     Socioeconomic History    Marital status:      Spouse name: Not on file    Number of children: Not on file    Years of education: Not on file    Highest education level: Not on file   Occupational History    Not on file   Tobacco Use    Smoking status: Never Smoker    Smokeless tobacco: Never Used   Vaping Use    Vaping Use: Never used   Substance and Sexual Activity    Alcohol use: Yes     Alcohol/week: 2.0 standard drinks     Types: 2 Glasses of wine per week     Comment: No caffeine    Drug use: No    Sexual activity: Yes     Partners: Female   Other Topics Concern    Not on file   Social History Narrative    Not on file     Social Determinants of Health     Financial Resource Strain:     Difficulty of Paying Living Expenses: Not on file   Food Insecurity:     Worried About Running Out of Food in the Last Year: Not on file    [place]ecenia of Food in the Last Year: Not on file   Transportation Needs:     Lack of Transportation (Medical):  Not on file    Lack of Transportation (Non-Medical): Not on file   Physical Activity:     Days of Exercise per Week: Not on file    Minutes of Exercise per Session: Not on file   Stress:     Feeling of Stress : Not on file   Social Connections:     Frequency of Communication with Friends and Family: Not on file    Frequency of Social Gatherings with Friends and Family: Not on file    Attends Mormon Services: Not on file    Active Member of [address] [place] [address] [employer] or Organizations: Not on file    Attends Club or Organization Meetings: Not on file    Marital Status: Not on file   Intimate Partner Violence:     Fear of Current or Ex-Partner: Not on file    Emotionally Abused: Not on file    Physically Abused: Not on file    Sexually Abused: Not on file   Housing Stability:     Unable to Pay for Housing in the Last Year: Not on file    Number of [place] in the Last Year: Not on file    Unstable Housing in the Last Year: Not on file     Family History   Problem Relation Age of Onset    Asthma Mother     Cancer Mother         lung    Emphysema Mother     Heart Disease Mother     Heart Disease Father     Alzheimer's Disease Father     Colon Cancer Sister     Prostate Cancer Brother     Diabetes Maternal Grandmother        REVIEW OF SYSTEMS:    General/Constitution:  (-)weight loss, (-)fever, (-)chills, (-)weakness. Skin: (-) rash,(-) psoriasis,(-) eczema, (-)skin cancer. Musculoskeletal: (-) fractures,  (-) dislocations,(-) collagen vascular disease, (-) fibromyalgia, (-) multiple sclerosis, (-) muscular dystrophy, (-) RSD,(-) joint pain (-)swelling, (-) joint pain,swelling. Neurologic: (-) epilepsy, (-)seizures,(-) brain tumor,(-) TIA, (-)stroke, (-)headaches, (-)Parkinson disease,(-) memory loss, (-) LOC. Cardiovascular: (-) Chest pain, (-) swelling in legs/feet, (-) SOB, (-) cramping in legs/feet with walking. Respiratory: (-) SOB, (-) Coughing, (-) night sweats.   GI: (-) nausea, (-) vomiting, (-) diarrhea, (-) blood in stool, (-) gastric ulcer. Psychiatric: (-) Depression, (-) Anxiety, (-) bipolar disease, (-) Alzheimer's Disease  Allergic/Immunologic: (-) allergies latex, (-) allergies metal, (-) skin sensitivity. Hematlogic: (-) anemia, (-) blood transfusion, (-) DVT/PE, (-) Clotting disorders    SUBJECTIVE:      Constitutional:    The patient is alert and oriented x 3, appears to be stated age and in no distress. Ht. 6 ft 4 in., Wt. 285 lbs. Skin:    Upon inspection: the skin appears warm, dry and intact. There is not a previous scar over the affected area. There is not any cellulitis, lymphedema or cutaneous lesions noted in the lower extremities. Upon palpation there is no induration noted. Neurologic:    Gait: antalgic; Motor exam of the upper extremities show: The reflexes in biceps/triceps/brachioradialis are equal and symmetric. Sensory exam C5-T1 are normal bilaterally. Cardiovascular: The vascular exam is normal and is well perfused to distal extremities. There are 2+ radial pulses bilaterally, and motor and sensation is intact to median, ulnar, and radial, musclocutaneus, and axillary nerve distribution and grossly symmetric bilaterally. There is cap refill noted less than two seconds in all digits. There is not edema of the bilateral upper extremities. There is  varicosities noted in the distal extremities. Lymph:    Upon palpation,  there is no lymphadenopathy noted in bilateral upper extremities. Musculoskeletal:  Gait: antalgic; examination of the nails and digits reveal no cyanosis or clubbing. Cervical Exam:    On physical exam, Krystyna Dobbins is well-developed, well-nourished, oriented to person, place and time. his gait is normal.  On evaluation of his cervical spine, he has full range of motion of the cervical spine without pain. There is no cervical tenderness to palpation.      Shoulder Exam:    On evaluation of his bilaterally upper extremities, his bilateral shoulder has no deformity. There is not evidence of scapular dyskinesis. There is not muscle atrophy in shoulder girdle. The range of motion for the Right Shoulder is 160/45/T8 and for the Left shoulder is 160/45/T8. Right shoulder Motor strength is 5/5 in the supraspinatus, 5/5 internal rotation and 5/5 in external rotation, and Left shoulder motor strength 5/5 in supraspinatus, 5/5 in internal rotation, 5/5 in external rotation. Elbow exam:    Evaluation of the elbow, reveals no signs of swelling or deformity. ROM is 0-150. There is not instability with varus/valgus stresses. Motor strength is 5/5 with flexion/extension. Wrist exam:       Inspection of the bilateral upper extremities, there is evidence of deformity of the wrist.  ROM Wrist ROM R wrist DF 30, VF 10, L wrist DF 40, VF 30, R pronation 70/ supination 70, L pronation 90/supination 90. Motor strength is 3/5 with Dorsiflexion/Volarflexion/Supination/Pronation. Motor and sensation is intact and symmetric throughout the bilateral upper extremities in the median, ulnar and radial , musclcutaneous, and axillary nerve distributions. He has severe pain over TFCC and has a positive piano key sign. He also has pain with ulnar and radial deviation. Hand exam:    The skin overlying the hand is not intact. There is not evidence of scar, lesion, laceration, or abrasion. The motion in the small joints of the hand are intact with no stiffness or deformity. The ROM in the MCP flexion WNL/ extension WNL , PIP flexion WNL/ extension WNL, DIP flexion WNL/ extension WNL. There is not rotational deformity. There is no masses or adenopathy in bilateral upper extremities. Radial pulses are 2+ and symmetric bilaterally. Capillary refill is intact and < 2 seconds. Motor strength is 5/5 with flexion and extension of the small finger joints.      Phallens sign(-), Tinnells sign (-), Median nerve compression test (-), Richardsonestbayrons (-), CMC Grind test (-), EchoStar Test(+). Xrays:   Not performed today. Radiographic findings reviewed with patient    Impression:   Encounter Diagnoses   Name Primary?     Status post total bilateral knee replacement     Kienboeck disease of adults     Radial styloid tenosynovitis of right hand        Plan:   Rice therapy  Refill meds   f/u 3 month

## 2022-03-28 ENCOUNTER — TELEPHONE (OUTPATIENT)
Dept: ORTHOPEDIC SURGERY | Age: 62
End: 2022-03-28

## 2022-03-28 DIAGNOSIS — M93.1 KIENBOECK DISEASE OF ADULTS: ICD-10-CM

## 2022-03-28 DIAGNOSIS — M65.4 RADIAL STYLOID TENOSYNOVITIS OF RIGHT HAND: ICD-10-CM

## 2022-03-28 RX ORDER — CYCLOBENZAPRINE HCL 10 MG
10 TABLET ORAL 2 TIMES DAILY PRN
Qty: 60 TABLET | Refills: 0 | Status: SHIPPED
Start: 2022-03-28 | End: 2022-05-20 | Stop reason: SDUPTHER

## 2022-03-28 RX ORDER — HYDROCODONE BITARTRATE AND ACETAMINOPHEN 10; 325 MG/1; MG/1
1 TABLET ORAL DAILY
Qty: 30 TABLET | Refills: 0 | Status: SHIPPED
Start: 2022-03-28 | End: 2022-05-20 | Stop reason: SDUPTHER

## 2022-04-07 ENCOUNTER — OFFICE VISIT (OUTPATIENT)
Dept: ORTHOPEDIC SURGERY | Age: 62
End: 2022-04-07
Payer: COMMERCIAL

## 2022-04-07 VITALS — WEIGHT: 285 LBS | BODY MASS INDEX: 34.7 KG/M2 | HEIGHT: 76 IN | TEMPERATURE: 98 F

## 2022-04-07 DIAGNOSIS — M65.4 RADIAL STYLOID TENOSYNOVITIS OF RIGHT HAND: ICD-10-CM

## 2022-04-07 DIAGNOSIS — M93.1 KIENBOECK DISEASE OF ADULTS: Primary | ICD-10-CM

## 2022-04-07 PROCEDURE — 99212 OFFICE O/P EST SF 10 MIN: CPT | Performed by: ORTHOPAEDIC SURGERY

## 2022-04-07 NOTE — PROGRESS NOTES
Chief Complaint   Patient presents with    Wrist Pain     Right Wrist BWC F/U, no improvements, pain remains the same. Collette Handler is a 64y.o. year old male who presents for follow up of right wrist.  He states he has good days and bad. He is doing well with current medication regimen of flexeril and norco.     Past Medical History:   Diagnosis Date    Cellulitis     Edema     legs with cellulitis    GERD (gastroesophageal reflux disease)     Hypertension     Osteoarthritis     Tenosynovitis, de Quervain     Venous insufficiency      Past Surgical History:   Procedure Laterality Date    COLONOSCOPY      x2    FOOT SURGERY      JOINT REPLACEMENT      bilateral knee replacement    JOINT REPLACEMENT      right ankle    OTHER SURGICAL HISTORY Right 01-25-13    right wrist 1st dorsal compartment release    REPLACEMENT TOTAL KNEE BILATERAL Bilateral 7/31/2019    KNEE TOTAL ARTHROPLASTY BILATERAL performed by Vidhi Dong DO at [address] [place] [address] Right 3/28/2021    KNEE TOTAL ARTHROPLASTY REVISION performed by Vidhi Dong DO at [facility] ARTHROSCOPY      TONSILLECTOMY      TONSILLECTOMY AND ADENOIDECTOMY      VASCULAR SURGERY      both legs     VASECTOMY         Current Outpatient Medications:     cyclobenzaprine (FLEXERIL) 10 MG tablet, Take 1 tablet by mouth 2 times daily as needed for Muscle spasms, Disp: 60 tablet, Rfl: 0    HYDROcodone-acetaminophen (NORCO)  MG per tablet, Take 1 tablet by mouth daily for 30 days.  Intended supply: 30 days, Disp: 30 tablet, Rfl: 0    cefdinir (OMNICEF) 300 MG capsule, Take 1 capsule by mouth 2 times daily, Disp: 60 capsule, Rfl: 3    ELIQUIS 5 MG TABS tablet, TAKE ONE TABLET (5MG) BY MOUTH EVERY 12 HOURS, Disp: , Rfl:     DILT- MG extended release capsule, , Disp: , Rfl:     magnesium 30 MG tablet, Take 30 mg by mouth 2 times daily, Disp: , Rfl:     potassium chloride (MICRO-K) 10 MEQ extended release capsule, Take 10 mEq by mouth 2 times daily, Disp: , Rfl:     Elastic Bandages & Supports (JOBST KNEE HIGH COMPRESSION SM) MISC, Dx: Varicose veins of the legs with pain and swelling. Bilateral thigh-high 30-40 mm Hg compression stockings. , Disp: 2 each, Rfl: 2    SSD 1 % cream, apply externally to affected area once a day, Disp: , Rfl:     diphenhydrAMINE-APAP, sleep, (TYLENOL PM EXTRA STRENGTH)  MG tablet, Take 1 tablet by mouth nightly as needed for Sleep, Disp: , Rfl:     Glucosamine-Chondroit-Vit C-Mn (GLUCOSAMINE 1500 COMPLEX PO), Take 1 tablet by mouth daily, Disp: , Rfl:     Turmeric 500 MG TABS, Take 500 mg by mouth daily, Disp: , Rfl:     omeprazole (PRILOSEC) 20 MG capsule, Take 20 mg by mouth Daily , Disp: , Rfl:     irbesartan (AVAPRO) 300 MG tablet, Take 300 mg by mouth daily. , Disp: , Rfl:     celecoxib (CELEBREX) 200 MG capsule, Take 1 capsule by mouth 2 times daily, Disp: 30 capsule, Rfl: 3    celecoxib (CELEBREX) 200 MG capsule, Take 1 capsule by mouth daily, Disp: 30 capsule, Rfl: 3  No Known Allergies  Social History     Socioeconomic History    Marital status:      Spouse name: Not on file    Number of children: Not on file    Years of education: Not on file    Highest education level: Not on file   Occupational History    Not on file   Tobacco Use    Smoking status: Never Smoker    Smokeless tobacco: Never Used   Vaping Use    Vaping Use: Never used   Substance and Sexual Activity    Alcohol use:  Yes     Alcohol/week: 2.0 standard drinks     Types: 2 Glasses of wine per week     Comment: No caffeine    Drug use: No    Sexual activity: Yes     Partners: Female   Other Topics Concern    Not on file   Social History Narrative    Not on file     Social Determinants of Health     Financial Resource Strain:     Difficulty of Paying Living Expenses: Not on file   Food Insecurity:     Worried About Running Out of Food in the Last Year: Not on file    [address] in the Last Year: Not on file   Transportation Needs:     Lack of Transportation (Medical): Not on file    Lack of Transportation (Non-Medical): Not on file   Physical Activity:     Days of Exercise per Week: Not on file    Minutes of Exercise per Session: Not on file   Stress:     Feeling of Stress : Not on file   Social Connections:     Frequency of Communication with Friends and Family: Not on file    Frequency of Social Gatherings with Friends and Family: Not on file    Attends [employer] Services: Not on file    Active Member of [address] [place] [address] [employer] or Organizations: Not on file    Attends Club or Organization Meetings: Not on file    Marital Status: Not on file   Intimate Partner Violence:     Fear of Current or Ex-Partner: Not on file    Emotionally Abused: Not on file    Physically Abused: Not on file    Sexually Abused: Not on file   Housing Stability:     Unable to Pay for Housing in the Last Year: Not on file    Number of [place] in the Last Year: Not on file    Unstable Housing in the Last Year: Not on file     Family History   Problem Relation Age of Onset    Asthma Mother     Cancer Mother         lung    Emphysema Mother     Heart Disease Mother     Heart Disease Father     Alzheimer's Disease Father     Colon Cancer Sister     Prostate Cancer Brother     Diabetes Maternal Grandmother        REVIEW OF SYSTEMS:    General/Constitution:  (-)weight loss, (-)fever, (-)chills, (-)weakness. Skin: (-) rash,(-) psoriasis,(-) eczema, (-)skin cancer. Musculoskeletal: (-) fractures,  (-) dislocations,(-) collagen vascular disease, (-) fibromyalgia, (-) multiple sclerosis, (-) muscular dystrophy, (-) RSD,(-) joint pain (-)swelling, (-) joint pain,swelling. Neurologic: (-) epilepsy, (-)seizures,(-) brain tumor,(-) TIA, (-)stroke, (-)headaches, (-)Parkinson disease,(-) memory loss, (-) LOC.   Cardiovascular: (-) Chest pain, (-) swelling in legs/feet, (-) SOB, (-) cramping in legs/feet with walking. Respiratory: (-) SOB, (-) Coughing, (-) night sweats. GI: (-) nausea, (-) vomiting, (-) diarrhea, (-) blood in stool, (-) gastric ulcer. Psychiatric: (-) Depression, (-) Anxiety, (-) bipolar disease, (-) Alzheimer's Disease  Allergic/Immunologic: (-) allergies latex, (-) allergies metal, (-) skin sensitivity. Hematlogic: (-) anemia, (-) blood transfusion, (-) DVT/PE, (-) Clotting disorders    SUBJECTIVE:      Constitutional:    The patient is alert and oriented x 3, appears to be stated age and in no distress. Ht. 6 ft 4 in., Wt. 285 lbs. Skin:    Upon inspection: the skin appears warm, dry and intact. There is not a previous scar over the affected area. There is not any cellulitis, lymphedema or cutaneous lesions noted in the lower extremities. Upon palpation there is no induration noted. Neurologic:    Gait: antalgic; Motor exam of the upper extremities show: The reflexes in biceps/triceps/brachioradialis are equal and symmetric. Sensory exam C5-T1 are normal bilaterally. Cardiovascular: The vascular exam is normal and is well perfused to distal extremities. There are 2+ radial pulses bilaterally, and motor and sensation is intact to median, ulnar, and radial, musclocutaneus, and axillary nerve distribution and grossly symmetric bilaterally. There is cap refill noted less than two seconds in all digits. There is not edema of the bilateral upper extremities. There is  varicosities noted in the distal extremities. Lymph:    Upon palpation,  there is no lymphadenopathy noted in bilateral upper extremities. Musculoskeletal:  Gait: antalgic; examination of the nails and digits reveal no cyanosis or clubbing. Cervical Exam:    On physical exam, Sergio Galarza is well-developed, well-nourished, oriented to person, place and time.   his gait is normal.  On evaluation of his cervical spine, he has full range of motion of the cervical spine without pain. There is no cervical tenderness to palpation. Shoulder Exam:    On evaluation of his bilaterally upper extremities, his bilateral shoulder has no deformity. There is not evidence of scapular dyskinesis. There is not muscle atrophy in shoulder girdle. The range of motion for the Right Shoulder is 160/45/T8 and for the Left shoulder is 160/45/T8. Right shoulder Motor strength is 5/5 in the supraspinatus, 5/5 internal rotation and 5/5 in external rotation, and Left shoulder motor strength 5/5 in supraspinatus, 5/5 in internal rotation, 5/5 in external rotation. Elbow exam:    Evaluation of the elbow, reveals no signs of swelling or deformity. ROM is 0-150. There is not instability with varus/valgus stresses. Motor strength is 5/5 with flexion/extension. Wrist exam:       Inspection of the bilateral upper extremities, there is evidence of deformity of the wrist.  ROM Wrist ROM R wrist DF 30, VF 10, L wrist DF 40, VF 30, R pronation 70/ supination 70, L pronation 90/supination 90. Motor strength is 3/5 with Dorsiflexion/Volarflexion/Supination/Pronation. Motor and sensation is intact and symmetric throughout the bilateral upper extremities in the median, ulnar and radial , musclcutaneous, and axillary nerve distributions. He has severe pain over TFCC and has a positive piano key sign. He also has pain with ulnar and radial deviation. Hand exam:    The skin overlying the hand is not intact. There is not evidence of scar, lesion, laceration, or abrasion. The motion in the small joints of the hand are intact with no stiffness or deformity. The ROM in the MCP flexion WNL/ extension WNL , PIP flexion WNL/ extension WNL, DIP flexion WNL/ extension WNL. There is not rotational deformity. There is no masses or adenopathy in bilateral upper extremities. Radial pulses are 2+ and symmetric bilaterally. Capillary refill is intact and < 2 seconds.   Motor strength is 5/5 with flexion and extension of the small finger joints. Phallens sign(-), Tinnells sign (-), Median nerve compression test (-),  Finklesteins (-), CMC Grind test (-), Piano Key Test(+). Xrays:   Not performed today. Radiographic findings reviewed with patient    Impression:   Encounter Diagnoses   Name Primary?  Kienboeck disease of adults Yes    Radial styloid tenosynovitis of right hand        Plan:   Continue with activities as tolerated. I will see him back in 3 months.

## 2022-05-20 ENCOUNTER — TELEPHONE (OUTPATIENT)
Dept: ORTHOPEDIC SURGERY | Age: 62
End: 2022-05-20

## 2022-05-20 DIAGNOSIS — M93.1 KIENBOECK DISEASE OF ADULTS: ICD-10-CM

## 2022-05-20 DIAGNOSIS — M65.4 RADIAL STYLOID TENOSYNOVITIS OF RIGHT HAND: ICD-10-CM

## 2022-05-20 RX ORDER — HYDROCODONE BITARTRATE AND ACETAMINOPHEN 10; 325 MG/1; MG/1
1 TABLET ORAL DAILY
Qty: 30 TABLET | Refills: 0 | Status: SHIPPED
Start: 2022-05-20 | End: 2022-06-28 | Stop reason: SDUPTHER

## 2022-05-20 RX ORDER — CYCLOBENZAPRINE HCL 10 MG
10 TABLET ORAL 2 TIMES DAILY PRN
Qty: 60 TABLET | Refills: 0 | Status: SHIPPED
Start: 2022-05-20 | End: 2022-06-28 | Stop reason: SDUPTHER

## 2022-05-20 NOTE — TELEPHONE ENCOUNTER
.  Last appointment 4/7/2022  Next appointment   Future Appointments   Date Time Provider Leanne Montelongo   7/7/2022  8:00 AM DO Abdifatah Palmer [facility]      Last refill:  03/28/2022  DOS: Monthly RX, last seen 04/07/2022      Patient called in requesting refill of:    cyclobenzaprine (FLEXERIL) 10 MG tablet       HYDROcodone-acetaminophen (NORCO)  MG per tablet         [id number] - [place], [place] - [address] [place] [address] [address] [place] [address]

## 2022-06-28 ENCOUNTER — TELEPHONE (OUTPATIENT)
Dept: ORTHOPEDIC SURGERY | Age: 62
End: 2022-06-28

## 2022-06-28 DIAGNOSIS — M93.1 KIENBOECK DISEASE OF ADULTS: ICD-10-CM

## 2022-06-28 DIAGNOSIS — M65.4 RADIAL STYLOID TENOSYNOVITIS OF RIGHT HAND: ICD-10-CM

## 2022-06-28 RX ORDER — CYCLOBENZAPRINE HCL 10 MG
10 TABLET ORAL 2 TIMES DAILY PRN
Qty: 60 TABLET | Refills: 0 | Status: SHIPPED
Start: 2022-06-28 | End: 2022-08-26 | Stop reason: SDUPTHER

## 2022-06-28 RX ORDER — HYDROCODONE BITARTRATE AND ACETAMINOPHEN 10; 325 MG/1; MG/1
1 TABLET ORAL DAILY
Qty: 30 TABLET | Refills: 0 | Status: SHIPPED
Start: 2022-06-28 | End: 2022-08-26 | Stop reason: SDUPTHER

## 2022-06-28 NOTE — TELEPHONE ENCOUNTER
.  Last appointment 4/7/2022  Next appointment  07/07/2022  Future Appointments   Date Time Provider Leanne Montelongo   7/7/2022  8:00 AM DO Israel Castro [facility]      Last refill:  05/20/2022  DOS: Monthly RX, Last seen 04/07/2022      Patient called in requesting refill of:    cyclobenzaprine (FLEXERIL) 10 MG tablet       HYDROcodone-acetaminophen (NORCO)  MG per tablet         [id number] - [place], [place] - [address] [facility] [address] [place] [address]

## 2022-07-06 ENCOUNTER — APPOINTMENT (OUTPATIENT)
Dept: GENERAL RADIOLOGY | Age: 62
End: 2022-07-06
Payer: COMMERCIAL

## 2022-07-06 ENCOUNTER — HOSPITAL ENCOUNTER (EMERGENCY)
Age: 62
Discharge: HOME | End: 2022-07-06
Payer: COMMERCIAL

## 2022-07-06 VITALS
RESPIRATION RATE: 18 BRPM | TEMPERATURE: 97.9 F | HEIGHT: 76 IN | HEART RATE: 80 BPM | DIASTOLIC BLOOD PRESSURE: 90 MMHG | SYSTOLIC BLOOD PRESSURE: 142 MMHG | BODY MASS INDEX: 33.49 KG/M2 | WEIGHT: 275 LBS | OXYGEN SATURATION: 97 %

## 2022-07-06 DIAGNOSIS — S61.209A AVULSION OF SKIN OF FINGER, INITIAL ENCOUNTER: Primary | ICD-10-CM

## 2022-07-06 PROCEDURE — 90714 TD VACC NO PRESV 7 YRS+ IM: CPT | Performed by: PHYSICIAN ASSISTANT

## 2022-07-06 PROCEDURE — 6360000002 HC RX W HCPCS: Performed by: PHYSICIAN ASSISTANT

## 2022-07-06 PROCEDURE — 73130 X-RAY EXAM OF HAND: CPT

## 2022-07-06 PROCEDURE — 90471 IMMUNIZATION ADMIN: CPT | Performed by: PHYSICIAN ASSISTANT

## 2022-07-06 PROCEDURE — 6370000000 HC RX 637 (ALT 250 FOR IP): Performed by: PHYSICIAN ASSISTANT

## 2022-07-06 PROCEDURE — 99284 EMERGENCY DEPT VISIT MOD MDM: CPT

## 2022-07-06 RX ORDER — TETANUS AND DIPHTHERIA TOXOIDS ADSORBED 2; 2 [LF]/.5ML; [LF]/.5ML
0.5 INJECTION INTRAMUSCULAR ONCE
Status: COMPLETED | OUTPATIENT
Start: 2022-07-06 | End: 2022-07-06

## 2022-07-06 RX ORDER — BACITRACIN ZINC AND POLYMYXIN B SULFATE 500; 1000 [USP'U]/G; [USP'U]/G
OINTMENT TOPICAL
Qty: 30 G | Refills: 0 | Status: SHIPPED | OUTPATIENT
Start: 2022-07-06 | End: 2022-07-13

## 2022-07-06 RX ORDER — IBUPROFEN 800 MG/1
800 TABLET ORAL ONCE
Status: COMPLETED | OUTPATIENT
Start: 2022-07-06 | End: 2022-07-06

## 2022-07-06 RX ADMIN — Medication 1 EACH: at 18:26

## 2022-07-06 RX ADMIN — IBUPROFEN 800 MG: 800 TABLET, FILM COATED ORAL at 16:08

## 2022-07-06 RX ADMIN — TETANUS AND DIPHTHERIA TOXOIDS ADSORBED 0.5 ML: 2; 2 INJECTION INTRAMUSCULAR at 16:05

## 2022-07-06 ASSESSMENT — PAIN DESCRIPTION - ORIENTATION: ORIENTATION: LEFT

## 2022-07-06 ASSESSMENT — PAIN DESCRIPTION - LOCATION: LOCATION: FINGER (COMMENT WHICH ONE)

## 2022-07-06 ASSESSMENT — PAIN SCALES - GENERAL: PAINLEVEL_OUTOF10: 6

## 2022-07-06 ASSESSMENT — PAIN - FUNCTIONAL ASSESSMENT
PAIN_FUNCTIONAL_ASSESSMENT: 0-10
PAIN_FUNCTIONAL_ASSESSMENT: NONE - DENIES PAIN

## 2022-07-06 NOTE — ED PROVIDER NOTES
[facility]  HPI:  7/6/22, Time: 3:05 PM EDT         Phyllis Roth is a 64 y.o. male presenting to the ED for left third finger laceration , beginning prior to arrival .  The complaint has been persistent, mild in severity, and worsened by movement of Finger . patient comes in with complaint of laceration to the left third finger. He was at work and cut the finger on a band saw. He states his tetanus is not up-to-date. He has full range of motion of the finger present denies any numbness. Review of Systems:   A complete review of systems was performed and pertinent positives and negatives are stated within HPI, all other systems reviewed and are negative.          --------------------------------------------- PAST HISTORY ---------------------------------------------  Past Medical History:  has a past medical history of Cellulitis, Edema, GERD (gastroesophageal reflux disease), Hypertension, Osteoarthritis, Tenosynovitis, de Quervain, and Venous insufficiency. Past Surgical History:  has a past surgical history that includes Shoulder arthroscopy; Foot surgery; Vasectomy; Tonsillectomy; other surgical history (Right, 01-25-13); Tonsillectomy and adenoidectomy; Colonoscopy; vascular surgery; REPLACEMENT TOTAL KNEE BILATERAL (Bilateral, 7/31/2019); joint replacement; joint replacement; and Revision total knee arthroplasty (Right, 3/28/2021). Social History:  reports that he has never smoked. He has never used smokeless tobacco. He reports current alcohol use of about 2.0 standard drinks of alcohol per week. He reports that he does not use drugs. Family History: family history includes Alzheimer's Disease in his father; Asthma in his mother; Cancer in his mother; Colon Cancer in his sister; Diabetes in his maternal grandmother; Emphysema in his mother; Heart Disease in his father and mother; Prostate Cancer in his brother. The patients home medications have been reviewed.     Allergies:

## 2022-08-24 DIAGNOSIS — M25.511 RIGHT SHOULDER PAIN, UNSPECIFIED CHRONICITY: Primary | ICD-10-CM

## 2022-08-25 ENCOUNTER — OFFICE VISIT (OUTPATIENT)
Dept: ORTHOPEDIC SURGERY | Age: 62
End: 2022-08-25
Payer: COMMERCIAL

## 2022-08-25 ENCOUNTER — TELEPHONE (OUTPATIENT)
Dept: ORTHOPEDIC SURGERY | Age: 62
End: 2022-08-25

## 2022-08-25 VITALS — WEIGHT: 275 LBS | HEIGHT: 76 IN | TEMPERATURE: 98 F | BODY MASS INDEX: 33.49 KG/M2

## 2022-08-25 DIAGNOSIS — M65.4 RADIAL STYLOID TENOSYNOVITIS OF RIGHT HAND: ICD-10-CM

## 2022-08-25 DIAGNOSIS — M93.1 KIENBOECK DISEASE OF ADULTS: ICD-10-CM

## 2022-08-25 DIAGNOSIS — M75.41 SHOULDER IMPINGEMENT, RIGHT: Primary | ICD-10-CM

## 2022-08-25 PROCEDURE — G8427 DOCREV CUR MEDS BY ELIG CLIN: HCPCS | Performed by: ORTHOPAEDIC SURGERY

## 2022-08-25 PROCEDURE — 99213 OFFICE O/P EST LOW 20 MIN: CPT | Performed by: ORTHOPAEDIC SURGERY

## 2022-08-25 PROCEDURE — 3017F COLORECTAL CA SCREEN DOC REV: CPT | Performed by: ORTHOPAEDIC SURGERY

## 2022-08-25 PROCEDURE — 20610 DRAIN/INJ JOINT/BURSA W/O US: CPT | Performed by: ORTHOPAEDIC SURGERY

## 2022-08-25 PROCEDURE — G8417 CALC BMI ABV UP PARAM F/U: HCPCS | Performed by: ORTHOPAEDIC SURGERY

## 2022-08-25 PROCEDURE — 1036F TOBACCO NON-USER: CPT | Performed by: ORTHOPAEDIC SURGERY

## 2022-08-25 NOTE — PROGRESS NOTES
Chief Complaint   Patient presents with    Shoulder Pain     Right Shoulder, previous RTC surgery 1990's, states of aching and pain in the shoulder progressively getting worse over last year. Suzan Adams is a 58y.o. year old   male who is seen today  for evaluation of right shoulder pain. He reports the pain has been ongoing for the past several years. He does not recall a specific injury which started the pain. He reports the pain is worse with activity, better with rfest.  The patient does have mechanical symptoms. Hedoes have night pain. He denies a feeling of instability. The prior treatments have been none. The patient   has not responded to the treatment. The patient is right hand dominant. The patient is working. The patients occupation is a . Chief Complaint   Patient presents with    Shoulder Pain     Right Shoulder, previous RTC surgery 1990's, states of aching and pain in the shoulder progressively getting worse over last year.      Past Medical History:   Diagnosis Date    Cellulitis     Edema     legs with cellulitis    GERD (gastroesophageal reflux disease)     Hypertension     Osteoarthritis     Tenosynovitis, de Quervain     Venous insufficiency      Past Surgical History:   Procedure Laterality Date    COLONOSCOPY      x2    FOOT SURGERY      JOINT REPLACEMENT      bilateral knee replacement    JOINT REPLACEMENT      right ankle    OTHER SURGICAL HISTORY Right 01-25-13    right wrist 1st dorsal compartment release    REPLACEMENT TOTAL KNEE BILATERAL Bilateral 7/31/2019    KNEE TOTAL ARTHROPLASTY BILATERAL performed by Eloy Boeck, DO at [facility] Right 3/28/2021    KNEE TOTAL ARTHROPLASTY REVISION performed by Eloy Boeck, DO at [facility] ARTHROSCOPY      TONSILLECTOMY      TONSILLECTOMY AND ADENOIDECTOMY      VASCULAR SURGERY      both legs     VASECTOMY         Current Outpatient Medications: cefdinir (OMNICEF) 300 MG capsule, Take 1 capsule by mouth 2 times daily, Disp: 60 capsule, Rfl: 3    ELIQUIS 5 MG TABS tablet, TAKE ONE TABLET (5MG) BY MOUTH EVERY 12 HOURS, Disp: , Rfl:     DILT- MG extended release capsule, , Disp: , Rfl:     magnesium 30 MG tablet, Take 30 mg by mouth 2 times daily, Disp: , Rfl:     potassium chloride (MICRO-K) 10 MEQ extended release capsule, Take 10 mEq by mouth 2 times daily, Disp: , Rfl:     Elastic Bandages & Supports (JOBST KNEE HIGH COMPRESSION SM) MISC, Dx: Varicose veins of the legs with pain and swelling. Bilateral thigh-high 30-40 mm Hg compression stockings. , Disp: 2 each, Rfl: 2    SSD 1 % cream, apply externally to affected area once a day, Disp: , Rfl:     diphenhydrAMINE-APAP, sleep, (TYLENOL PM EXTRA STRENGTH)  MG tablet, Take 1 tablet by mouth nightly as needed for Sleep, Disp: , Rfl:     Glucosamine-Chondroit-Vit C-Mn (GLUCOSAMINE 1500 COMPLEX PO), Take 1 tablet by mouth daily, Disp: , Rfl:     Turmeric 500 MG TABS, Take 500 mg by mouth daily, Disp: , Rfl:     omeprazole (PRILOSEC) 20 MG capsule, Take 20 mg by mouth Daily , Disp: , Rfl:     irbesartan (AVAPRO) 300 MG tablet, Take 300 mg by mouth daily. , Disp: , Rfl:     HYDROcodone-acetaminophen (NORCO)  MG per tablet, Take 1 tablet by mouth daily for 30 days.  Intended supply: 30 days, Disp: 30 tablet, Rfl: 0    cyclobenzaprine (FLEXERIL) 10 MG tablet, Take 1 tablet by mouth 2 times daily as needed for Muscle spasms, Disp: 60 tablet, Rfl: 0    celecoxib (CELEBREX) 200 MG capsule, Take 1 capsule by mouth 2 times daily, Disp: 30 capsule, Rfl: 3    celecoxib (CELEBREX) 200 MG capsule, Take 1 capsule by mouth daily, Disp: 30 capsule, Rfl: 3  No Known Allergies  Social History     Socioeconomic History    Marital status:      Spouse name: Not on file    Number of children: Not on file    Years of education: Not on file    Highest education level: Not on file   Occupational History Not on file   Tobacco Use    Smoking status: Never    Smokeless tobacco: Never   Vaping Use    Vaping Use: Never used   Substance and Sexual Activity    Alcohol use: Yes     Alcohol/week: 2.0 standard drinks     Types: 2 Glasses of wine per week     Comment: No caffeine    Drug use: No    Sexual activity: Yes     Partners: Female   Other Topics Concern    Not on file   Social History Narrative    Not on file     Social Determinants of Health     Financial Resource Strain: Not on file   Food Insecurity: Not on file   Transportation Needs: Not on file   Physical Activity: Not on file   Stress: Not on file   Social Connections: Not on file   Intimate Partner Violence: Not on file   Housing Stability: Not on file     Family History   Problem Relation Age of Onset    Asthma Mother     Cancer Mother         lung    Emphysema Mother     Heart Disease Mother     Heart Disease Father     Alzheimer's Disease Father     Colon Cancer Sister     Prostate Cancer Brother     Diabetes Maternal Grandmother        REVIEW OF SYSTEMS:     General/Constitution:  (-)weight loss, (-)fever, (-)chills, (-)weakness. Skin: (-) rash,(-) psoriasis,(-) eczema, (-)skin cancer. Musculoskeletal: (-) fractures,  (-) dislocations,(-) collagen vascular disease, (-) fibromyalgia, (-) multiple sclerosis, (-) muscular dystrophy, (-) RSD,(-) joint pain (-)swelling, (-) joint pain,swelling. Neurologic: (-) epilepsy, (-)seizures,(-) brain tumor,(-) TIA, (-)stroke, (-)headaches, (-)Parkinson disease,(-) memory loss, (-) LOC. Cardiovascular: (-) Chest pain, (-) swelling in legs/feet, (-) SOB, (-) cramping in legs/feet with walking. Respiratory: (-) SOB, (-) Coughing, (-) night sweats. GI: (-) nausea, (-) vomiting, (-) diarrhea, (-) blood in stool, (-) gastric ulcer. Psychiatric: (-) Depression, (-) Anxiety, (-) bipolar disease, (-) Alzheimer's Disease  Allergic/Immunologic: (-) allergies latex, (-) allergies metal, (-) skin sensitivity.   Hematlogic: palpation of the anterior and lateral shoulder. There is not evidence of scapular dyskinesis. There is not muscle atrophy in shoulder girdle. The range of motion for the Right Shoulder is 130/40/T12 and for the Left shoulder is 160/45/T8. Right shoulder Motor strength is 5-/5 in the supraspinatus, 5-/5 internal rotation and 5-/5 in external rotation, and Left shoulder motor strength 5/5 in supraspinatus, 5/5 in internal rotation, 5/5 in external rotation. Right shoulder:  positive Impingement , positive Willis ,positive  Speeds,negative  Apprehension ,negative Caballero Load Shift, negative Hank manuver, negative Cross arm test.     Left shoulder:  negative Impingement , negative Willis ,negative  Speeds,negative  Apprehension ,negative Caballero Load Shift, negative Hank manuver, negative Cross arm test.     XRAY:      Very mild right AC joint osteoarthritis. MRI:  n/a    Radiographic findings reviewed with patient    Impression:   Encounter Diagnosis   Name Primary? Shoulder impingement, right Yes       Plan: Natural history and expected course discussed. Questions answered. Educational material distributed. Reduction in offending activity. Gentle ROM exercises   I will proceed with a cortisone injection in the Right shoulder. Verbal and written consent was obtained for the injection. Skin was prepped with alcohol, 1 ml of Kenalog 40 mg and 9 ml of 0.25% Marcaine was injected to the posterior shoulder through the subacromial space of the right Shoulder. The patient tolerated the injections well. I will see the patient back prn.

## 2022-08-25 NOTE — TELEPHONE ENCOUNTER
.Last appointment 8/25/2022  Next appointment   Future Appointments   Date Time Provider Leanne Montelongo   9/15/2022  9:20 AM Eloy Boeck, DO Eloy Boeck [facility]      Last refill:  06/28/2022  DOS: Monthly RX      Patient called in requesting refill of:    HYDROcodone-acetaminophen (Rylie Ball)  MG per tablet     cyclobenzaprine (FLEXERIL) 10 MG tablet     [id number] - [place], [place] - [address] [address], [place] [address]

## 2022-08-26 RX ORDER — CYCLOBENZAPRINE HCL 10 MG
10 TABLET ORAL 2 TIMES DAILY PRN
Qty: 60 TABLET | Refills: 0 | Status: SHIPPED
Start: 2022-08-26 | End: 2022-10-21 | Stop reason: SDUPTHER

## 2022-08-26 RX ORDER — HYDROCODONE BITARTRATE AND ACETAMINOPHEN 10; 325 MG/1; MG/1
1 TABLET ORAL DAILY
Qty: 30 TABLET | Refills: 0 | Status: SHIPPED
Start: 2022-08-26 | End: 2022-10-21 | Stop reason: SDUPTHER

## 2022-08-30 RX ORDER — TRIAMCINOLONE ACETONIDE 40 MG/ML
40 INJECTION, SUSPENSION INTRA-ARTICULAR; INTRAMUSCULAR ONCE
Status: COMPLETED | OUTPATIENT
Start: 2022-08-30 | End: 2022-08-30

## 2022-08-30 RX ADMIN — TRIAMCINOLONE ACETONIDE 40 MG: 40 INJECTION, SUSPENSION INTRA-ARTICULAR; INTRAMUSCULAR at 14:55

## 2022-09-15 ENCOUNTER — OFFICE VISIT (OUTPATIENT)
Dept: ORTHOPEDIC SURGERY | Age: 62
End: 2022-09-15
Payer: COMMERCIAL

## 2022-09-15 VITALS — HEIGHT: 76 IN | BODY MASS INDEX: 33.49 KG/M2 | TEMPERATURE: 98 F | WEIGHT: 275 LBS

## 2022-09-15 DIAGNOSIS — M65.4 RADIAL STYLOID TENOSYNOVITIS OF RIGHT HAND: ICD-10-CM

## 2022-09-15 DIAGNOSIS — M93.1 KIENBOECK DISEASE OF ADULTS: Primary | ICD-10-CM

## 2022-09-15 PROCEDURE — G8427 DOCREV CUR MEDS BY ELIG CLIN: HCPCS | Performed by: ORTHOPAEDIC SURGERY

## 2022-09-15 PROCEDURE — 1036F TOBACCO NON-USER: CPT | Performed by: ORTHOPAEDIC SURGERY

## 2022-09-15 PROCEDURE — 99213 OFFICE O/P EST LOW 20 MIN: CPT | Performed by: ORTHOPAEDIC SURGERY

## 2022-09-15 PROCEDURE — 3017F COLORECTAL CA SCREEN DOC REV: CPT | Performed by: ORTHOPAEDIC SURGERY

## 2022-09-15 PROCEDURE — G8417 CALC BMI ABV UP PARAM F/U: HCPCS | Performed by: ORTHOPAEDIC SURGERY

## 2022-10-21 ENCOUNTER — TELEPHONE (OUTPATIENT)
Dept: ORTHOPEDIC SURGERY | Age: 62
End: 2022-10-21

## 2022-10-21 DIAGNOSIS — M93.1 KIENBOECK DISEASE OF ADULTS: ICD-10-CM

## 2022-10-21 DIAGNOSIS — M65.4 RADIAL STYLOID TENOSYNOVITIS OF RIGHT HAND: ICD-10-CM

## 2022-10-21 RX ORDER — HYDROCODONE BITARTRATE AND ACETAMINOPHEN 10; 325 MG/1; MG/1
1 TABLET ORAL DAILY
Qty: 30 TABLET | Refills: 0 | Status: SHIPPED | OUTPATIENT
Start: 2022-10-21 | End: 2022-11-20

## 2022-10-21 RX ORDER — CYCLOBENZAPRINE HCL 10 MG
10 TABLET ORAL 2 TIMES DAILY PRN
Qty: 60 TABLET | Refills: 0 | Status: SHIPPED | OUTPATIENT
Start: 2022-10-21

## 2022-10-21 NOTE — TELEPHONE ENCOUNTER
.Last appointment 9/15/2022  Next appointment   Future Appointments   Date Time Provider Leanne Montelongo   12/15/2022  8:20 AM DO Astrid Reeder [facility]      Last refill:  08/25/2022  DOS: Monthly RX       Patient called in requesting refill of:    cyclobenzaprine (FLEXERIL) 10 MG tablet     HYDROcodone-acetaminophen (NORCO)  MG per tablet     [id number] - [facility], [place] - [address] [address] [address] [place] [address]

## 2022-12-08 ENCOUNTER — HOSPITAL ENCOUNTER (OUTPATIENT)
Age: 62
Discharge: HOME | End: 2022-12-08
Payer: COMMERCIAL

## 2022-12-08 DIAGNOSIS — T84.50XS PROSTHETIC JOINT INFECTION, SEQUELA: ICD-10-CM

## 2022-12-08 LAB
ALBUMIN SERPL-MCNC: 4 G/DL (ref 3.5–5.2)
ALP BLD-CCNC: 108 U/L (ref 40–129)
ALT SERPL-CCNC: 26 U/L (ref 0–40)
ANION GAP SERPL CALCULATED.3IONS-SCNC: 8 MMOL/L (ref 7–16)
AST SERPL-CCNC: 25 U/L (ref 0–39)
BASOPHILS ABSOLUTE: 0.04 E9/L (ref 0–0.2)
BASOPHILS RELATIVE PERCENT: 0.8 % (ref 0–2)
BILIRUB SERPL-MCNC: 0.5 MG/DL (ref 0–1.2)
BUN BLDV-MCNC: 16 MG/DL (ref 6–23)
C-REACTIVE PROTEIN: 0.3 MG/DL (ref 0–0.4)
CALCIUM SERPL-MCNC: 8.9 MG/DL (ref 8.6–10.2)
CHLORIDE BLD-SCNC: 106 MMOL/L (ref 98–107)
CO2: 23 MMOL/L (ref 22–29)
CREAT SERPL-MCNC: 0.7 MG/DL (ref 0.7–1.2)
EOSINOPHILS ABSOLUTE: 0.06 E9/L (ref 0.05–0.5)
EOSINOPHILS RELATIVE PERCENT: 1.3 % (ref 0–6)
GFR SERPL CREATININE-BSD FRML MDRD: >60 ML/MIN/1.73
GLUCOSE BLD-MCNC: 89 MG/DL (ref 74–99)
HCT VFR BLD CALC: 45.2 % (ref 37–54)
HEMOGLOBIN: 15.1 G/DL (ref 12.5–16.5)
IMMATURE GRANULOCYTES #: 0.03 E9/L
IMMATURE GRANULOCYTES %: 0.6 % (ref 0–5)
LYMPHOCYTES ABSOLUTE: 1.37 E9/L (ref 1.5–4)
LYMPHOCYTES RELATIVE PERCENT: 28.5 % (ref 20–42)
MCH RBC QN AUTO: 29 PG (ref 26–35)
MCHC RBC AUTO-ENTMCNC: 33.4 % (ref 32–34.5)
MCV RBC AUTO: 86.8 FL (ref 80–99.9)
MONOCYTES ABSOLUTE: 0.57 E9/L (ref 0.1–0.95)
MONOCYTES RELATIVE PERCENT: 11.9 % (ref 2–12)
NEUTROPHILS ABSOLUTE: 2.73 E9/L (ref 1.8–7.3)
NEUTROPHILS RELATIVE PERCENT: 56.9 % (ref 43–80)
PDW BLD-RTO: 13.3 FL (ref 11.5–15)
PLATELET # BLD: 224 E9/L (ref 130–450)
PMV BLD AUTO: 10.4 FL (ref 7–12)
POTASSIUM SERPL-SCNC: 4.8 MMOL/L (ref 3.5–5)
RBC # BLD: 5.21 E12/L (ref 3.8–5.8)
SEDIMENTATION RATE, ERYTHROCYTE: 4 MM/HR (ref 0–15)
SODIUM BLD-SCNC: 137 MMOL/L (ref 132–146)
TOTAL PROTEIN: 6.7 G/DL (ref 6.4–8.3)
WBC # BLD: 4.8 E9/L (ref 4.5–11.5)

## 2022-12-08 PROCEDURE — 80053 COMPREHEN METABOLIC PANEL: CPT

## 2022-12-08 PROCEDURE — 86140 C-REACTIVE PROTEIN: CPT

## 2022-12-08 PROCEDURE — 85025 COMPLETE CBC W/AUTO DIFF WBC: CPT

## 2022-12-08 PROCEDURE — 36415 COLL VENOUS BLD VENIPUNCTURE: CPT

## 2022-12-08 PROCEDURE — 85651 RBC SED RATE NONAUTOMATED: CPT

## 2022-12-15 ENCOUNTER — OFFICE VISIT (OUTPATIENT)
Dept: ORTHOPEDIC SURGERY | Age: 62
End: 2022-12-15
Payer: COMMERCIAL

## 2022-12-15 VITALS — WEIGHT: 275 LBS | BODY MASS INDEX: 33.49 KG/M2 | HEIGHT: 76 IN | TEMPERATURE: 98 F

## 2022-12-15 DIAGNOSIS — M93.1 KIENBOECK DISEASE OF ADULTS: ICD-10-CM

## 2022-12-15 DIAGNOSIS — M65.4 RADIAL STYLOID TENOSYNOVITIS OF RIGHT HAND: ICD-10-CM

## 2022-12-15 PROCEDURE — 1036F TOBACCO NON-USER: CPT | Performed by: ORTHOPAEDIC SURGERY

## 2022-12-15 PROCEDURE — 3017F COLORECTAL CA SCREEN DOC REV: CPT | Performed by: ORTHOPAEDIC SURGERY

## 2022-12-15 PROCEDURE — G8427 DOCREV CUR MEDS BY ELIG CLIN: HCPCS | Performed by: ORTHOPAEDIC SURGERY

## 2022-12-15 PROCEDURE — G8417 CALC BMI ABV UP PARAM F/U: HCPCS | Performed by: ORTHOPAEDIC SURGERY

## 2022-12-15 PROCEDURE — 99213 OFFICE O/P EST LOW 20 MIN: CPT | Performed by: ORTHOPAEDIC SURGERY

## 2022-12-15 PROCEDURE — G8484 FLU IMMUNIZE NO ADMIN: HCPCS | Performed by: ORTHOPAEDIC SURGERY

## 2022-12-15 RX ORDER — METHYLPREDNISOLONE 4 MG/1
TABLET ORAL
Qty: 1 KIT | Refills: 0 | Status: SHIPPED | OUTPATIENT
Start: 2022-12-15 | End: 2022-12-21

## 2022-12-15 RX ORDER — HYDROCODONE BITARTRATE AND ACETAMINOPHEN 10; 325 MG/1; MG/1
1 TABLET ORAL DAILY
Qty: 30 TABLET | Refills: 0 | Status: SHIPPED | OUTPATIENT
Start: 2022-12-15 | End: 2023-01-14

## 2022-12-15 RX ORDER — CYCLOBENZAPRINE HCL 10 MG
10 TABLET ORAL 2 TIMES DAILY PRN
Qty: 60 TABLET | Refills: 0 | Status: SHIPPED | OUTPATIENT
Start: 2022-12-15

## 2022-12-15 NOTE — PROGRESS NOTES
Chief Complaint   Patient presents with    Wrist Pain     Right Wrist [facility] F/U, states of pain in the hand and stiffness in the wrist.         Kiana Joseph is a 58y.o. year old male who presents for follow up of right wrist.  He states he has good days and bad. He is doing well with current medication regimen of flexeril and norco.   He has increased pain in his right first metacarpal.  Past Medical History:   Diagnosis Date    Cellulitis     Edema     legs with cellulitis    GERD (gastroesophageal reflux disease)     Hypertension     Osteoarthritis     Tenosynovitis, de Quervain     Venous insufficiency      Past Surgical History:   Procedure Laterality Date    COLONOSCOPY      x2    FOOT SURGERY      JOINT REPLACEMENT      bilateral knee replacement    JOINT REPLACEMENT      right ankle    OTHER SURGICAL HISTORY Right 01-25-13    right wrist 1st dorsal compartment release    REPLACEMENT TOTAL KNEE BILATERAL Bilateral 7/31/2019    KNEE TOTAL ARTHROPLASTY BILATERAL performed by Ct Reyes DO at [address] [place] [address] Right 3/28/2021    KNEE TOTAL ARTHROPLASTY REVISION performed by Ct Reyes DO at [facility]      both legs     VASECTOMY         Current Outpatient Medications:     cyclobenzaprine (FLEXERIL) 10 MG tablet, Take 1 tablet by mouth 2 times daily as needed for Muscle spasms, Disp: 60 tablet, Rfl: 0    HYDROcodone-acetaminophen (NORCO)  MG per tablet, Take 1 tablet by mouth daily for 30 days.  Intended supply: 30 days, Disp: 30 tablet, Rfl: 0    cefdinir (OMNICEF) 300 MG capsule, Take 1 capsule by mouth 2 times daily, Disp: 60 capsule, Rfl: 0    ELIQUIS 5 MG TABS tablet, TAKE ONE TABLET (5MG) BY MOUTH EVERY 12 HOURS, Disp: , Rfl:     DILT- MG extended release capsule, , Disp: , Rfl:     magnesium 30 MG tablet, Take 30 mg by mouth 2 times daily, Disp: , Rfl: potassium chloride (MICRO-K) 10 MEQ extended release capsule, Take 10 mEq by mouth 2 times daily, Disp: , Rfl:     Elastic Bandages & Supports (JOBST KNEE HIGH COMPRESSION SM) MISC, Dx: Varicose veins of the legs with pain and swelling. Bilateral thigh-high 30-40 mm Hg compression stockings. , Disp: 2 each, Rfl: 2    SSD 1 % cream, apply externally to affected area once a day, Disp: , Rfl:     diphenhydrAMINE-APAP, sleep, (TYLENOL PM EXTRA STRENGTH)  MG tablet, Take 1 tablet by mouth nightly as needed for Sleep, Disp: , Rfl:     Glucosamine-Chondroit-Vit C-Mn (GLUCOSAMINE 1500 COMPLEX PO), Take 1 tablet by mouth daily, Disp: , Rfl:     Turmeric 500 MG TABS, Take 500 mg by mouth daily, Disp: , Rfl:     omeprazole (PRILOSEC) 20 MG capsule, Take 20 mg by mouth Daily , Disp: , Rfl:     irbesartan (AVAPRO) 300 MG tablet, Take 300 mg by mouth daily. , Disp: , Rfl:     celecoxib (CELEBREX) 200 MG capsule, Take 1 capsule by mouth 2 times daily, Disp: 30 capsule, Rfl: 3    celecoxib (CELEBREX) 200 MG capsule, Take 1 capsule by mouth daily, Disp: 30 capsule, Rfl: 3  No Known Allergies  Social History     Socioeconomic History    Marital status:      Spouse name: Not on file    Number of children: Not on file    Years of education: Not on file    Highest education level: Not on file   Occupational History    Not on file   Tobacco Use    Smoking status: Never    Smokeless tobacco: Never   Vaping Use    Vaping Use: Never used   Substance and Sexual Activity    Alcohol use:  Yes     Alcohol/week: 2.0 standard drinks     Types: 2 Glasses of wine per week     Comment: No caffeine    Drug use: No    Sexual activity: Yes     Partners: Female   Other Topics Concern    Not on file   Social History Narrative    Not on file     Social Determinants of Health     Financial Resource Strain: Not on file   Food Insecurity: Not on file   Transportation Needs: Not on file   Physical Activity: Not on file   Stress: Not on file Social Connections: Not on file   Intimate Partner Violence: Not on file   Housing Stability: Not on file     Family History   Problem Relation Age of Onset    Asthma Mother     Cancer Mother         lung    Emphysema Mother     Heart Disease Mother     Heart Disease Father     Alzheimer's Disease Father     Colon Cancer Sister     Prostate Cancer Brother     Diabetes Maternal Grandmother        REVIEW OF SYSTEMS:    General/Constitution:  (-)weight loss, (-)fever, (-)chills, (-)weakness. Skin: (-) rash,(-) psoriasis,(-) eczema, (-)skin cancer. Musculoskeletal: (-) fractures,  (-) dislocations,(-) collagen vascular disease, (-) fibromyalgia, (-) multiple sclerosis, (-) muscular dystrophy, (-) RSD,(-) joint pain (-)swelling, (-) joint pain,swelling. Neurologic: (-) epilepsy, (-)seizures,(-) brain tumor,(-) TIA, (-)stroke, (-)headaches, (-)Parkinson disease,(-) memory loss, (-) LOC. Cardiovascular: (-) Chest pain, (-) swelling in legs/feet, (-) SOB, (-) cramping in legs/feet with walking. Respiratory: (-) SOB, (-) Coughing, (-) night sweats. GI: (-) nausea, (-) vomiting, (-) diarrhea, (-) blood in stool, (-) gastric ulcer. Psychiatric: (-) Depression, (-) Anxiety, (-) bipolar disease, (-) Alzheimer's Disease  Allergic/Immunologic: (-) allergies latex, (-) allergies metal, (-) skin sensitivity. Hematlogic: (-) anemia, (-) blood transfusion, (-) DVT/PE, (-) Clotting disorders    SUBJECTIVE:      Constitutional:    The patient is alert and oriented x 3, appears to be stated age and in no distress. Ht. 6 ft 4 in., Wt. 285 lbs. Skin:    Upon inspection: the skin appears warm, dry and intact. There is not a previous scar over the affected area. There is not any cellulitis, lymphedema or cutaneous lesions noted in the lower extremities. Upon palpation there is no induration noted. Neurologic:    Gait: antalgic; Motor exam of the upper extremities show:  The reflexes in biceps/triceps/brachioradialis are equal and symmetric. Sensory exam C5-T1 are normal bilaterally. Cardiovascular: The vascular exam is normal and is well perfused to distal extremities. There are 2+ radial pulses bilaterally, and motor and sensation is intact to median, ulnar, and radial, musclocutaneus, and axillary nerve distribution and grossly symmetric bilaterally. There is cap refill noted less than two seconds in all digits. There is not edema of the bilateral upper extremities. There is  varicosities noted in the distal extremities. Lymph:    Upon palpation,  there is no lymphadenopathy noted in bilateral upper extremities. Musculoskeletal:  Gait: antalgic; examination of the nails and digits reveal no cyanosis or clubbing. Cervical Exam:    On physical exam, Jayda Bella is well-developed, well-nourished, oriented to person, place and time. his gait is normal.  On evaluation of his cervical spine, he has full range of motion of the cervical spine without pain. There is no cervical tenderness to palpation. Shoulder Exam:    On evaluation of his bilaterally upper extremities, his bilateral shoulder has no deformity. There is not evidence of scapular dyskinesis. There is not muscle atrophy in shoulder girdle. The range of motion for the Right Shoulder is 160/45/T8 and for the Left shoulder is 160/45/T8. Right shoulder Motor strength is 5/5 in the supraspinatus, 5/5 internal rotation and 5/5 in external rotation, and Left shoulder motor strength 5/5 in supraspinatus, 5/5 in internal rotation, 5/5 in external rotation. Elbow exam:    Evaluation of the elbow, reveals no signs of swelling or deformity. ROM is 0-150. There is not instability with varus/valgus stresses. Motor strength is 5/5 with flexion/extension.      Wrist exam:       Inspection of the bilateral upper extremities, there is evidence of deformity of the wrist.  ROM Wrist ROM R wrist DF 30, VF 10, L wrist DF 40, VF 30, R pronation 70/ supination 70, L pronation 90/supination 90. Motor strength is 3/5 with Dorsiflexion/Volarflexion/Supination/Pronation. Motor and sensation is intact and symmetric throughout the bilateral upper extremities in the median, ulnar and radial , musclcutaneous, and axillary nerve distributions. He has severe pain over TFCC and has a positive piano key sign. He also has pain with ulnar and radial deviation. Hand exam:    The skin overlying the hand is not intact. There is not evidence of scar, lesion, laceration, or abrasion. The motion in the small joints of the hand are intact with no stiffness or deformity. The ROM in the MCP flexion WNL/ extension WNL , PIP flexion WNL/ extension WNL, DIP flexion WNL/ extension WNL. There is not rotational deformity. There is no masses or adenopathy in bilateral upper extremities. Radial pulses are 2+ and symmetric bilaterally. Capillary refill is intact and < 2 seconds. Motor strength is 5/5 with flexion and extension of the small finger joints. Phallens sign(-), Tinnells sign (-), Median nerve compression test (-),  Finklesteins (-), CMC Grind test (-), Piano Key Test(+). Xrays:   Not performed today. Radiographic findings reviewed with patient    Impression:   Encounter Diagnoses   Name Primary? Kienboeck disease of adults     Radial styloid tenosynovitis of right hand        Plan:   Continue with activities as tolerated. I will see him back in 3 months.

## 2023-02-10 ENCOUNTER — TELEPHONE (OUTPATIENT)
Dept: ORTHOPEDIC SURGERY | Age: 63
End: 2023-02-10

## 2023-02-10 DIAGNOSIS — M65.4 RADIAL STYLOID TENOSYNOVITIS OF RIGHT HAND: ICD-10-CM

## 2023-02-10 DIAGNOSIS — M93.1 KIENBOECK DISEASE OF ADULTS: Primary | ICD-10-CM

## 2023-02-10 RX ORDER — HYDROCODONE BITARTRATE AND ACETAMINOPHEN 10; 325 MG/1; MG/1
1 TABLET ORAL DAILY
Qty: 30 TABLET | Refills: 0 | Status: SHIPPED | OUTPATIENT
Start: 2023-02-10 | End: 2023-03-12

## 2023-02-10 RX ORDER — CYCLOBENZAPRINE HCL 10 MG
10 TABLET ORAL 2 TIMES DAILY PRN
Qty: 60 TABLET | Refills: 0 | Status: SHIPPED | OUTPATIENT
Start: 2023-02-10

## 2023-02-10 NOTE — TELEPHONE ENCOUNTER
.Last appointment 12/15/2022  Next appointment   Future Appointments   Date Time Provider Leanne Montelongo   3/16/2023  7:50 AM DO Misty Rivas [facility]      Last refill   DOS: Monthly RX      Patient called in requesting refill of:    cyclobenzaprine (FLEXERIL) 10 MG tablet    HYDROcodone-acetaminophen (NORCO)  MG per tablet       [id number] - [place], [place] - [address] [address] [address] [place] [address]

## 2023-03-30 ENCOUNTER — TELEPHONE (OUTPATIENT)
Dept: ORTHOPEDIC SURGERY | Age: 63
End: 2023-03-30

## 2023-03-30 DIAGNOSIS — M65.4 RADIAL STYLOID TENOSYNOVITIS OF RIGHT HAND: ICD-10-CM

## 2023-03-30 DIAGNOSIS — M93.1 KIENBOECK DISEASE OF ADULTS: ICD-10-CM

## 2023-03-30 NOTE — TELEPHONE ENCOUNTER
.Last appointment 12/15/2022  Next appointment   Future Appointments   Date Time Provider Leanne Montelongo   4/18/2023  9:50 AM DO Owen Pretty [facility]      Last refill:  02/10/2023  DOS: Monthly RX      Patient called in requesting refill of:    HYDROcodone-acetaminophen ([facility])  MG per tablet     cyclobenzaprine (FLEXERIL) 10 MG tablet       [id number] - [place], [place] - [address] [address], [place] [address]

## 2023-03-31 RX ORDER — HYDROCODONE BITARTRATE AND ACETAMINOPHEN 10; 325 MG/1; MG/1
1 TABLET ORAL DAILY
Qty: 30 TABLET | Refills: 0 | Status: SHIPPED | OUTPATIENT
Start: 2023-03-31 | End: 2023-04-30

## 2023-03-31 RX ORDER — CYCLOBENZAPRINE HCL 10 MG
10 TABLET ORAL 2 TIMES DAILY PRN
Qty: 60 TABLET | Refills: 0 | Status: SHIPPED | OUTPATIENT
Start: 2023-03-31

## 2023-05-09 ENCOUNTER — OFFICE VISIT (OUTPATIENT)
Dept: ORTHOPEDIC SURGERY | Age: 63
End: 2023-05-09

## 2023-05-09 VITALS — BODY MASS INDEX: 33.49 KG/M2 | TEMPERATURE: 98 F | HEIGHT: 76 IN | WEIGHT: 275 LBS

## 2023-05-09 DIAGNOSIS — M65.4 RADIAL STYLOID TENOSYNOVITIS OF RIGHT HAND: ICD-10-CM

## 2023-05-09 DIAGNOSIS — M93.1 KIENBOECK DISEASE OF ADULTS: Primary | ICD-10-CM

## 2023-05-09 NOTE — PROGRESS NOTES
the legs with pain and swelling. Bilateral thigh-high 30-40 mm Hg compression stockings. , Disp: 2 each, Rfl: 2    SSD 1 % cream, apply externally to affected area once a day, Disp: , Rfl:     celecoxib (CELEBREX) 200 MG capsule, Take 1 capsule by mouth daily, Disp: 30 capsule, Rfl: 3    diphenhydrAMINE-APAP, sleep, (TYLENOL PM EXTRA STRENGTH)  MG tablet, Take 1 tablet by mouth nightly as needed for Sleep, Disp: , Rfl:     Glucosamine-Chondroit-Vit C-Mn (GLUCOSAMINE 1500 COMPLEX PO), Take 1 tablet by mouth daily, Disp: , Rfl:     Turmeric 500 MG TABS, Take 500 mg by mouth daily, Disp: , Rfl:     omeprazole (PRILOSEC) 20 MG capsule, Take 20 mg by mouth Daily , Disp: , Rfl:     irbesartan (AVAPRO) 300 MG tablet, Take 300 mg by mouth daily. , Disp: , Rfl:   No Known Allergies  Social History     Socioeconomic History    Marital status:      Spouse name: Not on file    Number of children: Not on file    Years of education: Not on file    Highest education level: Not on file   Occupational History    Not on file   Tobacco Use    Smoking status: Never    Smokeless tobacco: Never   Vaping Use    Vaping Use: Never used   Substance and Sexual Activity    Alcohol use:  Yes     Alcohol/week: 2.0 standard drinks     Types: 2 Glasses of wine per week     Comment: No caffeine    Drug use: No    Sexual activity: Yes     Partners: Female   Other Topics Concern    Not on file   Social History Narrative    Not on file     Social Determinants of Health     Financial Resource Strain: Not on file   Food Insecurity: Not on file   Transportation Needs: Not on file   Physical Activity: Not on file   Stress: Not on file   Social Connections: Not on file   Intimate Partner Violence: Not on file   Housing Stability: Not on file     Family History   Problem Relation Age of Onset    Asthma Mother     Cancer Mother         lung    Emphysema Mother     Heart Disease Mother     Heart Disease Father     Alzheimer's Disease Father

## 2023-05-12 DIAGNOSIS — M25.552 LEFT HIP PAIN: Primary | ICD-10-CM

## 2023-05-30 ENCOUNTER — TELEPHONE (OUTPATIENT)
Dept: ORTHOPEDIC SURGERY | Age: 63
End: 2023-05-30

## 2023-05-30 DIAGNOSIS — M93.1 KIENBOECK DISEASE OF ADULTS: ICD-10-CM

## 2023-05-30 DIAGNOSIS — M65.4 RADIAL STYLOID TENOSYNOVITIS OF RIGHT HAND: ICD-10-CM

## 2023-05-30 RX ORDER — HYDROCODONE BITARTRATE AND ACETAMINOPHEN 10; 325 MG/1; MG/1
1 TABLET ORAL DAILY
Qty: 30 TABLET | Refills: 0 | Status: SHIPPED | OUTPATIENT
Start: 2023-05-30 | End: 2023-06-29

## 2023-05-30 RX ORDER — CYCLOBENZAPRINE HCL 10 MG
10 TABLET ORAL 2 TIMES DAILY PRN
Qty: 60 TABLET | Refills: 0 | Status: SHIPPED | OUTPATIENT
Start: 2023-05-30

## 2023-05-30 NOTE — TELEPHONE ENCOUNTER
.Last appointment 5/9/23  Next appointment          Future Appointments   Date Time Provider Leanne Montelongo   4/18/2023  9:50 AM DO Sarahi Pa [facility]      Last refill:  3/31/23  DOS: Monthly RX        Patient called in requesting refill of:     HYDROcodone-acetaminophen ([address])  MG per tablet      cyclobenzaprine (FLEXERIL) 10 MG tablet         [id number] - [place], [place] - [address] [facility] [address], [place] [address] [address]

## 2023-06-07 DIAGNOSIS — M79.671 RIGHT FOOT PAIN: Primary | ICD-10-CM

## 2023-06-08 ENCOUNTER — OFFICE VISIT (OUTPATIENT)
Dept: PODIATRY | Age: 63
End: 2023-06-08
Payer: COMMERCIAL

## 2023-06-08 VITALS — BODY MASS INDEX: 33.49 KG/M2 | HEIGHT: 76 IN | WEIGHT: 275 LBS

## 2023-06-08 DIAGNOSIS — M65.9 SYNOVITIS OF RIGHT ANKLE: ICD-10-CM

## 2023-06-08 DIAGNOSIS — G89.29 CHRONIC PAIN OF RIGHT ANKLE: ICD-10-CM

## 2023-06-08 DIAGNOSIS — R26.2 DIFFICULTY WALKING: ICD-10-CM

## 2023-06-08 DIAGNOSIS — M19.071 ARTHRITIS OF RIGHT FOOT: Primary | ICD-10-CM

## 2023-06-08 DIAGNOSIS — M25.571 CHRONIC PAIN OF RIGHT ANKLE: ICD-10-CM

## 2023-06-08 PROCEDURE — G8417 CALC BMI ABV UP PARAM F/U: HCPCS | Performed by: PODIATRIST

## 2023-06-08 PROCEDURE — 3017F COLORECTAL CA SCREEN DOC REV: CPT | Performed by: PODIATRIST

## 2023-06-08 PROCEDURE — 1036F TOBACCO NON-USER: CPT | Performed by: PODIATRIST

## 2023-06-08 PROCEDURE — 99213 OFFICE O/P EST LOW 20 MIN: CPT | Performed by: PODIATRIST

## 2023-06-08 PROCEDURE — G8427 DOCREV CUR MEDS BY ELIG CLIN: HCPCS | Performed by: PODIATRIST

## 2023-06-08 NOTE — PROGRESS NOTES
23     Warren Sales    : 1960   Sex: male    Age: 58 y.o. Patient's PCP/Provider is:  Frederick Vidales MD    Subjective:  Patient is seen today for follow-up regarding continued evaluation regarding chronic arthritis and synovitis into the right ankle and hindfoot region. Patient was being seen at the [facility] clinic over the last several years with injection therapy regarding the arthritic issues. Patient does wear his custom shoe gear and insoles with all activities. Patient is still having chronic gait issues to both lower extremities. No other abnormalities noted at this time. Chief Complaint   Patient presents with    Foot Pain     Right foot pain       ROS:  Const: Positives and pertinent negatives as per HPI. Musculo: Denies symptoms other than stated above. Neuro: Denies symptoms other than stated above. Skin: Denies symptoms other than stated above. Current Medications:    Current Outpatient Medications:     cyclobenzaprine (FLEXERIL) 10 MG tablet, Take 1 tablet by mouth 2 times daily as needed for Muscle spasms, Disp: 60 tablet, Rfl: 0    HYDROcodone-acetaminophen (NORCO)  MG per tablet, Take 1 tablet by mouth daily for 30 days. Intended supply: 30 days, Disp: 30 tablet, Rfl: 0    ELIQUIS 5 MG TABS tablet, TAKE ONE TABLET (5MG) BY MOUTH EVERY 12 HOURS, Disp: , Rfl:     DILT- MG extended release capsule, , Disp: , Rfl:     magnesium 30 MG tablet, Take 1 tablet by mouth 2 times daily, Disp: , Rfl:     potassium chloride (MICRO-K) 10 MEQ extended release capsule, Take 1 capsule by mouth 2 times daily, Disp: , Rfl:     Elastic Bandages & Supports (JOBST KNEE HIGH COMPRESSION SM) MISC, Dx: Varicose veins of the legs with pain and swelling. Bilateral thigh-high 30-40 mm Hg compression stockings. , Disp: 2 each, Rfl: 2    SSD 1 % cream, apply externally to affected area once a day, Disp: , Rfl:     diphenhydrAMINE-APAP, sleep, (TYLENOL PM EXTRA STRENGTH)  MG

## 2023-06-08 NOTE — PROGRESS NOTES
Patient is here today after about 2 years. He has been seeing a doctor at the [facility], however he is leaving . Patient reports this doctor has been giving him steroid injections and they have been effective. His last injection was 05/11/2023. PCP is Dr. Rima Alvarado, last seen 01/19/2023.

## 2023-09-11 ENCOUNTER — TELEPHONE (OUTPATIENT)
Dept: ORTHOPEDIC SURGERY | Age: 63
End: 2023-09-11

## 2023-09-11 DIAGNOSIS — M93.1 KIENBOECK DISEASE OF ADULTS: Primary | ICD-10-CM

## 2023-09-11 RX ORDER — CYCLOBENZAPRINE HCL 10 MG
10 TABLET ORAL 2 TIMES DAILY PRN
Qty: 60 TABLET | Refills: 0 | Status: SHIPPED | OUTPATIENT
Start: 2023-09-11

## 2023-09-11 NOTE — TELEPHONE ENCOUNTER
Note      .Last appointment 5/9/23  Next appointment               Future Appointments   Date Time Provider [address]   4/18/2023  9:50 AM DO Yoselin Wise [facility]      Last refill:  3/31/23  DOS: Monthly RX        Patient called in requesting refill of:     HYDROcodone-acetaminophen ([address])  MG per tablet      cyclobenzaprine (FLEXERIL) 10 MG tablet         [id number] - [place], [place] - [address], [place] [address]

## 2023-10-10 ENCOUNTER — OFFICE VISIT (OUTPATIENT)
Dept: ORTHOPEDIC SURGERY | Age: 63
End: 2023-10-10
Payer: COMMERCIAL

## 2023-10-10 VITALS — BODY MASS INDEX: 33.49 KG/M2 | HEIGHT: 76 IN | TEMPERATURE: 98 F | WEIGHT: 275 LBS

## 2023-10-10 DIAGNOSIS — M16.12 PRIMARY OSTEOARTHRITIS OF LEFT HIP: Primary | ICD-10-CM

## 2023-10-10 DIAGNOSIS — M25.552 LEFT HIP PAIN: Primary | ICD-10-CM

## 2023-10-10 PROCEDURE — G8417 CALC BMI ABV UP PARAM F/U: HCPCS | Performed by: ORTHOPAEDIC SURGERY

## 2023-10-10 PROCEDURE — G8484 FLU IMMUNIZE NO ADMIN: HCPCS | Performed by: ORTHOPAEDIC SURGERY

## 2023-10-10 PROCEDURE — 1036F TOBACCO NON-USER: CPT | Performed by: ORTHOPAEDIC SURGERY

## 2023-10-10 PROCEDURE — G8427 DOCREV CUR MEDS BY ELIG CLIN: HCPCS | Performed by: ORTHOPAEDIC SURGERY

## 2023-10-10 PROCEDURE — 99213 OFFICE O/P EST LOW 20 MIN: CPT | Performed by: ORTHOPAEDIC SURGERY

## 2023-10-10 PROCEDURE — 3017F COLORECTAL CA SCREEN DOC REV: CPT | Performed by: ORTHOPAEDIC SURGERY

## 2023-10-10 NOTE — PROGRESS NOTES
Chief Complaint   Patient presents with    Hip Pain     Left Hip Pain: Pt has 6/10 pain at the time of visit. Pt has a dull achy sensation in the Left Hip in the anterior medial aspect. Kerrie Feliz  Is a 61 y.o.  male who presents today complaining of left hip pain. He states that the pain began 1  year(s) ago. He did not have a history of injury. Patients states pain is located anterior and has tenderness over the  Anterior portion of the hip. Hip pain is described as dull ache and throbbing and  is worse with weight bearing and is aggravated by walking along with groin discomfort. He states the pain occurs in the  no apparent pattern. Patient states hip pain is relieved by rest. Patient does not have a history of back pain. The patient does not use ambulatory aid.       Past Medical History:   Diagnosis Date    Cellulitis     Edema     legs with cellulitis    GERD (gastroesophageal reflux disease)     Hypertension     Osteoarthritis     Tenosynovitis, de Quervain     Venous insufficiency      Past Surgical History:   Procedure Laterality Date    COLONOSCOPY      x2    FOOT SURGERY      JOINT REPLACEMENT      bilateral knee replacement    JOINT REPLACEMENT      right ankle    OTHER SURGICAL HISTORY Right 01-25-13    right wrist 1st dorsal compartment release    REPLACEMENT TOTAL KNEE BILATERAL Bilateral 7/31/2019    KNEE TOTAL ARTHROPLASTY BILATERAL performed by Joshua Weller DO at [facility] Right 3/28/2021    KNEE TOTAL ARTHROPLASTY REVISION performed by Joshua Weller DO at [facility] [address] [facility] [address]      both legs     VASECTOMY         Current Outpatient Medications:     cyclobenzaprine (FLEXERIL) 10 MG tablet, Take 1 tablet by mouth 2 times daily as needed for Muscle spasms, Disp: 60 tablet, Rfl: 0    ELIQUIS 5 MG TABS tablet, TAKE ONE TABLET (5MG) BY MOUTH EVERY 12

## 2024-01-30 ENCOUNTER — TELEPHONE (OUTPATIENT)
Dept: ORTHOPEDIC SURGERY | Age: 64
End: 2024-01-30

## 2024-01-30 NOTE — TELEPHONE ENCOUNTER
Last appointment 5/9/23  Next appointment               Future Appointments   Date Time Provider Department Center   4/18/2023  9:50 AM DO Yady Petersen [facility]      Last refill:  9/11/2023  DOS: Monthly RX        Patient called in requesting refill of:     HYDROcodone-acetaminophen (NORCO)  MG per tablet      cyclobenzaprine (FLEXERIL) 10 MG tablet         [id number] - [place], [place] - [address] - P [phone] - F [phone]    [place] [address]

## 2024-01-31 DIAGNOSIS — M93.1 KIENBOECK DISEASE OF ADULTS: ICD-10-CM

## 2024-01-31 DIAGNOSIS — M65.4 RADIAL STYLOID TENOSYNOVITIS OF RIGHT HAND: ICD-10-CM

## 2024-01-31 RX ORDER — HYDROCODONE BITARTRATE AND ACETAMINOPHEN 10; 325 MG/1; MG/1
1 TABLET ORAL DAILY
Qty: 30 TABLET | Refills: 0 | Status: SHIPPED | OUTPATIENT
Start: 2024-01-31 | End: 2024-03-01

## 2024-01-31 RX ORDER — CYCLOBENZAPRINE HCL 10 MG
10 TABLET ORAL 2 TIMES DAILY PRN
Qty: 60 TABLET | Refills: 0 | Status: SHIPPED | OUTPATIENT
Start: 2024-01-31

## 2024-04-30 DIAGNOSIS — M93.1 KIENBOECK DISEASE OF ADULTS: ICD-10-CM

## 2024-04-30 RX ORDER — CYCLOBENZAPRINE HCL 10 MG
10 TABLET ORAL 2 TIMES DAILY PRN
Qty: 60 TABLET | Refills: 0 | Status: SHIPPED | OUTPATIENT
Start: 2024-04-30

## 2024-04-30 NOTE — TELEPHONE ENCOUNTER
.Last appointment 10/10/2023  Next appointment No future appointments.   Last refill 01/31/2024  DOS: [facility]      Patient called in requesting refill of :    cyclobenzaprine (FLEXERIL) 10 MG tablet      [id number] - [place], [place] - [address] - P [phone] - F [phone]

## 2024-05-30 ENCOUNTER — TELEPHONE (OUTPATIENT)
Dept: ORTHOPEDIC SURGERY | Age: 64
End: 2024-05-30

## 2024-05-30 NOTE — TELEPHONE ENCOUNTER
Patient called leaving a message stating that he needs us to send a C-9 for his Left wrist so he can be seen by Dr Madconald.  I tried to call him back and left message stating that it does not appear that we have ever seen him for his Left wrist we saw him for his Right. I also researched online and I did not see any claims for his Left wrist. I did leave a detail message for him.  Pema

## 2024-06-10 DIAGNOSIS — M93.1 KIENBOECK DISEASE OF ADULTS: ICD-10-CM

## 2024-06-10 RX ORDER — CYCLOBENZAPRINE HCL 10 MG
10 TABLET ORAL 2 TIMES DAILY PRN
Qty: 60 TABLET | Refills: 0 | Status: SHIPPED | OUTPATIENT
Start: 2024-06-10

## 2024-07-18 DIAGNOSIS — M93.1 KIENBOECK DISEASE OF ADULTS: ICD-10-CM

## 2024-07-18 RX ORDER — CYCLOBENZAPRINE HCL 10 MG
10 TABLET ORAL 2 TIMES DAILY PRN
Qty: 60 TABLET | Refills: 0 | Status: SHIPPED | OUTPATIENT
Start: 2024-07-18

## 2024-07-18 NOTE — TELEPHONE ENCOUNTER
.Last appointment 10/10/2023  Next appointment No future appointments.   Last refill 06/10/2024  DOS: [facility] Monthly RX      Patient called in requesting refill of :    cyclobenzaprine (FLEXERIL) 10 MG tablet     [id number] - [place], [place] - [address] - P [phone] - F [phone]

## 2024-09-05 DIAGNOSIS — M93.1 KIENBOECK DISEASE OF ADULTS: ICD-10-CM

## 2024-09-05 RX ORDER — CYCLOBENZAPRINE HCL 10 MG
10 TABLET ORAL 2 TIMES DAILY PRN
Qty: 60 TABLET | Refills: 0 | Status: SHIPPED | OUTPATIENT
Start: 2024-09-05

## 2024-09-05 NOTE — TELEPHONE ENCOUNTER
.Last appointment 10/10/2023  Next appointment No future appointments.   Last refill 07/18/2024  DOS: [facility]      Patient called in requesting refill of :      cyclobenzaprine (FLEXERIL) 10 MG tablet     [id number] - [place], [place] - [address] - P [phone] - F [phone]

## 2024-11-23 ENCOUNTER — HOSPITAL ENCOUNTER (EMERGENCY)
Age: 64
Discharge: HOME | End: 2024-11-23
Payer: COMMERCIAL

## 2024-11-23 ENCOUNTER — APPOINTMENT (OUTPATIENT)
Dept: GENERAL RADIOLOGY | Age: 64
End: 2024-11-23
Payer: COMMERCIAL

## 2024-11-23 VITALS
TEMPERATURE: 97.7 F | BODY MASS INDEX: 34.69 KG/M2 | SYSTOLIC BLOOD PRESSURE: 153 MMHG | WEIGHT: 285 LBS | DIASTOLIC BLOOD PRESSURE: 83 MMHG | HEART RATE: 65 BPM | RESPIRATION RATE: 18 BRPM | OXYGEN SATURATION: 97 %

## 2024-11-23 DIAGNOSIS — M25.531 RIGHT WRIST PAIN: Primary | ICD-10-CM

## 2024-11-23 PROCEDURE — 99211 OFF/OP EST MAY X REQ PHY/QHP: CPT

## 2024-11-23 PROCEDURE — 73110 X-RAY EXAM OF WRIST: CPT

## 2024-11-23 RX ORDER — NAPROXEN 500 MG/1
500 TABLET ORAL 2 TIMES DAILY
Qty: 14 TABLET | Refills: 0 | Status: SHIPPED | OUTPATIENT
Start: 2024-11-23 | End: 2024-11-30

## 2024-11-23 ASSESSMENT — PAIN DESCRIPTION - DESCRIPTORS: DESCRIPTORS: ACHING

## 2024-11-23 ASSESSMENT — PAIN DESCRIPTION - ORIENTATION: ORIENTATION: RIGHT

## 2024-11-23 ASSESSMENT — PAIN - FUNCTIONAL ASSESSMENT: PAIN_FUNCTIONAL_ASSESSMENT: 0-10

## 2024-11-23 ASSESSMENT — PAIN DESCRIPTION - LOCATION: LOCATION: WRIST

## 2024-11-23 NOTE — ED PROVIDER NOTES
Department of Emergency Medicine   ED  Encounter Note  Admit Date/RoomTime: 2024  1:48 PM  ED Room:     NAME: Gabriel Davey  : 1960  MRN: [id number]     Chief Complaint:  Wrist Pain (Right wrist pain, twisted a can to open it on Thursday and it still hurts, states he has a chronic condition)    History of Present Illness       Gabriel Davey is a 64 y.o. old male who presents to the urgent care by private vehicle, for traumatic Right wrist pain which occured 2 day(s) prior to arrival.  The complaint is due to felt it pulling when trying to open a jar 2 days ago.  History of Worker's Comp. injury to this wrist many years ago.  Since onset the symptoms have been stable.  His pain is aggraveated by any movement and relieved by nothing, as no treatment has been provided prior to this visit. He denies any fall fever or chills.    ROS   Pertinent positives and negatives are stated within HPI, all other systems reviewed and are negative.    Past Medical History:  has a past medical history of Cellulitis, Edema, GERD (gastroesophageal reflux disease), Hypertension, Osteoarthritis, Tenosynovitis, de Quervain, and Venous insufficiency.    Surgical History:  has a past surgical history that includes Shoulder arthroscopy; Foot surgery; Vasectomy; Tonsillectomy; other surgical history (Right, 13); Tonsillectomy and adenoidectomy; Colonoscopy; vascular surgery; REPLACEMENT TOTAL KNEE BILATERAL (Bilateral, 2019); joint replacement; joint replacement; and Revision total knee arthroplasty (Right, 3/28/2021).    Social History:  reports that he has never smoked. He has never used smokeless tobacco. He reports current alcohol use of about 2.0 standard drinks of alcohol per week. He reports that he does not use drugs.    Family History: family history includes Alzheimer's Disease in his father; Asthma in his mother; Cancer in his mother; Colon Cancer in his sister; Diabetes in his maternal  grandmother; Emphysema in his mother; Heart Disease in his father and mother; Prostate Cancer in his brother.     Allergies: Patient has no known allergies.    Physical Exam   Oxygen Saturation Interpretation: Normal.        ED Triage Vitals   BP Systolic BP Percentile Diastolic BP Percentile Temp Temp src Pulse Respirations SpO2   11/23/24 1353 -- -- 11/23/24 1353 -- 11/23/24 1353 11/23/24 1353 11/23/24 1353   (!) 153/83   97.7 °F (36.5 °C)  65 18 97 %      Height Weight - Scale         -- 11/23/24 1351          129.3 kg (285 lb)               Constitutional:  Alert, development consistent with age.  Neck:  Normal ROM.  Supple. Non-tender.  Physical Exam  Right Wrist: diffusely across carpal bones.              Tenderness:  mild.             Swelling: Mild.             Deformity: no deformity observed/palpated.            ROM: diminished range with pain.             Skin:  no wounds, erythema, or swelling.       Neurovascular:              Motor deficit: none.             Sensory deficit:   none.            Pulse deficit: none.              Capillary refill: normal.  Right Hand: all metacarpals             Tenderness: none              Swelling: None.            Deformity: no deformity observed/palpated.               ROM: full range of motion.              Skin:  no wounds, erythema, or swelling.  Lymphatics: No lymphangitis or adenopathy noted.  Neurological:  Oriented.  Motor functions intact.    Lab / Imaging Results   (All laboratory and radiology results have been personally reviewed by myself)  Labs:  No results found for this visit on 11/23/24.  Imaging:  All Radiology results interpreted by Radiologist unless otherwise noted.  XR WRIST RIGHT (MIN 3 VIEWS)   Final Result   Severe degenerative changes seen of the radiocarpal joint and wrist with no   evidence of acute bony abnormality.           ED Course / Medical Decision Making   Medications - No data to display

## 2025-01-09 ENCOUNTER — TELEPHONE (OUTPATIENT)
Dept: ORTHOPEDIC SURGERY | Age: 65
End: 2025-01-09

## 2025-01-09 ENCOUNTER — OFFICE VISIT (OUTPATIENT)
Dept: ORTHOPEDIC SURGERY | Age: 65
End: 2025-01-09

## 2025-01-09 VITALS — BODY MASS INDEX: 34.7 KG/M2 | WEIGHT: 285 LBS | HEIGHT: 76 IN

## 2025-01-09 DIAGNOSIS — M93.1 KIENBOECK DISEASE OF ADULTS: Primary | ICD-10-CM

## 2025-01-09 RX ORDER — NAPROXEN 500 MG/1
500 TABLET ORAL 2 TIMES DAILY WITH MEALS
Qty: 60 TABLET | Refills: 3 | Status: SHIPPED | OUTPATIENT
Start: 2025-01-09 | End: 2025-05-09

## 2025-01-09 RX ORDER — CYCLOBENZAPRINE HCL 10 MG
10 TABLET ORAL 2 TIMES DAILY PRN
Qty: 60 TABLET | Refills: 0 | Status: SHIPPED | OUTPATIENT
Start: 2025-01-09

## 2025-01-09 NOTE — PROGRESS NOTES
and motor and sensation is intact to median, ulnar, and radial, musclocutaneus, and axillary nerve distribution and grossly symmetric bilaterally.  There is cap refill noted less than two seconds in all digits. There is not edema of the bilateral upper extremities.  There is  varicosities noted in the distal extremities.      Lymph:    Upon palpation,  there is no lymphadenopathy noted in bilateral upper extremities.      Musculoskeletal:  Gait: antalgic; examination of the nails and digits reveal no cyanosis or clubbing.    Cervical Exam:    On physical exam, Gabriel Davey is well-developed, well-nourished, oriented to person, place and time.  his gait is normal.  On evaluation of his cervical spine, he has full range of motion of the cervical spine without pain.  There is no cervical tenderness to palpation.     Shoulder Exam:    On evaluation of his bilaterally upper extremities, his bilateral shoulder has no deformity.     There is not evidence of scapular dyskinesis.  There is not muscle atrophy in shoulder girdle. The range of motion for the Right Shoulder is 160/45/T8 and for the Left shoulder is 160/45/T8.Right shoulder Motor strength is 5/5 in the supraspinatus, 5/5 internal rotation and 5/5 in external rotation, and Left shoulder motor strength 5/5 in supraspinatus, 5/5 in internal rotation, 5/5 in external rotation.        Elbow exam:    Evaluation of the elbow, reveals no signs of swelling or deformity. ROM is 0-150.  There is not instability with varus/valgus stresses.  Motor strength is 5/5 with flexion/extension.     Wrist exam:       Inspection of the bilateral upper extremities, there is evidence of deformity of the wrist.  ROM Wrist ROM R wrist DF 30, VF 10, L wrist DF 40, VF 30, R pronation 70/ supination 70, L pronation 90/supination 90.  Motor strength is 3/5 with Dorsiflexion/Volarflexion/Supination/Pronation.  Motor and sensation is intact and symmetric throughout the bilateral upper

## 2025-01-22 DIAGNOSIS — M79.671 FOOT PAIN, RIGHT: Primary | ICD-10-CM

## 2025-01-23 ENCOUNTER — OFFICE VISIT (OUTPATIENT)
Dept: PODIATRY | Age: 65
End: 2025-01-23
Payer: COMMERCIAL

## 2025-01-23 VITALS
HEART RATE: 63 BPM | SYSTOLIC BLOOD PRESSURE: 129 MMHG | TEMPERATURE: 97.7 F | DIASTOLIC BLOOD PRESSURE: 76 MMHG | HEIGHT: 76 IN | OXYGEN SATURATION: 97 % | WEIGHT: 285 LBS | BODY MASS INDEX: 34.7 KG/M2

## 2025-01-23 DIAGNOSIS — M20.42 HAMMERTOE OF LEFT FOOT: ICD-10-CM

## 2025-01-23 DIAGNOSIS — M19.071 ARTHRITIS OF RIGHT FOOT: Primary | ICD-10-CM

## 2025-01-23 DIAGNOSIS — M65.971 SYNOVITIS OF RIGHT ANKLE: ICD-10-CM

## 2025-01-23 DIAGNOSIS — R26.2 DIFFICULTY WALKING: ICD-10-CM

## 2025-01-23 PROCEDURE — 3078F DIAST BP <80 MM HG: CPT | Performed by: PODIATRIST

## 2025-01-23 PROCEDURE — 3017F COLORECTAL CA SCREEN DOC REV: CPT | Performed by: PODIATRIST

## 2025-01-23 PROCEDURE — G8427 DOCREV CUR MEDS BY ELIG CLIN: HCPCS | Performed by: PODIATRIST

## 2025-01-23 PROCEDURE — 99213 OFFICE O/P EST LOW 20 MIN: CPT | Performed by: PODIATRIST

## 2025-01-23 PROCEDURE — 1036F TOBACCO NON-USER: CPT | Performed by: PODIATRIST

## 2025-01-23 PROCEDURE — G8417 CALC BMI ABV UP PARAM F/U: HCPCS | Performed by: PODIATRIST

## 2025-01-23 PROCEDURE — 3074F SYST BP LT 130 MM HG: CPT | Performed by: PODIATRIST

## 2025-01-23 NOTE — PROGRESS NOTES
25     Gabriel Davey    : 1960   Sex: male    Age: 64 y.o.    Patient's PCP/Provider is:  Richard Montanez MD    Subjective:  Patient is seen today for evaluation regarding continued care regarding pain into his right and left foot regions.  Patient has a longstanding history of surgical procedures on both lower extremities.  Patient still has arthritic issues into the right foot and ankle region.  Patient also complaining of painful hammertoe deformity left second.  Patient been trying to wear good supportive shoe gear with daily activities.  No other additional abnormalities noted at this time.    Chief Complaint   Patient presents with    Foot Pain     Right foot pain       ROS:  Const: Positives and pertinent negatives as per HPI.    Musculo: Denies symptoms other than stated above.  Neuro: Denies symptoms other than stated above.  Skin: Denies symptoms other than stated above.    Current Medications:    Current Outpatient Medications:     cyclobenzaprine (FLEXERIL) 10 MG tablet, Take 1 tablet by mouth 2 times daily as needed for Muscle spasms, Disp: 60 tablet, Rfl: 0    naproxen (NAPROSYN) 500 MG tablet, Take 1 tablet by mouth 2 times daily (with meals), Disp: 60 tablet, Rfl: 3    ELIQUIS 5 MG TABS tablet, TAKE ONE TABLET (5MG) BY MOUTH EVERY 12 HOURS, Disp: , Rfl:     DILT- MG extended release capsule, , Disp: , Rfl:     magnesium 30 MG tablet, Take 1 tablet by mouth 2 times daily, Disp: , Rfl:     potassium chloride (MICRO-K) 10 MEQ extended release capsule, Take 1 capsule by mouth 2 times daily, Disp: , Rfl:     Elastic Bandages & Supports (JOBST KNEE HIGH COMPRESSION SM) MISC, Dx: Varicose veins of the legs with pain and swelling.     Bilateral thigh-high 30-40 mm Hg compression stockings., Disp: 2 each, Rfl: 2    SSD 1 % cream, apply externally to affected area once a day, Disp: , Rfl:     diphenhydrAMINE-APAP, sleep, (TYLENOL PM EXTRA STRENGTH)  MG tablet, Take 1 tablet by

## 2025-01-23 NOTE — PROGRESS NOTES
Patient here for right foot pain. Patient has had this pain for years. Richard Montanez MD last visit 10/30/2024   Electronically signed by Stephanie Christianson LPN on 1/23/2025 at 8:52 AM     2

## 2025-02-09 DIAGNOSIS — M93.1 KIENBOECK DISEASE OF ADULTS: ICD-10-CM

## 2025-02-10 RX ORDER — CYCLOBENZAPRINE HCL 10 MG
TABLET ORAL
Qty: 60 TABLET | Refills: 0 | Status: SHIPPED | OUTPATIENT
Start: 2025-02-10

## 2025-03-10 ENCOUNTER — OFFICE VISIT (OUTPATIENT)
Dept: ORTHOPEDIC SURGERY | Age: 65
End: 2025-03-10
Payer: COMMERCIAL

## 2025-03-10 VITALS — BODY MASS INDEX: 34.7 KG/M2 | TEMPERATURE: 98 F | HEIGHT: 76 IN | WEIGHT: 285 LBS

## 2025-03-10 DIAGNOSIS — M93.1 KIENBOECK DISEASE OF ADULTS: Primary | ICD-10-CM

## 2025-03-10 PROCEDURE — 3017F COLORECTAL CA SCREEN DOC REV: CPT | Performed by: ORTHOPAEDIC SURGERY

## 2025-03-10 PROCEDURE — G8417 CALC BMI ABV UP PARAM F/U: HCPCS | Performed by: ORTHOPAEDIC SURGERY

## 2025-03-10 PROCEDURE — 1036F TOBACCO NON-USER: CPT | Performed by: ORTHOPAEDIC SURGERY

## 2025-03-10 PROCEDURE — 99213 OFFICE O/P EST LOW 20 MIN: CPT | Performed by: ORTHOPAEDIC SURGERY

## 2025-03-10 PROCEDURE — G8427 DOCREV CUR MEDS BY ELIG CLIN: HCPCS | Performed by: ORTHOPAEDIC SURGERY

## 2025-03-10 NOTE — PROGRESS NOTES
Chief Complaint   Patient presents with    Wrist Pain     [facility] Right wrist follow up. Here to discuss getting MRI. Wrist more painful today then last visit.          Gabriel Davey is a 64 y.o. year old who presents for follow up of right wrist pain.  The pain is located mainly wrist and forearm. He recently retired. He has been taking Naproxen and Flexeril. He has limited movement with the wrist.      Past Medical History:   Diagnosis Date    Cellulitis     Edema     legs with cellulitis    GERD (gastroesophageal reflux disease)     Hypertension     Osteoarthritis     Tenosynovitis, de Quervain     Venous insufficiency      Past Surgical History:   Procedure Laterality Date    COLONOSCOPY      x2    FOOT SURGERY      JOINT REPLACEMENT      bilateral knee replacement    JOINT REPLACEMENT      right ankle    OTHER SURGICAL HISTORY Right 01-25-13    right wrist 1st dorsal compartment release    REPLACEMENT TOTAL KNEE BILATERAL Bilateral 7/31/2019    KNEE TOTAL ARTHROPLASTY BILATERAL performed by Enoch Macdonald DO at [facility] OR    REVISION TOTAL KNEE ARTHROPLASTY Right 3/28/2021    KNEE TOTAL ARTHROPLASTY REVISION performed by Enoch Macdonald DO at [facility] OR    SHOULDER ARTHROSCOPY      TONSILLECTOMY      TONSILLECTOMY AND ADENOIDECTOMY      VASCULAR SURGERY      both legs     VASECTOMY         Current Outpatient Medications:     cyclobenzaprine (FLEXERIL) 10 MG tablet, TAKE ONE TABLET BY MOUTH TWO TIMES A DAY AS NEEDED FOR MUSCLE SPASMS, Disp: 60 tablet, Rfl: 0    naproxen (NAPROSYN) 500 MG tablet, Take 1 tablet by mouth 2 times daily (with meals), Disp: 60 tablet, Rfl: 3    naproxen (NAPROSYN) 500 MG tablet, Take 1 tablet by mouth 2 times daily for 7 days, Disp: 14 tablet, Rfl: 0    ELIQUIS 5 MG TABS tablet, TAKE ONE TABLET (5MG) BY MOUTH EVERY 12 HOURS, Disp: , Rfl:     DILT- MG extended release capsule, , Disp: , Rfl:     magnesium 30 MG tablet, Take 1 tablet by mouth 2 times daily, Disp: , Rfl:     potassium

## 2025-03-14 DIAGNOSIS — M25.511 RIGHT SHOULDER PAIN, UNSPECIFIED CHRONICITY: Primary | ICD-10-CM

## 2025-03-24 ENCOUNTER — OFFICE VISIT (OUTPATIENT)
Dept: ORTHOPEDIC SURGERY | Age: 65
End: 2025-03-24
Payer: COMMERCIAL

## 2025-03-24 VITALS
HEIGHT: 76 IN | OXYGEN SATURATION: 99 % | WEIGHT: 295 LBS | BODY MASS INDEX: 35.92 KG/M2 | RESPIRATION RATE: 18 BRPM | TEMPERATURE: 97.5 F

## 2025-03-24 DIAGNOSIS — M25.811 SHOULDER IMPINGEMENT, RIGHT: Primary | ICD-10-CM

## 2025-03-24 PROCEDURE — 20610 DRAIN/INJ JOINT/BURSA W/O US: CPT | Performed by: ORTHOPAEDIC SURGERY

## 2025-03-24 PROCEDURE — G8427 DOCREV CUR MEDS BY ELIG CLIN: HCPCS | Performed by: ORTHOPAEDIC SURGERY

## 2025-03-24 PROCEDURE — G8417 CALC BMI ABV UP PARAM F/U: HCPCS | Performed by: ORTHOPAEDIC SURGERY

## 2025-03-24 PROCEDURE — 1036F TOBACCO NON-USER: CPT | Performed by: ORTHOPAEDIC SURGERY

## 2025-03-24 PROCEDURE — 3017F COLORECTAL CA SCREEN DOC REV: CPT | Performed by: ORTHOPAEDIC SURGERY

## 2025-03-24 PROCEDURE — 99213 OFFICE O/P EST LOW 20 MIN: CPT | Performed by: ORTHOPAEDIC SURGERY

## 2025-03-24 RX ORDER — TRIAMCINOLONE ACETONIDE 40 MG/ML
40 INJECTION, SUSPENSION INTRA-ARTICULAR; INTRAMUSCULAR ONCE
Status: COMPLETED | OUTPATIENT
Start: 2025-03-24 | End: 2025-03-24

## 2025-03-24 RX ORDER — METHYLPREDNISOLONE 4 MG/1
TABLET ORAL
Qty: 1 KIT | Refills: 0 | Status: SHIPPED | OUTPATIENT
Start: 2025-03-24 | End: 2025-03-30

## 2025-03-24 RX ADMIN — TRIAMCINOLONE ACETONIDE 40 MG: 40 INJECTION, SUSPENSION INTRA-ARTICULAR; INTRAMUSCULAR at 09:12

## 2025-03-24 NOTE — PROGRESS NOTES
intact.  There is not a previous scar over the affected area.There is not any cellulitis, lymphedema or cutaneous lesions noted in the lower extremities.   Upon palpation there is no induration noted.      Neurologic:  Motor exam of the upper extremities show: The reflexes in biceps/triceps/brachioradialis are equal and symmetric.  Sensory exam C5-T1 are normal bilaterally.      Cardiovascular:  The vascular exam is normal and is well perfused to distal extremities.There are 2+ radial pulses bilaterally, and motor and sensation is intact to median, ulnar, and radial, musclocutaneus, and axillary nerve distribution and grossly symmetric bilaterally.  There is cap refill noted less than two seconds in all digits. There is not edema of the bilateral upper extremities.  There is not varicosities noted in the distal extremities.      Lymph:  Upon palpation,  there is no lymphadenopathy noted in bilateral upper extremities.      Musculoskeletal:  Gait: normal; examination of the nails and digits reveal no cyanosis or clubbing.      Cervical Exam:  On physical exam, Gabriel Davey is well-developed, well-nourished, oriented to person, place and time.  his gait is normal.  On evaluation of hiscervical spine, He has full range of motion of the cervical spine without pain.  There is no cervical tenderness to palpation.     Shoulder Exam:  On evaluation of his bilaterally upper extremities, his right shoulder has no deformity.  There is tenderness upon palpation of the lateral shoulder.  There is not evidence of scapular dyskinesis.  There is not muscle atrophy in shoulder girdle. The range of motion for the Right Shoulder is 150/40/T10 and for the Left shoulder is 160/45/T8.  Right shoulder Motor strength is 5-/5 in the supraspinatus, 5/5 internal rotation and 5/5 in external rotation, and Left shoulder motor strength 5/5 in supraspinatus, 5/5 in internal rotation, 5/5 in external rotation.        Right shoulder:  positive

## 2025-03-29 ENCOUNTER — HOSPITAL ENCOUNTER (OUTPATIENT)
Dept: MRI IMAGING | Age: 65
Discharge: HOME | End: 2025-03-31
Attending: ORTHOPAEDIC SURGERY
Payer: COMMERCIAL

## 2025-03-29 DIAGNOSIS — M93.1 KIENBOECK DISEASE OF ADULTS: ICD-10-CM

## 2025-03-29 PROCEDURE — 73221 MRI JOINT UPR EXTREM W/O DYE: CPT

## 2025-04-29 ENCOUNTER — OFFICE VISIT (OUTPATIENT)
Dept: ORTHOPEDIC SURGERY | Age: 65
End: 2025-04-29
Payer: COMMERCIAL

## 2025-04-29 VITALS — BODY MASS INDEX: 35.92 KG/M2 | HEIGHT: 76 IN | WEIGHT: 295 LBS

## 2025-04-29 DIAGNOSIS — M93.1 KIENBOECK DISEASE OF ADULTS: Primary | ICD-10-CM

## 2025-04-29 PROCEDURE — 99213 OFFICE O/P EST LOW 20 MIN: CPT | Performed by: ORTHOPAEDIC SURGERY

## 2025-04-29 NOTE — PROGRESS NOTES
fibromyalgia, (-) multiple sclerosis, (-) muscular dystrophy, (-) RSD,(-) joint pain (-)swelling, (-) joint pain,swelling.  Neurologic: (-) epilepsy, (-)seizures,(-) brain tumor,(-) TIA, (-)stroke, (-)headaches, (-)Parkinson disease,(-) memory loss, (-) LOC.  Cardiovascular: (-) Chest pain, (-) swelling in legs/feet, (-) SOB, (-) cramping in legs/feet with walking.  Respiratory: (-) SOB, (-) Coughing, (-) night sweats.  GI: (-) nausea, (-) vomiting, (-) diarrhea, (-) blood in stool, (-) gastric ulcer.  Psychiatric: (-) Depression, (-) Anxiety, (-) bipolar disease, (-) Alzheimer's Disease  Allergic/Immunologic: (-) allergies latex, (-) allergies metal, (-) skin sensitivity.  Hematlogic: (-) anemia, (-) blood transfusion, (-) DVT/PE, (-) Clotting disorders    SUBJECTIVE:      Constitutional:    The patient is alert and oriented x 3, appears to be stated age and in no distress.  Ht. 6 ft 4 in., Wt. 285 lbs.  Skin:    Upon inspection: the skin appears warm, dry and intact.  There is not a previous scar over the affected area.There is not any cellulitis, lymphedema or cutaneous lesions noted in the lower extremities.   Upon palpation there is no induration noted.          Neurologic:    Gait: antalgic;  Motor exam of the upper extremities show: The reflexes in biceps/triceps/brachioradialis are equal and symmetric.  Sensory exam C5-T1 are normal bilaterally.          Cardiovascular:    The vascular exam is normal and is well perfused to distal extremities.There are 2+ radial pulses bilaterally, and motor and sensation is intact to median, ulnar, and radial, musclocutaneus, and axillary nerve distribution and grossly symmetric bilaterally.  There is cap refill noted less than two seconds in all digits. There is not edema of the bilateral upper extremities.  There is  varicosities noted in the distal extremities.      Lymph:    Upon palpation,  there is no lymphadenopathy noted in bilateral upper extremities.

## 2025-05-14 RX ORDER — NAPROXEN 500 MG/1
500 TABLET ORAL 2 TIMES DAILY WITH MEALS
Qty: 60 TABLET | Refills: 0 | Status: SHIPPED | OUTPATIENT
Start: 2025-05-14

## 2025-06-20 RX ORDER — NAPROXEN 500 MG/1
500 TABLET ORAL 2 TIMES DAILY WITH MEALS
Qty: 60 TABLET | Refills: 0 | Status: SHIPPED | OUTPATIENT
Start: 2025-06-20

## 2025-07-23 RX ORDER — NAPROXEN 500 MG/1
500 TABLET ORAL 2 TIMES DAILY WITH MEALS
Qty: 60 TABLET | Refills: 0 | Status: SHIPPED | OUTPATIENT
Start: 2025-07-23

## 2025-08-28 RX ORDER — NAPROXEN 500 MG/1
500 TABLET ORAL 2 TIMES DAILY WITH MEALS
Qty: 60 TABLET | Refills: 0 | Status: SHIPPED | OUTPATIENT
Start: 2025-08-28

## (undated) DEVICE — SYRINGE IRRIG 60ML SFT PLIABLE BLB EZ TO GRP 1 HND USE W/

## (undated) DEVICE — 3M™ IOBAN™ 2 ANTIMICROBIAL INCISE DRAPE 6650EZ: Brand: IOBAN™ 2

## (undated) DEVICE — BANDAGE COMPR L W4INXL11YD 100% COT WVN E DBL LEN CLP CLSR

## (undated) DEVICE — SYRINGE MED 30ML STD CLR PLAS LUERLOCK TIP N CTRL DISP

## (undated) DEVICE — CANNULA IV 18GA L15IN BLNT FILL LUERLOCK HUB MJCT

## (undated) DEVICE — NON STERILE VISIONAIRE ADAPTIVE                                    GUIDE KIT JOURNEY II: Brand: VISIONAIRE

## (undated) DEVICE — SET MAJOR INSTR ORTHO

## (undated) DEVICE — SHEET SUPPORT

## (undated) DEVICE — PATIENT RETURN ELECTRODE, SINGLE-USE, CONTACT QUALITY MONITORING, ADULT, WITH 9FT CORD, FOR PATIENTS WEIGING OVER 33LBS. (15KG): Brand: MEGADYNE

## (undated) DEVICE — Z DISCONTINUED USE 2275686 GLOVE SURG SZ 8 L12IN FNGR THK13MIL WHT ISOLEX POLYISOPRENE

## (undated) DEVICE — HANDPIECE SET WITH BONE CLEANING TIP: Brand: INTERPULSE

## (undated) DEVICE — COVER,TABLE,60X90,STERILE: Brand: MEDLINE

## (undated) DEVICE — ELECTROSURGICAL PENCIL BUTTON SWITCH E-Z CLEAN COATED BLADE ELECTRODE 10 FT (3 M) CORD HOLSTER: Brand: MEGADYNE

## (undated) DEVICE — SYRINGE MED 50ML LUERLOCK TIP

## (undated) DEVICE — COVER,LIGHT HANDLE,FLX,1/PK: Brand: MEDLINE INDUSTRIES, INC.

## (undated) DEVICE — 3M™ IOBAN™ 2 ANTIMICROBIAL INCISE DRAPE 6640EZ: Brand: IOBAN™ 2

## (undated) DEVICE — ELECTRODE PT RET AD L9FT HI MOIST COND ADH HYDRGEL CORDED

## (undated) DEVICE — YANKAUER,BULB TIP,W/O VENT,RIGID,STERILE: Brand: MEDLINE

## (undated) DEVICE — SUTURE STRATAFIX SYMMETRIC SZ 1 L18IN ABSRB VLT CT1 L36CM SXPP1A404

## (undated) DEVICE — GOWN,SIRUS,FABRNF,XL,20/CS: Brand: MEDLINE

## (undated) DEVICE — DRESSING ALG W1XL24IN SIL ROPE ACTICOAT FLX 7

## (undated) DEVICE — 3M™ STERI-DRAPE™ U-DRAPE 1015: Brand: STERI-DRAPE™

## (undated) DEVICE — SHEET DRAPE FULL 70X100

## (undated) DEVICE — SOLUTION IV IRRIG POUR BRL 0.9% SODIUM CHL 2F7124

## (undated) DEVICE — TUBING, SUCTION, 1/4" X 10', STRAIGHT: Brand: MEDLINE

## (undated) DEVICE — STERILE HOOK LOCK LATEX FREE ELASTIC BANDAGE 6INX5YD: Brand: HOOK LOCK™

## (undated) DEVICE — PAD,ABDOMINAL,8"X10",ST,LF: Brand: MEDLINE

## (undated) DEVICE — 3 BONE CEMENT MIXER: Brand: MIXEVAC

## (undated) DEVICE — SPONGE LAP W18XL18IN WHT COT 4 PLY FLD STRUNG RADPQ DISP ST

## (undated) DEVICE — Device

## (undated) DEVICE — INTENDED FOR TISSUE SEPARATION, AND OTHER PROCEDURES THAT REQUIRE A SHARP SURGICAL BLADE TO PUNCTURE OR CUT.: Brand: BARD-PARKER ® STAINLESS STEEL BLADES

## (undated) DEVICE — PITCHER PT 1200ML W HNDL CSR WRP

## (undated) DEVICE — DOUBLE BASIN SET: Brand: MEDLINE INDUSTRIES, INC.

## (undated) DEVICE — PICO 7 10CM X 30CM: Brand: PICO™ 7

## (undated) DEVICE — GAUZE,SPONGE,4"X4",16PLY,STRL,LF,10/TRAY: Brand: MEDLINE

## (undated) DEVICE — CHLORAPREP 26ML ORANGE

## (undated) DEVICE — STRYKER PERFORMANCE SERIES SAGITTAL BLADE: Brand: STRYKER PERFORMANCE SERIES

## (undated) DEVICE — TOWEL,OR,DSP,ST,BLUE,STD,6/PK,12PK/CS: Brand: MEDLINE

## (undated) DEVICE — SOLUTION IV 500ML 0.9% SOD CHL PH 5 INJ USP VIAFLX PLAS

## (undated) DEVICE — SPONGE,DRAIN,NONWVN,4"X4",6PLY,STRL,LF: Brand: MEDLINE

## (undated) DEVICE — BANDAGE COMPR W6INXL12FT SMOOTH FOR LIMB EXSANG ESMARCH

## (undated) DEVICE — GRADUATE

## (undated) DEVICE — Z DISCONTINUED PER MEDLINE USE 2741944 DRESSING AQUACEL 12 IN SURG W9XL30CM SIL CVR WTRPRF VIR BACT BARR ANTIMIC

## (undated) DEVICE — NEEDLE FLTR 18GA L1.5IN MEM THK5UM BLNT DISP

## (undated) DEVICE — BANDAGE COMPR W6INXL5YD SELF ADH COHESIVE CO FLX

## (undated) DEVICE — MEDI-VAC YANKAUER SUCTION HANDLE: Brand: CARDINAL HEALTH

## (undated) DEVICE — STOCKINETTE,IMPERVIOUS,12X48,STERILE: Brand: MEDLINE

## (undated) DEVICE — SOLUTION IV IRRIG 500ML 0.9% SODIUM CHL 2F7123

## (undated) DEVICE — PACK,EXTREMITY,ORTHOMAX,5/CS: Brand: MEDLINE

## (undated) DEVICE — PENCIL ES L3M BTTN SWCH HOLSTER W/ BLDE ELECTRD EDGE

## (undated) DEVICE — GOWN,SIRUS,POLYRNF,BRTHSLV,XLN/XL,20/CS: Brand: MEDLINE

## (undated) DEVICE — NDL CNTR 40CT FM MAG: Brand: MEDLINE INDUSTRIES, INC.

## (undated) DEVICE — APPLICATOR MEDICATED 26 CC SOLUTION HI LT ORNG CHLORAPREP

## (undated) DEVICE — PADDING CAST W6INXL4YD COT LO LINTING WYTEX

## (undated) DEVICE — MARKER,SKIN,WI/RULER AND LABELS: Brand: MEDLINE

## (undated) DEVICE — SOLUTION IRRIG 1500ML 0.9% SOD CHL USP POUR PLAS BTL

## (undated) DEVICE — SOLUTION IV 1000ML 0.9% SOD CHL PH 5 INJ USP VIAFLX PLAS

## (undated) DEVICE — STANDARD HYPODERMIC NEEDLE,POLYPROPYLENE HUB: Brand: MONOJECT

## (undated) DEVICE — DRAPE, EXTREMITY, BILATERAL, STERILE: Brand: MEDLINE

## (undated) DEVICE — PACK SURG BASIC I LF

## (undated) DEVICE — GARMENT,MEDLINE,DVT,INT,CALF,MED, GEN2: Brand: MEDLINE

## (undated) DEVICE — CLOSURE SKIN FLX NONINVASIVE PRELOC TECHNOLOGY FOR 24IN